# Patient Record
Sex: FEMALE | Race: BLACK OR AFRICAN AMERICAN | NOT HISPANIC OR LATINO | Employment: OTHER | URBAN - METROPOLITAN AREA
[De-identification: names, ages, dates, MRNs, and addresses within clinical notes are randomized per-mention and may not be internally consistent; named-entity substitution may affect disease eponyms.]

---

## 2017-01-05 ENCOUNTER — TELEPHONE (OUTPATIENT)
Dept: INTERNAL MEDICINE | Facility: CLINIC | Age: 72
End: 2017-01-05

## 2017-01-05 ENCOUNTER — OFFICE VISIT (OUTPATIENT)
Dept: INTERNAL MEDICINE | Facility: CLINIC | Age: 72
End: 2017-01-05
Payer: MEDICARE

## 2017-01-05 ENCOUNTER — TELEPHONE (OUTPATIENT)
Dept: OTOLARYNGOLOGY | Facility: CLINIC | Age: 72
End: 2017-01-05

## 2017-01-05 VITALS
HEART RATE: 102 BPM | HEIGHT: 64 IN | DIASTOLIC BLOOD PRESSURE: 94 MMHG | BODY MASS INDEX: 31.69 KG/M2 | WEIGHT: 185.63 LBS | SYSTOLIC BLOOD PRESSURE: 146 MMHG

## 2017-01-05 DIAGNOSIS — H92.01 RIGHT EAR PAIN: Primary | ICD-10-CM

## 2017-01-05 DIAGNOSIS — H61.23 BILATERAL IMPACTED CERUMEN: ICD-10-CM

## 2017-01-05 DIAGNOSIS — H60.311 DIFFUSE OTITIS EXTERNA, RIGHT EAR: ICD-10-CM

## 2017-01-05 PROCEDURE — 99999 PR PBB SHADOW E&M-EST. PATIENT-LVL IV: CPT | Mod: PBBFAC,,, | Performed by: PHYSICIAN ASSISTANT

## 2017-01-05 PROCEDURE — 99213 OFFICE O/P EST LOW 20 MIN: CPT | Mod: S$GLB,,, | Performed by: PHYSICIAN ASSISTANT

## 2017-01-05 PROCEDURE — 99214 OFFICE O/P EST MOD 30 MIN: CPT | Mod: PBBFAC | Performed by: PHYSICIAN ASSISTANT

## 2017-01-05 RX ORDER — ACETAMINOPHEN AND CODEINE PHOSPHATE 300; 30 MG/1; MG/1
1 TABLET ORAL NIGHTLY PRN
Qty: 14 TABLET | Refills: 0 | Status: SHIPPED | OUTPATIENT
Start: 2017-01-05 | End: 2017-01-15

## 2017-01-05 RX ORDER — AMOXICILLIN AND CLAVULANATE POTASSIUM 875; 125 MG/1; MG/1
1 TABLET, FILM COATED ORAL EVERY 12 HOURS
Qty: 14 TABLET | Refills: 0 | Status: SHIPPED | OUTPATIENT
Start: 2017-01-05 | End: 2017-01-24 | Stop reason: ALTCHOICE

## 2017-01-05 NOTE — PROGRESS NOTES
Subjective:       Patient ID: Lesli Gong is a 71 y.o. female.        Chief Complaint: Otalgia (R-pain=9)    HPI Comments: Lesli Gong is an established patient of Patti Brian MD here today for urgent care visit.    Seen 12/6 and 12/13 for right ear pain.  Had ENT appointment but missed it unfortunately.  With bilateral cerumen impaction and right otitis externa, which resolves with antibiotics (Augmentin, then Levaquin) but then recurs.  No discharge from the ear.  No headache, nausea, vomiting, diarrhea, constipation.  No other URI sx.  Flew to [place] to spend [place] with her daughter and right ear pain recurred.           Review of Systems   Constitutional: Negative for chills, diaphoresis, fatigue and fever.   HENT: Positive for ear pain. Negative for congestion and sore throat.    Eyes: Negative for visual disturbance.   Respiratory: Negative for cough, chest tightness and shortness of breath.    Cardiovascular: Negative for chest pain, palpitations and leg swelling.   Gastrointestinal: Negative for abdominal pain, blood in stool, constipation, diarrhea, nausea and vomiting.   Genitourinary: Negative for dysuria, frequency, hematuria and urgency.   Musculoskeletal: Negative for arthralgias and back pain.   Skin: Negative for rash.   Neurological: Negative for dizziness, syncope, weakness and headaches.   Psychiatric/Behavioral: Negative for dysphoric mood and sleep disturbance. The patient is not nervous/anxious.        Objective:      Physical Exam   Constitutional: She appears well-developed and well-nourished.   HENT:   Head: Normocephalic.   Right Ear: External ear normal. There is tenderness. No drainage or swelling.   Left Ear: External ear normal. No drainage, swelling or tenderness.   Mouth/Throat: Oropharynx is clear and moist.   Pain with palpation of the tragus.  Bilateral cerumen impaction.  Right ear almost completely impacted with cerumen.  Can see top of TM and it looks clear  "but due to cerumen difficult to visualize.     Eyes: Pupils are equal, round, and reactive to light.   Cardiovascular: Normal rate, regular rhythm and normal heart sounds.  Exam reveals no gallop and no friction rub.    No murmur heard.  Pulmonary/Chest: Effort normal and breath sounds normal. No respiratory distress.   Abdominal: Soft. Normal appearance. There is no tenderness.   Musculoskeletal: She exhibits no edema.   Neurological: She is alert.   Skin: Skin is warm and dry.   Psychiatric: She has a normal mood and affect.   Nursing note and vitals reviewed.      Assessment:       1. Right ear pain    2. Diffuse otitis externa, right ear    3. Bilateral impacted cerumen        Plan:       Lesli was seen today for otalgia.    Diagnoses and all orders for this visit:    Right ear pain/right diffuse otitis externa  -     Ambulatory consult to ENT  -     amoxicillin-clavulanate 875-125mg (AUGMENTIN) 875-125 mg per tablet; Take 1 tablet by mouth every 12 (twelve) hours.  -     acetaminophen-codeine 300-30mg (TYLENOL #3) 300-30 mg Tab; Take 1 tablet by mouth nightly as needed.    Bilateral impacted cerumen    Called ENT.  Soonest available appointment 1/17/17.  Importance of keeping this appointment discussed.      Pt has been given instructions populated from [employer] database and has verbalized understanding of the after visit summary and information contained wherein.    Follow up with a primary care provider. May go to ER for acute shortness of breath, lightheadedness, fever, or any other emergent complaints or changes in condition.    "This note will be shared with the patient"    Future Appointments  Date Time Provider Department Center   1/17/2017 1:30 PM Dave Mcgee MD [facility] ENT [facility]   1/23/2017 9:30 AM LAB, APPOINTMENT [facility] LAB [facility]   1/23/2017 12:00 PM [facility] CT2 64- LIMIT 600 LBS [facility] CT SCAN [facility]   1/24/2017 1:00 PM Shani Francisco MD [facility] HEM ONC [facility] "   3/6/2017 9:20 AM [facility]

## 2017-01-05 NOTE — TELEPHONE ENCOUNTER
----- Message from Brie Garcia sent at 1/5/2017 12:01 PM CST -----  Contact: Self/ [phone]   Type: Rx    Name of medication(s): acetaminophen (TYLENOL EXTRA STRENGTH) 500 MG tablet, and  levoFLOXacin (LEVAQUIN) 500 MG tablet    Is this a refill? New rx? Refill     Who prescribed medication? Dr. Johnson     Pharmacy Name, Phone, & Location[facility]     Comments: pt is calling to have a refill on the medications above. Please call and advise     Thank you

## 2017-01-05 NOTE — PATIENT INSTRUCTIONS
Impacted Earwax  Impacted earwax is a buildup of the natural wax in the ear (cerumen). Impacted earwax is very common. It can cause symptoms such as hearing loss. It can also stop a doctor doing an exam of your ear.  Understanding earwax  Tiny glands in your ear make substances that combine with dead skin cells to form earwax. Earwax helps protect your ear canal from water, dirt, infection, and injury. Over time, earwax travels from the inner part of your ear canal to the entrance of the canal. Then it falls away naturally. But in some cases, it cant travel to the entrance of the canal. This may be because of a health condition or objects put in the ear. With age, earwax tends to become harder and less fluid. Older adults are more likely to have problems with earwax buildup.  What causes impacted earwax?  Earwax can build up because of many health conditions. Some cause a physical blockage. Others cause too much earwax to be made. Health conditions that can cause earwax buildup include:  · Bony blockage in the ear (osteoma or exostoses)  · Infections, such as swimmers ear (external otitis)  · Skin disease, such as eczema  · Autoimmune diseases, such as lupus  · A narrowed ear canal from birth, chronic inflammation, or injury  · Too much earwax because of injury  · Too much earwax because of  water in the ear canal  Objects repeatedly placed in the ear can also cause impacted earwax. For example, putting cotton swabs in the ear may push the wax deeper into the ear. Over time, this may cause blockage. Hearing aids, swimming plugs, and swim molds can cause the same problem when used again and again.  In some cases, the cause of impacted earwax is not known.  Symptoms of impacted earwax  Excess earwax usually does not cause any symptoms, unless there is a large amount of buildup. Then it may cause symptoms such as:  · Hearing loss  · Earache  · Sense of ear fullness  · Itching in the ear  · Dizziness  · Ringing in  the ears  · Cough  Treatment for impacted earwax  If you dont have symptoms, you may not need treatment. Often the earwax goes away on its own with time. If you have symptoms, you may have 1 or more treatments such as:  · Ear drops. These help to soften the earwax. This helps it leave the ear over time.  · Rinsing (irrigation) of the ear canal with water. This is done in a doctors office.  · Removal of the earwax with small tools. This is also done in a doctors office.  In rare cases, some treatments for earwax removal may cause complications such as:  · Swimmers ear (otitis external)  · Earache  · Short-term hearing loss  · Dizziness  · Water trapped in the ear canal  · Hole in the eardrum  · Ringing in the ears  · Bleeding from the ear  Talk with your health care provider about which risks apply most to you.  Dont use these at home  Health care providers do not advise use of ear candles or ear vacuum kits. These methods are not shown to work.   Preventing impacted earwax  You may not be able to prevent impacted earwax if you have a health condition that causes it, such as eczema. In other cases, you may be able to prevent earwax buildup by:  · Using ear drops once a week  · Having routine cleaning of the ear about every 6 months  · Not using cotton swabs in the ear  When to call the health care provider  Call your health care provider right away if you have severe symptoms after earwax removal. These may include bleeding or severe ear pain.      © 4597-7930 [employer]. [address] [place] [address], [place] [address]. All rights reserved. This information is not intended as a substitute for professional medical care. Always follow your healthcare professional's instructions.        External Ear Infection (Adult)    External otitis (also called swimmers ear) is an infection in the ear canal. It is often caused by bacteria or fungus. It can occur a few days after water gets trapped in the ear canal  (from swimming or bathing). It can also occur after cleaning too deeply in the ear canal with a cotton swab or other object. Sometimes, hair care products get into the ear canal and cause this problem.  Symptoms can include pain, fever, itching, redness, drainage, or swelling of the ear canal. Temporary hearing loss may also occur.  Home care  · Do not try to clean the ear canal. This can push pus and bacteria deeper into the canal.  · Use prescribed ear drops as directed. These help reduce swelling and fight the infection. If an ear wick was placed in the ear canal, apply drops right onto the end of the wick. The wick will draw the medication into the ear canal even if it is swollen closed.  · A cotton ball may be loosely placed in the outer ear to absorb any drainage.  · You may use acetaminophen or ibuprofen to control pain, unless another medication was prescribed. Note: If you have chronic liver or kidney disease or ever had a stomach ulcer or GI bleeding, talk to your health care provider before taking any of these medications.  · Do not allow water to get into your ear when bathing. Also, avoid swimming until the infection has cleared.  Prevention  · Keep your ears dry. This helps lower the risk of infection. Dry your ears with a towel or hair dryer after getting wet. Also, use ear plugs when swimming.  · Do not stick any objects in the ear to remove wax.  · If you feel water trapped in your ear, use ear drops right away. You can get these drops over the counter at most drugstores. They work by removing water from the ear canal.  Follow-up care  Follow up with your health care provider in one week, or as advised.  When to seek medical advice  Call your health care provider right away if any of these occur:  · Ear pain becomes worse or doesnt improve after 3 days of treatment  · Redness or swelling of the outer ear occurs or gets worse  · Headache  · Painful or stiff neck  · Drowsiness or confusion  · Fever  of 100.4ºF (38ºC) or higher, or as directed by your health care provider  · Seizure  © 1045-7092 [employer]. [address] [place] [address], [place] [address]. All rights reserved. This information is not intended as a substitute for professional medical care. Always follow your healthcare professional's instructions.

## 2017-01-05 NOTE — TELEPHONE ENCOUNTER
----- Message from Arie Inman sent at 1/5/2017 12:31 PM CST -----  Contact: Self [phone]  Type: Rx    Name of medication(s): levoFLOXacin (LEVAQUIN) 500 MG tablet, Tylenol #3-300-30 MG    Is this a refill? New rx? Refill    Who prescribed medication? Dr Brian    Pharmacy Name, Phone, & Location:[facility] [address]    Comments:Please advice    Thanks

## 2017-01-05 NOTE — TELEPHONE ENCOUNTER
Left message for patient to call office.  Per Dr. Brian's notes need to find out why patient is requesting Levofoxacin and if she is sick will need to schedule an UC appt to be seen.

## 2017-01-05 NOTE — TELEPHONE ENCOUNTER
What condition is she asking for these meds for? Levofloxacin is an antiobiotic - needs to be evaluated if sick.

## 2017-01-05 NOTE — MR AVS SNAPSHOT
[facility]  [address]  [place] [address]  Phone: [phone]  Fax: [phone]                  Lesli Gong   2017 3:00 PM   Office Visit    Description:  Female : 1945   Provider:  Nisa Lanza PA-C   Department:  [facility]           Reason for Visit     Otalgia           Diagnoses this Visit        Comments    Right ear pain    -  Primary     Diffuse otitis externa, right ear         Bilateral impacted cerumen                To Do List           Future Appointments        Provider Department Dept Phone    2017 1:30 PM Dave Mcgee MD [facility] - Otorhinolaryngology [phone]    2017 9:30 AM LAB, APPOINTMENT [place] [facility] [phone]    2017 12:00 PM [facility] CT2 64- LIMIT 600 LBS [facility] [phone]    2017 1:00 PM MD [facility] - Hematology Oncology [phone]    3/6/2017 9:20 AM Patti Brian MD [facility] - Internal Medicine [phone]      Goals (5 Years of Data)     None       These Medications        Disp Refills Start End    amoxicillin-clavulanate 875-125mg (AUGMENTIN) 875-125 mg per tablet 14 tablet 0 2017     Take 1 tablet by mouth every 12 (twelve) hours. - Oral    Pharmacy: [facility] [address] [place] [address] [address] [place] [address] Ph #: [phone]       acetaminophen-codeine 300-30mg (TYLENOL #3) 300-30 mg Tab 14 tablet 0 2017 1/15/2017    Take 1 tablet by mouth nightly as needed. - Oral    Pharmacy: [facility] [address] [place] [address] [address] [facility] Ph #: [phone]         [facility] On Call     [facility] On Call Nurse Care Line -  Assistance  Registered nurses in the [facility] On Call Center provide clinical advisement, health education, appointment booking, and other advisory services.  Call for this free service at [phone].             Medications           Message regarding Medications   "   Verify the changes and/or additions to your medication regime listed below are the same as discussed with your clinician today.  If any of these changes or additions are incorrect, please notify your healthcare provider.        START taking these NEW medications        Refills    amoxicillin-clavulanate 875-125mg (AUGMENTIN) 875-125 mg per tablet 0    Sig: Take 1 tablet by mouth every 12 (twelve) hours.    Class: Normal    Route: Oral    acetaminophen-codeine 300-30mg (TYLENOL #3) 300-30 mg Tab 0    Sig: Take 1 tablet by mouth nightly as needed.    Class: Print    Route: Oral      STOP taking these medications     acetaminophen (TYLENOL EXTRA STRENGTH) 500 MG tablet Take 500 mg by mouth every 6 (six) hours as needed for Pain.           Verify that the below list of medications is an accurate representation of the medications you are currently taking.  If none reported, the list may be blank. If incorrect, please contact your healthcare provider. Carry this list with you in case of emergency.           Current Medications     acetaminophen-codeine 300-30mg (TYLENOL #3) 300-30 mg Tab Take 1 tablet by mouth nightly as needed.    amoxicillin-clavulanate 875-125mg (AUGMENTIN) 875-125 mg per tablet Take 1 tablet by mouth every 12 (twelve) hours.    calcium-vitamin D (OSCAL) 250 (625)-125 mg-unit per tablet Take 1 tablet by mouth once daily.    cyanocobalamin, vitamin B-12, (VITAMIN B-12) 50 mcg tablet Take 50 mcg by mouth once daily.    lisinopril 10 MG tablet Take 10 mg by mouth 2 (two) times daily.    lorazepam (ATIVAN) 1 MG tablet TK 1 T PO UP TO TID    multivitamin capsule Take 1 capsule by mouth once daily.           Clinical Reference Information           Vital Signs - Last Recorded  Most recent update: 1/5/2017  2:59 PM by Lona Pitt MA    BP Pulse Ht Wt BMI    (!) 146/94 (BP Location: Left arm, Patient Position: Sitting, BP Method: Manual) 102 5' 4" (1.626 m) 84.2 kg (185 lb 10 oz) 31.86 kg/m2      Blood " Pressure          Most Recent Value    BP  (!)  146/94      Allergies as of 1/5/2017     Anaprox [Naproxen Sodium]    Motrin [Ibuprofen]    Neomycin-polymyxin-hc    Sulfa (Sulfonamide Antibiotics)      Immunizations Administered on Date of Encounter - 1/5/2017     None      Orders Placed During Today's Visit      Normal Orders This Visit    Ambulatory consult to ENT       [facility] Sign-Up     Activating your [facility] account is as easy as 1-2-3!     1) Visit [email or web address], select Sign Up Now, enter this activation code and your date of birth, then select Next.  [email or web address]  Expires: 1/20/2017  2:45 PM      2) Create a username and password to use when you visit [facility] in the future and select a security question in case you lose your password and select Next.    3) Enter your e-mail address and click Sign Up!    Additional Information  If you have questions, please e-mail [email or web address] or call [phone] to talk to our [facility] staff. Remember, [facility] is NOT to be used for urgent needs. For medical emergencies, dial 911.         Instructions      Impacted Earwax  Impacted earwax is a buildup of the natural wax in the ear (cerumen). Impacted earwax is very common. It can cause symptoms such as hearing loss. It can also stop a doctor doing an exam of your ear.  Understanding earwax  Tiny glands in your ear make substances that combine with dead skin cells to form earwax. Earwax helps protect your ear canal from water, dirt, infection, and injury. Over time, earwax travels from the inner part of your ear canal to the entrance of the canal. Then it falls away naturally. But in some cases, it cant travel to the entrance of the canal. This may be because of a health condition or objects put in the ear. With age, earwax tends to become harder and less fluid. Older adults are more likely to have problems with earwax buildup.  What causes impacted earwax?  Earwax can build up because of many health  conditions. Some cause a physical blockage. Others cause too much earwax to be made. Health conditions that can cause earwax buildup include:  · Bony blockage in the ear (osteoma or exostoses)  · Infections, such as swimmers ear (external otitis)  · Skin disease, such as eczema  · Autoimmune diseases, such as lupus  · A narrowed ear canal from birth, chronic inflammation, or injury  · Too much earwax because of injury  · Too much earwax because of  water in the ear canal  Objects repeatedly placed in the ear can also cause impacted earwax. For example, putting cotton swabs in the ear may push the wax deeper into the ear. Over time, this may cause blockage. Hearing aids, swimming plugs, and swim molds can cause the same problem when used again and again.  In some cases, the cause of impacted earwax is not known.  Symptoms of impacted earwax  Excess earwax usually does not cause any symptoms, unless there is a large amount of buildup. Then it may cause symptoms such as:  · Hearing loss  · Earache  · Sense of ear fullness  · Itching in the ear  · Dizziness  · Ringing in the ears  · Cough  Treatment for impacted earwax  If you dont have symptoms, you may not need treatment. Often the earwax goes away on its own with time. If you have symptoms, you may have 1 or more treatments such as:  · Ear drops. These help to soften the earwax. This helps it leave the ear over time.  · Rinsing (irrigation) of the ear canal with water. This is done in a doctors office.  · Removal of the earwax with small tools. This is also done in a doctors office.  In rare cases, some treatments for earwax removal may cause complications such as:  · Swimmers ear (otitis external)  · Earache  · Short-term hearing loss  · Dizziness  · Water trapped in the ear canal  · Hole in the eardrum  · Ringing in the ears  · Bleeding from the ear  Talk with your health care provider about which risks apply most to you.  Dont use these at home  Health care  providers do not advise use of ear candles or ear vacuum kits. These methods are not shown to work.   Preventing impacted earwax  You may not be able to prevent impacted earwax if you have a health condition that causes it, such as eczema. In other cases, you may be able to prevent earwax buildup by:  · Using ear drops once a week  · Having routine cleaning of the ear about every 6 months  · Not using cotton swabs in the ear  When to call the health care provider  Call your health care provider right away if you have severe symptoms after earwax removal. These may include bleeding or severe ear pain.      © 2586-4919 [employer]. [address] [place] [address], [place] [address]. All rights reserved. This information is not intended as a substitute for professional medical care. Always follow your healthcare professional's instructions.        External Ear Infection (Adult)    External otitis (also called swimmers ear) is an infection in the ear canal. It is often caused by bacteria or fungus. It can occur a few days after water gets trapped in the ear canal (from swimming or bathing). It can also occur after cleaning too deeply in the ear canal with a cotton swab or other object. Sometimes, hair care products get into the ear canal and cause this problem.  Symptoms can include pain, fever, itching, redness, drainage, or swelling of the ear canal. Temporary hearing loss may also occur.  Home care  · Do not try to clean the ear canal. This can push pus and bacteria deeper into the canal.  · Use prescribed ear drops as directed. These help reduce swelling and fight the infection. If an ear wick was placed in the ear canal, apply drops right onto the end of the wick. The wick will draw the medication into the ear canal even if it is swollen closed.  · A cotton ball may be loosely placed in the outer ear to absorb any drainage.  · You may use acetaminophen or ibuprofen to control pain, unless another medication  was prescribed. Note: If you have chronic liver or kidney disease or ever had a stomach ulcer or GI bleeding, talk to your health care provider before taking any of these medications.  · Do not allow water to get into your ear when bathing. Also, avoid swimming until the infection has cleared.  Prevention  · Keep your ears dry. This helps lower the risk of infection. Dry your ears with a towel or hair dryer after getting wet. Also, use ear plugs when swimming.  · Do not stick any objects in the ear to remove wax.  · If you feel water trapped in your ear, use ear drops right away. You can get these drops over the counter at most drugstores. They work by removing water from the ear canal.  Follow-up care  Follow up with your health care provider in one week, or as advised.  When to seek medical advice  Call your health care provider right away if any of these occur:  · Ear pain becomes worse or doesnt improve after 3 days of treatment  · Redness or swelling of the outer ear occurs or gets worse  · Headache  · Painful or stiff neck  · Drowsiness or confusion  · Fever of 100.4ºF (38ºC) or higher, or as directed by your health care provider  · Seizure  © 5689-5918 [employer]. [address] [place] [address], [place] [address]. All rights reserved. This information is not intended as a substitute for professional medical care. Always follow your healthcare professional's instructions.

## 2017-01-17 ENCOUNTER — INITIAL CONSULT (OUTPATIENT)
Dept: OTOLARYNGOLOGY | Facility: CLINIC | Age: 72
End: 2017-01-17
Payer: MEDICARE

## 2017-01-17 VITALS
BODY MASS INDEX: 31.79 KG/M2 | SYSTOLIC BLOOD PRESSURE: 131 MMHG | DIASTOLIC BLOOD PRESSURE: 92 MMHG | WEIGHT: 185.19 LBS

## 2017-01-17 DIAGNOSIS — H93.8X3 SENSATION OF FULLNESS IN BOTH EARS: ICD-10-CM

## 2017-01-17 DIAGNOSIS — H61.23 IMPACTED CERUMEN OF BOTH EARS: ICD-10-CM

## 2017-01-17 DIAGNOSIS — H60.331 ACUTE SWIMMER'S EAR OF RIGHT SIDE: Primary | ICD-10-CM

## 2017-01-17 PROCEDURE — 99999 PR PBB SHADOW E&M-EST. PATIENT-LVL III: CPT | Mod: PBBFAC,,, | Performed by: OTOLARYNGOLOGY

## 2017-01-17 PROCEDURE — 1160F RVW MEDS BY RX/DR IN RCRD: CPT | Mod: S$GLB,,, | Performed by: OTOLARYNGOLOGY

## 2017-01-17 PROCEDURE — 1157F ADVNC CARE PLAN IN RCRD: CPT | Mod: S$GLB,,, | Performed by: OTOLARYNGOLOGY

## 2017-01-17 PROCEDURE — 69210 REMOVE IMPACTED EAR WAX UNI: CPT | Mod: S$GLB,,, | Performed by: OTOLARYNGOLOGY

## 2017-01-17 PROCEDURE — 99213 OFFICE O/P EST LOW 20 MIN: CPT | Mod: 25,S$GLB,, | Performed by: OTOLARYNGOLOGY

## 2017-01-17 PROCEDURE — 1125F AMNT PAIN NOTED PAIN PRSNT: CPT | Mod: S$GLB,,, | Performed by: OTOLARYNGOLOGY

## 2017-01-17 PROCEDURE — 1159F MED LIST DOCD IN RCRD: CPT | Mod: S$GLB,,, | Performed by: OTOLARYNGOLOGY

## 2017-01-17 PROCEDURE — 3075F SYST BP GE 130 - 139MM HG: CPT | Mod: S$GLB,,, | Performed by: OTOLARYNGOLOGY

## 2017-01-17 PROCEDURE — 3080F DIAST BP >= 90 MM HG: CPT | Mod: S$GLB,,, | Performed by: OTOLARYNGOLOGY

## 2017-01-17 RX ORDER — CIPROFLOXACIN AND DEXAMETHASONE 3; 1 MG/ML; MG/ML
4 SUSPENSION/ DROPS AURICULAR (OTIC) 2 TIMES DAILY
Qty: 7.5 ML | Refills: 3 | Status: SHIPPED | OUTPATIENT
Start: 2017-01-17 | End: 2017-01-27

## 2017-01-17 NOTE — PROGRESS NOTES
Subjective:       Patient ID: Lesli Gong is a 71 y.o. female.    Chief Complaint: Ear Fullness    Ear Fullness    There is pain in both ears. This is a recurrent problem. The current episode started 1 to 4 weeks ago. The problem occurs constantly. The problem has been unchanged. There has been no fever. The patient is experiencing no pain. Associated symptoms include hearing loss. Pertinent negatives include no coughing, diarrhea, ear discharge, headaches, neck pain, rash, rhinorrhea or vomiting. She has tried nothing for the symptoms. Her past medical history is significant for hearing loss. There is no history of a chronic ear infection or a tympanostomy tube.     Review of Systems   Constitutional: Negative for activity change, appetite change and fever.   HENT: Positive for hearing loss. Negative for congestion, ear discharge, ear pain, nosebleeds, postnasal drip, rhinorrhea and sneezing.         Recurrent bilateral ear fullness for about a month and similar to symptoms that she presented with back in June of 2016 to this clinic.   Eyes: Negative for redness and visual disturbance.   Respiratory: Negative for apnea, cough, shortness of breath and wheezing.    Cardiovascular: Negative for chest pain and palpitations.   Gastrointestinal: Negative for diarrhea and vomiting.   Genitourinary: Negative for difficulty urinating and frequency.   Musculoskeletal: Negative for arthralgias, back pain, gait problem and neck pain.   Skin: Negative for color change and rash.   Neurological: Negative for dizziness, speech difficulty, weakness and headaches.   Hematological: Negative for adenopathy. Does not bruise/bleed easily.   Psychiatric/Behavioral: Negative for agitation and behavioral problems.       Objective:        Constitutional:   Vital signs are normal. She appears well-developed and well-nourished. She is active. Normal speech.      Head:  Normocephalic and atraumatic. Salivary glands normal.  Facial strength  is normal.      Ears:    Right Ear: Decreased hearing is noted.   Left Ear: Decreased hearing is noted.       PROCEDURE NOTE:  Ceruminosis is noted in both EACs.  Wax was removed by manual debridement and suctioning utilizing the assistance of binocular microscopy revealing EACs and TMs WNL. The patient tolerated this procedure without difficulty. The subjective decrease noted in hearing pre-cleaning was resolved post-cleaning.        Nose:  Nose normal including turbinates, nasal mucosa, sinuses and nasal septum.     Mouth/Throat  Oropharynx clear and moist without lesions or asymmetry and normal uvula midline.     Neck:  Neck normal without thyromegaly masses, asymmetry, normal tracheal structure, crepitus, and tenderness, thyroid normal, trachea normal, phonation normal and full range of motion with neck supple.     Psychiatric:   She has a normal mood and affect. Her speech is normal and behavior is normal.     Neurological:   She has neurological normal, alert and oriented.     Skin:   No abrasions, lacerations, lesions, or rashes.       Assessment:       1. Sensation of fullness in both ears    2. Impacted cerumen of both ears        Plan:       1. Hearing conservation strongly recommended.  2. Trial of amplification bilaterally also recommended.  3. Re-check of hearing in 18-24 months or sooner if subjective change noted.  4. F/U with PCP as per schedule.

## 2017-01-17 NOTE — MR AVS SNAPSHOT
[facility] - Otorhinolaryngology  [address]  [facility] [address]  Phone: [phone]  Fax: [phone]                  Lesli Gong   2017 1:30 PM   Initial consult    Description:  Female : 1945   Provider:  Dave Mcgee MD   Department:  [facility] - Otorhinolaryngology           Reason for Visit     Ear Fullness           Diagnoses this Visit        Comments    Sensation of fullness in both ears         Impacted cerumen of both ears                To Do List           Future Appointments        Provider Department Dept Phone    2017 1:30 PM Dave Mcgee MD [facility] Otorhinolaryngology [phone]    2017 9:30 AM LAB, APPOINTMENT [place] [facility] [phone]    2017 12:00 PM [facility] CT2 64- LIMIT 600 LBS [facility] [phone]    2017 1:00 PM Shani Francisco MD [place] - Hematology Oncology [phone]    3/6/2017 9:20 AM Patti Brian MD [facility] - Internal Medicine [phone]      Goals (5 Years of Data)     None      Follow-Up and Disposition     Return if symptoms worsen or fail to improve.      [facility]     [facility] Care [facility] -  Assistance  Registered nurses in the [facility] provide clinical advisement, health education, appointment booking, and other advisory services.  Call for this free service at [phone].             Medications           Message regarding Medications     Verify the changes and/or additions to your medication regime listed below are the same as discussed with your clinician today.  If any of these changes or additions are incorrect, please notify your healthcare provider.             Verify that the below list of medications is an accurate representation of the medications you are currently taking.  If none reported, the list may be blank. If incorrect, please contact your healthcare provider. Carry this list with you in  case of emergency.           Current Medications     amoxicillin-clavulanate 875-125mg (AUGMENTIN) 875-125 mg per tablet Take 1 tablet by mouth every 12 (twelve) hours.    calcium-vitamin D (OSCAL) 250 (625)-125 mg-unit per tablet Take 1 tablet by mouth once daily.    cyanocobalamin, vitamin B-12, (VITAMIN B-12) 50 mcg tablet Take 50 mcg by mouth once daily.    lisinopril 10 MG tablet Take 10 mg by mouth 2 (two) times daily.    lorazepam (ATIVAN) 1 MG tablet TK 1 T PO UP TO TID    multivitamin capsule Take 1 capsule by mouth once daily.           Clinical Reference Information           Vital Signs - Last Recorded  Most recent update: 1/17/2017  1:11 PM by Sandi Wylie MA    BP Wt BMI          (!) 131/92 (BP Location: Right arm, Patient Position: Sitting, BP Method: Automatic) 84 kg (185 lb 3 oz) 31.79 kg/m2        Blood Pressure          Most Recent Value    BP  (!)  131/92      Allergies as of 1/17/2017     Anaprox [Naproxen Sodium]    Motrin [Ibuprofen]    Neomycin-polymyxin-hc    Sulfa (Sulfonamide Antibiotics)      Immunizations Administered on Date of Encounter - 1/17/2017     None      [facility] Sign-Up     Activating your [facility] account is as easy as 1-2-3!     1) Visit [email or web address], select Sign Up Now, enter this activation code and your date of birth, then select Next.  [email or web address]  Expires: 1/20/2017  2:45 PM      2) Create a username and password to use when you visit [facility] in the future and select a security question in case you lose your password and select Next.    3) Enter your e-mail address and click Sign Up!    Additional Information  If you have questions, please e-mail [email or web address] or call [phone] to talk to our [facility] staff. Remember, [facility] is NOT to be used for urgent needs. For medical emergencies, dial 911.

## 2017-01-23 ENCOUNTER — TELEPHONE (OUTPATIENT)
Dept: HEMATOLOGY/ONCOLOGY | Facility: CLINIC | Age: 72
End: 2017-01-23

## 2017-01-23 ENCOUNTER — HOSPITAL ENCOUNTER (OUTPATIENT)
Dept: RADIOLOGY | Facility: HOSPITAL | Age: 72
Discharge: HOME | End: 2017-01-23
Attending: INTERNAL MEDICINE
Payer: MEDICARE

## 2017-01-23 DIAGNOSIS — C49.A3 GIST (GASTROINTESTINAL STROMAL TUMOR) OF SMALL BOWEL, MALIGNANT: ICD-10-CM

## 2017-01-23 PROCEDURE — 25500020 PHARM REV CODE 255: Performed by: INTERNAL MEDICINE

## 2017-01-23 PROCEDURE — 74177 CT ABD & PELVIS W/CONTRAST: CPT | Mod: 26,,, | Performed by: RADIOLOGY

## 2017-01-23 PROCEDURE — 74177 CT ABD & PELVIS W/CONTRAST: CPT | Mod: TC

## 2017-01-23 PROCEDURE — 71260 CT THORAX DX C+: CPT | Mod: TC

## 2017-01-23 PROCEDURE — 71260 CT THORAX DX C+: CPT | Mod: 26,,, | Performed by: RADIOLOGY

## 2017-01-23 RX ADMIN — IOHEXOL 15 ML: 350 INJECTION, SOLUTION INTRAVENOUS at 11:01

## 2017-01-23 RX ADMIN — IOHEXOL 15 ML: 350 INJECTION, SOLUTION INTRAVENOUS at 10:01

## 2017-01-23 RX ADMIN — IOHEXOL 100 ML: 350 INJECTION, SOLUTION INTRAVENOUS at 02:01

## 2017-01-23 NOTE — TELEPHONE ENCOUNTER
----- Message from Brianna Shields sent at 1/23/2017  9:29 AM CST -----  Contact: self  Pt states she has pain around th shoulder were cancerous tumor was removed, pt is requesting a visit today if available, pt is scheduled tomorrow, pt states if she can't be seen today can  can call in a pain medication to [facility].

## 2017-01-23 NOTE — TELEPHONE ENCOUNTER
Returned call to patient, who is calling with L shoulder pain x 3 days. Patient is taking nothing OTC, and she is requesting something to be prescribed. Patient denies fall.  Patient understands she is not on active treatment, and she may have to get pain medication/xray from her PCP, (although nurse would speak with dr renteria).    Patient states she will call back after her CTscan today to see what dr renteria decided. Patient scheduled to see dr renteria tomorrow.    Message routed to dr renteria

## 2017-01-23 NOTE — TELEPHONE ENCOUNTER
Informed patient to take tylenol today---and based on her CTscan results, maybe prescription pain meds will be prescribed tomorrow (at her visit).

## 2017-01-23 NOTE — TELEPHONE ENCOUNTER
----- Message from Eva Youngblood sent at 1/23/2017  9:53 AM CST -----  Contact: self  Pt would like medication for pain.  She currently doesn't have anything.    Contact number [phone]

## 2017-01-23 NOTE — TELEPHONE ENCOUNTER
----- Message from Blessing Randolph sent at 1/23/2017 11:59 AM CST -----  Contact: Pt  Pt is calling to speak with nurse in regards to pain medicine for shoulder.  Pt can be reached at [phone].    Thank you

## 2017-01-23 NOTE — TELEPHONE ENCOUNTER
We will look at the CT scan and decide if she needs prescription pain meds  She can take tylenol in the meantime

## 2017-01-24 ENCOUNTER — OFFICE VISIT (OUTPATIENT)
Dept: HEMATOLOGY/ONCOLOGY | Facility: CLINIC | Age: 72
End: 2017-01-24
Payer: MEDICARE

## 2017-01-24 VITALS
SYSTOLIC BLOOD PRESSURE: 119 MMHG | RESPIRATION RATE: 19 BRPM | BODY MASS INDEX: 31.81 KG/M2 | HEIGHT: 64 IN | HEART RATE: 107 BPM | OXYGEN SATURATION: 94 % | DIASTOLIC BLOOD PRESSURE: 79 MMHG | WEIGHT: 186.31 LBS | TEMPERATURE: 98 F

## 2017-01-24 DIAGNOSIS — C49.A3 GIST (GASTROINTESTINAL STROMAL TUMOR) OF SMALL BOWEL, MALIGNANT: Primary | ICD-10-CM

## 2017-01-24 DIAGNOSIS — G89.29 CHRONIC LEFT SHOULDER PAIN: ICD-10-CM

## 2017-01-24 DIAGNOSIS — M25.512 CHRONIC LEFT SHOULDER PAIN: ICD-10-CM

## 2017-01-24 DIAGNOSIS — G89.29 OTHER CHRONIC PAIN: ICD-10-CM

## 2017-01-24 PROCEDURE — 1159F MED LIST DOCD IN RCRD: CPT | Mod: S$GLB,,, | Performed by: INTERNAL MEDICINE

## 2017-01-24 PROCEDURE — 1125F AMNT PAIN NOTED PAIN PRSNT: CPT | Mod: S$GLB,,, | Performed by: INTERNAL MEDICINE

## 2017-01-24 PROCEDURE — 1160F RVW MEDS BY RX/DR IN RCRD: CPT | Mod: S$GLB,,, | Performed by: INTERNAL MEDICINE

## 2017-01-24 PROCEDURE — 1157F ADVNC CARE PLAN IN RCRD: CPT | Mod: S$GLB,,, | Performed by: INTERNAL MEDICINE

## 2017-01-24 PROCEDURE — 99999 PR PBB SHADOW E&M-EST. PATIENT-LVL IV: CPT | Mod: PBBFAC,,, | Performed by: INTERNAL MEDICINE

## 2017-01-24 PROCEDURE — 3078F DIAST BP <80 MM HG: CPT | Mod: S$GLB,,, | Performed by: INTERNAL MEDICINE

## 2017-01-24 PROCEDURE — 3074F SYST BP LT 130 MM HG: CPT | Mod: S$GLB,,, | Performed by: INTERNAL MEDICINE

## 2017-01-24 PROCEDURE — 99214 OFFICE O/P EST MOD 30 MIN: CPT | Mod: S$GLB,,, | Performed by: INTERNAL MEDICINE

## 2017-01-24 RX ORDER — ACETAMINOPHEN AND CODEINE PHOSPHATE 300; 30 MG/1; MG/1
1 TABLET ORAL 2 TIMES DAILY PRN
Qty: 30 TABLET | Refills: 0 | Status: SHIPPED | OUTPATIENT
Start: 2017-01-24 | End: 2017-02-01 | Stop reason: SDUPTHER

## 2017-01-24 NOTE — Clinical Note
RTC 6 months to see Dr. Francisco with CBC, CMP, CT chest/abdmone/pelvis.  Please set up --Amb referral to pain management.

## 2017-01-24 NOTE — PROGRESS NOTES
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 Gong is a 69-year-old female with a diagnosis of type 1 neurofibromatosis, who was referred to see Dr. Camilo for evaluation of an abdominal mass found. 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 Final pathology from that revealed neurofibroma with atypia and invasion into the skeletal muscle rather than liposarcoma. She underwent a PET scan, which revealed a diffuse low-level activity as a surgical defect and a focus of high-level activity in or near a loop of the small bowel, probably   jejunum in the left upper quadrant. She was recommended to undergo CT scan for further evaluation of the bowel lesion, which she did on 05/12/2015 and that revealed a soft tissue mass in the left upper quadrant abutting a loop of the proximal jejunum, and she was referred to undergo surgery. She underwent exploratory laparotomy, lysis of adhesions and excision of two approximately 5   cm masses as well as small bowel resection with primary repair on 05/25/2015. Pathology revealed gastrointestinal stromal tumor 345, size range from 1 to 3.5 cm involving the small bowel. GIST subtype is mixed 1 mitotic rate per 50 high power fields. Necrosis is present in 30%, low-grade tumor, Ki67 is less than 1% in the tumor cells showing nuclear staining.  She did not want to do Gleevac. She is on surveillance.     4/2016-CT scans which reveal no evidence of recurrence.    1/23/17 CT scans "1. In this patient with history of gist tumor status post small bowel resection, no evidence of local recurrence or metastatic disease is " seen. 2.  0.5 cm pulmonary nodule in the right upper lobe.  Continued surveillance is recommended. 3.  Surgical clips in the right perinephric space, compatible with prior pheochromocytoma resection. 4.  0.6 cm calculus in the urinary bladder adjacent to the right ureterovesicular junction, unchanged and likely representing a nonobstructing bladder calculus. 5.  2.4 cm area of skin thickening in the right lower quadrant, nonspecific.  Correlation with physical exam findings is recommended.    Interval History: Ms. Gong returns for follow up and CT results as above. Patient c/o left shoulder pain after a tight hug from a large man in [place]. She has been caring for her sister which she lifts. She denies any nausea, vomiting, diarrhea, constipation, abdominal pain, weight loss or loss of appetite, chest pain, shortness of breath, leg swelling, fatigue,  headache, dizziness, or mood changes. She is alone.   NOTE: Patient saw  Dr. Grayson 9/23/16 for left shoulder pain in the past and they referred her to pain management.     ECOG Performance Status:   ECOG SCORE    0 - Fully active-able to carry on all pre-disease performance without restriction          PMFSH: all information reviewed and updated as relevant to today's visit    Review of Systems:   Review of Systems   Constitutional: Negative for activity change, appetite change, fatigue and fever.   HENT: Negative for mouth sores, nosebleeds and sore throat.    Eyes: Negative for visual disturbance.   Respiratory: Negative for cough and shortness of breath.    Cardiovascular: Negative for chest pain, palpitations and leg swelling.   Gastrointestinal: Negative for abdominal pain, constipation, diarrhea, nausea and vomiting.   Genitourinary: Negative for difficulty urinating and frequency.   Musculoskeletal: Negative for arthralgias and back pain.        Left shoulder pain   Skin: Negative for rash.   Neurological: Negative for dizziness, numbness and headaches.  "  Hematological: Negative for adenopathy. Does not bruise/bleed easily.   Psychiatric/Behavioral: Negative for confusion and sleep disturbance. The patient is not nervous/anxious.    All other systems reviewed and are negative.        Objective:      Vitals:   Vitals:    01/24/17 1312   BP: 119/79   Pulse: 107   Resp: 19   Temp: 98.2 °F (36.8 °C)   TempSrc: Oral   SpO2: (!) 94%   Weight: 84.5 kg (186 lb 4.6 oz)   Height: 5' 4" (1.626 m)     BMI: Body mass index is 31.98 kg/(m^2).      Physical Exam:   Physical Exam   Constitutional: She is oriented to person, place, and time. She appears well-developed and well-nourished. No distress.   HENT:   Head: Normocephalic.   Right Ear: External ear normal.   Left Ear: External ear normal.   Mouth/Throat: No oropharyngeal exudate.   No sinus tenderness.   Eyes: Conjunctivae and lids are normal. Pupils are equal, round, and reactive to light. No scleral icterus.   Neck: Trachea normal and normal range of motion. Neck supple. No thyromegaly present.   Cardiovascular: Normal rate, regular rhythm and normal heart sounds.    Pulmonary/Chest: Effort normal and breath sounds normal.   Abdominal: Soft. Normal appearance and bowel sounds are normal. She exhibits no distension and no mass. There is no tenderness.   Musculoskeletal:   ROM decreased to left shoulder due to pain   Lymphadenopathy:        Head (right side): No submental and no submandibular adenopathy present.        Head (left side): No submental and no submandibular adenopathy present.     She has no cervical adenopathy.   Neurological: She is alert and oriented to person, place, and time. She has normal strength and normal reflexes. No cranial nerve deficit. Gait normal.   Skin: Skin is warm, dry and intact. No bruising and no rash noted. No cyanosis. Nails show no clubbing.   Psychiatric: She has a normal mood and affect. Her speech is normal and behavior is normal.   Nursing note and vitals " reviewed.        Laboratory Data:  WBC   Date Value Ref Range Status   01/23/2017 6.27 3.90 - 12.70 K/uL Final     Hemoglobin   Date Value Ref Range Status   01/23/2017 15.2 12.0 - 16.0 g/dL Final     Hematocrit   Date Value Ref Range Status   01/23/2017 44.7 37.0 - 48.5 % Final     Platelets   Date Value Ref Range Status   01/23/2017 190 150 - 350 K/uL Final     Gran #   Date Value Ref Range Status   01/23/2017 3.4 1.8 - 7.7 K/uL Final     Comment:     The ANC is based on a white cell differential from an   automated cell counter. It has not been microscopically   reviewed for the presence of abnormal cells. Clinical   correlation is required.       Gran%   Date Value Ref Range Status   09/12/2016 67.9 38.0 - 73.0 % Final       Chemistry        Component Value Date/Time     01/23/2017 0846    K 4.2 01/23/2017 0846     01/23/2017 0846    CO2 27 01/23/2017 0846    BUN 13 01/23/2017 0846    CREATININE 0.8 01/23/2017 0846    GLU 94 01/23/2017 0846        Component Value Date/Time    CALCIUM 9.6 01/23/2017 0846    ALKPHOS 94 01/23/2017 0846    AST 28 01/23/2017 0846    ALT 42 01/23/2017 0846    BILITOT 0.7 01/23/2017 0846              Assessment/Plan:     1. 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 Chronic left shoulder pain    3. Other chronic pain        Plan:    1./ Patient appears clinically stable. CT scan results discussed- no evidence of recurrence. RTC 6 months to see Dr. Francisco with CBC, CMP, CT chest/abdmone/pelvis.  2.3. Dr. Francisco sent one time RX for Tylenol #3.  No more pain medication will be filled. Amb referral to pain management. Encouraged patient to see pain management as per Dr. Grayson requested in 9/2016.    Med and Orders:  Orders Placed This Encounter    CT Abdomen Pelvis With Contrast    CT Chest With Contrast    CBC Oncology    Comprehensive metabolic panel    Ambulatory Referral to Pain Clinic    acetaminophen-codeine 300-30mg (TYLENOL #3) 300-30  mg Tab       Follow Up:  Return in about 6 months (around 7/24/2017).      More than 25 mins were spent during this encounter, greater than 50% was spent in direct counseling and/or coordination of care.   Patient was also seen and examined by Dr. Francisco who agrees with above plan. Patient is in agreement with the proposed treatment plan. All questions were answered to the patient's satisfaction. Pt knows to call clinic if anything is needed before the next clinic visit.    LAMBERT Mike  Hematology and Medical Oncology      STAFF NOTE:  I have reviewed the notes, assessments, and/or procedures performed by the nurse practitioner, as above.  I have personally interviewed the patient at the beside, and rounded with the nurse practitioner. I formulated the plan of care.  I concur with her documentation of Lesli Gong.

## 2017-02-01 DIAGNOSIS — G89.29 OTHER CHRONIC PAIN: ICD-10-CM

## 2017-02-01 RX ORDER — ACETAMINOPHEN AND CODEINE PHOSPHATE 300; 30 MG/1; MG/1
1 TABLET ORAL 2 TIMES DAILY PRN
Qty: 30 TABLET | Refills: 0 | Status: SHIPPED | OUTPATIENT
Start: 2017-02-01 | End: 2017-02-11

## 2017-02-01 NOTE — TELEPHONE ENCOUNTER
----- Message from Blessing Randolph sent at 2/1/2017  1:26 PM CST -----  Contact: Pt  Pt is calling to have medication refilled, acetaminophen-codeine 300-30mg (TYLENOL #3) 300-30 mg Tab.    Rx can be sent to [facility] at [phone].  Pt can be reached at [phone].  Thank you

## 2017-02-07 ENCOUNTER — OFFICE VISIT (OUTPATIENT)
Dept: PAIN MEDICINE | Facility: CLINIC | Age: 72
End: 2017-02-07
Attending: ANESTHESIOLOGY
Payer: MEDICARE

## 2017-02-07 VITALS
RESPIRATION RATE: 18 BRPM | TEMPERATURE: 98 F | WEIGHT: 180.75 LBS | HEIGHT: 64 IN | HEART RATE: 81 BPM | SYSTOLIC BLOOD PRESSURE: 145 MMHG | BODY MASS INDEX: 30.86 KG/M2 | DIASTOLIC BLOOD PRESSURE: 89 MMHG

## 2017-02-07 DIAGNOSIS — S49.92XA: ICD-10-CM

## 2017-02-07 DIAGNOSIS — Q85.01 NEUROFIBROMATOSIS, TYPE 1: Primary | ICD-10-CM

## 2017-02-07 DIAGNOSIS — G89.29 CHRONIC SCAPULAR PAIN: ICD-10-CM

## 2017-02-07 DIAGNOSIS — M89.8X1 CHRONIC SCAPULAR PAIN: ICD-10-CM

## 2017-02-07 PROCEDURE — 1157F ADVNC CARE PLAN IN RCRD: CPT | Mod: S$GLB,,, | Performed by: ANESTHESIOLOGY

## 2017-02-07 PROCEDURE — 3077F SYST BP >= 140 MM HG: CPT | Mod: S$GLB,,, | Performed by: ANESTHESIOLOGY

## 2017-02-07 PROCEDURE — 99204 OFFICE O/P NEW MOD 45 MIN: CPT | Mod: S$GLB,,, | Performed by: ANESTHESIOLOGY

## 2017-02-07 PROCEDURE — 1160F RVW MEDS BY RX/DR IN RCRD: CPT | Mod: S$GLB,,, | Performed by: ANESTHESIOLOGY

## 2017-02-07 PROCEDURE — 99999 PR PBB SHADOW E&M-EST. PATIENT-LVL III: CPT | Mod: PBBFAC,,, | Performed by: ANESTHESIOLOGY

## 2017-02-07 PROCEDURE — 1159F MED LIST DOCD IN RCRD: CPT | Mod: S$GLB,,, | Performed by: ANESTHESIOLOGY

## 2017-02-07 PROCEDURE — 3079F DIAST BP 80-89 MM HG: CPT | Mod: S$GLB,,, | Performed by: ANESTHESIOLOGY

## 2017-02-07 PROCEDURE — 1125F AMNT PAIN NOTED PAIN PRSNT: CPT | Mod: S$GLB,,, | Performed by: ANESTHESIOLOGY

## 2017-02-07 RX ORDER — ACETAMINOPHEN AND CODEINE PHOSPHATE 300; 30 MG/1; MG/1
1 TABLET ORAL EVERY 12 HOURS PRN
Qty: 60 TABLET | Refills: 0 | Status: SHIPPED | OUTPATIENT
Start: 2017-02-07 | End: 2017-03-06 | Stop reason: SDUPTHER

## 2017-02-07 NOTE — MR AVS SNAPSHOT
[facility]  [address]  [facility] [address]  Phone: [phone]  Fax: [phone]                  Lesli Gong   2017 9:30 AM   Office Visit    Description:  Female : 1945   Provider:  Livia Cheney MD   Department:  [facility]           Reason for Visit     Shoulder Pain           Diagnoses this Visit        Comments    Neurofibromatosis, type 1    -  Primary     Chronic scapular pain         Injury to scapular region, left, initial encounter                To Do List           Future Appointments        Provider Department Dept Phone    2017 5:15 PM [facility] [phone]    3/6/2017 9:20 AM Patti Brian MD [facility] - Internal Medicine [phone]    3/21/2017 10:00 AM Livia Cheney MD [facility] Pain Management [phone]      Goals (5 Years of Data)     None       These Medications        Disp Refills Start End    acetaminophen-codeine 300-30mg (TYLENOL #3) 300-30 mg Tab 60 tablet 0 2017 3/9/2017    Take 1 tablet by mouth every 12 (twelve) hours as needed. - Oral    Pharmacy: [facility] - [place], [place] - [address] Ph #: [phone]         [facility]     [facility] Nurse Care Line -  Assistance  Registered nurses in the [facility] provide clinical advisement, health education, appointment booking, and other advisory services.  Call for this free service at [phone].             Medications           Message regarding Medications     Verify the changes and/or additions to your medication regime listed below are the same as discussed with your clinician today.  If any of these changes or additions are incorrect, please notify your healthcare provider.        START taking these NEW medications        Refills    acetaminophen-codeine 300-30mg (TYLENOL #3) 300-30 mg Tab 0    Sig: Take 1 tablet by mouth every 12  "(twelve) hours as needed.    Class: Print    Route: Oral           Verify that the below list of medications is an accurate representation of the medications you are currently taking.  If none reported, the list may be blank. If incorrect, please contact your healthcare provider. Carry this list with you in case of emergency.           Current Medications     acetaminophen-codeine 300-30mg (TYLENOL #3) 300-30 mg Tab Take 1 tablet by mouth 2 (two) times daily as needed.    acetaminophen-codeine 300-30mg (TYLENOL #3) 300-30 mg Tab Take 1 tablet by mouth every 12 (twelve) hours as needed.    calcium-vitamin D (OSCAL) 250 (625)-125 mg-unit per tablet Take 1 tablet by mouth once daily.    cyanocobalamin, vitamin B-12, (VITAMIN B-12) 50 mcg tablet Take 50 mcg by mouth once daily.    lisinopril 10 MG tablet Take 10 mg by mouth 2 (two) times daily.    lorazepam (ATIVAN) 1 MG tablet TK 1 T PO UP TO TID    multivitamin capsule Take 1 capsule by mouth once daily.           Clinical Reference Information           Your Vitals Were     BP Pulse Temp Resp Height Weight    145/89 81 97.8 °F (36.6 °C) (Oral) 18 5' 4" (1.626 m) 82 kg (180 lb 12.4 oz)    BMI                31.03 kg/m2          Blood Pressure          Most Recent Value    BP  (!)  145/89      Allergies as of 2/7/2017     Anaprox [Naproxen Sodium]    Motrin [Ibuprofen]    Neomycin-polymyxin-hc    Sulfa (Sulfonamide Antibiotics)      Immunizations Administered on Date of Encounter - 2/7/2017     None      Orders Placed During Today's Visit     Future Labs/Procedures Expected by Expires    MRI Chest W WO Contrast  2/7/2017 2/7/2018      [facility] Sign-Up     Activating your [facility] account is as easy as 1-2-3!     1) Visit [email or web address], select Sign Up Now, enter this activation code and your date of birth, then select Next.  [email or web address]  Expires: 3/24/2017 10:07 AM      2) Create a username and password to use when you visit [facility] in the future and select a " security question in case you lose your password and select Next.    3) Enter your e-mail address and click Sign Up!    Additional Information  If you have questions, please e-mail [email or web address] or call [phone] to talk to our [facility] staff. Remember, [facility] is NOT to be used for urgent needs. For medical emergencies, dial 911.         Language Assistance Services     ATTENTION: Language assistance services are available, free of charge. Please call [phone].      ATENCIÓN: Si habla español, tiene a gipson disposición servicios gratuitos de asistencia lingüística. Llame al [phone].     CHÚ Ý: N?u b?n nói Ti?ng Vi?t, có các d?ch v? h? tr? ngôn ng? mi?n phí dành cho b?n. G?i s? [phone].         [facility] complies with applicable Federal civil rights laws and does not discriminate on the basis of race, color, national origin, age, disability, or sex.

## 2017-02-07 NOTE — PROGRESS NOTES
"  Chronic Pain - New Consult    Referring Physician: Pamella Templeton NP Heme/Onc    Chief Complaint:   Chief Complaint   Patient presents with    Shoulder Pain     left        SUBJECTIVE: Disclaimer: This note has been generated using voice-recognition software. There may be typographical errors that have been missed during proof-reading    Initial encounter:    Lesli Gong presents to the clinic for the evaluation of her left sided scapular pain. The pain started post-operatively following an excision of a neurofibroma in 2014, and was recently exacerbated by a particular vigorous "hug" at [employer] approximately 2 weeks ago.  Her symptoms have been unchanged. Since that acute event described as dull achey and without radiation - worsened with touch or pressure "like from a bra-strap" but treated well with topical icy/hot cream.      The pain is located in the left medial scapular border and muscles surrounding that border.      At BEST  6/10     At WORST  10/10 on the WORST day.      On average pain is rated as 6/10.     Today the pain is rated as 7/10    The pain is described as aching and dull      Symptoms interfere with daily activity and sleeping.     Exacerbating factors: Laying, Touching and Lifting.      Mitigating factors heat, ice, massage, medications and rest.     Patient denies night fever/night sweats, urinary incontinence, bowel incontinence, significant weight loss, significant motor weakness and loss of sensations.  Patient denies any suicidal or homicidal ideations    Pain Medications: Tylenol #3 - 1-2 tabs daily PRN      Tried in Past:  NSAIDs -Tylenol OTC  TCA -Never  SNRI -Never  Anti-convulsants -Never  Muscle Relaxants -Never  Opioids-Percocet, Norco & tramadol    Physical Therapy/Home Exercise: yes       report:  Reviewed and consistent with medication use as prescribed.    Pain Procedures: None    Chiropractor -never  Acupuncture - never  TENS unit -never  Spinal decompression " -never  Joint replacement - left big toe bunion surgery now fused    Imaging:   MRI cervical spine 10/29/2016  [id number] 10/29/16  10:23:32 [id number] ([facility]) : MRI CERVICAL SPINE WITHOUT CONTRAST    SUPPLIED CLINICAL HISTORY:  Sprain and ligamentous cervical spine, initial in counter.  Strain of the muscle, fascia and tendon and neck level, initial in counter    TECHNIQUE:  Sagittal T1, T2, STIR, and gradient in addition to axial T2 and gradient images of the Cervical Spine without contrast.      COMPARISON: F-18 FDG PET/CT 5/1/15.  No prior cross-sectional or radiographic imaging of the neck is available.     FINDINGS:    Motion limited examination.    There is 2-3 mm anterolisthesis C3 on C4.  Alignment of the remaining cervical spine is within normal limits.  There is reversal of the normal cervical lordosis, apex at C6.      Vertebral body heights are adequately maintained.  No evidence of acute osseous fracture.  There is osteophytic spurring anteriorly at C5-C6, C6-C7, and C7-T1.      There is degenerative disc disease and disc space narrowing throughout the cervical spine, worst at C5-C6 and C6-C7.    The visualized posterior fossa contents, cervicomedullary junction, cervical cord, and visualized upper thoracic cord demonstrate normal signal.      Incidentally visualized soft tissues structures of the neck demonstrate an enlarged thyroid.      C2-C3: No focal disc bulge.  No significant spinal canal or neural foraminal narrowing.    C3-C4: There is 2-3 mm anterolisthesis C3 on C4, bilateral uncovertebral spurring (RIGHT greater than LEFT), and bilateral facet arthropathy which results in moderate spinal canal narrowing, mild mass effect on the ventral surface of the spinal cord, and mild LEFT, moderate RIGHT neuroforaminal narrowing.  No underlying cord signal abnormality.    C4-C5: No focal disc bulge.  There is bilateral uncovertebral spurring and RIGHT facet arthropathy which results in no significant spinal  canal or neuroforaminal narrowing.    C5-C6: There is posterior disc osteophyte complex formation (asymmetric to the LEFT paracentral region), bilateral uncovertebral spurring, and RIGHT facet arthropathy which results in mild spinal canal narrowing but no significant neuroforaminal stenosis.    C6-C7: There is posterior disc osteophyte complex or measuring, right-sided uncovertebral spurring, and bilateral facet arthropathy which results in effacement of the anterior CSF sleeve and moderate RIGHT neuroforaminal stenosis.    C7-T1: No focal disc bulge.  There is bilateral uncovertebral spurring and facet arthropathy which results in moderate bilateral neural foraminal narrowing.   Impression        Multilevel cervical spondylosis as detailed above.             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, malignant 2015    Mass 10-10-14     excision of mass/left shoulder    Neurofibromatosis, type 1 (von Recklinghausen's disease) 2014    Pheochromocytoma      S/p resection in s    Soft tissue sarcoma of chest wall 2014     Past Surgical History   Procedure Laterality Date    Tonsillectomy Bilateral     Hysterectomy N/A     Pheochromocytoma excision N/A     Bunionectomy Right     Breast biopsy      Bilateral salpingoophorectomy       section      Exploratory laparotomy w/ bowel resection      Appendectomy       Social History     Social History    Marital status:      Spouse name: N/A    Number of children: N/A    Years of education: N/A     Occupational History    Not on file.     Social History Main Topics    Smoking status: Never Smoker    Smokeless tobacco: Never Used    Alcohol use No    Drug use: No    Sexual activity: Not Currently     Other Topics Concern    Not on file     Social History Narrative     Family History   Problem Relation Age of Onset     Cancer Sister      GIST    Neurofibromatosis Sister     Neurofibromatosis Father        Review of patient's allergies indicates:   Allergen Reactions    Anaprox [naproxen sodium] Nausea Only and Palpitations    Motrin [ibuprofen] Nausea Only and Palpitations    Neomycin-polymyxin-hc      Itchy (skin)^    Sulfa (sulfonamide antibiotics)      Hives (skin)^       Current Outpatient Prescriptions   Medication Sig    acetaminophen-codeine 300-30mg (TYLENOL #3) 300-30 mg Tab Take 1 tablet by mouth 2 (two) times daily as needed.    acetaminophen-codeine 300-30mg (TYLENOL #3) 300-30 mg Tab Take 1 tablet by mouth every 12 (twelve) hours as needed.    calcium-vitamin D (OSCAL) 250 (625)-125 mg-unit per tablet Take 1 tablet by mouth once daily.    cyanocobalamin, vitamin B-12, (VITAMIN B-12) 50 mcg tablet Take 50 mcg by mouth once daily.    lisinopril 10 MG tablet Take 10 mg by mouth 2 (two) times daily.    lorazepam (ATIVAN) 1 MG tablet TK 1 T PO UP TO TID    multivitamin capsule Take 1 capsule by mouth once daily.     No current facility-administered medications for this visit.        REVIEW OF SYSTEMS:    GENERAL:  No weight loss, malaise or fevers.  HEENT:   No recent changes in vision but gradual decline in hearing and endorses Sinusitis chronically  NECK:  no difficulty with swallowing.  RESPIRATORY:  Negative for cough, wheezing or shortness of breath, patient denies any recent URI.  CARDIOVASCULAR:  Negative for chest pain, leg swelling or palpitations.  GI:  Negative for abdominal discomfort, blood in stools or black stools or change in bowel habits.  MUSCULOSKELETAL:  See HPI.  SKIN:  + for neurofibromas scattered globally, negative for rash, and itching.  PSYCH:  No mood disorder or recent psychosocial stressors.  Patients sleep is somewhat disturbed secondary to pain.  HEMATOLOGY/LYMPHOLOGY:  Negative for prolonged bleeding, bruising easily.  Patient is not currently taking any anti-coagulants  ENDO:  "No history of diabetes or thyroid dysfunction. +hot flashes with post-menopausal status  NEURO:   No history of headaches, syncope, paralysis, seizures or tremors.  All other reviewed and negative other than HPI.     OBJECTIVE:    Visit Vitals    BP (!) 145/89    Pulse 81    Temp 97.8 °F (36.6 °C) (Oral)    Resp 18    Ht 5' 4" (1.626 m)    Wt 82 kg (180 lb 12.4 oz)    BMI 31.03 kg/m2       PHYSICAL EXAMINATION:    GENERAL: Well appearing, in no acute distress, alert and oriented x3.  PSYCH:  Mood and affect appropriate.  SKIN: Skin color, texture, turgor normal, scattered global neurofibromas.  HEAD/FACE:  Normocephalic, atraumatic. Cranial nerves grossly intact.  NECK: No pain to palpation over the cervical paraspinous muscles. Spurling Negative. No pain with neck flexion, extension, or lateral flexion.   CV: RRR with palpation of the radial artery.  PULM: No evidence of respiratory difficulty, symmetric chest rise.  GI:  Soft and non-tender.  BACK:  No pain to palpation over the facet joints of the cervical & thoracic spine or spinous processes. Normal range of motion without pain reproduction.  EXTREMITIES: Right shoulder joint ROM is full and pain free without obvious instability or laxity.  Her left shoulder is painful at end-range of active forward flexion and abduction but is nontender to palpation at AC joint, bicipital tendon - HOWEVER she is exquisitely tender at the medial left scapular border at the excision scar site as slightly lateral at the infraspinatus muscle.  Good capillary refill.  MUSCULOSKELETAL: Shoulder provocative maneuvers are negative.   Bilateral upper and lower extremity strength is normal and symmetric with the exception of the left shoulder abduction limited by pain.  No atrophy or tone abnormalities are noted.  NEURO: Bilateral upper and lower extremity coordination and muscle stretch reflexes are physiologic and symmetric.   No loss of sensation is noted.  GAIT: " normal.    Labs:   CMP  Sodium   Date Value Ref Range Status   01/23/2017 142 136 - 145 mmol/L Final     Potassium   Date Value Ref Range Status   01/23/2017 4.2 3.5 - 5.1 mmol/L Final     Chloride   Date Value Ref Range Status   01/23/2017 107 95 - 110 mmol/L Final     CO2   Date Value Ref Range Status   01/23/2017 27 23 - 29 mmol/L Final     Glucose   Date Value Ref Range Status   01/23/2017 94 70 - 110 mg/dL Final     BUN, Bld   Date Value Ref Range Status   01/23/2017 13 8 - 23 mg/dL Final     Creatinine   Date Value Ref Range Status   01/23/2017 0.8 0.5 - 1.4 mg/dL Final     Calcium   Date Value Ref Range Status   01/23/2017 9.6 8.7 - 10.5 mg/dL Final     Total Protein   Date Value Ref Range Status   01/23/2017 8.1 6.0 - 8.4 g/dL Final     Albumin   Date Value Ref Range Status   01/23/2017 3.7 3.5 - 5.2 g/dL Final     Total Bilirubin   Date Value Ref Range Status   01/23/2017 0.7 0.1 - 1.0 mg/dL Final     Comment:     For infants and newborns, interpretation of results should be based  on gestational age, weight and in agreement with clinical  observations.  Premature Infant recommended reference ranges:  Up to 24 hours.............<8.0 mg/dL  Up to 48 hours............<12.0 mg/dL  3-5 days..................<15.0 mg/dL  6-29 days.................<15.0 mg/dL       Alkaline Phosphatase   Date Value Ref Range Status   01/23/2017 94 55 - 135 U/L Final     AST   Date Value Ref Range Status   01/23/2017 28 10 - 40 U/L Final     ALT   Date Value Ref Range Status   01/23/2017 42 10 - 44 U/L Final     Anion Gap   Date Value Ref Range Status   01/23/2017 8 8 - 16 mmol/L Final     eGFR if    Date Value Ref Range Status   01/23/2017 >60.0 >60 mL/min/1.73 m^2 Final     eGFR if non    Date Value Ref Range Status   01/23/2017 >60.0 >60 mL/min/1.73 m^2 Final     Comment:     Calculation used to obtain the estimated glomerular filtration  rate (eGFR) is the CKD-EPI equation. Since race is unknown    in our information system, the eGFR values for   -American and Non--American patients are given   for each creatinine result.       Lab Results   Component Value Date    WBC 6.27 01/23/2017    HGB 15.2 01/23/2017    HCT 44.7 01/23/2017    MCV 95 01/23/2017     01/23/2017         ASSESSMENT: 71 y.o. year old female with pain, consistent with     Encounter Diagnoses   Name Primary?    Neurofibromatosis, type 1 Yes    Chronic scapular pain     Injury to scapular region, left, initial encounter        PLAN:   1. Recent acute exacerbation of left scapular pain on chronic excisional site pain of neurofibroma type 1 requires further imaging to evaluate for fracture vs recurrence of lesions  1. MRI Chest W & W/O ordered after discussion with Radiology - reviewed of labs - no evidence of kidney dysfunction previously.  2. If negative imaging findings may consider U/S guided aj-excisional medal scapular border injection & hydro-dissection  2. Refilled Tylenol #3 provided today Q12h PRN - (60 tabs) no refills.  3. Ordered compounded topical analgesic cream   1. If not obtained due to cost may consider topical voltaren  4. F/U  in 1 month.    Greater than 25 minutes spent in total in todays visit with the patient, with more than half that time direct face to face counseling and education with the patient today. We discussed the disease process, prognosis, treatment plan, and risks and benefits.     Olu Mathew DO  Pain Fellow PGY5     I reviewed and edited the fellow's note, I conducted my own interview and physical examination and agree with the findings.      Olu Mathew  02/07/2017

## 2017-02-07 NOTE — LETTER
February 7, 2017      Pamella Templeton, NP  [address] [facility] [address]           [facility]  [address]  [facility] [address]  Phone: [phone]  Fax: [phone]          Patient: Lesli Gong   MR Number: [id number]   YOB: 1945   Date of Visit: 2/7/2017       Dear Pamella Templeton:    Thank you for referring Lesli Gong to me for evaluation. Attached you will find relevant portions of my assessment and plan of care.    If you have questions, please do not hesitate to call me. I look forward to following Lesli Gong along with you.    Sincerely,    Livia Cheney MD    Enclosure  CC:  No Recipients    If you would like to receive this communication electronically, please contact [email or web address] or [phone] to request more information on [employer] Link access.    For providers and/or their staff who would like to refer a patient to [facility], please contact us through our one-stop-shop provider referral line, [facility], at [phone].    If you feel you have received this communication in error or would no longer like to receive these types of communications, please e-mail [email or web address]

## 2017-03-06 ENCOUNTER — OFFICE VISIT (OUTPATIENT)
Dept: INTERNAL MEDICINE | Facility: CLINIC | Age: 72
End: 2017-03-06
Payer: MEDICARE

## 2017-03-06 VITALS
HEIGHT: 64 IN | DIASTOLIC BLOOD PRESSURE: 84 MMHG | OXYGEN SATURATION: 98 % | HEART RATE: 96 BPM | BODY MASS INDEX: 30.04 KG/M2 | WEIGHT: 175.94 LBS | TEMPERATURE: 98 F | SYSTOLIC BLOOD PRESSURE: 138 MMHG

## 2017-03-06 DIAGNOSIS — M89.8X1 PAIN OF LEFT SCAPULA: ICD-10-CM

## 2017-03-06 DIAGNOSIS — C49.A3 GIST (GASTROINTESTINAL STROMAL TUMOR) OF SMALL BOWEL, MALIGNANT: ICD-10-CM

## 2017-03-06 DIAGNOSIS — I10 ESSENTIAL HYPERTENSION: Primary | ICD-10-CM

## 2017-03-06 DIAGNOSIS — Q85.01 NEUROFIBROMATOSIS, TYPE 1 (VON RECKLINGHAUSEN'S DISEASE): ICD-10-CM

## 2017-03-06 DIAGNOSIS — F41.9 ANXIETY: ICD-10-CM

## 2017-03-06 DIAGNOSIS — R35.0 URINARY FREQUENCY: ICD-10-CM

## 2017-03-06 LAB
BACTERIA #/AREA URNS AUTO: NORMAL /HPF
BILIRUB UR QL STRIP: ABNORMAL
CAOX CRY UR QL COMP ASSIST: NORMAL
CLARITY UR REFRACT.AUTO: CLEAR
COLOR UR AUTO: YELLOW
GLUCOSE UR QL STRIP: NEGATIVE
HGB UR QL STRIP: NEGATIVE
HYALINE CASTS UR QL AUTO: 0 /LPF
KETONES UR QL STRIP: ABNORMAL
LEUKOCYTE ESTERASE UR QL STRIP: NEGATIVE
MICROSCOPIC COMMENT: NORMAL
NITRITE UR QL STRIP: NEGATIVE
PH UR STRIP: 6 [PH] (ref 5–8)
PROT UR QL STRIP: ABNORMAL
RBC #/AREA URNS AUTO: 2 /HPF (ref 0–4)
SP GR UR STRIP: >=1.03 (ref 1–1.03)
URN SPEC COLLECT METH UR: ABNORMAL
UROBILINOGEN UR STRIP-ACNC: 1 EU/DL
WBC #/AREA URNS AUTO: 2 /HPF (ref 0–5)

## 2017-03-06 PROCEDURE — 1159F MED LIST DOCD IN RCRD: CPT | Mod: S$GLB,,, | Performed by: INTERNAL MEDICINE

## 2017-03-06 PROCEDURE — 3075F SYST BP GE 130 - 139MM HG: CPT | Mod: S$GLB,,, | Performed by: INTERNAL MEDICINE

## 2017-03-06 PROCEDURE — 81001 URINALYSIS AUTO W/SCOPE: CPT

## 2017-03-06 PROCEDURE — 1160F RVW MEDS BY RX/DR IN RCRD: CPT | Mod: S$GLB,,, | Performed by: INTERNAL MEDICINE

## 2017-03-06 PROCEDURE — 3079F DIAST BP 80-89 MM HG: CPT | Mod: S$GLB,,, | Performed by: INTERNAL MEDICINE

## 2017-03-06 PROCEDURE — 99214 OFFICE O/P EST MOD 30 MIN: CPT | Mod: S$GLB,,, | Performed by: INTERNAL MEDICINE

## 2017-03-06 PROCEDURE — 1157F ADVNC CARE PLAN IN RCRD: CPT | Mod: S$GLB,,, | Performed by: INTERNAL MEDICINE

## 2017-03-06 PROCEDURE — 87086 URINE CULTURE/COLONY COUNT: CPT

## 2017-03-06 PROCEDURE — 1126F AMNT PAIN NOTED NONE PRSNT: CPT | Mod: S$GLB,,, | Performed by: INTERNAL MEDICINE

## 2017-03-06 PROCEDURE — 99999 PR PBB SHADOW E&M-EST. PATIENT-LVL III: CPT | Mod: PBBFAC,,, | Performed by: INTERNAL MEDICINE

## 2017-03-06 RX ORDER — HYDROCODONE BITARTRATE AND ACETAMINOPHEN 5; 325 MG/1; MG/1
1 TABLET ORAL EVERY 12 HOURS PRN
Qty: 60 TABLET | Refills: 0 | Status: SHIPPED | OUTPATIENT
Start: 2017-03-06 | End: 2017-04-10 | Stop reason: SDUPTHER

## 2017-03-06 RX ORDER — LORAZEPAM 1 MG/1
TABLET ORAL
Qty: 60 TABLET | Refills: 1 | Status: SHIPPED | OUTPATIENT
Start: 2017-03-06 | End: 2017-04-19 | Stop reason: ALTCHOICE

## 2017-03-06 RX ORDER — CIPROFLOXACIN HYDROCHLORIDE 3 MG/ML
SOLUTION/ DROPS OPHTHALMIC
Refills: 0 | COMMUNITY
Start: 2016-12-14 | End: 2017-04-19 | Stop reason: ALTCHOICE

## 2017-03-06 NOTE — PROGRESS NOTES
"Subjective:       Patient ID: Lesli Gong is a 71 y.o. female.    Chief Complaint: htn    HPI   72 yo F here for follow up of HTN, right ear OM,     Hx GIST - 6 mo labs, CT  Dr. 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 Recent acute exacerbation of left scapular pain on chronic excisional site pain of neurofibroma type 1 requires further imaging to evaluate for fracture vs recurrence of lesions  1. MRI Chest W & W/O ordered after discussion with Radiology - reviewed of labs - no evidence of kidney dysfunction previously. Scheduled wed 3pm  2. If negative imaging findings may consider U/S guided aj-excisional medal scapular border injection & hydro-dissection  2. Refilled Tylenol #3 provided today Q12h PRN - (60 tabs) no refills.  3. Ordered compounded topical analgesic cream   1. If not obtained due to cost may consider topical voltaren  4. F/U in 1 month        Ativan 1mg  Previous psychiatrist at   Wants to switch to me    Review of Systems   Constitutional: Negative for fever.   Respiratory: Negative for shortness of breath.    Cardiovascular: Negative for chest pain.   Musculoskeletal: Negative.         Left scapular pain near previous sarcoma excision   Skin: Negative.        Objective:   /84  Pulse 96  Temp 98.4 °F (36.9 °C)  Ht 5' 4" (1.626 m)  Wt 79.8 kg (175 lb 14.8 oz)  SpO2 98%  BMI 30.2 kg/m2     Physical Exam   Constitutional: She is oriented to person, place, and time. She appears well-developed and well-nourished. No distress.   HENT:   Head: Normocephalic and atraumatic.   Cardiovascular: Normal rate and regular rhythm.    Pulmonary/Chest: Effort normal. No respiratory distress. She has no wheezes. She has no rales.   Abdominal: Soft. She exhibits no distension. There is no tenderness.   Musculoskeletal:   Left scapula area with " well healed surgical scar, surgically small scapula, ttp without discrete area of pain, no rash, no flucutance   Neurological: She is alert and oriented to person, place, and time.   Skin: Skin is warm and dry. She is not diaphoretic.   Multiple neurofibromas   Psychiatric: She has a normal mood and affect. Her behavior is normal.       Assessment:       1. Essential hypertension    2. 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 Neurofibromatosis, type 1 (von Recklinghausen's disease)    4. Anxiety    5. Pain of left scapula    6. Urinary frequency        Plan:       Lesli was seen today for establish care.    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 Francisco     Neurofibromatosis, type 1 (von Recklinghausen's disease)  Stable, monitor    Anxiety  -     lorazepam (ATIVAN) 1 MG tablet; Take 1 tab PO BID PRN anxiety/panic attack   Will take over refills -    Decrease ativan from tid to bid    Pain of left scapula  -     hydrocodone-acetaminophen 5-325mg (NORCO) 5-325 mg per tablet; Take 1 tablet by mouth every 12 (twelve) hours as needed for Pain.   MRI scheduled in 2 days   Followed by pain clinic    Urinary frequency  -     Urine culture  -     Urinalysis         cscope one year ago at     [facility] to  - cscope, dexa, immunizations, last clinic notes

## 2017-03-06 NOTE — LETTER
March 6, 2017      Gaudencio Johnson MD  [address]  [place] [address]           [facility]  [address]  [facility] [address]  Phone: [phone]  Fax: [phone]          Patient: Lesli Gong   MR Number: [id number]   YOB: 1945   Date of Visit: 3/6/2017       Dear Dr. Gaudencio Johnson:    Thank you for referring Lesli Gong to me for evaluation. Attached you will find relevant portions of my assessment and plan of care.    If you have questions, please do not hesitate to call me. I look forward to following Lesli Gong along with you.    Sincerely,    Patti Brian MD    Enclosure  CC:  No Recipients    If you would like to receive this communication electronically, please contact [email or web address] or [phone] to request more information on [employer] Link access.    For providers and/or their staff who would like to refer a patient to [facility], please contact us through our one-stop-shop provider referral line, [facility], at [phone].    If you feel you have received this communication in error or would no longer like to receive these types of communications, please e-mail [email or web address]

## 2017-03-06 NOTE — MR AVS SNAPSHOT
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 MRI2 [facility] [phone]    3/21/2017 10:00 AM Livia Cheney MD [facility] - Pain Management [phone]    4/10/2017 10:40 AM Patti Brian MD [facility] Internal Medicine [phone]      Goals (5 Years of Data)     None      Follow-Up and Disposition     Return in about 4 weeks (around 4/3/2017).    Follow-up and Disposition History       These Medications        Disp Refills Start End    lorazepam (ATIVAN) 1 MG tablet 60 tablet 1 3/6/2017     Take 1 tab PO BID PRN anxiety/panic attack    Pharmacy: [facility] [place] [address] Ph #: [phone]       hydrocodone-acetaminophen 5-325mg (NORCO) 5-325 mg per tablet 60 tablet 0 3/6/2017     Take 1 tablet by mouth every 12 (twelve) hours as needed for Pain. - Oral    Pharmacy: [facility] - [place], [place] - [address] Ph #: [phone]         [facility]     [facility] Nurse Care [facility] -  Assistance  Registered nurses in the [facility] provide clinical advisement, health education, appointment  booking, and other advisory services.  Call for this free service at [phone].             Medications           Message regarding Medications     Verify the changes and/or additions to your medication regime listed below are the same as discussed with your clinician today.  If any of these changes or additions are incorrect, please notify your healthcare provider.        START taking these NEW medications        Refills    hydrocodone-acetaminophen 5-325mg (NORCO) 5-325 mg per tablet 0    Sig: Take 1 tablet by mouth every 12 (twelve) hours as needed for Pain.    Class: Normal    Route: Oral      CHANGE how you are taking these medications     Start Taking Instead of    lorazepam (ATIVAN) 1 MG tablet lorazepam (ATIVAN) 1 MG tablet    Dosage:  Take 1 tab PO BID PRN anxiety/panic attack Dosage:  TK 1 T PO UP TO TID    Reason for Change:  Reorder       STOP taking these medications     acetaminophen-codeine 300-30mg (TYLENOL #3) 300-30 mg Tab Take 1 tablet by mouth every 12 (twelve) hours as needed.           Verify that the below list of medications is an accurate representation of the medications you are currently taking.  If none reported, the list may be blank. If incorrect, please contact your healthcare provider. Carry this list with you in case of emergency.           Current Medications     calcium-vitamin D (OSCAL) 250 (625)-125 mg-unit per tablet Take 1 tablet by mouth once daily.    cyanocobalamin, vitamin B-12, (VITAMIN B-12) 50 mcg tablet Take 50 mcg by mouth once daily.    lisinopril 10 MG tablet Take 10 mg by mouth 2 (two) times daily.    lorazepam (ATIVAN) 1 MG tablet Take 1 tab PO BID PRN anxiety/panic attack    multivitamin capsule Take 1 capsule by mouth once daily.    ciprofloxacin HCl (CILOXAN) 0.3 % ophthalmic solution INSTILL 1 DROP INTO RIGHT EAR TWICE A DAY FOR 7 DAYS    hydrocodone-acetaminophen 5-325mg (NORCO) 5-325 mg per tablet Take 1 tablet by mouth every 12 (twelve) hours as needed  "for Pain.           Clinical Reference Information           Your Vitals Were     BP Pulse Temp Height Weight SpO2    138/84 96 98.4 °F (36.9 °C) 5' 4" (1.626 m) 79.8 kg (175 lb 14.8 oz) 98%    BMI                30.2 kg/m2          Blood Pressure          Most Recent Value    BP  138/84      Allergies as of 3/6/2017     Anaprox [Naproxen Sodium]    Motrin [Ibuprofen]    Neomycin-polymyxin-hc    Sulfa (Sulfonamide Antibiotics)      Immunizations Administered on Date of Encounter - 3/6/2017     None      Orders Placed During Today's Visit      Normal Orders This Visit    Urinalysis     Urine culture       [facility] Sign-Up     Activating your [facility] account is as easy as 1-2-3!     1) Visit [email or web address], select Sign Up Now, enter this activation code and your date of birth, then select Next.  [email or web address]  Expires: 3/24/2017 10:07 AM      2) Create a username and password to use when you visit [facility] in the future and select a security question in case you lose your password and select Next.    3) Enter your e-mail address and click Sign Up!    Additional Information  If you have questions, please e-mail [email or web address] or call [phone] to talk to our [facility] staff. Remember, [facility] is NOT to be used for urgent needs. For medical emergencies, dial 911.         Language Assistance Services     ATTENTION: Language assistance services are available, free of charge. Please call [phone].      ATENCIÓN: Si habla español, tiene a gipson disposición servicios gratuitos de asistencia lingüística. Llame al [phone].     PILI Ý: N?u b?n nói Ti?ng Vi?t, có các d?ch v? h? tr? ngôn ng? mi?n phí dành cho b?n. G?i s? [phone].         [facility] complies with applicable Federal civil rights laws and does not discriminate on the basis of race, color, national origin, age, disability, or sex.        "

## 2017-03-07 LAB — BACTERIA UR CULT: NO GROWTH

## 2017-03-08 ENCOUNTER — HOSPITAL ENCOUNTER (OUTPATIENT)
Dept: RADIOLOGY | Facility: HOSPITAL | Age: 72
Discharge: HOME | End: 2017-03-08
Attending: ANESTHESIOLOGY
Payer: MEDICARE

## 2017-03-08 DIAGNOSIS — Q85.01 NEUROFIBROMATOSIS, TYPE 1: ICD-10-CM

## 2017-03-08 DIAGNOSIS — S49.92XA: ICD-10-CM

## 2017-03-08 PROCEDURE — 71552 MRI CHEST W/O & W/DYE: CPT | Mod: TC

## 2017-03-08 PROCEDURE — 25500020 PHARM REV CODE 255: Performed by: ANESTHESIOLOGY

## 2017-03-08 PROCEDURE — A9585 GADOBUTROL INJECTION: HCPCS | Performed by: ANESTHESIOLOGY

## 2017-03-08 PROCEDURE — 71552 MRI CHEST W/O & W/DYE: CPT | Mod: 26,,, | Performed by: RADIOLOGY

## 2017-03-08 RX ORDER — GADOBUTROL 604.72 MG/ML
8 INJECTION INTRAVENOUS
Status: COMPLETED | OUTPATIENT
Start: 2017-03-08 | End: 2017-03-08

## 2017-03-08 RX ADMIN — GADOBUTROL 8 ML: 604.72 INJECTION INTRAVENOUS at 05:03

## 2017-03-14 ENCOUNTER — TELEPHONE (OUTPATIENT)
Dept: INTERNAL MEDICINE | Facility: CLINIC | Age: 72
End: 2017-03-14

## 2017-03-14 DIAGNOSIS — Z20.5 EXPOSURE TO HEPATITIS B: ICD-10-CM

## 2017-03-14 DIAGNOSIS — R76.8 HEPATITIS C ANTIBODY TEST POSITIVE: Primary | ICD-10-CM

## 2017-03-14 NOTE — TELEPHONE ENCOUNTER
Called pt re: results. Spoke to pt.  She has no hx of hepatitis C that she knows of. She had hx hep B that was told had cleared spontaneously.   Hep c ab positive.   Viral load ordered.   Alt 46, remainder nl.     Please schedule pt for labs tomorrow 3/15 at 11am.

## 2017-03-15 ENCOUNTER — LAB VISIT (OUTPATIENT)
Dept: LAB | Facility: HOSPITAL | Age: 72
End: 2017-03-15
Attending: INTERNAL MEDICINE
Payer: MEDICARE

## 2017-03-15 DIAGNOSIS — R76.8 HEPATITIS C ANTIBODY TEST POSITIVE: ICD-10-CM

## 2017-03-15 DIAGNOSIS — Z20.5 EXPOSURE TO HEPATITIS B: ICD-10-CM

## 2017-03-15 LAB
INR PPP: 1.1
PROTHROMBIN TIME: 11.3 SEC

## 2017-03-15 PROCEDURE — 87516 HEPATITIS B DNA AMP PROBE: CPT

## 2017-03-15 PROCEDURE — 85610 PROTHROMBIN TIME: CPT

## 2017-03-15 PROCEDURE — 36415 COLL VENOUS BLD VENIPUNCTURE: CPT

## 2017-03-15 PROCEDURE — 87522 HEPATITIS C REVRS TRNSCRPJ: CPT

## 2017-03-16 ENCOUNTER — TELEPHONE (OUTPATIENT)
Dept: INTERNAL MEDICINE | Facility: CLINIC | Age: 72
End: 2017-03-16

## 2017-03-16 LAB
HCV LOG: 6.26 LOG (10) IU/ML
HCV RNA QUANT PCR: ABNORMAL IU/ML
HCV, QUALITATIVE: DETECTED IU/ML

## 2017-03-16 NOTE — TELEPHONE ENCOUNTER
She has done the correct labs. It takes a few days for these results to come back. I should have them by Monday but hopefully tomorrow.

## 2017-03-16 NOTE — TELEPHONE ENCOUNTER
----- Message from Brie Garcia sent at 3/16/2017 10:27 AM CDT -----  Contact: Self/ [phone]   Pt want to know if she have any more lab she need to take. Pt want to know if she finish taking all the labs. Please call and advise     Thank you

## 2017-03-17 ENCOUNTER — TELEPHONE (OUTPATIENT)
Dept: INTERNAL MEDICINE | Facility: CLINIC | Age: 72
End: 2017-03-17

## 2017-03-17 DIAGNOSIS — B18.2 CHRONIC HEPATITIS C WITHOUT HEPATIC COMA: ICD-10-CM

## 2017-03-17 LAB — HBV DNA SPEC QL NAA+PROBE: NOT DETECTED

## 2017-03-19 ENCOUNTER — DOCUMENTATION ONLY (OUTPATIENT)
Dept: TRANSPLANT | Facility: CLINIC | Age: 72
End: 2017-03-19

## 2017-03-19 NOTE — LETTER
March 19, 2017    Patti Brian MD  [address]  [place] [address]      Dear Dr. Brian    Patient: Lesli Gong   MR Number: [id number]   YOB: 1945     Thank you for the referral of Lesli Gong to the [facility]. An initial appointment will be scheduled for your patient with one of our Hepatologists.      Thank you again for your trust in our program.  If there is anything we can do for you or your staff, please feel free to contact us.        Sincerely,        [facility]  [facility] [place], [place] [id number] [phone]

## 2017-03-19 NOTE — LETTER
March 19, 2017    Lesli Gong  [facility] [address]      Dear Lesli Gong:    Your doctor has referred you to the [facility]. You will be contacted by our office and an initial appointment will then be scheduled for you.    We look forward to seeing you soon. If you have any further questions, please contact us at [phone].       Sincerely,        [facility]   [address] [place] [address] [id number] [phone]

## 2017-03-20 ENCOUNTER — TELEPHONE (OUTPATIENT)
Dept: HEPATOLOGY | Facility: CLINIC | Age: 72
End: 2017-03-20

## 2017-03-20 NOTE — TELEPHONE ENCOUNTER
----- Message from Megan Morris sent at 3/19/2017 11:50 PM CDT -----  Pt records reviewed.   Pt will be referred to Hepatitis C clinic    Initial referral received  from the [facility].   Referring Provider/diagnosis  Patti Brian MD   Diagnosis: Chronic hepatitis C without hepatic coma        Referral letter sent to provider and patient.

## 2017-03-20 NOTE — NURSING
Pt records reviewed.   Pt will be referred to Hepatitis C clinic    Initial referral received  from the [facility].   Referring Provider/diagnosis  Patti Brian MD    Diagnosis: Chronic hepatitis C without hepatic coma         Referral letter sent to provider and patient.

## 2017-03-20 NOTE — TELEPHONE ENCOUNTER
Spoke with pt regarding diagnosis of hepatitis c. She will follow up with hepatology. She expresses understanding.    She also reports ongoing shoulder pain. She has a follow up appt with pain management tomorrow.

## 2017-03-20 NOTE — TELEPHONE ENCOUNTER
Chart reviewed. HCV RNA noted along with recent labs. CT abdomen done 9/12/16.   Attempted to speak with pt. Left message requesting that pt call back to schedule consult. Given direct number.

## 2017-03-23 ENCOUNTER — TELEPHONE (OUTPATIENT)
Dept: INTERNAL MEDICINE | Facility: CLINIC | Age: 72
End: 2017-03-23

## 2017-03-23 NOTE — TELEPHONE ENCOUNTER
----- Message from Caitlyn Collnis MA sent at 3/23/2017 12:41 PM CDT -----  Contact: self - [phone] or [phone]  Type: Rx    Name of medication(s):  hydrocodone-acetaminophen 5-325mg (NORCO) 5-325 mg per tablet    Is this a refill? New rx? Refill     Who prescribed medication?    Pharmacy Name, Phone, & Location: [facility] - [place], [place] - [address]    Comments: Please call. Thanks!

## 2017-03-24 ENCOUNTER — TELEPHONE (OUTPATIENT)
Dept: HEPATOLOGY | Facility: CLINIC | Age: 72
End: 2017-03-24

## 2017-03-28 ENCOUNTER — TELEPHONE (OUTPATIENT)
Dept: INTERNAL MEDICINE | Facility: CLINIC | Age: 72
End: 2017-03-28

## 2017-03-28 NOTE — TELEPHONE ENCOUNTER
----- Message from Ethan Alaniz sent at 3/27/2017 10:43 AM CDT -----  Contact: self   Patient would like to get medical advice.  Symptoms (please be specific):  Pain to right side neck & shoulder   How long has patient had these symptoms:  2 days   Pharmacy name and phone #:  [facility]  [phone] (Phone)  [phone] (Fax)  Any drug allergies:  See Chart   Comments:

## 2017-03-30 ENCOUNTER — TELEPHONE (OUTPATIENT)
Dept: HEPATOLOGY | Facility: CLINIC | Age: 72
End: 2017-03-30

## 2017-03-30 DIAGNOSIS — B18.2 CHRONIC HEPATITIS C WITHOUT HEPATIC COMA: Primary | ICD-10-CM

## 2017-03-30 NOTE — TELEPHONE ENCOUNTER
Pt returned call to schedule consult. HCV RNA noted along with recent labs. CT abd done 9/12/16.    Genotype scheduled on 4/10. Fibroscan and clinic visit scheduled on 4/19. Reminders mailed.

## 2017-04-04 DIAGNOSIS — F41.9 ANXIETY: ICD-10-CM

## 2017-04-04 RX ORDER — LORAZEPAM 1 MG/1
TABLET ORAL
Qty: 60 TABLET | Refills: 1 | Status: CANCELLED | OUTPATIENT
Start: 2017-04-04

## 2017-04-04 NOTE — TELEPHONE ENCOUNTER
----- Message from Megan Malagon sent at 4/4/2017  4:16 PM CDT -----  Contact: Patient  Refill    lorazepam (ATIVAN) 1 MG tablet   Sig: Take 1 tab PO BID PRN anxiety/panic attack    [facility] - [place], [place] - [address] [phone] (Phone) [phone] (Fax)    The patient would like a call back at home [phone].    Thanks!

## 2017-04-04 NOTE — TELEPHONE ENCOUNTER
Lorazepam was sent to primary care pharmacy on 3/6. It had a refill - please advise pt to contact pharmacy to fill the refill.

## 2017-04-10 ENCOUNTER — LAB VISIT (OUTPATIENT)
Dept: LAB | Facility: HOSPITAL | Age: 72
End: 2017-04-10
Attending: INTERNAL MEDICINE
Payer: MEDICARE

## 2017-04-10 ENCOUNTER — OFFICE VISIT (OUTPATIENT)
Dept: INTERNAL MEDICINE | Facility: CLINIC | Age: 72
End: 2017-04-10
Payer: MEDICARE

## 2017-04-10 VITALS
HEIGHT: 64 IN | WEIGHT: 187.63 LBS | BODY MASS INDEX: 32.03 KG/M2 | HEART RATE: 103 BPM | SYSTOLIC BLOOD PRESSURE: 140 MMHG | DIASTOLIC BLOOD PRESSURE: 88 MMHG

## 2017-04-10 DIAGNOSIS — C49.A3 GIST (GASTROINTESTINAL STROMAL TUMOR) OF SMALL BOWEL, MALIGNANT: ICD-10-CM

## 2017-04-10 DIAGNOSIS — I10 ESSENTIAL HYPERTENSION: Primary | ICD-10-CM

## 2017-04-10 DIAGNOSIS — Q85.01 NEUROFIBROMATOSIS, TYPE 1 (VON RECKLINGHAUSEN'S DISEASE): ICD-10-CM

## 2017-04-10 DIAGNOSIS — Z23 NEED FOR STREPTOCOCCUS PNEUMONIAE VACCINATION: ICD-10-CM

## 2017-04-10 DIAGNOSIS — G89.29 CHRONIC LEFT SHOULDER PAIN: ICD-10-CM

## 2017-04-10 DIAGNOSIS — Z85.831 HISTORY OF SARCOMA OF SOFT TISSUE: ICD-10-CM

## 2017-04-10 DIAGNOSIS — B18.2 CHRONIC HEPATITIS C WITHOUT HEPATIC COMA: ICD-10-CM

## 2017-04-10 DIAGNOSIS — M89.8X1 PAIN OF LEFT SCAPULA: ICD-10-CM

## 2017-04-10 DIAGNOSIS — M25.512 CHRONIC LEFT SHOULDER PAIN: ICD-10-CM

## 2017-04-10 PROCEDURE — 99214 OFFICE O/P EST MOD 30 MIN: CPT | Mod: S$GLB,,, | Performed by: INTERNAL MEDICINE

## 2017-04-10 PROCEDURE — 1160F RVW MEDS BY RX/DR IN RCRD: CPT | Mod: S$GLB,,, | Performed by: INTERNAL MEDICINE

## 2017-04-10 PROCEDURE — 1157F ADVNC CARE PLAN IN RCRD: CPT | Mod: S$GLB,,, | Performed by: INTERNAL MEDICINE

## 2017-04-10 PROCEDURE — G0009 ADMIN PNEUMOCOCCAL VACCINE: HCPCS | Mod: S$GLB,,, | Performed by: INTERNAL MEDICINE

## 2017-04-10 PROCEDURE — 36415 COLL VENOUS BLD VENIPUNCTURE: CPT

## 2017-04-10 PROCEDURE — 3077F SYST BP >= 140 MM HG: CPT | Mod: S$GLB,,, | Performed by: INTERNAL MEDICINE

## 2017-04-10 PROCEDURE — 3079F DIAST BP 80-89 MM HG: CPT | Mod: S$GLB,,, | Performed by: INTERNAL MEDICINE

## 2017-04-10 PROCEDURE — 99999 PR PBB SHADOW E&M-EST. PATIENT-LVL III: CPT | Mod: PBBFAC,,, | Performed by: INTERNAL MEDICINE

## 2017-04-10 PROCEDURE — 87522 HEPATITIS C REVRS TRNSCRPJ: CPT

## 2017-04-10 PROCEDURE — 90670 PCV13 VACCINE IM: CPT | Mod: S$GLB,,, | Performed by: INTERNAL MEDICINE

## 2017-04-10 PROCEDURE — 1159F MED LIST DOCD IN RCRD: CPT | Mod: S$GLB,,, | Performed by: INTERNAL MEDICINE

## 2017-04-10 PROCEDURE — 1125F AMNT PAIN NOTED PAIN PRSNT: CPT | Mod: S$GLB,,, | Performed by: INTERNAL MEDICINE

## 2017-04-10 PROCEDURE — 87902 NFCT AGT GNTYP ALYS HEP C: CPT

## 2017-04-10 RX ORDER — HYDROCODONE BITARTRATE AND ACETAMINOPHEN 5; 325 MG/1; MG/1
1 TABLET ORAL EVERY 12 HOURS PRN
Qty: 60 TABLET | Refills: 0 | Status: SHIPPED | OUTPATIENT
Start: 2017-04-10 | End: 2017-04-19 | Stop reason: ALTCHOICE

## 2017-04-10 NOTE — PROGRESS NOTES
"Subjective:       Patient ID: Lesli Gong is a 71 y.o. female.    Chief Complaint: Follow-up    HPI   72 yo F here for follow up of HTN, NF 1, hx GIST followed by Dr. Francisco, anxiety and left scapula pain.     HTN  Lisinopril   bp 140s/90s at home.   No orthostatic sx.    Followed by pain clinic.  MRI - Post surgical changes from prior posterior left thorax soft tissue mass resection without evidence of focal enhancing residual nodular component to indicate residual or recurrent tumor.    Her hep c screening test was found to be positive. Hepatology scheduled on 4/19.       Review of Systems   Constitutional: Negative for fever.   Respiratory: Negative for shortness of breath.    Cardiovascular: Negative for chest pain.   Musculoskeletal: Negative.    Skin: Negative.        Objective:   BP (!) 140/88  Pulse 103  Ht 5' 4" (1.626 m)  Wt 85.1 kg (187 lb 9.8 oz)  BMI 32.2 kg/m2     Physical Exam   Constitutional: She is oriented to person, place, and time. She appears well-developed and well-nourished. No distress.   HENT:   Head: Normocephalic and atraumatic.   Cardiovascular: Normal rate and regular rhythm.    Pulmonary/Chest: Effort normal. No respiratory distress. She has no wheezes. She has no rales.   Neurological: She is alert and oriented to person, place, and time.   Skin: Skin is warm and dry. She is not diaphoretic.   Psychiatric: She has a normal mood and affect. Her behavior is normal.       Assessment:       1. Essential hypertension    2. 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 Chronic left shoulder pain    4. Neurofibromatosis, type 1 (von Recklinghausen's disease)    5. History of sarcoma of soft tissue    6. Chronic hepatitis C without hepatic coma    7. Pain of left scapula    8. Need for Streptococcus pneumoniae vaccination        Plan:       Lesli was seen today for follow-up.    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 Francisco    Neurofibromatosis, type 1 (von Recklinghausen's disease)  Stable, monitor    Chronic hepatitis C without hepatic coma  Keep upcoming hepatology appt  Discussed at length with pt  Transmission of hep c discussed    Pain of left scapula; History of sarcoma of soft tissue  -     hydrocodone-acetaminophen 5-325mg (NORCO) 5-325 mg per tablet; Take 1 tablet by mouth every 12 (twelve) hours as needed for Pain.    Need for Streptococcus pneumoniae vaccination  -     Pneumococcal Conjugate Vaccine (13 Valent) (IM)

## 2017-04-10 NOTE — MR AVS SNAPSHOT
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 Scheuermann, PA [facility] Hepatology [phone]    2017 2:30 PM Livia Cheney MD [facility] [place] [phone]    2017 1:00 PM Patti Brian MD [facility] Internal Medicine [phone]      Goals (5 Years of Data)     None      Follow-Up and Disposition     Return in about 3 months (around 7/10/2017) for 40 minute established physical.       These Medications        Disp Refills Start End    hydrocodone-acetaminophen 5-325mg (NORCO) 5-325 mg per tablet 60 tablet 0 4/10/2017     Take 1 tablet by mouth every 12 (twelve) hours as needed for Pain. - Oral    Pharmacy: [facility] - [place], [place] - [address] Ph #: [phone]         [facility]     [facility] Care [facility] -  Assistance  Unless otherwise directed by your provider, please contact [facility], our nurse care line that is available for  assistance.     Registered nurses in the [facility]  "Center provide: appointment scheduling, clinical advisement, health education, and other advisory services.  Call: [phone] (toll free)               Medications           Message regarding Medications     Verify the changes and/or additions to your medication regime listed below are the same as discussed with your clinician today.  If any of these changes or additions are incorrect, please notify your healthcare provider.             Verify that the below list of medications is an accurate representation of the medications you are currently taking.  If none reported, the list may be blank. If incorrect, please contact your healthcare provider. Carry this list with you in case of emergency.           Current Medications     calcium-vitamin D (OSCAL) 250 (625)-125 mg-unit per tablet Take 1 tablet by mouth once daily.    ciprofloxacin HCl (CILOXAN) 0.3 % ophthalmic solution INSTILL 1 DROP INTO RIGHT EAR TWICE A DAY FOR 7 DAYS    cyanocobalamin, vitamin B-12, (VITAMIN B-12) 50 mcg tablet Take 50 mcg by mouth once daily.    hydrocodone-acetaminophen 5-325mg (NORCO) 5-325 mg per tablet Take 1 tablet by mouth every 12 (twelve) hours as needed for Pain.    lisinopril 10 MG tablet Take 10 mg by mouth 2 (two) times daily.    lorazepam (ATIVAN) 1 MG tablet Take 1 tab PO BID PRN anxiety/panic attack    multivitamin capsule Take 1 capsule by mouth once daily.           Clinical Reference Information           Your Vitals Were     BP Pulse Height Weight BMI    140/88 103 5' 4" (1.626 m) 85.1 kg (187 lb 9.8 oz) 32.2 kg/m2      Blood Pressure          Most Recent Value    BP  (!)  140/88      Allergies as of 4/10/2017     Anaprox [Naproxen Sodium]    Motrin [Ibuprofen]    Neomycin-polymyxin-hc    Sulfa (Sulfonamide Antibiotics)      Immunizations Administered on Date of Encounter - 4/10/2017     Name Date Dose VIS Date Route    Pneumococcal Conjugate - 13 Valent 4/10/2017 0.5 mL 11/5/2015 Intramuscular      Orders Placed " During Today's Visit      Normal Orders This Visit    Pneumococcal Conjugate Vaccine (13 Valent) (IM)       [facility] Sign-Up     Activating your [facility] account is as easy as 1-2-3!     1) Visit [email or web address], select Sign Up Now, enter this activation code and your date of birth, then select Next.  [email or web address]  Expires: 5/25/2017 12:16 PM      2) Create a username and password to use when you visit [facility] in the future and select a security question in case you lose your password and select Next.    3) Enter your e-mail address and click Sign Up!    Additional Information  If you have questions, please e-mail [email or web address] or call [phone] to talk to our [facility] staff. Remember, [facility] is NOT to be used for urgent needs. For medical emergencies, dial 911.         Language Assistance Services     ATTENTION: Language assistance services are available, free of charge. Please call [phone].      ATENCIÓN: Si habla español, tiene a gipson disposición servicios gratuitos de asistencia lingüística. Llame al [phone].     PILI Ý: N?u b?n nói Ti?ng Vi?t, có các d?ch v? h? tr? ngôn ng? mi?n phí dành cho b?n. G?i s? [phone].         [facility] complies with applicable Federal civil rights laws and does not discriminate on the basis of race, color, national origin, age, disability, or sex.

## 2017-04-14 LAB
HCV GENTYP SERPL NAA+PROBE: ABNORMAL
HCV QUALITATIVE RESULT: DETECTED
HCV QUANTITATIVE LOG: 6.34 LOG (10) IU/ML
HCV RNA SPEC NAA+PROBE-ACNC: ABNORMAL IU/ML

## 2017-04-19 ENCOUNTER — OFFICE VISIT (OUTPATIENT)
Dept: HEPATOLOGY | Facility: CLINIC | Age: 72
End: 2017-04-19
Payer: MEDICARE

## 2017-04-19 ENCOUNTER — OFFICE VISIT (OUTPATIENT)
Dept: INTERNAL MEDICINE | Facility: CLINIC | Age: 72
End: 2017-04-19
Payer: MEDICARE

## 2017-04-19 ENCOUNTER — PROCEDURE VISIT (OUTPATIENT)
Dept: HEPATOLOGY | Facility: CLINIC | Age: 72
End: 2017-04-19
Attending: INTERNAL MEDICINE
Payer: MEDICARE

## 2017-04-19 ENCOUNTER — TELEPHONE (OUTPATIENT)
Dept: INTERNAL MEDICINE | Facility: CLINIC | Age: 72
End: 2017-04-19

## 2017-04-19 VITALS
SYSTOLIC BLOOD PRESSURE: 129 MMHG | HEIGHT: 64 IN | DIASTOLIC BLOOD PRESSURE: 76 MMHG | TEMPERATURE: 97 F | RESPIRATION RATE: 20 BRPM | WEIGHT: 176.13 LBS | BODY MASS INDEX: 30.07 KG/M2 | OXYGEN SATURATION: 96 % | HEART RATE: 104 BPM

## 2017-04-19 VITALS
WEIGHT: 174 LBS | HEIGHT: 64 IN | OXYGEN SATURATION: 99 % | TEMPERATURE: 98 F | SYSTOLIC BLOOD PRESSURE: 110 MMHG | DIASTOLIC BLOOD PRESSURE: 80 MMHG | BODY MASS INDEX: 29.71 KG/M2 | HEART RATE: 101 BPM

## 2017-04-19 DIAGNOSIS — R74.8 ABNORMAL TRANSAMINASES: ICD-10-CM

## 2017-04-19 DIAGNOSIS — B18.2 CHRONIC HEPATITIS C WITHOUT HEPATIC COMA: Primary | ICD-10-CM

## 2017-04-19 DIAGNOSIS — R39.15 URINARY URGENCY: Primary | ICD-10-CM

## 2017-04-19 DIAGNOSIS — B18.2 CHRONIC HEPATITIS C WITHOUT HEPATIC COMA: ICD-10-CM

## 2017-04-19 DIAGNOSIS — Q85.01 NEUROFIBROMATOSIS, TYPE 1 (VON RECKLINGHAUSEN'S DISEASE): ICD-10-CM

## 2017-04-19 DIAGNOSIS — R35.0 URINARY FREQUENCY: ICD-10-CM

## 2017-04-19 LAB
BILIRUB SERPL-MCNC: NORMAL MG/DL
BLOOD URINE, POC: NORMAL
COLOR, POC UA: NORMAL
GLUCOSE UR QL STRIP: NORMAL
KETONES UR QL STRIP: NORMAL
LEUKOCYTE ESTERASE URINE, POC: NORMAL
NITRITE, POC UA: NORMAL
PH, POC UA: 5
PROTEIN, POC: NORMAL
SPECIFIC GRAVITY, POC UA: 1.02
UROBILINOGEN, POC UA: 1

## 2017-04-19 PROCEDURE — 1125F AMNT PAIN NOTED PAIN PRSNT: CPT | Mod: S$GLB,,, | Performed by: PHYSICIAN ASSISTANT

## 2017-04-19 PROCEDURE — 1126F AMNT PAIN NOTED NONE PRSNT: CPT | Mod: S$GLB,,, | Performed by: INTERNAL MEDICINE

## 2017-04-19 PROCEDURE — 3078F DIAST BP <80 MM HG: CPT | Mod: S$GLB,,, | Performed by: PHYSICIAN ASSISTANT

## 2017-04-19 PROCEDURE — 99999 PR PBB SHADOW E&M-EST. PATIENT-LVL III: CPT | Mod: PBBFAC,,, | Performed by: PHYSICIAN ASSISTANT

## 2017-04-19 PROCEDURE — 3079F DIAST BP 80-89 MM HG: CPT | Mod: S$GLB,,, | Performed by: INTERNAL MEDICINE

## 2017-04-19 PROCEDURE — 3074F SYST BP LT 130 MM HG: CPT | Mod: S$GLB,,, | Performed by: INTERNAL MEDICINE

## 2017-04-19 PROCEDURE — 99999 PR PBB SHADOW E&M-EST. PATIENT-LVL IV: CPT | Mod: PBBFAC,,, | Performed by: INTERNAL MEDICINE

## 2017-04-19 PROCEDURE — 1157F ADVNC CARE PLAN IN RCRD: CPT | Mod: 8P,S$GLB,, | Performed by: PHYSICIAN ASSISTANT

## 2017-04-19 PROCEDURE — 91200 LIVER ELASTOGRAPHY: CPT | Mod: S$GLB,,, | Performed by: PHYSICIAN ASSISTANT

## 2017-04-19 PROCEDURE — 1159F MED LIST DOCD IN RCRD: CPT | Mod: S$GLB,,, | Performed by: PHYSICIAN ASSISTANT

## 2017-04-19 PROCEDURE — 1160F RVW MEDS BY RX/DR IN RCRD: CPT | Mod: S$GLB,,, | Performed by: INTERNAL MEDICINE

## 2017-04-19 PROCEDURE — 1159F MED LIST DOCD IN RCRD: CPT | Mod: S$GLB,,, | Performed by: INTERNAL MEDICINE

## 2017-04-19 PROCEDURE — 1160F RVW MEDS BY RX/DR IN RCRD: CPT | Mod: S$GLB,,, | Performed by: PHYSICIAN ASSISTANT

## 2017-04-19 PROCEDURE — 81002 URINALYSIS NONAUTO W/O SCOPE: CPT | Mod: S$GLB,,, | Performed by: INTERNAL MEDICINE

## 2017-04-19 PROCEDURE — 87086 URINE CULTURE/COLONY COUNT: CPT

## 2017-04-19 PROCEDURE — 3074F SYST BP LT 130 MM HG: CPT | Mod: S$GLB,,, | Performed by: PHYSICIAN ASSISTANT

## 2017-04-19 PROCEDURE — 99213 OFFICE O/P EST LOW 20 MIN: CPT | Mod: 25,S$GLB,, | Performed by: INTERNAL MEDICINE

## 2017-04-19 PROCEDURE — 99204 OFFICE O/P NEW MOD 45 MIN: CPT | Mod: S$GLB,,, | Performed by: PHYSICIAN ASSISTANT

## 2017-04-19 NOTE — PROGRESS NOTES
Subjective:       Patient ID: Lesli Gong is a 71 y.o. female.    Chief Complaint: Urinary Frequency    HPI Comments: Patient very worried and upset about eval for Hep C.  Not eating or drinking normally    Urinary Frequency    This is a new problem. The current episode started in the past 7 days. The problem occurs every urination. The problem has been unchanged. Quality: no pain. The pain is at a severity of 0/10. The patient is experiencing no pain. There has been no fever. The fever has been present for less than 1 day. She is not sexually active. Associated symptoms include frequency and urgency. Pertinent negatives include no chills, nausea, vomiting or rash. She has tried nothing for the symptoms. The treatment provided no relief.     Review of Systems   Constitutional: Negative for activity change, chills, fatigue and fever.   HENT: Negative for congestion, ear pain, nosebleeds, postnasal drip, sinus pressure and sore throat.    Eyes: Negative.  Negative for visual disturbance.   Respiratory: Negative for cough, chest tightness, shortness of breath and wheezing.    Cardiovascular: Negative for chest pain.   Gastrointestinal: Negative for abdominal pain, diarrhea, nausea and vomiting.   Genitourinary: Positive for frequency and urgency. Negative for difficulty urinating and dysuria.   Musculoskeletal: Negative for arthralgias and neck stiffness.   Skin: Negative for rash.   Neurological: Negative for dizziness, weakness and headaches.   Psychiatric/Behavioral: Negative for sleep disturbance. The patient is not nervous/anxious.        Objective:      Physical Exam   Abdominal: Bowel sounds are normal. She exhibits no mass. There is no tenderness. There is no rigidity, no guarding and no CVA tenderness.   Skin:            Assessment:       1. Urinary urgency    2. Urinary frequency    3. Chronic hepatitis C without hepatic coma    4. Neurofibromatosis, type 1 (von Recklinghausen's disease)        Plan:    Lesli was seen today for urinary frequency.    Diagnoses and all orders for this visit:    Urinary urgency  -     POCT URINE DIPSTICK WITHOUT MICROSCOPE  -     Urine culture    Urinary frequency  -     POCT URINE DIPSTICK WITHOUT MICROSCOPE  -     Urine culture    Chronic hepatitis C without hepatic coma    Neurofibromatosis, type 1 (von Recklinghausen's disease)

## 2017-04-19 NOTE — MR AVS SNAPSHOT
[facility] - Internal Medicine  [address]  [facility] [address]  Phone: [phone]  Fax: [phone]                  Lesli Gong   2017 12:40 PM   Office Visit    Description:  Female : 1945   Provider:  Daphne Garcia MD   Department:  [facility] - Internal Medicine           Reason for Visit     Urinary Frequency           Diagnoses this Visit        Comments    Urinary urgency    -  Primary     Urinary frequency                To Do List           Future Appointments        Provider Department Dept Phone    2017 2:30 PM Livia Cheney MD [facility] Sp[facility] Services [phone]    2017 1:00 PM Patti Brian MD [facility] Internal Medicine [phone]      Goals (5 Years of Data)     None      [facility]     [facility] On [facility] Nurse Care Line -  Assistance  Unless otherwise directed by your provider, please contact [facility], our nurse care line that is available for  assistance.     Registered nurses in the [facility] On Call Center provide: appointment scheduling, clinical advisement, health education, and other advisory services.  Call: [phone] (toll free)               Medications           Message regarding Medications     Verify the changes and/or additions to your medication regime listed below are the same as discussed with your clinician today.  If any of these changes or additions are incorrect, please notify your healthcare provider.             Verify that the below list of medications is an accurate representation of the medications you are currently taking.  If none reported, the list may be blank. If incorrect, please contact your healthcare provider. Carry this list with you in case of emergency.           Current Medications     calcium-vitamin D (OSCAL) 250 (625)-125 mg-unit per tablet Take 1 tablet by mouth once daily.    cyanocobalamin, vitamin B-12, (VITAMIN B-12) 50 mcg tablet Take 50 mcg by mouth once daily.     "lisinopril 10 MG tablet Take 10 mg by mouth 2 (two) times daily.    multivitamin capsule Take 1 capsule by mouth once daily.           Clinical Reference Information           Your Vitals Were     BP Pulse Temp Height Weight SpO2    110/80 101 97.8 °F (36.6 °C) 5' 4" (1.626 m) 78.9 kg (174 lb) 99%    BMI                29.87 kg/m2          Blood Pressure          Most Recent Value    BP  110/80      Allergies as of 4/19/2017     Anaprox [Naproxen Sodium]    Motrin [Ibuprofen]    Neomycin-polymyxin-hc    Sulfa (Sulfonamide Antibiotics)      Immunizations Administered on Date of Encounter - 4/19/2017     None      Orders Placed During Today's Visit      Normal Orders This Visit    POCT URINE DIPSTICK WITHOUT MICROSCOPE     Urine culture          4/19/2017  1:10 PM - Elise Terry MA      Component Results     Component    Color, UA    Sherice    Spec Grav UA    1.020    pH, UA    5    WBC, UA    neg    Nitrite, UA    neg    Protein    trace    Glucose, UA    normal    Ketones, UA    ++mod    Urobilinogen, UA    1    Bilirubin    +    Blood, UA    neg            [facility] Sign-Up     Activating your [facility] account is as easy as 1-2-3!     1) Visit [email or web address], select Sign Up Now, enter this activation code and your date of birth, then select Next.  [email or web address]  Expires: 5/25/2017 12:16 PM      2) Create a username and password to use when you visit [facility] in the future and select a security question in case you lose your password and select Next.    3) Enter your e-mail address and click Sign Up!    Additional Information  If you have questions, please e-mail [email or web address] or call [phone] to talk to our [facility] staff. Remember, [facility] is NOT to be used for urgent needs. For medical emergencies, dial 911.         Instructions    Try azo for your bladder       Language Assistance Services     ATTENTION: Language assistance services are available, free of charge. Please call " [phone].      ATENCIÓN: Si habla español, tiene a gipson disposición servicios gratuitos de asistencia lingüística. Llame al [phone].     CHÚ Ý: N?u b?n nói Ti?ng Vi?t, có các d?ch v? h? tr? ngôn ng? mi?n phí dành cho b?n. G?i s? [phone].         [facility] complies with applicable Federal civil rights laws and does not discriminate on the basis of race, color, national origin, age, disability, or sex.

## 2017-04-19 NOTE — PROCEDURES
Procedures   Name: Lesli Gong  Date of Procedure : 2017   :: Jennifer B Scheuermann, PA  Diagnosis: HCV  Probe: M    Fibroscan readin.3 KPa    IQR/med:2 %    Fibrosis:F 0-1

## 2017-04-19 NOTE — LETTER
April 19, 2017      Patti Brian MD  [address]  [place] [address]           [facility] - Hepatology  [address]  [facility] [address]  Phone: [phone]  Fax: [phone]          Patient: Lesli Gong   MR Number: [id number]   YOB: 1945   Date of Visit: 4/19/2017       Dear Dr. Patti Brian:    Thank you for referring Lesli Gong to me for evaluation. Attached you will find relevant portions of my assessment and plan of care.    If you have questions, please do not hesitate to call me. I look forward to following Lesli Gong along with you.    Sincerely,    Jennifer B. Scheuermann, PA    Enclosure  CC:  No Recipients    If you would like to receive this communication electronically, please contact [email or web address] or [phone] to request more information on [employer] access.    For providers and/or their staff who would like to refer a patient to [facility], please contact us through our one-stop-shop provider referral line, [facility], at [phone].    If you feel you have received this communication in error or would no longer like to receive these types of communications, please e-mail [email or web address]

## 2017-04-19 NOTE — MR AVS SNAPSHOT
[facility] - Hepatology  [address]  [facility] [address]  Phone: [phone]  Fax: [phone]                  Lesli Gong   2017 11:20 AM   Office Visit    Description:  Female : 1945   Provider:  Jennifer B. Scheuermann, PA   Department:  [facility] - Hepatology           Reason for Visit     Hepatitis C           Diagnoses this Visit        Comments    Chronic hepatitis C without hepatic coma    -  Primary     Abnormal transaminases                To Do List           Future Appointments        Provider Department Dept Phone    2017 2:30 PM Livia Cheney MD [facility] - Spine Services [phone]    2017 1:00 PM Patti Brian MD [facility] - Internal Medicine [phone]      Goals (5 Years of Data)     None      [facility]     [facility] On Call Nurse Care Line -  Assistance  Unless otherwise directed by your provider, please contact [facility], our nurse care line that is available for  assistance.     Registered nurses in the [facility] On Call Center provide: appointment scheduling, clinical advisement, health education, and other advisory services.  Call: [phone] (toll free)               Medications           Message regarding Medications     Verify the changes and/or additions to your medication regime listed below are the same as discussed with your clinician today.  If any of these changes or additions are incorrect, please notify your healthcare provider.        STOP taking these medications     ciprofloxacin HCl (CILOXAN) 0.3 % ophthalmic solution INSTILL 1 DROP INTO RIGHT EAR TWICE A DAY FOR 7 DAYS    hydrocodone-acetaminophen 5-325mg (NORCO) 5-325 mg per tablet Take 1 tablet by mouth every 12 (twelve) hours as needed for Pain.    lorazepam (ATIVAN) 1 MG tablet Take 1 tab PO BID PRN anxiety/panic attack           Verify that the below list of medications is an accurate representation of the medications you are currently taking.   "If none reported, the list may be blank. If incorrect, please contact your healthcare provider. Carry this list with you in case of emergency.           Current Medications     calcium-vitamin D (OSCAL) 250 (625)-125 mg-unit per tablet Take 1 tablet by mouth once daily.    cyanocobalamin, vitamin B-12, (VITAMIN B-12) 50 mcg tablet Take 50 mcg by mouth once daily.    lisinopril 10 MG tablet Take 10 mg by mouth 2 (two) times daily.    multivitamin capsule Take 1 capsule by mouth once daily.           Clinical Reference Information           Your Vitals Were     BP Pulse Temp Resp Height Weight    129/76 (BP Location: Right arm, Patient Position: Sitting, BP Method: Automatic) 104 97.1 °F (36.2 °C) (Oral) 20 5' 4" (1.626 m) 79.9 kg (176 lb 2.4 oz)    SpO2 BMI             96% 30.24 kg/m2         Blood Pressure          Most Recent Value    BP  129/76      Allergies as of 4/19/2017     Anaprox [Naproxen Sodium]    Motrin [Ibuprofen]    Neomycin-polymyxin-hc    Sulfa (Sulfonamide Antibiotics)      Immunizations Administered on Date of Encounter - 4/19/2017     None      Orders Placed During Today's Visit     Future Labs/Procedures Expected by Expires    HCV RNA NS5A RESISTANCE,RAEANN. 1  4/19/2017 6/18/2018    Hepatitis A antibody, IgG  As directed 4/19/2018    Hepatitis B core antibody, total  As directed 4/19/2018    Hepatitis B surface antibody  As directed 4/19/2018    Hepatitis B surface antigen  As directed 4/19/2018      [facility] Sign-Up     Activating your [facility] account is as easy as 1-2-3!     1) Visit [email or web address], select Sign Up Now, enter this activation code and your date of birth, then select Next.  [email or web address]  Expires: 5/25/2017 12:16 PM      2) Create a username and password to use when you visit [facility] in the future and select a security question in case you lose your password and select Next.    3) Enter your e-mail address and click Sign Up!    Additional Information  If you have " questions, please e-mail [email or web address] or call [phone] to talk to our [facility] staff. Remember, [facility] is NOT to be used for urgent needs. For medical emergencies, dial 911.         Language Assistance Services     ATTENTION: Language assistance services are available, free of charge. Please call [phone].      ATENCIÓN: Si habla español, tiene a gipson disposición servicios gratuitos de asistencia lingüística. Llame al [phone].     CHÚ Ý: N?u b?n nói Ti?ng Vi?t, có các d?ch v? h? tr? ngôn ng? mi?n phí dành cho b?n. G?i s? [phone].         [facility] complies with applicable Federal civil rights laws and does not discriminate on the basis of race, color, national origin, age, disability, or sex.

## 2017-04-19 NOTE — PROGRESS NOTES
HEPATOLOGY CLINIC VISIT NOTE - HCV clinic    REFERRING PROVIDER: Patti Brian MD    CHIEF COMPLAINT: Hepatitis C    HISTORY: This is a 71 y.o. Black or  female with recently diagnosed chronic hepatitis C, here for further eval / mngmt. She is very anxious about this diagnosis      HCV history:  Recently diagnosed after routine screening  Risk for HCV: blood transfusion             Denies IVDA, nasal drug use, tattoos   - treatment naive  - Genotype 1a  - HCV RNA 2,186,718 IU/mL - 2017    Liver staging:  FibroScan today kPa 5.3, F0-1    Labs and imaging are consistent with this.       Mild transaminase elevation w/ ALT>AST, Normal TBili, albumin 4.2   Normal plt and INR   Normal spleen / liver on CT 2016  No evidence of advanced liver disease, portal HTN or compromised liver function       Denies jaundice, dark urine, abdominal distention, hematemesis, melena, slowed mentation.   No abnormal skin rashes. No generalized joint pain.   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, malignant 2015    Mass 10-10-14    excision of mass/left shoulder    Neurofibromatosis, type 1 (von Recklinghausen's disease) 2014    Pheochromocytoma     S/p resection in     Soft tissue sarcoma of chest wall 2014       Past Surgical History:   Procedure Laterality Date    APPENDECTOMY      BILATERAL SALPINGOOPHORECTOMY      BREAST BIOPSY      BUNIONECTOMY Right      SECTION      EXPLORATORY LAPAROTOMY W/ BOWEL RESECTION      HYSTERECTOMY N/A     pheochromocytoma excision N/A     TONSILLECTOMY Bilateral        FAMILY HISTORY: Negative for liver disease    SOCIAL HISTORY:   Not .  History   Smoking Status    Never Smoker   Smokeless Tobacco    Never Used     Alcohol -  none  Drugs - none      ROS:   No fever, chills, weight loss, fatigue  No chest pain, palpitations, dyspnea, cough  (+) abdominal pain. No change in bowel pattern, nausea, vomiting  (+) dysuria. No hematuria   No skin rashes but numerous nodules / tumors consistent w/ hx of neurofibromatosis  No headaches  No lower extremity edema  No depression. (+) anxiety      PHYSICAL EXAM:  Friendly Black or  female, in no acute distress; alert and oriented to person, place and time  VITALS: reviewed  HEENT: Sclerae anicteric.   NECK: Supple  CVS: Regular rate and rhythm. No murmurs  LUNGS: Normal respiratory effort. Clear bilaterally  ABDOMEN: Flat, soft, nontender. No organomegaly or masses. No ascites or hernias. Good bowel sounds.    SKIN: Warm and dry. No jaundice, No obvious rashes. Numerous small tumors consistent w/ neurofibromatosis  EXTREMITIES: No lower extremity edema  NEURO/PSYCH: Normal gate. Memory intact. Thought and speech pattern appropriate. Behavior normal. No depression or anxiety noted.    RECENT LABS:  Lab Results   Component Value Date    WBC 6.27 2017    HGB 15.2 2017     2017     Lab Results   Component Value Date    INR 1.1 03/15/2017     Lab Results   Component Value Date    AST 32 2017    ALT 46 (H) 2017    BILITOT 0.8 2017    ALBUMIN 4.2 2017    ALKPHOS 108 2017    CREATININE 0.8 2017    BUN 13 2017     2017    K 4.7 2017 16:05   HIV 1/2 Ag/Ab Negative       RECENT IMAGIN2016 CT [facility] - normal liver and spleen. No ascites    ASSESSMENT  71 y.o. Black or  female with:  1. CHRONIC HEPATITIS C, GENOTYPE 1a - treatment naive  -- Fibroscan today - F0-1  -- Elevated transaminases  -- Unknown Immunity to HAV & HBV      EDUCATION:  The natural history of Hepatitis C, including potential progression to cirrhosis was reviewed. Complications of cirrhosis,  including hepatic decompensation, liver cancer, liver failure, need for liver transplant, and death were reviewed.     We discussed the increased progression of liver disease secondary to alcohol use; patient was advised to avoid alcohol completely.     Transmission of Hepatitis C was reviewed, including possible sexual transmission. Sexual contacts should be screened.   Risk of vertical transmission of Hepatitis C from mother to baby was reviewed. Children should be screened.   Patient should avoid sharing personal products such as razors, toothbrushes, etc.     We reviewed that vaccination against Hepatitis A and Hepatitis B is recommended if patient does not already have immunity.    Recommend avoiding raw seafood.  Limit acetaminophen to 2000mg daily.    HCV TREATMENT  Discussed goal of HCV eradication to prevent progression of liver disease.  Discussed use of Zepatier daily x 12 weeks w/ potential side effects of fatigue and headache.   Discussed addition of Ribavirin and extending to 16 weeks (based on lab results) w/ potential side effects of anemia, fatigue, rash, insomnia    Herbal / alternative therapies must be discontinued / avoided  Potential for drug drug interactions reviewed  - Current med list reviewed; no interactions noted  - Pt to notify me if other drugs are prescribed so potential interactions can be assessed    Importance of drug compliance reviewed w/ risk of treatment failure and viral resistance if pt is not adherent to regimen         PLAN:  1. Labs today:  HAVAB, HBsAb, HBsAg, HBcAb - vaccinate accordingly  NS5A resistance  2. Will move forward w/ either 12 weeks zepatier OR 16 weeks zepatier + RBV 1200mg based on labs    Significance of her minimal fibrosis discussed and reassurance provided

## 2017-04-20 LAB — BACTERIA UR CULT: NO GROWTH

## 2017-04-21 ENCOUNTER — TELEPHONE (OUTPATIENT)
Dept: INTERNAL MEDICINE | Facility: CLINIC | Age: 72
End: 2017-04-21

## 2017-04-24 ENCOUNTER — TELEPHONE (OUTPATIENT)
Dept: HEPATOLOGY | Facility: CLINIC | Age: 72
End: 2017-04-24

## 2017-04-24 RX ORDER — RIBAVIRIN 200 MG/1
1200 TABLET, FILM COATED ORAL 2 TIMES DAILY WITH MEALS
Qty: 168 TABLET | Refills: 3 | Status: SHIPPED | OUTPATIENT
Start: 2017-04-24 | End: 2017-09-09

## 2017-04-24 RX ORDER — ELBASVIR AND GRAZOPREVIR 50; 100 MG/1; MG/1
1 TABLET, FILM COATED ORAL DAILY
Qty: 28 TABLET | Refills: 3 | Status: SHIPPED | OUTPATIENT
Start: 2017-04-24 | End: 2017-09-12 | Stop reason: ALTCHOICE

## 2017-04-24 NOTE — TELEPHONE ENCOUNTER
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    I will send an Rx today to [facility] for zepatier (1 pill daliy) + Ribavirin 200mg (3 pills in AM w/ brkfst and 3 pills in PM w/ dinnner) for 16wks    Remind her to call me when she has her meds    2.) labs show she has protection against Hep A and Hep B and does NOT need this vaccine    thanks

## 2017-04-25 ENCOUNTER — TELEPHONE (OUTPATIENT)
Dept: PHARMACY | Facility: CLINIC | Age: 72
End: 2017-04-25

## 2017-04-25 NOTE — TELEPHONE ENCOUNTER
Attempted to speak with pt. Left message from provider. Given call back number.   Provider message also mailed.

## 2017-04-27 ENCOUNTER — TELEPHONE (OUTPATIENT)
Dept: PHARMACY | Facility: CLINIC | Age: 72
End: 2017-04-27

## 2017-04-27 ENCOUNTER — TELEPHONE (OUTPATIENT)
Dept: INTERNAL MEDICINE | Facility: CLINIC | Age: 72
End: 2017-04-27

## 2017-04-27 RX ORDER — LORAZEPAM 1 MG/1
TABLET ORAL
Qty: 60 TABLET | Refills: 0 | Status: SHIPPED | OUTPATIENT
Start: 2017-04-27 | End: 2017-05-25 | Stop reason: SDUPTHER

## 2017-04-27 NOTE — TELEPHONE ENCOUNTER
----- Message from Brie Garcia sent at 4/27/2017  3:22 PM CDT -----  Contact: Self/ [phone]   Type: Rx    Name of medication(s): diazepam (VALIUM) 2 MG tablet     Is this a refill? New rx? Refill     Who prescribed medication? Dr. Brian     Pharmacy Name, Phone, & Location: [facility]     Comments: pt is calling to receive a refill on the medication above. Please call and advise     Thank you

## 2017-04-27 NOTE — TELEPHONE ENCOUNTER
DOCUMENTATION ONLY  Zepatier & Ribavirin prior authorization approved on 04/27/2017 x 16 weeks.  Approval date: 04/27/2017 to 08/17/2017  PA# Unknown  Zepatier Co-pay: $7.70  Ribavirin Co-pay: $3.08    Ribavirin didNOT required prior authorization with insurance company.     VML @3:42pm

## 2017-04-28 ENCOUNTER — TELEPHONE (OUTPATIENT)
Dept: HEPATOLOGY | Facility: CLINIC | Age: 72
End: 2017-04-28

## 2017-04-28 NOTE — TELEPHONE ENCOUNTER
Returned call to pt. Informed her that medications have been approved.  Given number to specialty pharmacy to set up phone consult. Pt states she is sitting with her ill sister today and will have to call back on Monday when she can speak freely.

## 2017-04-28 NOTE — TELEPHONE ENCOUNTER
----- Message from Patti Goyal sent at 4/28/2017  9:40 AM CDT -----  Contact: pt  Called to see if insurance was contacted regarding Zepatier, please call back.

## 2017-05-01 ENCOUNTER — EPISODE CHANGES (OUTPATIENT)
Dept: HEPATOLOGY | Facility: CLINIC | Age: 72
End: 2017-05-01

## 2017-05-01 NOTE — TELEPHONE ENCOUNTER
Patient returned phone call back regarding specialty medication for Zepatier and Ribavirin. Inform patient medication prior authorization approved and copay. Patient scheduled for a phone consultation on Tues 05/02 @1pm and ship medication out on the same day Tues 05/02 address confirmed and patient request for a invoice to send back money order for the payment. Patient voiced understanding.

## 2017-05-02 ENCOUNTER — EPISODE CHANGES (OUTPATIENT)
Dept: HEPATOLOGY | Facility: CLINIC | Age: 72
End: 2017-05-02

## 2017-05-08 ENCOUNTER — TELEPHONE (OUTPATIENT)
Dept: HEPATOLOGY | Facility: CLINIC | Age: 72
End: 2017-05-08

## 2017-05-08 ENCOUNTER — EPISODE CHANGES (OUTPATIENT)
Dept: HEPATOLOGY | Facility: CLINIC | Age: 72
End: 2017-05-08

## 2017-05-08 DIAGNOSIS — B18.2 CHRONIC HEPATITIS C WITHOUT HEPATIC COMA: Primary | ICD-10-CM

## 2017-05-08 DIAGNOSIS — M89.8X1 PAIN OF LEFT SCAPULA: ICD-10-CM

## 2017-05-08 RX ORDER — HYDROCODONE BITARTRATE AND ACETAMINOPHEN 5; 325 MG/1; MG/1
TABLET ORAL
Qty: 60 TABLET | Refills: 0 | Status: SHIPPED | OUTPATIENT
Start: 2017-05-08 | End: 2017-06-09

## 2017-05-08 RX ORDER — HYDROCODONE BITARTRATE AND ACETAMINOPHEN 5; 325 MG/1; MG/1
TABLET ORAL
Qty: 60 TABLET | Refills: 0 | Status: SHIPPED | OUTPATIENT
Start: 2017-05-08 | End: 2017-05-08 | Stop reason: SDUPTHER

## 2017-05-08 NOTE — TELEPHONE ENCOUNTER
----- Message from Ethan Alaniz sent at 5/8/2017 12:55 PM CDT -----  Contact: self  or [phone]  RX request - refill or new RX.  Is this a refill or new RX:  Refill   RX name and strength: hydrocodone acetaminophen 5-325 mg  Directions: Take 1 tablet by mouth as needed for pain   Is this a 30 day or 90 day RX:  30     Please call pt when hard copy ready for pickup

## 2017-05-08 NOTE — TELEPHONE ENCOUNTER
Attempt made to reach patient.  Message from PA Scheuermann left on her VM and mailed to her.  Labs and F/u scheduled as ordered; reminder notices mailed.

## 2017-05-08 NOTE — TELEPHONE ENCOUNTER
----- Message from Francesca Russo sent at 5/8/2017 11:27 AM CDT -----  Contact: self/[phone]  Pt called in regards to getting a Rx for hydrocodone-acetaminophen 5-325mg per tablet 1 tablet.       [facility]  Please advise

## 2017-05-08 NOTE — TELEPHONE ENCOUNTER
Pt beginning 16 weeks of Zepatier + Ribavirin 1200mg on 5/4/17  1a, M28 mutation, F0-1,naive    Pls schedule  - CBC, CMP at week 2 - 5/17  - CBC, CMP, HCV RNA at week 4 - 5/31  - CBC, CMP at week 6 - 6/14    F/U VISIT IN 4-8 WEEKS    thanks

## 2017-05-15 ENCOUNTER — TELEPHONE (OUTPATIENT)
Dept: INTERNAL MEDICINE | Facility: CLINIC | Age: 72
End: 2017-05-15

## 2017-05-15 DIAGNOSIS — F41.9 ANXIETY: Primary | ICD-10-CM

## 2017-05-15 NOTE — TELEPHONE ENCOUNTER
Lorazepam was rx to [facility] on 4/27 for a 1 month supply. She should still have more of this medication.

## 2017-05-15 NOTE — TELEPHONE ENCOUNTER
----- Message from Nan Roblero sent at 5/15/2017  9:16 AM CDT -----  Contact: Pt [phone]  RX request - refill or new RX.  Is this a refill or new RX:  New  RX name and strength: Lorazepam 1 mg  Directions: 1 T BID  Is this a 30 day or 90 day RX:  60   Pharmacy name and phone #: [facility], [id number]  Comments:

## 2017-05-17 ENCOUNTER — TELEPHONE (OUTPATIENT)
Dept: HEPATOLOGY | Facility: CLINIC | Age: 72
End: 2017-05-17

## 2017-05-17 ENCOUNTER — EPISODE CHANGES (OUTPATIENT)
Dept: HEPATOLOGY | Facility: CLINIC | Age: 72
End: 2017-05-17

## 2017-05-17 ENCOUNTER — LAB VISIT (OUTPATIENT)
Dept: LAB | Facility: HOSPITAL | Age: 72
End: 2017-05-17
Attending: INTERNAL MEDICINE
Payer: MEDICARE

## 2017-05-17 DIAGNOSIS — B18.2 CHRONIC HEPATITIS C WITHOUT HEPATIC COMA: ICD-10-CM

## 2017-05-17 LAB
ALBUMIN SERPL BCP-MCNC: 4 G/DL
ALP SERPL-CCNC: 93 U/L
ALT SERPL W/O P-5'-P-CCNC: 14 U/L
ANION GAP SERPL CALC-SCNC: 7 MMOL/L
AST SERPL-CCNC: 17 U/L
BASOPHILS # BLD AUTO: 0 K/UL
BASOPHILS NFR BLD: 0 %
BILIRUB SERPL-MCNC: 1.4 MG/DL
BUN SERPL-MCNC: 29 MG/DL
CALCIUM SERPL-MCNC: 9.9 MG/DL
CHLORIDE SERPL-SCNC: 103 MMOL/L
CO2 SERPL-SCNC: 26 MMOL/L
CREAT SERPL-MCNC: 1.1 MG/DL
DIFFERENTIAL METHOD: ABNORMAL
EOSINOPHIL # BLD AUTO: 0.1 K/UL
EOSINOPHIL NFR BLD: 0.9 %
ERYTHROCYTE [DISTWIDTH] IN BLOOD BY AUTOMATED COUNT: 14 %
EST. GFR  (AFRICAN AMERICAN): 59.2 ML/MIN/1.73 M^2
EST. GFR  (NON AFRICAN AMERICAN): 51.3 ML/MIN/1.73 M^2
GLUCOSE SERPL-MCNC: 92 MG/DL
HCT VFR BLD AUTO: 41.5 %
HGB BLD-MCNC: 13.9 G/DL
LYMPHOCYTES # BLD AUTO: 2.5 K/UL
LYMPHOCYTES NFR BLD: 29.2 %
MCH RBC QN AUTO: 32 PG
MCHC RBC AUTO-ENTMCNC: 33.5 %
MCV RBC AUTO: 95 FL
MONOCYTES # BLD AUTO: 0.8 K/UL
MONOCYTES NFR BLD: 9.2 %
NEUTROPHILS # BLD AUTO: 5.2 K/UL
NEUTROPHILS NFR BLD: 59.9 %
PLATELET # BLD AUTO: 221 K/UL
PMV BLD AUTO: 10.3 FL
POTASSIUM SERPL-SCNC: 4.6 MMOL/L
PROT SERPL-MCNC: 8.2 G/DL
RBC # BLD AUTO: 4.35 M/UL
SODIUM SERPL-SCNC: 136 MMOL/L
WBC # BLD AUTO: 8.66 K/UL

## 2017-05-17 PROCEDURE — 80053 COMPREHEN METABOLIC PANEL: CPT

## 2017-05-17 PROCEDURE — 36415 COLL VENOUS BLD VENIPUNCTURE: CPT

## 2017-05-17 PROCEDURE — 85025 COMPLETE CBC W/AUTO DIFF WBC: CPT

## 2017-05-18 ENCOUNTER — TELEPHONE (OUTPATIENT)
Dept: PAIN MEDICINE | Facility: CLINIC | Age: 72
End: 2017-05-18

## 2017-05-18 ENCOUNTER — OFFICE VISIT (OUTPATIENT)
Dept: SPINE | Facility: CLINIC | Age: 72
End: 2017-05-18
Attending: ANESTHESIOLOGY
Payer: MEDICARE

## 2017-05-18 VITALS
BODY MASS INDEX: 29.71 KG/M2 | DIASTOLIC BLOOD PRESSURE: 72 MMHG | HEART RATE: 91 BPM | SYSTOLIC BLOOD PRESSURE: 145 MMHG | HEIGHT: 64 IN | WEIGHT: 174 LBS

## 2017-05-18 DIAGNOSIS — G89.29 OTHER CHRONIC PAIN: ICD-10-CM

## 2017-05-18 DIAGNOSIS — M25.512 CHRONIC LEFT SHOULDER PAIN: Primary | ICD-10-CM

## 2017-05-18 DIAGNOSIS — G89.29 CHRONIC LEFT SHOULDER PAIN: Primary | ICD-10-CM

## 2017-05-18 DIAGNOSIS — Q85.01 NEUROFIBROMATOSIS, TYPE 1 (VON RECKLINGHAUSEN'S DISEASE): ICD-10-CM

## 2017-05-18 PROCEDURE — 99214 OFFICE O/P EST MOD 30 MIN: CPT | Mod: 25,S$GLB,, | Performed by: ANESTHESIOLOGY

## 2017-05-18 PROCEDURE — 1160F RVW MEDS BY RX/DR IN RCRD: CPT | Mod: S$GLB,,, | Performed by: ANESTHESIOLOGY

## 2017-05-18 PROCEDURE — 1125F AMNT PAIN NOTED PAIN PRSNT: CPT | Mod: S$GLB,,, | Performed by: ANESTHESIOLOGY

## 2017-05-18 PROCEDURE — 3077F SYST BP >= 140 MM HG: CPT | Mod: S$GLB,,, | Performed by: ANESTHESIOLOGY

## 2017-05-18 PROCEDURE — 3078F DIAST BP <80 MM HG: CPT | Mod: S$GLB,,, | Performed by: ANESTHESIOLOGY

## 2017-05-18 PROCEDURE — 99999 PR PBB SHADOW E&M-EST. PATIENT-LVL III: CPT | Mod: PBBFAC,,, | Performed by: ANESTHESIOLOGY

## 2017-05-18 PROCEDURE — 1159F MED LIST DOCD IN RCRD: CPT | Mod: S$GLB,,, | Performed by: ANESTHESIOLOGY

## 2017-05-18 RX ORDER — ACETAMINOPHEN AND CODEINE PHOSPHATE 300; 30 MG/1; MG/1
1 TABLET ORAL EVERY 12 HOURS PRN
Qty: 60 TABLET | Refills: 0 | Status: SHIPPED | OUTPATIENT
Start: 2017-06-07 | End: 2017-06-09 | Stop reason: ALTCHOICE

## 2017-05-18 NOTE — TELEPHONE ENCOUNTER
Called spoke with Maxine Boykin he stated that patient has additional refills on Cream last refilled was February 2017 asked that patient could be sent refills he verified her address matched what we have on file.

## 2017-05-18 NOTE — TELEPHONE ENCOUNTER
HCV LAB REVIEW  Week 2 of Zepatier + Ribavirin 1200mg, planning on 16 weeks treatment  1a, M28 mutation, F0-1,naive    Pertinent labs:  5/17/17  CBC stable, Hgb 13.9  CMP stable      pls call pt:  Labs look good. Liver enzymes now normal. No anemia  - Continue Zepatier - 1 pill daily - don't miss any doses.  - Continue Ribavirin 1200mg - THREE 200mg pills in AM and in PM    Next labs due  - CBC, CMP, HCV RNA at week 4 - 5/31  - CBC, CMP at week 6 - 6/14

## 2017-05-18 NOTE — PROGRESS NOTES
Subjective:      Patient ID: Lesli Gong is a 71 y.o. female.    Chief Complaint: Back Pain and Knee Pain (bilateral)    Referred by: No ref. provider found     Pain Scales  Best: 8/10  Worst: 9/10  Usually: 4/10  Today: 9/10    Back Pain     Knee Pain          Interventional Pain History  ***    Review of Systems   Musculoskeletal: Positive for back pain.           Objective:      ***    Ortho/SPM Exam      Assessment:       No diagnosis found.      Plan:       There are no diagnoses linked to this encounter.     ***
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
## 2017-05-19 ENCOUNTER — TELEPHONE (OUTPATIENT)
Dept: ORTHOPEDICS | Facility: CLINIC | Age: 72
End: 2017-05-19

## 2017-05-19 NOTE — TELEPHONE ENCOUNTER
"Left voice mail for pt to return my call.   Want to verify what pt is coming in for today.  Notes on appointment say "shoulder"   Pt with previous foot issues also  "

## 2017-05-22 ENCOUNTER — TELEPHONE (OUTPATIENT)
Dept: INTERNAL MEDICINE | Facility: CLINIC | Age: 72
End: 2017-05-22

## 2017-05-22 NOTE — TELEPHONE ENCOUNTER
----- Message from Brit Allen sent at 5/22/2017  9:10 AM CDT -----  Contact: Self/[phone]  Pt states she will like antibiotic called into pharmacy.Pt states that she has a possible UTI. Pharmacy:  [facility]  [phone] (Phone) or [phone] (Fax)

## 2017-05-23 ENCOUNTER — TELEPHONE (OUTPATIENT)
Dept: PHARMACY | Facility: CLINIC | Age: 72
End: 2017-05-23

## 2017-05-23 ENCOUNTER — TELEPHONE (OUTPATIENT)
Dept: ORTHOPEDICS | Facility: CLINIC | Age: 72
End: 2017-05-23

## 2017-05-23 NOTE — TELEPHONE ENCOUNTER
----- Message from Alpa Ford sent at 5/23/2017  9:04 AM CDT -----  Contact: self   Pt wanted to tell Dr. Grayson thank you for helping her with her medication because she feels so much better now.

## 2017-05-24 ENCOUNTER — TELEPHONE (OUTPATIENT)
Dept: HEPATOLOGY | Facility: CLINIC | Age: 72
End: 2017-05-24

## 2017-05-24 ENCOUNTER — TELEPHONE (OUTPATIENT)
Dept: ORTHOPEDICS | Facility: CLINIC | Age: 72
End: 2017-05-24

## 2017-05-24 NOTE — TELEPHONE ENCOUNTER
----- Message from Lisa Meeks sent at 5/24/2017 11:42 AM CDT -----  Contact: patient   Requesting a return call needing refill please call

## 2017-05-24 NOTE — TELEPHONE ENCOUNTER
Returned call to pt. Informed her that pharmacy tried reaching her earlier to set up refill.  Given number to specialty pharmacy. Pt states she will call back today.

## 2017-05-24 NOTE — TELEPHONE ENCOUNTER
Tried contacting patient  no answer regarding her appointment tomorrow 5-25-17 with Rosa Tejada left message on cell phone , home phone voice mail box was full

## 2017-05-25 RX ORDER — LORAZEPAM 1 MG/1
TABLET ORAL
Qty: 60 TABLET | Refills: 0 | Status: SHIPPED | OUTPATIENT
Start: 2017-05-25 | End: 2017-06-09 | Stop reason: ALTCHOICE

## 2017-05-25 NOTE — TELEPHONE ENCOUNTER
----- Message from Annalee Flores sent at 5/25/2017  7:45 AM CDT -----  Contact: Self/[phone] home/[phone]   RX request - refill or new RX.  Is this a refill or new RX:  Refill  RX name and strength: lorazepam (ATIVAN) 1 MG tablet  Directions: TAKE ONE TABLET BY MOUTH TWICE A DAY AS NEEDED FOR ANXIETY/PANIC ATTACK  Is this a 30 day or 90 day RX:  30  Pharmacy name and phone #: [facility]       [phone]   Comments: pt said that she needs the medication early because her  is in the hospital and she is a little upset. Please call and advise      Thank you

## 2017-05-29 ENCOUNTER — TELEPHONE (OUTPATIENT)
Dept: HEPATOLOGY | Facility: CLINIC | Age: 72
End: 2017-05-29

## 2017-05-29 NOTE — TELEPHONE ENCOUNTER
----- Message from Monserrat Barroso sent at 5/29/2017  9:34 AM CDT -----  Contact: pt  Said she received med by mail and it is not right she needs to speak with someone right away she is almost out of ribavirin (COPEGUS) 200 MG tablet and has questions about the ZEPATIER  mg Tab as well  please call her asap is worried wants a call today this # first @ # [phone] sitting for a friend

## 2017-05-29 NOTE — TELEPHONE ENCOUNTER
Returned call to pt.   She states that she only received Zepatier in the mail. She did not receive any Ribavirin.   She has a few Zepatier left at home, to get through tomorrow, but still needs refill.    Informed pt that message will be sent to pharmacy for clarification.   Please contact her at her sister's number today: [phone].

## 2017-05-31 ENCOUNTER — EPISODE CHANGES (OUTPATIENT)
Dept: HEPATOLOGY | Facility: CLINIC | Age: 72
End: 2017-05-31

## 2017-05-31 ENCOUNTER — LAB VISIT (OUTPATIENT)
Dept: LAB | Facility: HOSPITAL | Age: 72
End: 2017-05-31
Attending: INTERNAL MEDICINE
Payer: MEDICARE

## 2017-05-31 DIAGNOSIS — B18.2 CHRONIC HEPATITIS C WITHOUT HEPATIC COMA: ICD-10-CM

## 2017-05-31 LAB
ALBUMIN SERPL BCP-MCNC: 3.5 G/DL
ALP SERPL-CCNC: 96 U/L
ALT SERPL W/O P-5'-P-CCNC: 14 U/L
ANION GAP SERPL CALC-SCNC: 8 MMOL/L
AST SERPL-CCNC: 13 U/L
BASOPHILS # BLD AUTO: 0 K/UL
BASOPHILS NFR BLD: 0 %
BILIRUB SERPL-MCNC: 0.6 MG/DL
BUN SERPL-MCNC: 12 MG/DL
CALCIUM SERPL-MCNC: 9 MG/DL
CHLORIDE SERPL-SCNC: 108 MMOL/L
CO2 SERPL-SCNC: 22 MMOL/L
CREAT SERPL-MCNC: 0.7 MG/DL
DIFFERENTIAL METHOD: ABNORMAL
EOSINOPHIL # BLD AUTO: 0.1 K/UL
EOSINOPHIL NFR BLD: 1.3 %
ERYTHROCYTE [DISTWIDTH] IN BLOOD BY AUTOMATED COUNT: 15.1 %
EST. GFR  (AFRICAN AMERICAN): >60 ML/MIN/1.73 M^2
EST. GFR  (NON AFRICAN AMERICAN): >60 ML/MIN/1.73 M^2
GLUCOSE SERPL-MCNC: 104 MG/DL
HCT VFR BLD AUTO: 35.6 %
HGB BLD-MCNC: 11.8 G/DL
LYMPHOCYTES # BLD AUTO: 1.5 K/UL
LYMPHOCYTES NFR BLD: 27 %
MCH RBC QN AUTO: 31.6 PG
MCHC RBC AUTO-ENTMCNC: 33.1 %
MCV RBC AUTO: 95 FL
MONOCYTES # BLD AUTO: 0.4 K/UL
MONOCYTES NFR BLD: 6.8 %
NEUTROPHILS # BLD AUTO: 3.6 K/UL
NEUTROPHILS NFR BLD: 64.4 %
PLATELET # BLD AUTO: 199 K/UL
PMV BLD AUTO: 9.3 FL
POTASSIUM SERPL-SCNC: 4.1 MMOL/L
PROT SERPL-MCNC: 7.6 G/DL
RBC # BLD AUTO: 3.74 M/UL
SODIUM SERPL-SCNC: 138 MMOL/L
WBC # BLD AUTO: 5.56 K/UL

## 2017-05-31 PROCEDURE — 85025 COMPLETE CBC W/AUTO DIFF WBC: CPT

## 2017-05-31 PROCEDURE — 80053 COMPREHEN METABOLIC PANEL: CPT

## 2017-05-31 PROCEDURE — 87522 HEPATITIS C REVRS TRNSCRPJ: CPT

## 2017-06-02 ENCOUNTER — TELEPHONE (OUTPATIENT)
Dept: HEPATOLOGY | Facility: CLINIC | Age: 72
End: 2017-06-02

## 2017-06-02 DIAGNOSIS — B18.2 CHRONIC HEPATITIS C WITHOUT HEPATIC COMA: ICD-10-CM

## 2017-06-02 LAB
HCV LOG: 1.58 LOG (10) IU/ML
HCV RNA QUANT PCR: 38 IU/ML
HCV, QUALITATIVE: DETECTED IU/ML

## 2017-06-02 NOTE — TELEPHONE ENCOUNTER
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 (+) anemia. HCV has dropped from > 2 million down to 38  - Continue Zepatier - 1 pill daily - don't miss any doses.  - Continue Ribavirin 1200mg - THREE 200mg pills in AM and in PM    Please assess for anemia symptoms.  If she is doing fine we can continue current ribavirin dose and move up next lab draw to NEXT WEEK - CBC, CMP 6/7    If having symptoms let me know

## 2017-06-05 NOTE — TELEPHONE ENCOUNTER
"Pt returned call. Given message from provider. Pt denies any symptoms of anemia. She stated, "I feel good. Haven't noticed any changes at all."  Instructed to continue meds at current doses.  Labs scheduled on 6/7.    "

## 2017-06-09 ENCOUNTER — LAB VISIT (OUTPATIENT)
Dept: LAB | Facility: HOSPITAL | Age: 72
End: 2017-06-09
Attending: INTERNAL MEDICINE
Payer: MEDICARE

## 2017-06-09 ENCOUNTER — OFFICE VISIT (OUTPATIENT)
Dept: INTERNAL MEDICINE | Facility: CLINIC | Age: 72
End: 2017-06-09
Payer: MEDICARE

## 2017-06-09 VITALS
DIASTOLIC BLOOD PRESSURE: 74 MMHG | BODY MASS INDEX: 31.1 KG/M2 | SYSTOLIC BLOOD PRESSURE: 108 MMHG | WEIGHT: 182.19 LBS | HEIGHT: 64 IN | HEART RATE: 81 BPM

## 2017-06-09 DIAGNOSIS — B18.2 CHRONIC HEPATITIS C WITHOUT HEPATIC COMA: ICD-10-CM

## 2017-06-09 DIAGNOSIS — Q85.01 NEUROFIBROMATOSIS, TYPE 1 (VON RECKLINGHAUSEN'S DISEASE): ICD-10-CM

## 2017-06-09 DIAGNOSIS — F41.9 ANXIETY: Primary | ICD-10-CM

## 2017-06-09 DIAGNOSIS — I10 ESSENTIAL HYPERTENSION: ICD-10-CM

## 2017-06-09 LAB
ALBUMIN SERPL BCP-MCNC: 3.8 G/DL
ALP SERPL-CCNC: 95 U/L
ALT SERPL W/O P-5'-P-CCNC: 17 U/L
ANION GAP SERPL CALC-SCNC: 7 MMOL/L
AST SERPL-CCNC: 14 U/L
BASOPHILS # BLD AUTO: 0 K/UL
BASOPHILS NFR BLD: 0 %
BILIRUB SERPL-MCNC: 1 MG/DL
BUN SERPL-MCNC: 12 MG/DL
CALCIUM SERPL-MCNC: 9.1 MG/DL
CHLORIDE SERPL-SCNC: 105 MMOL/L
CO2 SERPL-SCNC: 26 MMOL/L
CREAT SERPL-MCNC: 0.9 MG/DL
DIFFERENTIAL METHOD: ABNORMAL
EOSINOPHIL # BLD AUTO: 0.1 K/UL
EOSINOPHIL NFR BLD: 0.8 %
ERYTHROCYTE [DISTWIDTH] IN BLOOD BY AUTOMATED COUNT: 15.5 %
EST. GFR  (AFRICAN AMERICAN): >60 ML/MIN/1.73 M^2
EST. GFR  (NON AFRICAN AMERICAN): >60 ML/MIN/1.73 M^2
GLUCOSE SERPL-MCNC: 91 MG/DL
HCT VFR BLD AUTO: 38.7 %
HGB BLD-MCNC: 12.4 G/DL
LYMPHOCYTES # BLD AUTO: 1.6 K/UL
LYMPHOCYTES NFR BLD: 25.8 %
MCH RBC QN AUTO: 31.7 PG
MCHC RBC AUTO-ENTMCNC: 32 %
MCV RBC AUTO: 99 FL
MONOCYTES # BLD AUTO: 0.5 K/UL
MONOCYTES NFR BLD: 8.2 %
NEUTROPHILS # BLD AUTO: 4 K/UL
NEUTROPHILS NFR BLD: 64.7 %
PLATELET # BLD AUTO: 270 K/UL
PMV BLD AUTO: 9.8 FL
POTASSIUM SERPL-SCNC: 4.4 MMOL/L
PROT SERPL-MCNC: 7.4 G/DL
RBC # BLD AUTO: 3.91 M/UL
SODIUM SERPL-SCNC: 138 MMOL/L
WBC # BLD AUTO: 6.12 K/UL

## 2017-06-09 PROCEDURE — 99999 PR PBB SHADOW E&M-EST. PATIENT-LVL III: CPT | Mod: PBBFAC,,, | Performed by: INTERNAL MEDICINE

## 2017-06-09 PROCEDURE — 1126F AMNT PAIN NOTED NONE PRSNT: CPT | Mod: S$GLB,,, | Performed by: INTERNAL MEDICINE

## 2017-06-09 PROCEDURE — 85025 COMPLETE CBC W/AUTO DIFF WBC: CPT

## 2017-06-09 PROCEDURE — 1159F MED LIST DOCD IN RCRD: CPT | Mod: S$GLB,,, | Performed by: INTERNAL MEDICINE

## 2017-06-09 PROCEDURE — 99214 OFFICE O/P EST MOD 30 MIN: CPT | Mod: S$GLB,,, | Performed by: INTERNAL MEDICINE

## 2017-06-09 PROCEDURE — 36415 COLL VENOUS BLD VENIPUNCTURE: CPT

## 2017-06-09 PROCEDURE — 80053 COMPREHEN METABOLIC PANEL: CPT

## 2017-06-09 RX ORDER — DIAZEPAM 2 MG/1
2 TABLET ORAL EVERY 12 HOURS PRN
Qty: 60 TABLET | Refills: 3 | Status: SHIPPED | OUTPATIENT
Start: 2017-06-09 | End: 2017-07-03 | Stop reason: SDUPTHER
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
## 2017-06-12 ENCOUNTER — LAB VISIT (OUTPATIENT)
Dept: LAB | Facility: HOSPITAL | Age: 72
End: 2017-06-12
Attending: INTERNAL MEDICINE
Payer: MEDICARE

## 2017-06-12 DIAGNOSIS — F41.9 ANXIETY: ICD-10-CM

## 2017-06-12 LAB
AMPHET+METHAMPHET UR QL: NEGATIVE
BARBITURATES UR QL SCN>200 NG/ML: NORMAL
BENZODIAZ UR QL SCN>200 NG/ML: NORMAL
BZE UR QL SCN: NEGATIVE
CANNABINOIDS UR QL SCN: NEGATIVE
CREAT UR-MCNC: 94 MG/DL
ETHANOL UR-MCNC: <10 MG/DL
METHADONE UR QL SCN>300 NG/ML: NEGATIVE
OPIATES UR QL SCN: NEGATIVE
PCP UR QL SCN>25 NG/ML: NEGATIVE
TOXICOLOGY INFORMATION: NORMAL

## 2017-06-12 PROCEDURE — 80307 DRUG TEST PRSMV CHEM ANLYZR: CPT

## 2017-06-14 ENCOUNTER — TELEPHONE (OUTPATIENT)
Dept: INTERNAL MEDICINE | Facility: CLINIC | Age: 72
End: 2017-06-14

## 2017-06-14 NOTE — TELEPHONE ENCOUNTER
Please tell her that I recommend she speak to her pain management doctor regarding her pain medications.   Please let her know that I received the results of her urine screen. It showed benzodiazepines as expected from the diazepam but also showed barbiturates. Is she taking phenobarbital or any non-prescribed medications?

## 2017-06-14 NOTE — TELEPHONE ENCOUNTER
----- Message from Chen Vallejo sent at 6/14/2017  1:56 PM CDT -----  Contact: Self/[phone]  Prescription Request:     Name of medication: Pain Medication    Reason for request: Pain in shoulder since yesterday    Pharmacy: [facility] [address]    Please notify patient when RX has been sent.    Thanks

## 2017-06-15 ENCOUNTER — LAB VISIT (OUTPATIENT)
Dept: LAB | Facility: HOSPITAL | Age: 72
End: 2017-06-15
Attending: INTERNAL MEDICINE
Payer: MEDICARE

## 2017-06-15 ENCOUNTER — EPISODE CHANGES (OUTPATIENT)
Dept: HEPATOLOGY | Facility: CLINIC | Age: 72
End: 2017-06-15

## 2017-06-15 ENCOUNTER — OFFICE VISIT (OUTPATIENT)
Dept: HEPATOLOGY | Facility: CLINIC | Age: 72
End: 2017-06-15
Payer: MEDICARE

## 2017-06-15 ENCOUNTER — TELEPHONE (OUTPATIENT)
Dept: PHARMACY | Facility: CLINIC | Age: 72
End: 2017-06-15

## 2017-06-15 VITALS
DIASTOLIC BLOOD PRESSURE: 70 MMHG | SYSTOLIC BLOOD PRESSURE: 134 MMHG | TEMPERATURE: 97 F | BODY MASS INDEX: 31.66 KG/M2 | RESPIRATION RATE: 16 BRPM | HEIGHT: 64 IN | HEART RATE: 96 BPM | OXYGEN SATURATION: 98 % | WEIGHT: 185.44 LBS

## 2017-06-15 DIAGNOSIS — B18.2 CHRONIC HEPATITIS C WITHOUT HEPATIC COMA: ICD-10-CM

## 2017-06-15 DIAGNOSIS — B18.2 CHRONIC HEPATITIS C WITHOUT HEPATIC COMA: Primary | ICD-10-CM

## 2017-06-15 LAB
ALBUMIN SERPL BCP-MCNC: 3.6 G/DL
ALP SERPL-CCNC: 104 U/L
ALT SERPL W/O P-5'-P-CCNC: 13 U/L
ANION GAP SERPL CALC-SCNC: 8 MMOL/L
AST SERPL-CCNC: 13 U/L
BASOPHILS # BLD AUTO: 0 K/UL
BASOPHILS NFR BLD: 0 %
BILIRUB SERPL-MCNC: 0.5 MG/DL
BUN SERPL-MCNC: 11 MG/DL
CALCIUM SERPL-MCNC: 9.9 MG/DL
CHLORIDE SERPL-SCNC: 107 MMOL/L
CO2 SERPL-SCNC: 26 MMOL/L
CREAT SERPL-MCNC: 0.8 MG/DL
DIFFERENTIAL METHOD: ABNORMAL
EOSINOPHIL # BLD AUTO: 0.1 K/UL
EOSINOPHIL NFR BLD: 2.2 %
ERYTHROCYTE [DISTWIDTH] IN BLOOD BY AUTOMATED COUNT: 15 %
EST. GFR  (AFRICAN AMERICAN): >60 ML/MIN/1.73 M^2
EST. GFR  (NON AFRICAN AMERICAN): >60 ML/MIN/1.73 M^2
GLUCOSE SERPL-MCNC: 92 MG/DL
HCT VFR BLD AUTO: 40.4 %
HGB BLD-MCNC: 12.9 G/DL
LYMPHOCYTES # BLD AUTO: 1.4 K/UL
LYMPHOCYTES NFR BLD: 24.9 %
MCH RBC QN AUTO: 31.6 PG
MCHC RBC AUTO-ENTMCNC: 31.9 %
MCV RBC AUTO: 99 FL
MONOCYTES # BLD AUTO: 0.8 K/UL
MONOCYTES NFR BLD: 14.4 %
NEUTROPHILS # BLD AUTO: 3.1 K/UL
NEUTROPHILS NFR BLD: 57.8 %
PLATELET # BLD AUTO: 248 K/UL
PMV BLD AUTO: 9.9 FL
POTASSIUM SERPL-SCNC: 4.6 MMOL/L
PROT SERPL-MCNC: 7.9 G/DL
RBC # BLD AUTO: 4.08 M/UL
SODIUM SERPL-SCNC: 141 MMOL/L
WBC # BLD AUTO: 5.43 K/UL

## 2017-06-15 PROCEDURE — 99213 OFFICE O/P EST LOW 20 MIN: CPT | Mod: S$GLB,,, | Performed by: PHYSICIAN ASSISTANT

## 2017-06-15 PROCEDURE — 1126F AMNT PAIN NOTED NONE PRSNT: CPT | Mod: S$GLB,,, | Performed by: PHYSICIAN ASSISTANT

## 2017-06-15 PROCEDURE — 1159F MED LIST DOCD IN RCRD: CPT | Mod: S$GLB,,, | Performed by: PHYSICIAN ASSISTANT

## 2017-06-15 PROCEDURE — 99999 PR PBB SHADOW E&M-EST. PATIENT-LVL IV: CPT | Mod: PBBFAC,,, | Performed by: PHYSICIAN ASSISTANT

## 2017-06-15 NOTE — PROGRESS NOTES
HEPATOLOGY CLINIC VISIT NOTE - HCV clinic    CHIEF COMPLAINT: Hepatitis C - on treatment    HISTORY: This is a 71 y.o. Black or  female with chronic hepatitis C, here for f/u. Since last visit she has started HCV therapy.       Interval history:  Began 16 wks Zepatier + RBV 1200mg on 17; presently starting 7th week of treatment   - Baseline HCV RNA 2,186,718 IU/mL  - week 4 HCV RNA 38 IU/mL  Transaminases have normalized    Hgb has remained stable w/ current value 12.9  Has not required RBV dose reduction    Tolerating therapy well  No missed doses  No side effects  Denies headache, fatigue, skin rash, n/v/d      HCV history:  Recently diagnosed 3/2017 after routine screening  Risk for HCV: blood transfusion 1970s            Denies IVDA, nasal drug use, tattoos   - treatment naive prior to Zepatier  - Genotype 1a  - NS5A resistance mutation (17) - M28V      Liver staging:  FibroScan 17 - kPa 5.3, F0-1    Labs and imaging are consistent with this.       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, malignant 2015    Mass 10-10-14    excision of mass/left shoulder    Neurofibromatosis, type 1 (von Recklinghausen's disease) 2014    Pheochromocytoma     S/p resection in     Soft tissue sarcoma of chest wall 2014       Past Surgical History:   Procedure Laterality Date    APPENDECTOMY      BILATERAL SALPINGOOPHORECTOMY      BREAST BIOPSY      BUNIONECTOMY Right      SECTION      EXPLORATORY LAPAROTOMY W/ BOWEL RESECTION      HYSTERECTOMY N/A     pheochromocytoma excision N/A     TONSILLECTOMY Bilateral         FAMILY HISTORY: Negative for liver disease    SOCIAL HISTORY:   Not .  History   Smoking Status    Never Smoker   Smokeless Tobacco    Never Used     Alcohol - none  Drugs - none      ROS:   No fever, chills, weight loss, fatigue  No chest pain, palpitations, dyspnea, cough  No abdominal pain. No change in bowel pattern, nausea, vomiting  No skin rashes but numerous nodules / tumors consistent w/ hx of neurofibromatosis  No headaches  No lower extremity edema  No depression. (+) anxiety      PHYSICAL EXAM:  Friendly Black or  female, in no acute distress; alert and oriented to person, place and time  VITALS: reviewed  HEENT: Sclerae anicteric.   NECK: Supple  LUNGS: Normal respiratory effort.  ABDOMEN: Flat, soft, nontender.   SKIN: Warm and dry. No jaundice, No obvious rashes. Numerous small tumors consistent w/ neurofibromatosis  NEURO/PSYCH: Normal gate. Memory intact. Thought and speech pattern appropriate. Behavior normal. No depression or anxiety noted.    RECENT LABS:  Lab Results   Component Value Date    WBC 5.43 06/15/2017    HGB 12.9 06/15/2017     06/15/2017     Lab Results   Component Value Date    INR 1.1 03/15/2017     Lab Results   Component Value Date    AST 13 06/15/2017    ALT 13 06/15/2017    BILITOT 0.5 06/15/2017    ALBUMIN 3.6 06/15/2017    ALKPHOS 104 06/15/2017    CREATININE 0.8 06/15/2017    BUN 11 06/15/2017     06/15/2017    K 4.6 06/15/2017     RECENT IMAGIN2016 CT [facility] in [facility] - normal liver and spleen. No ascites    ASSESSMENT  71 y.o. Black or  female with:  1. CHRONIC HEPATITIS C, GENOTYPE 1a - on treatment   -- week 7 HCV rx w/ Zepatier + RBV  -- Fibroscan today - F0-1  -- (+) Immunity to HAV & HBV      EDUCATION:  HCV TREATMENT  Discussed plan of 16 weeks of Zepatier + Ribavirin  Discussed small chance of viral relapse after therapy is complete. Will monitor HCV RNA x 12 weeks post treatment to determine  SVR12  Discussed that SVR12 is equivalent to CURE. Relapse would not be expected at that point. No immunity will be conferred. Could be reinfected.       PLAN:  1. Continue zepatier + RBV 1200mg x 16 weeks total  2. CBC and CMP due - 7/10

## 2017-06-20 ENCOUNTER — TELEPHONE (OUTPATIENT)
Dept: PAIN MEDICINE | Facility: CLINIC | Age: 72
End: 2017-06-20

## 2017-06-20 ENCOUNTER — TELEPHONE (OUTPATIENT)
Dept: ORTHOPEDICS | Facility: CLINIC | Age: 72
End: 2017-06-20

## 2017-06-20 NOTE — TELEPHONE ENCOUNTER
----- Message from Saray Ruffin sent at 6/20/2017  2:49 PM CDT -----  X_  1st Request  _  2nd Request  _  3rd Request        Who: JERRI DERAS [[id number]]    Why: Pt would like to know if Dr. Cheney can prescribe something for her shoulder pain. She's rubbing her shoulder with the cream he gave her but she's still having pain. Please call to discuss.    What Number to Call Back:[phone]    When to Expect a call back: (Before the end of the day)   -- if the call is after 12:00, the call back will be tomorrow.                        Returned pt call, no answer, left voicemail message

## 2017-06-20 NOTE — TELEPHONE ENCOUNTER
Spoke with patient regarding pain medication an her appointment in orthopedics with Rosa Tejada on 6-22-17 , patient should  make follow up appointment in pain management for shoulder pain ,Rosa can not give pain medication .Appointment will be cancel

## 2017-06-21 ENCOUNTER — TELEPHONE (OUTPATIENT)
Dept: PHARMACY | Facility: CLINIC | Age: 72
End: 2017-06-21

## 2017-06-21 ENCOUNTER — TELEPHONE (OUTPATIENT)
Dept: PAIN MEDICINE | Facility: CLINIC | Age: 72
End: 2017-06-21

## 2017-06-21 NOTE — TELEPHONE ENCOUNTER
Patient returned phone call back regarding specialty medication refill for Zepatier & Ribavirin. She informed Zepatier is once daily and have 2 tablets left and Ribavirin 3 tablets BID with meals. She scheduled to ship medication out on Thurs 06/22 address confirmed and copay $0. She voiced understanding.     Please call [phone] if medication cant be ship out.

## 2017-06-21 NOTE — TELEPHONE ENCOUNTER
----- Message from Bindu Perez sent at 6/21/2017 10:06 AM CDT -----  Contact: Patient  _  1st Request  x_  2nd Request  _  3rd Request        Who: JERRI DERAS [[id number]]     Why: Pt would like to know if Dr. Cheney can prescribe something for her shoulder pain. She's rubbing her shoulder with the cream he gave her but she's still having pain. Please call to discuss.     What Number to Call Back:[phone]      When to Expect a call back: (Before the end of the day)                        -- if the call is after 12:00, the call back will be tomorrow.

## 2017-06-22 ENCOUNTER — TELEPHONE (OUTPATIENT)
Dept: ORTHOPEDICS | Facility: CLINIC | Age: 72
End: 2017-06-22

## 2017-06-22 NOTE — TELEPHONE ENCOUNTER
Spoke with pt.   Pt requested pain medication or an appointment with Dr Grayson for her shoulder pain.   Advised pt that Dr Grayson does not treat shoulder pain.  Offered pt an appointment with Dr Nixon or a PA.  Pt will call back if she decides she wants to schedule.

## 2017-06-22 NOTE — TELEPHONE ENCOUNTER
----- Message from Melinda Haas sent at 6/22/2017  2:04 PM CDT -----  Contact: self  Pt was returning your call. Pt can be reached at [phone].

## 2017-07-03 DIAGNOSIS — F41.9 ANXIETY: ICD-10-CM

## 2017-07-03 RX ORDER — DIAZEPAM 2 MG/1
2 TABLET ORAL EVERY 12 HOURS PRN
Qty: 30 TABLET | Refills: 0 | Status: SHIPPED | OUTPATIENT
Start: 2017-07-03 | End: 2017-07-27 | Stop reason: SDUPTHER

## 2017-07-03 RX ORDER — LISINOPRIL 10 MG/1
10 TABLET ORAL 2 TIMES DAILY
Status: CANCELLED | OUTPATIENT
Start: 2017-07-03

## 2017-07-03 NOTE — TELEPHONE ENCOUNTER
----- Message from Curt Harding sent at 7/3/2017  9:25 AM CDT -----  Contact: self [phone]  Type: Rx    Name of medication(s): lisinopril 10 MG tablet    Is this a refill? New rx? refill      Who prescribed medication?    Pharmacy Name, Phone, & Location: [facility] from [facility]     Comments: please advise, Thanks !

## 2017-07-03 NOTE — TELEPHONE ENCOUNTER
----- Message from Gayathri Vidal sent at 7/3/2017 10:03 AM CDT -----  Contact: self/[phone]      RX request - refill or new RX.  Is this a refill or new RX:  Refill  RX name and strength: diazePAM (VALIUM) 2 MG tablet  Directions: Take 1 tablet (2 mg total) by mouth every 12 (twelve) hours as needed for Anxiety. - Oral  Is this a 30 day or 90 day RX:    Pharmacy name and phone #: [facility]/pharmacy #[id number] - [place] - [address]  # [phone]   Comments:  Please call and advise.       Thank you!!!

## 2017-07-10 ENCOUNTER — EPISODE CHANGES (OUTPATIENT)
Dept: HEPATOLOGY | Facility: CLINIC | Age: 72
End: 2017-07-10

## 2017-07-10 ENCOUNTER — LAB VISIT (OUTPATIENT)
Dept: LAB | Facility: HOSPITAL | Age: 72
End: 2017-07-10
Attending: INTERNAL MEDICINE
Payer: MEDICARE

## 2017-07-10 ENCOUNTER — TELEPHONE (OUTPATIENT)
Dept: HEPATOLOGY | Facility: CLINIC | Age: 72
End: 2017-07-10

## 2017-07-10 DIAGNOSIS — B18.2 CHRONIC HEPATITIS C WITHOUT HEPATIC COMA: Primary | ICD-10-CM

## 2017-07-10 DIAGNOSIS — B18.2 CHRONIC HEPATITIS C WITHOUT HEPATIC COMA: ICD-10-CM

## 2017-07-10 LAB
ALBUMIN SERPL BCP-MCNC: 3.8 G/DL
ALP SERPL-CCNC: 91 U/L
ALT SERPL W/O P-5'-P-CCNC: 16 U/L
ANION GAP SERPL CALC-SCNC: 8 MMOL/L
AST SERPL-CCNC: 15 U/L
BASOPHILS # BLD AUTO: 0 K/UL
BASOPHILS NFR BLD: 0 %
BILIRUB SERPL-MCNC: 0.7 MG/DL
BUN SERPL-MCNC: 14 MG/DL
CALCIUM SERPL-MCNC: 9.1 MG/DL
CHLORIDE SERPL-SCNC: 111 MMOL/L
CO2 SERPL-SCNC: 23 MMOL/L
CREAT SERPL-MCNC: 0.8 MG/DL
DIFFERENTIAL METHOD: ABNORMAL
EOSINOPHIL # BLD AUTO: 0.1 K/UL
EOSINOPHIL NFR BLD: 2.3 %
ERYTHROCYTE [DISTWIDTH] IN BLOOD BY AUTOMATED COUNT: 14.9 %
EST. GFR  (AFRICAN AMERICAN): >60 ML/MIN/1.73 M^2
EST. GFR  (NON AFRICAN AMERICAN): >60 ML/MIN/1.73 M^2
GLUCOSE SERPL-MCNC: 74 MG/DL
HCT VFR BLD AUTO: 35.4 %
HGB BLD-MCNC: 11.1 G/DL
LYMPHOCYTES # BLD AUTO: 1.1 K/UL
LYMPHOCYTES NFR BLD: 21.9 %
MCH RBC QN AUTO: 31.8 PG
MCHC RBC AUTO-ENTMCNC: 31.4 %
MCV RBC AUTO: 101 FL
MONOCYTES # BLD AUTO: 0.6 K/UL
MONOCYTES NFR BLD: 11.5 %
NEUTROPHILS # BLD AUTO: 3.1 K/UL
NEUTROPHILS NFR BLD: 64.1 %
PLATELET # BLD AUTO: 182 K/UL
PMV BLD AUTO: 9.2 FL
POTASSIUM SERPL-SCNC: 4.8 MMOL/L
PROT SERPL-MCNC: 7.3 G/DL
RBC # BLD AUTO: 3.49 M/UL
SODIUM SERPL-SCNC: 142 MMOL/L
WBC # BLD AUTO: 4.79 K/UL

## 2017-07-10 PROCEDURE — 85025 COMPLETE CBC W/AUTO DIFF WBC: CPT

## 2017-07-10 PROCEDURE — 80053 COMPREHEN METABOLIC PANEL: CPT

## 2017-07-10 PROCEDURE — 36415 COLL VENOUS BLD VENIPUNCTURE: CPT

## 2017-07-10 NOTE — TELEPHONE ENCOUNTER
HCV LAB REVIEW  Week 10 of Zepatier + Ribavirin 1200mg, planning on 16 weeks treatment  1a, M28 mutation, F0-1,naive    Pertinent labs:  7/10/17  CBC stable, Hgb 11.1  CMP stable      pls call pt:  Liver labs normal. (+) anemia but it is stable  - Continue Zepatier - 1 pill daily - don't miss any doses.  - Continue Ribavirin 1200mg - THREE 200mg pills in AM and in PM    pls schedule  CBC, CMP - wk 13 - 8/2  CBC, CMP, HCV RNA - end of Rx - wk 16 - 8/23

## 2017-07-12 ENCOUNTER — TELEPHONE (OUTPATIENT)
Dept: PHARMACY | Facility: CLINIC | Age: 72
End: 2017-07-12

## 2017-07-12 ENCOUNTER — TELEPHONE (OUTPATIENT)
Dept: HEPATOLOGY | Facility: CLINIC | Age: 72
End: 2017-07-12

## 2017-07-12 ENCOUNTER — OFFICE VISIT (OUTPATIENT)
Dept: INTERNAL MEDICINE | Facility: CLINIC | Age: 72
End: 2017-07-12
Payer: MEDICARE

## 2017-07-12 VITALS
OXYGEN SATURATION: 98 % | HEIGHT: 64 IN | DIASTOLIC BLOOD PRESSURE: 78 MMHG | SYSTOLIC BLOOD PRESSURE: 134 MMHG | WEIGHT: 184.5 LBS | BODY MASS INDEX: 31.5 KG/M2 | HEART RATE: 92 BPM

## 2017-07-12 DIAGNOSIS — F41.9 ANXIETY: ICD-10-CM

## 2017-07-12 DIAGNOSIS — B18.2 CHRONIC HEPATITIS C WITHOUT HEPATIC COMA: ICD-10-CM

## 2017-07-12 DIAGNOSIS — I10 ESSENTIAL HYPERTENSION: ICD-10-CM

## 2017-07-12 DIAGNOSIS — Q85.01 NEUROFIBROMATOSIS, TYPE 1 (VON RECKLINGHAUSEN'S DISEASE): ICD-10-CM

## 2017-07-12 DIAGNOSIS — Z00.00 HEALTH CARE MAINTENANCE: Primary | ICD-10-CM

## 2017-07-12 PROCEDURE — 99397 PER PM REEVAL EST PAT 65+ YR: CPT | Mod: S$GLB,,, | Performed by: INTERNAL MEDICINE

## 2017-07-12 PROCEDURE — 99999 PR PBB SHADOW E&M-EST. PATIENT-LVL III: CPT | Mod: PBBFAC,,, | Performed by: INTERNAL MEDICINE

## 2017-07-12 RX ORDER — DULOXETIN HYDROCHLORIDE 30 MG/1
30 CAPSULE, DELAYED RELEASE ORAL DAILY
Qty: 30 CAPSULE | Refills: 5 | Status: SHIPPED | OUTPATIENT
Start: 2017-07-12 | End: 2017-08-24

## 2017-07-12 NOTE — PROGRESS NOTES
"Subjective:       Patient ID: Lesli Gong is a 71 y.o. female.    Chief Complaint: Annual Exam    HPI   70 yo F here for annual exam.     Hep C - plan to treat with Zepatier + Ribavirin. Her medication went missing. She is getting new pills soon. Repeat labs due.     She reports her ex- who is an alcoholic has been around the house intermittently.     Review of Systems   Constitutional: Negative for fever.   HENT: Negative.    Eyes: Negative.    Respiratory: Negative for shortness of breath.    Cardiovascular: Negative for chest pain and leg swelling.   Gastrointestinal: Negative for abdominal pain, diarrhea, nausea and vomiting.   Genitourinary: Negative.    Musculoskeletal: Positive for arthralgias and myalgias.   Skin: Negative.  Negative for rash.   Psychiatric/Behavioral: Negative.        Objective:   /78 (BP Location: Left arm, Patient Position: Sitting, BP Method: Manual)   Pulse 92   Ht 5' 4" (1.626 m)   Wt 83.7 kg (184 lb 8 oz)   SpO2 98%   BMI 31.67 kg/m²      Physical Exam   Constitutional: She is oriented to person, place, and time. She appears well-developed and well-nourished. No distress.   HENT:   Head: Normocephalic and atraumatic.   Cardiovascular: Normal rate and regular rhythm.    Pulmonary/Chest: Effort normal. No respiratory distress. She has no wheezes. She has no rales.   Neurological: She is alert and oriented to person, place, and time.   Skin: Skin is warm and dry. She is not diaphoretic.   Psychiatric: She has a normal mood and affect. Her behavior is normal.   Anxious appearing, pressure speech       Assessment:       1. Health care maintenance    2. Chronic hepatitis C without hepatic coma    3. Neurofibromatosis, type 1 (von Recklinghausen's disease)    4. Anxiety    5. Essential hypertension        Plan:       Lesli was seen today for annual exam.    Diagnoses and all orders for this visit:    Health care maintenance  cscope done at  about a year ago and dexa " ""several years" prior  Will request records  Patient given written rx to receive tdap and shingles vaccine at pharmacy    Chronic hepatitis C without hepatic coma  Undergoing treatment by hepatology    Neurofibromatosis, type 1 (von Recklinghausen's disease)  Stable,  Anxiety  -    start duloxetine (CYMBALTA) 30 MG capsule; Take 1 capsule (30 mg total) by mouth once daily.  -     Ambulatory referral to Psychiatry    Essential hypertension   At goal, continue current meds              "

## 2017-07-12 NOTE — TELEPHONE ENCOUNTER
Patient called and LVM informing [facility] that she has taken her last dose of Zepatier this morning. She has plenty of Ribavirin on hand though. I explained that she may have misplaced a 14 d/s sleeve from the carton. Our last conversation with her on 6/22/17 - she had 2 doses remaining, so she should have enough medication for another week and a half at least. She was asked to search her home for the remaining Zepatier supply. In the meantime, [facility] will try to contact insurance (refill too soon until 7/14/17) to get override to fill and extra month OR try to get emergency supply from .

## 2017-07-12 NOTE — TELEPHONE ENCOUNTER
[phone]   [facility] Help Desk: She informed patient's plan does not allow an override for an early fill.     Member Services: [phone]- Patient will need to call [facility] member services to request for an early override.     Patient: [phone]- Unable to reach out to patient regarding the early refill for Zepatier & RBV.    Member Services/Patient (conference phone call): Submit an override to insurance company to get Zepatier & RBV fill early. Awaiting for insurance to call back.

## 2017-07-12 NOTE — TELEPHONE ENCOUNTER
----- Message from Jade Naranjo sent at 7/12/2017  8:23 AM CDT -----  Contact: pt   Calling to get a refill on her medication. Please call  [phone]

## 2017-07-13 NOTE — TELEPHONE ENCOUNTER
Patient called back about Zepatier refill. She drove here as we were talking and Rx for Zepatier and RBV $0 copay were [facility] b/c she could not find our pharmacy - 2 patient identifiers confirmed upon delivery - name and . Patient will look for Social Security award letter and mail it into [facility] to proceed with further assistance/coverage of replacement for lost Zepatier (~ 14 day supply).

## 2017-07-13 NOTE — TELEPHONE ENCOUNTER
We were able to get an override to fill her Zepatier and RBV today, 7/13/17. UMAIR Cotto, is confirming that insurance also approved another month of medication to replace lost/stolen supply. We will also obtain income information (# per household and SS award letter) to proceed with trying to obtain ~14 day supply to replace lost drug from [facility].     LM to inform patient to  Zepatier+ RBV today to avoid any missed doses AND to provide income information. Pending patient response.    normal...

## 2017-07-14 ENCOUNTER — TELEPHONE (OUTPATIENT)
Dept: INTERNAL MEDICINE | Facility: CLINIC | Age: 72
End: 2017-07-14

## 2017-07-14 DIAGNOSIS — Z12.11 COLON CANCER SCREENING: Primary | ICD-10-CM

## 2017-07-14 NOTE — TELEPHONE ENCOUNTER
Bone density testing done 1/27/15 at  received   No colonoscopy on file at .     I recommend she have colonoscopy done. Is she willing for me to have this ordered at [facility]?

## 2017-07-26 NOTE — TELEPHONE ENCOUNTER
"Spoke with patient-states she is not interested in having a colonoscopy ordered at this time.  She also requested I tell PCP "the pills I was given for my nerves haven't helped at all."  I went through her med list to see which medication she was referencing, but the patient was not able to confirm which medication she was given.  "

## 2017-07-26 NOTE — TELEPHONE ENCOUNTER
Perhaps she would be willing to do the FIT stool collection instead. I have ordered this if she is willing to do so she can  from our office. I am guessing she means duloxetine - we only started it a few weeks ago so please give it a little longer to take effect and set up with psychiatry like we discussed.   Thanks Kinjal!

## 2017-07-27 ENCOUNTER — TELEPHONE (OUTPATIENT)
Dept: INTERNAL MEDICINE | Facility: CLINIC | Age: 72
End: 2017-07-27

## 2017-07-27 ENCOUNTER — TELEPHONE (OUTPATIENT)
Dept: RADIOLOGY | Facility: HOSPITAL | Age: 72
End: 2017-07-27

## 2017-07-27 DIAGNOSIS — F41.9 ANXIETY: ICD-10-CM

## 2017-07-27 RX ORDER — DIAZEPAM 2 MG/1
2 TABLET ORAL EVERY 12 HOURS PRN
Qty: 12 TABLET | Refills: 0 | Status: SHIPPED | OUTPATIENT
Start: 2017-07-27 | End: 2017-08-24

## 2017-07-27 NOTE — TELEPHONE ENCOUNTER
----- Message from Annalee Flores sent at 7/27/2017  9:08 AM CDT -----  Contact: Self/[phone] cell   Pt said that she is calling in regards to needing to let the doctor know that the duloxetine (CYMBALTA) 30 MG capsule she is currently on is not working for her pt wants to know if she can be put back on the diazePAM (VALIUM) 2 MG tablet. Please call and advise              Thanks!!!

## 2017-07-27 NOTE — TELEPHONE ENCOUNTER
She has not been on the duloxetine long enough. This medication can take a few weeks to work. Please continue it. Will send a few diazepam she can take PRN in addition in meantime. Please advise pt to contact psychiatry to schedule with them.

## 2017-07-28 ENCOUNTER — HOSPITAL ENCOUNTER (OUTPATIENT)
Dept: RADIOLOGY | Facility: HOSPITAL | Age: 72
Discharge: HOME | End: 2017-07-28
Attending: NURSE PRACTITIONER
Payer: MEDICARE

## 2017-07-28 DIAGNOSIS — C49.A3 GIST (GASTROINTESTINAL STROMAL TUMOR) OF SMALL BOWEL, MALIGNANT: ICD-10-CM

## 2017-07-28 PROCEDURE — 74177 CT ABD & PELVIS W/CONTRAST: CPT | Mod: 26,,, | Performed by: RADIOLOGY

## 2017-07-28 PROCEDURE — 71260 CT THORAX DX C+: CPT | Mod: TC

## 2017-07-28 PROCEDURE — 74177 CT ABD & PELVIS W/CONTRAST: CPT | Mod: TC

## 2017-07-28 PROCEDURE — 25500020 PHARM REV CODE 255: Performed by: NURSE PRACTITIONER

## 2017-07-28 PROCEDURE — 71260 CT THORAX DX C+: CPT | Mod: 26,,, | Performed by: RADIOLOGY

## 2017-07-28 RX ADMIN — IOHEXOL 75 ML: 350 INJECTION, SOLUTION INTRAVENOUS at 10:07

## 2017-07-28 RX ADMIN — IOHEXOL 15 ML: 350 INJECTION, SOLUTION INTRAVENOUS at 08:07

## 2017-07-28 RX ADMIN — IOHEXOL 15 ML: 350 INJECTION, SOLUTION INTRAVENOUS at 09:07

## 2017-07-31 ENCOUNTER — OFFICE VISIT (OUTPATIENT)
Dept: HEMATOLOGY/ONCOLOGY | Facility: CLINIC | Age: 72
End: 2017-07-31
Payer: MEDICARE

## 2017-07-31 VITALS
HEART RATE: 89 BPM | WEIGHT: 180.56 LBS | SYSTOLIC BLOOD PRESSURE: 130 MMHG | DIASTOLIC BLOOD PRESSURE: 77 MMHG | HEIGHT: 64 IN | OXYGEN SATURATION: 100 % | TEMPERATURE: 98 F | BODY MASS INDEX: 30.83 KG/M2 | RESPIRATION RATE: 18 BRPM

## 2017-07-31 DIAGNOSIS — Z85.09 HISTORY OF GASTROINTESTINAL STROMAL TUMOR (GIST): Primary | ICD-10-CM

## 2017-07-31 DIAGNOSIS — M25.512 ACUTE PAIN OF LEFT SHOULDER: ICD-10-CM

## 2017-07-31 PROCEDURE — 1159F MED LIST DOCD IN RCRD: CPT | Mod: S$GLB,,, | Performed by: INTERNAL MEDICINE

## 2017-07-31 PROCEDURE — 1125F AMNT PAIN NOTED PAIN PRSNT: CPT | Mod: S$GLB,,, | Performed by: INTERNAL MEDICINE

## 2017-07-31 PROCEDURE — 99214 OFFICE O/P EST MOD 30 MIN: CPT | Mod: S$GLB,,, | Performed by: INTERNAL MEDICINE

## 2017-07-31 PROCEDURE — 99999 PR PBB SHADOW E&M-EST. PATIENT-LVL IV: CPT | Mod: PBBFAC,,, | Performed by: INTERNAL MEDICINE

## 2017-07-31 RX ORDER — OXYCODONE AND ACETAMINOPHEN 5; 325 MG/1; MG/1
1 TABLET ORAL EVERY 6 HOURS PRN
Qty: 10 TABLET | Refills: 0 | Status: SHIPPED | OUTPATIENT
Start: 2017-07-31 | End: 2017-08-24

## 2017-07-31 NOTE — PROGRESS NOTES
Subjective:       Patient ID: Lesli Gong is a 71 y.o. female.    Chief Complaint: 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, malign and 6/10 left shoulder pain (fall)  Oncologic History:  Ms. Gong is a 71-year-old female with a diagnosis of type 1 neurofibromatosis, who was referred to see Dr. Camilo for evaluation of an abdominal mass found. 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 Final pathology from that revealed neurofibroma with atypia and invasion into the skeletal muscle rather than liposarcoma. She underwent a PET scan, which revealed a diffuse low-level activity as a surgical defect and a focus of high-level activity in or near a loop of the small bowel, probably   jejunum in the left upper quadrant. She was recommended to undergo CT scan for further evaluation of the bowel lesion, which she did on 05/12/2015 and that revealed a soft tissue mass in the left upper quadrant abutting a loop of the proximal jejunum, and she was referred to undergo surgery. She underwent exploratory laparotomy, lysis of adhesions and excision of two approximately 5   cm masses as well as small bowel resection with primary repair on 05/25/2015. Pathology revealed gastrointestinal stromal tumor 345, size range from 1 to 3.5 cm involving the small bowel. GIST subtype is mixed 1 mitotic rate per 50 high power fields. Necrosis is present in 30%, low-grade tumor, Ki67 is less than 1% in the tumor cells showing nuclear staining.  She did not want to do Gleevac. She is on surveillance.       [facility] scans which reveals no evidence of recurrence.    Review of Systems   Constitutional: Negative for appetite change, fatigue and unexpected weight change.   HENT: Negative for mouth sores.    Eyes: Negative for visual  disturbance.   Respiratory: Negative for cough and shortness of breath.    Cardiovascular: Negative for chest pain.   Gastrointestinal: Negative for abdominal pain and diarrhea.   Genitourinary: Negative for frequency.   Musculoskeletal: Negative for back pain.   Skin: Negative for rash.   Neurological: Negative for headaches.   Hematological: Negative for adenopathy.   Psychiatric/Behavioral: The patient is not nervous/anxious.    All other systems reviewed and are negative.      Objective:      Physical Exam   Constitutional: She is oriented to person, place, and time. She appears well-developed and well-nourished.   HENT:   Mouth/Throat: No oropharyngeal exudate.   Cardiovascular: Normal rate and normal heart sounds.    Pulmonary/Chest: Effort normal and breath sounds normal. She has no wheezes.   Abdominal: Soft. Bowel sounds are normal. There is no tenderness.   Musculoskeletal: She exhibits no edema or tenderness.   Lymphadenopathy:     She has no cervical adenopathy.   Neurological: She is alert and oriented to person, place, and time. Coordination normal.   Skin: Skin is warm and dry. No rash noted.   Psychiatric: She has a normal mood and affect. Judgment and thought content normal.   Vitals reviewed.      LABS:  WBC   Date Value Ref Range Status   07/28/2017 4.35 3.90 - 12.70 K/uL Final     Hemoglobin   Date Value Ref Range Status   07/28/2017 11.6 (L) 12.0 - 16.0 g/dL Final     Hematocrit   Date Value Ref Range Status   07/28/2017 37.1 37.0 - 48.5 % Final     Platelets   Date Value Ref Range Status   07/28/2017 177 150 - 350 K/uL Final     Gran #   Date Value Ref Range Status   07/28/2017 2.7 1.8 - 7.7 K/uL Final     Comment:     The ANC is based on a white cell differential from an   automated cell counter. It has not been microscopically   reviewed for the presence of abnormal cells. Clinical   correlation is required.         Chemistry        Component Value Date/Time     07/28/2017 1057    K 4.8  07/28/2017 1057     07/28/2017 1057    CO2 25 07/28/2017 1057    BUN 10 07/28/2017 1057    CREATININE 0.7 07/28/2017 1057    GLU 87 07/28/2017 1057        Component Value Date/Time    CALCIUM 9.4 07/28/2017 1057    ALKPHOS 98 07/28/2017 1057    AST 16 07/28/2017 1057    ALT 17 07/28/2017 1057    BILITOT 1.5 (H) 07/28/2017 1057    ESTGFRAFRICA >60.0 07/28/2017 1057    EGFRNONAA >60.0 07/28/2017 1057          Assessment:       1. History of gastrointestinal stromal tumor (GIST)    2. Acute pain of left shoulder        Plan:        1. She is doing well off of GLeevac and will continue on surveillance and will return in 6 months with labs.  2. Gave # 10 tabs of percocet for pain s/p fall. She feels Advil and tylenol is helping. She understands we will not be giving out more pain meds.    Above care plan was discussed with patient and all questions were addressed to her satisfaction          Distress Screening Results: Psychosocial Distress screening score of Distress Score: 3 noted and reviewed. No intervention indicated.

## 2017-08-02 ENCOUNTER — EPISODE CHANGES (OUTPATIENT)
Dept: HEPATOLOGY | Facility: CLINIC | Age: 72
End: 2017-08-02

## 2017-08-02 ENCOUNTER — LAB VISIT (OUTPATIENT)
Dept: LAB | Facility: HOSPITAL | Age: 72
End: 2017-08-02
Attending: INTERNAL MEDICINE
Payer: MEDICARE

## 2017-08-02 ENCOUNTER — TELEPHONE (OUTPATIENT)
Dept: HEPATOLOGY | Facility: CLINIC | Age: 72
End: 2017-08-02

## 2017-08-02 DIAGNOSIS — B18.2 CHRONIC HEPATITIS C WITHOUT HEPATIC COMA: ICD-10-CM

## 2017-08-02 LAB
ALBUMIN SERPL BCP-MCNC: 3.9 G/DL
ALP SERPL-CCNC: 106 U/L
ALT SERPL W/O P-5'-P-CCNC: 13 U/L
ANION GAP SERPL CALC-SCNC: 9 MMOL/L
AST SERPL-CCNC: 15 U/L
BASOPHILS # BLD AUTO: 0 K/UL
BASOPHILS NFR BLD: 0 %
BILIRUB SERPL-MCNC: 1.3 MG/DL
BUN SERPL-MCNC: 12 MG/DL
CALCIUM SERPL-MCNC: 9.2 MG/DL
CHLORIDE SERPL-SCNC: 104 MMOL/L
CO2 SERPL-SCNC: 21 MMOL/L
CREAT SERPL-MCNC: 0.7 MG/DL
DIFFERENTIAL METHOD: ABNORMAL
EOSINOPHIL # BLD AUTO: 0.1 K/UL
EOSINOPHIL NFR BLD: 1.1 %
ERYTHROCYTE [DISTWIDTH] IN BLOOD BY AUTOMATED COUNT: 14 %
EST. GFR  (AFRICAN AMERICAN): >60 ML/MIN/1.73 M^2
EST. GFR  (NON AFRICAN AMERICAN): >60 ML/MIN/1.73 M^2
GLUCOSE SERPL-MCNC: 91 MG/DL
HCT VFR BLD AUTO: 37.5 %
HGB BLD-MCNC: 11.8 G/DL
LYMPHOCYTES # BLD AUTO: 0.9 K/UL
LYMPHOCYTES NFR BLD: 16.1 %
MCH RBC QN AUTO: 31.3 PG
MCHC RBC AUTO-ENTMCNC: 31.5 G/DL
MCV RBC AUTO: 100 FL
MONOCYTES # BLD AUTO: 0.3 K/UL
MONOCYTES NFR BLD: 6.2 %
NEUTROPHILS # BLD AUTO: 4.1 K/UL
NEUTROPHILS NFR BLD: 76.2 %
PLATELET # BLD AUTO: 201 K/UL
PMV BLD AUTO: 9.2 FL
POTASSIUM SERPL-SCNC: 3.7 MMOL/L
PROT SERPL-MCNC: 7.6 G/DL
RBC # BLD AUTO: 3.77 M/UL
SODIUM SERPL-SCNC: 134 MMOL/L
WBC # BLD AUTO: 5.34 K/UL

## 2017-08-02 PROCEDURE — 80053 COMPREHEN METABOLIC PANEL: CPT

## 2017-08-02 PROCEDURE — 85025 COMPLETE CBC W/AUTO DIFF WBC: CPT

## 2017-08-02 PROCEDURE — 36415 COLL VENOUS BLD VENIPUNCTURE: CPT

## 2017-08-02 NOTE — TELEPHONE ENCOUNTER
HCV LAB REVIEW  Week 13 of Zepatier + Ribavirin 1200mg, planning on 16 weeks treatment  1a, M28 mutation, F0-1,naive    Pertinent labs:  8/2/17  CBC stable, Hgb 11.8  CMP stable      pls call pt:  Liver labs normal. (+) anemia but it is stable  - Continue Zepatier - 1 pill daily - don't miss any doses.  - Continue Ribavirin 1200mg - THREE 200mg pills in AM and in PM  Only a couple weeks left    pls schedule  CBC, CMP, HCV RNA - end of Rx - wk 16 - 8/23

## 2017-08-02 NOTE — TELEPHONE ENCOUNTER
Attempt made to reach patient.  Message from PA Scheuermann left on her VM.  Labs already scheduled.

## 2017-08-03 ENCOUNTER — EPISODE CHANGES (OUTPATIENT)
Dept: HEPATOLOGY | Facility: CLINIC | Age: 72
End: 2017-08-03

## 2017-08-03 ENCOUNTER — TELEPHONE (OUTPATIENT)
Dept: PHARMACY | Facility: CLINIC | Age: 72
End: 2017-08-03

## 2017-08-03 NOTE — TELEPHONE ENCOUNTER
Zepatier (5 of 5 - loss/misplaced fill) final refill arrangement. Confirmed 2 patient identifiers - name and . He/she has ~7 doses remaining - she was not home to give exact count. Dosing confirmed - Zepatier: one tablet daily, Ribavirin 200m tablets daily. No side effects or missed doses reported. He/she confirms no changes to insurance or health and has no further questions at this time. Patient asked to have medication shipped on 17 to arrive at patient's home on 17. $0 copay (004). Address confirmed - no signature requirement requested.  All questions answered and addressed to patients satisfaction.     Routing provider - this is replacement for ~14 doses lost/stolen. Provider to advise if she should be remainder to clinic or complete additional ~14 doses. TTN

## 2017-08-03 NOTE — TELEPHONE ENCOUNTER
pls call pt  Tell her that she should finish taking 1 zepatier pill daily until they run out - this will be a total of 18 weeks of treatment  She may run out of ribavirin prior to finishing zepatier and this is fine. We will not refill ribavirin    Can push back end of treatment labs from 8/23 to 9/6  (will need to update episode)    thanks

## 2017-08-03 NOTE — TELEPHONE ENCOUNTER
I spoke with patient and message from PA Scheuermann relayed.  It was stressed that she should not stop Zepatier and should take all the tabs until they run out.  Also message from provider mailed to patient.  Lab draw moved to 9/6; reminder notice mailed.

## 2017-08-09 ENCOUNTER — TELEPHONE (OUTPATIENT)
Dept: ORTHOPEDICS | Facility: CLINIC | Age: 72
End: 2017-08-09

## 2017-08-09 NOTE — TELEPHONE ENCOUNTER
Due to lost medication (~ 2 weeks), another refill was supplied and patient directed to complete ALL of this supply - Zepatier. She should also have enough ribavirin for the additional two weeks as well. EOT should be around 9/5/17 (~ 18 weeks). Patient confirms that she has received shipment and verbalized understanding. TTN.

## 2017-08-09 NOTE — TELEPHONE ENCOUNTER
Spoke with pt.   States she is just looking for info on the type of shoe she should wear.   Advised pt that I will discuss with Dr Grayson and get back to her.  Pt asked that I call her on her cell when responding.   Advised pt that it will be tomorrow when he returns to clinic

## 2017-08-09 NOTE — TELEPHONE ENCOUNTER
----- Message from Real Templeton sent at 8/8/2017  2:48 PM CDT -----  Contact: self  Pt request a call regarding a recommendation for orthopedic shoes

## 2017-08-11 ENCOUNTER — OFFICE VISIT (OUTPATIENT)
Dept: PAIN MEDICINE | Facility: CLINIC | Age: 72
End: 2017-08-11
Attending: ANESTHESIOLOGY
Payer: MEDICARE

## 2017-08-11 ENCOUNTER — TELEPHONE (OUTPATIENT)
Dept: ORTHOPEDICS | Facility: CLINIC | Age: 72
End: 2017-08-11

## 2017-08-11 VITALS
BODY MASS INDEX: 31.24 KG/M2 | DIASTOLIC BLOOD PRESSURE: 77 MMHG | HEIGHT: 64 IN | HEART RATE: 91 BPM | SYSTOLIC BLOOD PRESSURE: 145 MMHG | TEMPERATURE: 99 F | WEIGHT: 183 LBS | RESPIRATION RATE: 18 BRPM

## 2017-08-11 DIAGNOSIS — G89.29 OTHER CHRONIC PAIN: ICD-10-CM

## 2017-08-11 DIAGNOSIS — G89.29 CHRONIC LEFT SHOULDER PAIN: ICD-10-CM

## 2017-08-11 DIAGNOSIS — Q85.01 NEUROFIBROMATOSIS, TYPE 1 (VON RECKLINGHAUSEN'S DISEASE): Primary | ICD-10-CM

## 2017-08-11 DIAGNOSIS — M25.512 CHRONIC LEFT SHOULDER PAIN: ICD-10-CM

## 2017-08-11 PROCEDURE — 3078F DIAST BP <80 MM HG: CPT | Mod: S$GLB,,, | Performed by: ANESTHESIOLOGY

## 2017-08-11 PROCEDURE — 1159F MED LIST DOCD IN RCRD: CPT | Mod: S$GLB,,, | Performed by: ANESTHESIOLOGY

## 2017-08-11 PROCEDURE — 3077F SYST BP >= 140 MM HG: CPT | Mod: S$GLB,,, | Performed by: ANESTHESIOLOGY

## 2017-08-11 PROCEDURE — 1125F AMNT PAIN NOTED PAIN PRSNT: CPT | Mod: S$GLB,,, | Performed by: ANESTHESIOLOGY

## 2017-08-11 PROCEDURE — 99999 PR PBB SHADOW E&M-EST. PATIENT-LVL IV: CPT | Mod: PBBFAC,,, | Performed by: ANESTHESIOLOGY

## 2017-08-11 PROCEDURE — 99214 OFFICE O/P EST MOD 30 MIN: CPT | Mod: S$GLB,,, | Performed by: ANESTHESIOLOGY

## 2017-08-11 RX ORDER — DICLOFENAC SODIUM 10 MG/G
2 GEL TOPICAL 4 TIMES DAILY
Qty: 1 TUBE | Refills: 2 | Status: SHIPPED | OUTPATIENT
Start: 2017-08-11 | End: 2017-11-13 | Stop reason: SDUPTHER

## 2017-08-11 RX ORDER — ACETAMINOPHEN AND CODEINE PHOSPHATE 300; 30 MG/1; MG/1
1 TABLET ORAL 2 TIMES DAILY PRN
Qty: 60 TABLET | Refills: 2 | Status: SHIPPED | OUTPATIENT
Start: 2017-08-11 | End: 2017-08-21

## 2017-08-11 NOTE — TELEPHONE ENCOUNTER
Left a detailed voice mail that pt may wear any shoe that is comfortable.  Dr Hatfield did suggest [employer] brand shoes.  Advised pt to call for any further questions
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
## 2017-08-24 ENCOUNTER — OFFICE VISIT (OUTPATIENT)
Dept: INTERNAL MEDICINE | Facility: CLINIC | Age: 72
End: 2017-08-24
Payer: MEDICARE

## 2017-08-24 ENCOUNTER — DOCUMENTATION ONLY (OUTPATIENT)
Dept: INTERNAL MEDICINE | Facility: CLINIC | Age: 72
End: 2017-08-24

## 2017-08-24 VITALS
HEART RATE: 99 BPM | DIASTOLIC BLOOD PRESSURE: 82 MMHG | OXYGEN SATURATION: 98 % | WEIGHT: 188.5 LBS | BODY MASS INDEX: 32.18 KG/M2 | HEIGHT: 64 IN | SYSTOLIC BLOOD PRESSURE: 134 MMHG

## 2017-08-24 DIAGNOSIS — F41.9 ANXIETY: Primary | ICD-10-CM

## 2017-08-24 DIAGNOSIS — Q85.01 NEUROFIBROMATOSIS, TYPE 1 (VON RECKLINGHAUSEN'S DISEASE): ICD-10-CM

## 2017-08-24 DIAGNOSIS — Z12.11 COLON CANCER SCREENING: ICD-10-CM

## 2017-08-24 DIAGNOSIS — G89.29 OTHER CHRONIC PAIN: ICD-10-CM

## 2017-08-24 PROCEDURE — 3008F BODY MASS INDEX DOCD: CPT | Mod: S$GLB,,, | Performed by: INTERNAL MEDICINE

## 2017-08-24 PROCEDURE — 99214 OFFICE O/P EST MOD 30 MIN: CPT | Mod: S$GLB,,, | Performed by: INTERNAL MEDICINE

## 2017-08-24 PROCEDURE — 1159F MED LIST DOCD IN RCRD: CPT | Mod: S$GLB,,, | Performed by: INTERNAL MEDICINE

## 2017-08-24 PROCEDURE — 3075F SYST BP GE 130 - 139MM HG: CPT | Mod: S$GLB,,, | Performed by: INTERNAL MEDICINE

## 2017-08-24 PROCEDURE — 1125F AMNT PAIN NOTED PAIN PRSNT: CPT | Mod: S$GLB,,, | Performed by: INTERNAL MEDICINE

## 2017-08-24 PROCEDURE — 99999 PR PBB SHADOW E&M-EST. PATIENT-LVL III: CPT | Mod: PBBFAC,,, | Performed by: INTERNAL MEDICINE

## 2017-08-24 PROCEDURE — 3079F DIAST BP 80-89 MM HG: CPT | Mod: S$GLB,,, | Performed by: INTERNAL MEDICINE

## 2017-08-24 RX ORDER — BUSPIRONE HYDROCHLORIDE 5 MG/1
5 TABLET ORAL 2 TIMES DAILY PRN
Qty: 60 TABLET | Refills: 3 | Status: SHIPPED | OUTPATIENT
Start: 2017-08-24 | End: 2017-09-12 | Stop reason: SDUPTHER

## 2017-08-24 NOTE — PROGRESS NOTES
"Subjective:       Patient ID: Lesli Gong is a 71 y.o. female.    Chief Complaint: Shoulder Pain (pt slipped & had a fall in bathtub x1wk ago. Pt took Tylenol to relieve the pain but gave no relief.)    HPI   72 yo F here for follow up of chronic anxiety and chronic pain. Currently with shoulder papin bilaterally after slipping and falling in bathtub a week ago. Has tried tylenol otc without relief.   She is asking for rx of oxycodone and valium today.   She saw Dr. Cheney on 8/11. He has rx tylenol #3 60 tabs with 2 refills, voltaren gel, and referred to OT home. I confirmed on  that tylenol #3 rx was filled.     When we have previously spoken about anxiety I prescribed duloxetine 30mg which she did not stay on because she felt like it didn't go down her throat. I referred to psychiatry in July. I gave her short term rx of valium.       Review of Systems   Constitutional: Negative for fever.   Respiratory: Negative for shortness of breath.    Cardiovascular: Negative for chest pain.   Musculoskeletal: Positive for arthralgias and myalgias.   Skin: Negative.    Psychiatric/Behavioral: The patient is nervous/anxious.        Objective:   /82 (BP Location: Right arm, Patient Position: Sitting, BP Method: Medium (Manual))   Pulse 99   Ht 5' 4" (1.626 m)   Wt 85.5 kg (188 lb 7.9 oz)   SpO2 98%   BMI 32.35 kg/m²      Physical Exam   Constitutional: She is oriented to person, place, and time. She appears well-developed and well-nourished. No distress.   HENT:   Head: Normocephalic and atraumatic.   Cardiovascular: Normal rate and regular rhythm.    Pulmonary/Chest: Effort normal. No respiratory distress. She has no wheezes. She has no rales.   Neurological: She is alert and oriented to person, place, and time.   Skin: Skin is warm and dry. She is not diaphoretic.   Psychiatric: She has a normal mood and affect. Her behavior is normal.       Assessment:       1. Anxiety    2. Other chronic pain    3. " Neurofibromatosis, type 1 (von Recklinghausen's disease)    4. Colon cancer screening        Plan:       Lesli was seen today for shoulder pain.    Diagnoses and all orders for this visit:    Anxiety  -  I am not comfortable rx benzos for her while she is on apap with codeine from pain management. She is not willing to take ssri/snri. Discussed extensively. Encouraged to establish with psychiatry.  Trial of  busPIRone (BUSPAR) 5 MG Tab; Take 1 tablet (5 mg total) by mouth 2 (two) times daily as needed (anxiety).    Other chronic pain  Managed by pain management  apap #3 prn    Neurofibromatosis, type 1 (von Recklinghausen's disease)  Stable, monitor    Colon cancer screening  -     Fecal Immunochemical Test (iFOBT); Future

## 2017-08-25 ENCOUNTER — TELEPHONE (OUTPATIENT)
Dept: ORTHOPEDICS | Facility: CLINIC | Age: 72
End: 2017-08-25

## 2017-08-25 NOTE — TELEPHONE ENCOUNTER
Spoke with pt.   States she wants to know if Dr Grayson can order some hand rails for her bath tub.   Advised that I will discuss this with Dr Grayson and get back to her.  Pt verbalized understanding

## 2017-08-25 NOTE — TELEPHONE ENCOUNTER
----- Message from Kaycee Eldridge MA sent at 8/22/2017  1:25 PM CDT -----  Contact: self      ----- Message -----  From: Real [place]  Sent: 8/22/2017   1:10 PM  To: [facility] Staff    Pt request a call regarding her bath rail orders. [phone]

## 2017-08-30 ENCOUNTER — TELEPHONE (OUTPATIENT)
Dept: HEPATOLOGY | Facility: CLINIC | Age: 72
End: 2017-08-30

## 2017-08-30 NOTE — TELEPHONE ENCOUNTER
----- Message from Jade Naranjo sent at 8/30/2017  2:46 PM CDT -----  Patient calling to get an update. Please call  or

## 2017-08-31 ENCOUNTER — TELEPHONE (OUTPATIENT)
Dept: HEPATOLOGY | Facility: CLINIC | Age: 72
End: 2017-08-31

## 2017-08-31 NOTE — TELEPHONE ENCOUNTER
I spoke with patient and told her that she should complete hep c tx on 9/6 and that EOT labs were scheduled on that day.

## 2017-08-31 NOTE — TELEPHONE ENCOUNTER
----- Message from Jade Naranjo sent at 8/31/2017  8:08 AM CDT -----  Contact: patient   Patient calling to get an update on her health. Please call

## 2017-09-06 ENCOUNTER — EPISODE CHANGES (OUTPATIENT)
Dept: HEPATOLOGY | Facility: CLINIC | Age: 72
End: 2017-09-06

## 2017-09-06 ENCOUNTER — TELEPHONE (OUTPATIENT)
Dept: HEPATOLOGY | Facility: CLINIC | Age: 72
End: 2017-09-06

## 2017-09-06 ENCOUNTER — LAB VISIT (OUTPATIENT)
Dept: LAB | Facility: HOSPITAL | Age: 72
End: 2017-09-06
Attending: INTERNAL MEDICINE
Payer: MEDICARE

## 2017-09-06 DIAGNOSIS — B18.2 CHRONIC HEPATITIS C WITHOUT HEPATIC COMA: ICD-10-CM

## 2017-09-06 LAB
ALBUMIN SERPL BCP-MCNC: 3.6 G/DL
ALP SERPL-CCNC: 114 U/L
ALT SERPL W/O P-5'-P-CCNC: 14 U/L
ANION GAP SERPL CALC-SCNC: 11 MMOL/L
AST SERPL-CCNC: 15 U/L
BASOPHILS # BLD AUTO: 0 K/UL
BASOPHILS NFR BLD: 0 %
BILIRUB SERPL-MCNC: 0.3 MG/DL
BUN SERPL-MCNC: 14 MG/DL
CALCIUM SERPL-MCNC: 9.2 MG/DL
CHLORIDE SERPL-SCNC: 110 MMOL/L
CO2 SERPL-SCNC: 22 MMOL/L
CREAT SERPL-MCNC: 0.7 MG/DL
DIFFERENTIAL METHOD: NORMAL
EOSINOPHIL # BLD AUTO: 0.1 K/UL
EOSINOPHIL NFR BLD: 1 %
ERYTHROCYTE [DISTWIDTH] IN BLOOD BY AUTOMATED COUNT: 13 %
EST. GFR  (AFRICAN AMERICAN): >60 ML/MIN/1.73 M^2
EST. GFR  (NON AFRICAN AMERICAN): >60 ML/MIN/1.73 M^2
GLUCOSE SERPL-MCNC: 72 MG/DL
HCT VFR BLD AUTO: 39.8 %
HGB BLD-MCNC: 13 G/DL
LYMPHOCYTES # BLD AUTO: 1.4 K/UL
LYMPHOCYTES NFR BLD: 22.5 %
MCH RBC QN AUTO: 31 PG
MCHC RBC AUTO-ENTMCNC: 32.7 G/DL
MCV RBC AUTO: 95 FL
MONOCYTES # BLD AUTO: 0.4 K/UL
MONOCYTES NFR BLD: 6.7 %
NEUTROPHILS # BLD AUTO: 4.4 K/UL
NEUTROPHILS NFR BLD: 69.5 %
PLATELET # BLD AUTO: 179 K/UL
PMV BLD AUTO: 9.4 FL
POTASSIUM SERPL-SCNC: 3.8 MMOL/L
PROT SERPL-MCNC: 7.5 G/DL
RBC # BLD AUTO: 4.19 M/UL
SODIUM SERPL-SCNC: 143 MMOL/L
WBC # BLD AUTO: 6.26 K/UL

## 2017-09-06 PROCEDURE — 80053 COMPREHEN METABOLIC PANEL: CPT

## 2017-09-06 PROCEDURE — 85025 COMPLETE CBC W/AUTO DIFF WBC: CPT

## 2017-09-06 PROCEDURE — 87522 HEPATITIS C REVRS TRNSCRPJ: CPT

## 2017-09-06 NOTE — TELEPHONE ENCOUNTER
----- Message from Jade Naranjo sent at 9/6/2017  9:37 AM CDT -----  Contact: pt  Patient would a call back from [facility]...please call ...[phone]

## 2017-09-08 LAB
HCV LOG: <1.08 LOG (10) IU/ML
HCV RNA QUANT PCR: <12 IU/ML
HCV, QUALITATIVE: NOT DETECTED IU/ML

## 2017-09-09 ENCOUNTER — HOSPITAL ENCOUNTER (EMERGENCY)
Facility: HOSPITAL | Age: 72
Discharge: HOME | End: 2017-09-09
Attending: FAMILY MEDICINE
Payer: MEDICARE

## 2017-09-09 VITALS
DIASTOLIC BLOOD PRESSURE: 79 MMHG | HEIGHT: 64 IN | RESPIRATION RATE: 16 BRPM | SYSTOLIC BLOOD PRESSURE: 128 MMHG | TEMPERATURE: 99 F | BODY MASS INDEX: 29.02 KG/M2 | WEIGHT: 170 LBS | HEART RATE: 70 BPM | OXYGEN SATURATION: 99 %

## 2017-09-09 DIAGNOSIS — R05.9 COUGH: ICD-10-CM

## 2017-09-09 DIAGNOSIS — J06.9 UPPER RESPIRATORY TRACT INFECTION, UNSPECIFIED TYPE: Primary | ICD-10-CM

## 2017-09-09 LAB
ALBUMIN SERPL BCP-MCNC: 3.7 G/DL
ALP SERPL-CCNC: 111 U/L
ALT SERPL W/O P-5'-P-CCNC: 14 U/L
ANION GAP SERPL CALC-SCNC: 9 MMOL/L
AST SERPL-CCNC: 14 U/L
BASOPHILS # BLD AUTO: 0 K/UL
BASOPHILS NFR BLD: 0 %
BILIRUB SERPL-MCNC: 0.2 MG/DL
BUN SERPL-MCNC: 12 MG/DL
CALCIUM SERPL-MCNC: 9.1 MG/DL
CHLORIDE SERPL-SCNC: 107 MMOL/L
CO2 SERPL-SCNC: 24 MMOL/L
CREAT SERPL-MCNC: 0.8 MG/DL
DIFFERENTIAL METHOD: NORMAL
EOSINOPHIL # BLD AUTO: 0.1 K/UL
EOSINOPHIL NFR BLD: 0.9 %
ERYTHROCYTE [DISTWIDTH] IN BLOOD BY AUTOMATED COUNT: 12.6 %
EST. GFR  (AFRICAN AMERICAN): >60 ML/MIN/1.73 M^2
EST. GFR  (NON AFRICAN AMERICAN): >60 ML/MIN/1.73 M^2
FLUAV AG SPEC QL IA: NEGATIVE
FLUBV AG SPEC QL IA: NEGATIVE
GLUCOSE SERPL-MCNC: 101 MG/DL
HCT VFR BLD AUTO: 40.1 %
HGB BLD-MCNC: 13 G/DL
LYMPHOCYTES # BLD AUTO: 1.4 K/UL
LYMPHOCYTES NFR BLD: 25.2 %
MCH RBC QN AUTO: 30.7 PG
MCHC RBC AUTO-ENTMCNC: 32.4 G/DL
MCV RBC AUTO: 95 FL
MONOCYTES # BLD AUTO: 0.6 K/UL
MONOCYTES NFR BLD: 11.9 %
NEUTROPHILS # BLD AUTO: 3.3 K/UL
NEUTROPHILS NFR BLD: 61.6 %
PLATELET # BLD AUTO: 160 K/UL
PMV BLD AUTO: 9.2 FL
POTASSIUM SERPL-SCNC: 4.1 MMOL/L
PROT SERPL-MCNC: 7.7 G/DL
RBC # BLD AUTO: 4.23 M/UL
SODIUM SERPL-SCNC: 140 MMOL/L
SPECIMEN SOURCE: NORMAL
WBC # BLD AUTO: 5.4 K/UL

## 2017-09-09 PROCEDURE — 85025 COMPLETE CBC W/AUTO DIFF WBC: CPT

## 2017-09-09 PROCEDURE — 99284 EMERGENCY DEPT VISIT MOD MDM: CPT | Mod: ,,,

## 2017-09-09 PROCEDURE — 80053 COMPREHEN METABOLIC PANEL: CPT

## 2017-09-09 PROCEDURE — 99284 EMERGENCY DEPT VISIT MOD MDM: CPT

## 2017-09-09 PROCEDURE — 87400 INFLUENZA A/B EACH AG IA: CPT

## 2017-09-09 RX ORDER — AZITHROMYCIN 250 MG/1
250 TABLET, FILM COATED ORAL DAILY
Qty: 5 TABLET | Refills: 0 | Status: SHIPPED | OUTPATIENT
Start: 2017-09-09 | End: 2017-09-14

## 2017-09-09 RX ORDER — BENZONATATE 100 MG/1
100 CAPSULE ORAL 3 TIMES DAILY PRN
Qty: 20 CAPSULE | Refills: 0 | Status: SHIPPED | OUTPATIENT
Start: 2017-09-09 | End: 2017-09-19

## 2017-09-09 NOTE — ED PROVIDER NOTES
Encounter Date: 2017       History     Chief Complaint   Patient presents with    URI     uri after getting flu shot  1 week ago     71 y.o. female with medical history of anxiety, HTN and hep C presents to ED with productive cough, myalgias and chills x 1 week. Patient had influenza shot last week and concerned. Patient denies fever, chest pain, shortness of breath, abdominal pain, nausea, vomiting, weakness, fatigue, diaphoresis, rhinorrhea, sinus congestion.          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, malignant 2015    Mass 10-10-14    excision of mass/left shoulder    Neurofibromatosis, type 1 (von Recklinghausen's disease) 2014    Pheochromocytoma     S/p resection in     Soft tissue sarcoma of chest wall 2014     Past Surgical History:   Procedure Laterality Date    APPENDECTOMY      BILATERAL SALPINGOOPHORECTOMY      BREAST BIOPSY      BUNIONECTOMY Right      SECTION      EXPLORATORY LAPAROTOMY W/ BOWEL RESECTION      HYSTERECTOMY N/A     pheochromocytoma excision N/A     TONSILLECTOMY Bilateral      Family History   Problem Relation Age of Onset    Cancer Sister      GIST    Neurofibromatosis Sister     Neurofibromatosis Father      Social History   Substance Use Topics    Smoking status: Never Smoker    Smokeless tobacco: Never Used    Alcohol use No     Review of Systems   Constitutional: Positive for appetite change, chills and fatigue. Negative for diaphoresis and fever.   HENT: Negative for sore throat.    Respiratory: Positive for cough. Negative  for shortness of breath.    Cardiovascular: Negative for chest pain and palpitations.   Gastrointestinal: Negative for abdominal pain, nausea and vomiting.   Genitourinary: Negative for dysuria, flank pain, vaginal bleeding, vaginal discharge and vaginal pain.   Musculoskeletal: Positive for myalgias. Negative for back pain and neck pain.   Skin: Negative for rash.   Neurological: Negative for syncope, weakness, light-headedness and headaches.   Hematological: Does not bruise/bleed easily.   Psychiatric/Behavioral: The patient is not nervous/anxious.        Physical Exam     Initial Vitals [09/09/17 1508]   BP Pulse Resp Temp SpO2   121/71 93 18 99.3 °F (37.4 °C) 98 %      MAP       87.67         Physical Exam    Vitals reviewed.  Constitutional: Vital signs are normal. She appears well-developed and well-nourished. She is not diaphoretic. No distress.   HENT:   Head: Normocephalic and atraumatic.   Nose: Nose normal.   Mouth/Throat: Oropharynx is clear and moist.   Eyes: Conjunctivae, EOM and lids are normal. Pupils are equal, round, and reactive to light. Lids are everted and swept, no foreign bodies found. Right eye exhibits no discharge. Left eye exhibits no discharge.   Neck: Trachea normal and normal range of motion. Neck supple.   Cardiovascular: Normal rate, regular rhythm, intact distal pulses and normal pulses.   Pulmonary/Chest: Breath sounds normal. She has no wheezes. She has no rhonchi. She has no rales. She exhibits no tenderness.   Abdominal: Soft. Normal appearance and bowel sounds are normal. There is no tenderness. There is no rebound and no guarding.   Musculoskeletal: She exhibits no edema.   Neurological: She is alert and oriented to person, place, and time. She has normal strength. No cranial nerve deficit or sensory deficit.   Skin: Skin is warm. Capillary refill takes less than 2 seconds. No rash noted. No cyanosis.   Psychiatric: She has a normal mood and affect.         ED Course    Procedures  Labs Reviewed   CBC W/ AUTO DIFFERENTIAL   COMPREHENSIVE METABOLIC PANEL   INFLUENZA A AND B ANTIGEN        Imaging Results          X-Ray Chest PA And Lateral (Final result)  Result time 09/09/17 17:09:53    Final result by Shweta Villasenor MD (09/09/17 17:09:53)                 Impression:         No acute cardiopulmonary process noting possible nodular focus versus atelectasis or scarring projected over the lateral aspect of the right lower lung zone, nonspecific.  Comparison with any more recent examinations would be helpful, noting this at most measures 4 mm..  Nonemergent, outpatient CT could be performed as warranted..          Electronically signed by: SHWETA VILLASENOR MD  Date:     09/09/17  Time:    17:09              Narrative:    Chest PA and lateral    Indication:Cough    Comparison:CT 7/28/2014, radiograph 9/17/2012    Findings: There is stable elevation of left hemidiaphragm. The cardiomediastinal silhouette or is not enlarged.  There is no pleural effusion.  The trachea is midline.  The lungs are symmetrically expanded bilaterally with coarse interstitial attenuation and scattered bandlike regions of atelectasis or scarring, similar to the previous exam.  There is a probable calcified granuloma or cluster of granulomas project over the right midlung zone.  A nodular focus projects over the right lower lung zone, nonspecific, maybe related to atelectasis, scarring, or artifact. No large focal consolidation seen.  There is no pneumothorax.  The osseous structures are remarkable for degenerative changes of the spine and shoulders.  Post surgical changes overlie the right upper quadrant.                                         APC / Resident Notes:   71 y.o. female with medical history of anxiety, HTN and hep C presents to ED with productive cough, myalgias and chills.    DDX includes but not limited to pneumonia, bronchitis, influenza, electrolyte abnormality. Will get basic labs, CXR and  influenza. Informed patient that influenza vaccine is dead virus and can't give you the flu. She would still like to be swabbed for flu. Labs benign. Flu negative. CXR shows possible atelectisis vs. Nodule. Informed patient to follow up with PCP for CT. Discharged to home in stable condition, return to ED warnings given, follow up and patient care instructions given.  Will discharge home with tessalon 100mg and azithromycin 250mg.     I have discussed and reviewed with my supervising physician.              ED Course      Clinical Impression:   The primary encounter diagnosis was Upper respiratory tract infection, unspecified type. A diagnosis of Cough was also pertinent to this visit.    Disposition:   Disposition: Discharged  Condition: Stable                        Janie Guzman PA-C  09/09/17 [id number]

## 2017-09-09 NOTE — ED TRIAGE NOTES
Comes to the ED for c/o productive cough, white/ yellow.  Sore throat. Afebrile since having the flu shot 1 week ago.

## 2017-09-09 NOTE — ED NOTES
LOC: The patient is awake, alert, and oriented to place, time, situation. Affect is appropriate.  Speech is appropriate and clear.     APPEARANCE: Patient resting comfortably in no acute distress.  Patient is clean and well groomed.    SKIN: The skin is warm and dry; color consistent with ethnicity.  Patient has normal skin turgor and moist mucus membranes.  Skin intact; no breakdown or bruising noted.     MUSCULOSKELETAL: Patient moving upper and lower extremities without difficulty.  Denies weakness.     RESPIRATORY: Airway is open and patent. Respirations spontaneous, even, easy, and non-labored.  Patient has a normal effort and rate.  No accessory muscle use noted. Productive cough.  White sputum.  BS clear.    CARDIAC:  No peripheral edema noted. No complaints of chest pain.      ABDOMEN: Soft and non tender to palpation.  No distention noted.     NEUROLOGIC: Eyes open spontaneously.  Behavior appropriate to situation.  Follows commands; facial expression symmetrical.  Purposeful motor response noted; normal sensation in all extremities.

## 2017-09-09 NOTE — ED NOTES
Discharge instructions explained to patient. Patient verbalizes understanding. Patient and discharge paperwork escorted to registration desk by RN at this time. Discharge paperwork given to registration for completion of discharge.  Pt understands need to f/u with pcp. Ambulated without difficulty to discharge desk

## 2017-09-11 ENCOUNTER — TELEPHONE (OUTPATIENT)
Dept: PAIN MEDICINE | Facility: CLINIC | Age: 72
End: 2017-09-11

## 2017-09-11 NOTE — TELEPHONE ENCOUNTER
----- Message from Ten Templeton sent at 9/11/2017 10:05 AM CDT -----  Contact: pt  x_ 1st Request   _ 2nd Request   _ 3rd Request     Who: JERRI DERAS [[id number]]    Why: pt is requesting a call back in reference to getting something called in for pain.    What Number to Call Back: [phone]    When to Expect a call back: (Before the end of the day)   -- if call after 3:00 call back will be tomorrow.    Spoke with patient after speaking with [facility] pharmacy to confirm that patient had 2 refills left on her Tylenol #3 prescription, pharmacist stated that would refill now for patient

## 2017-09-12 ENCOUNTER — TELEPHONE (OUTPATIENT)
Dept: HEPATOLOGY | Facility: CLINIC | Age: 72
End: 2017-09-12

## 2017-09-12 ENCOUNTER — OFFICE VISIT (OUTPATIENT)
Dept: INTERNAL MEDICINE | Facility: CLINIC | Age: 72
End: 2017-09-12
Payer: MEDICARE

## 2017-09-12 VITALS
SYSTOLIC BLOOD PRESSURE: 142 MMHG | DIASTOLIC BLOOD PRESSURE: 98 MMHG | TEMPERATURE: 98 F | BODY MASS INDEX: 30.9 KG/M2 | HEIGHT: 64 IN | WEIGHT: 181 LBS | OXYGEN SATURATION: 98 % | HEART RATE: 81 BPM

## 2017-09-12 DIAGNOSIS — I10 ESSENTIAL HYPERTENSION: Primary | ICD-10-CM

## 2017-09-12 DIAGNOSIS — R93.89 CHEST X-RAY ABNORMALITY: ICD-10-CM

## 2017-09-12 DIAGNOSIS — F41.9 ANXIETY: Primary | ICD-10-CM

## 2017-09-12 DIAGNOSIS — B18.2 CHRONIC HEPATITIS C WITHOUT HEPATIC COMA: Primary | ICD-10-CM

## 2017-09-12 PROCEDURE — 99999 PR PBB SHADOW E&M-EST. PATIENT-LVL IV: CPT | Mod: PBBFAC,,, | Performed by: INTERNAL MEDICINE

## 2017-09-12 PROCEDURE — 99214 OFFICE O/P EST MOD 30 MIN: CPT | Mod: S$GLB,,, | Performed by: INTERNAL MEDICINE

## 2017-09-12 PROCEDURE — 1159F MED LIST DOCD IN RCRD: CPT | Mod: S$GLB,,, | Performed by: INTERNAL MEDICINE

## 2017-09-12 PROCEDURE — 3080F DIAST BP >= 90 MM HG: CPT | Mod: S$GLB,,, | Performed by: INTERNAL MEDICINE

## 2017-09-12 PROCEDURE — 1125F AMNT PAIN NOTED PAIN PRSNT: CPT | Mod: S$GLB,,, | Performed by: INTERNAL MEDICINE

## 2017-09-12 PROCEDURE — 3008F BODY MASS INDEX DOCD: CPT | Mod: S$GLB,,, | Performed by: INTERNAL MEDICINE

## 2017-09-12 PROCEDURE — 3077F SYST BP >= 140 MM HG: CPT | Mod: S$GLB,,, | Performed by: INTERNAL MEDICINE

## 2017-09-12 RX ORDER — BUSPIRONE HYDROCHLORIDE 10 MG/1
10 TABLET ORAL 2 TIMES DAILY PRN
Qty: 60 TABLET | Refills: 3 | Status: SHIPPED | OUTPATIENT
Start: 2017-09-12 | End: 2018-02-28 | Stop reason: SDUPTHER

## 2017-09-12 NOTE — TELEPHONE ENCOUNTER
Attempted to speak with pt. Left message from provider.  Upcoming lab and f/u scheduled, reminders mailed.

## 2017-09-12 NOTE — TELEPHONE ENCOUNTER
----- Message from Brie Garcia sent at 9/11/2017  9:48 AM CDT -----  Contact: Self/ [phone]   Type: Sooner appointment than  is able to schedule    When is the first available appointment? 10/10/2017     What is the nature of the appointment? Follow up from ed     What appointment type: ep     Comments: pt is calling to have a sooner appt. Please call and advise     Thank you

## 2017-09-12 NOTE — TELEPHONE ENCOUNTER
----- Message from Curt Harding sent at 9/7/2017  2:03 PM CDT -----  Contact: self [phone]  Patient is calling to check status of medication for cough . Please call and advise , Thanks  !

## 2017-09-12 NOTE — TELEPHONE ENCOUNTER
----- Message from Abbey Dc sent at 9/7/2017  2:24 PM CDT -----  Contact: Pt at [phone]  Patient would like to get medical advice.  Symptoms (please be specific):  Cough,sore throat  How long has patient had these symptoms:  About 1 week and getting worse  Pharmacy name and phone #:  [id number] - [place], [place] - [address]   [phone] (Phone)  [phone] (Fax)      Any drug allergies:  See chart  Comments:

## 2017-09-12 NOTE — TELEPHONE ENCOUNTER
----- Message from Curt Harding sent at 9/7/2017  2:03 PM CDT -----  Contact: self [phone]  Patient is calling to check status of medication for cough . Please call and advise , Thanks  !

## 2017-09-12 NOTE — TELEPHONE ENCOUNTER
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 HCV now negative  Patient should be finishing HCV treatment any day now.   There is a small chance the virus can return. We will monitor blood for this.      pls schedule  CMP, HCV RNA - SVR12 - 11/29  F/u visit - first week of December

## 2017-09-12 NOTE — PROGRESS NOTES
"Subjective:       Patient ID: Lesli Gong is a 71 y.o. female.    Chief Complaint: Pulmonary Nodules (pt states she had an x-ray done this past sat and they found a nodule on her L lung. ) and URI (pt mentions that she suffered with flu like symptoms ( coughing, body aches). srtarted thurs and progressively worsened that fri.)    HPI   72 yo F here for evaluation of possible pulmonary nodules.  Chest x-ray on 9/9/17 showed possible nodular focus versus atelectasis or scarring over lateral aspect of right mid lung max of 4 mm which is nonspecific.  CT done in July 2017 shows a stable right upper pulmonary nodule that is stable since 2014.  She was treated with zpack. She is feeling much better.     At last visit prescribed buspar 5mg bid prn anxiety. She still wants the valium.   ------------------  When we have previously spoken about anxiety I prescribed duloxetine 30mg which she did not stay on because she felt like it didn't go down her throat. I referred to psychiatry in July. I gave her short term rx of valium.      Review of Systems   Constitutional: Negative for fever.   Respiratory: Negative for shortness of breath.    Cardiovascular: Negative for chest pain.   Musculoskeletal: Negative.    Skin: Negative.    Psychiatric/Behavioral: The patient is nervous/anxious.        Objective:   BP (!) 142/98 (BP Location: Right arm, Patient Position: Sitting, BP Method: Medium (Manual))   Pulse 81   Temp 98.4 °F (36.9 °C) (Oral)   Ht 5' 4" (1.626 m)   Wt 82.1 kg (181 lb)   SpO2 98%   BMI 31.07 kg/m²      Physical Exam   Constitutional: She is oriented to person, place, and time. She appears well-developed and well-nourished. No distress.   HENT:   Head: Normocephalic and atraumatic.   Cardiovascular: Normal rate and regular rhythm.    Pulmonary/Chest: Effort normal. No respiratory distress. She has no wheezes. She has no rales.   Neurological: She is alert and oriented to person, place, and time.   Skin: Skin " is warm and dry. She is not diaphoretic.   Psychiatric: She has a normal mood and affect. Her behavior is normal.       Assessment:       1. Anxiety    2. Chest x-ray abnormality        Plan:       Lesli was seen today for pulmonary nodules and uri.    Diagnoses and all orders for this visit:    Anxiety  -     Increase busPIRone (BUSPAR) 10 MG tablet; Take 1 tablet (10 mg total) by mouth 2 (two) times daily as needed (anxiety).  -     Ambulatory referral to Psychiatry    Chest x-ray abnormality   Right mid lung with atelectasis versus nodule.  She just had a CT scan done in July which showed a stable right upper lobe nodule.  I do not know if we are seeing the same nodule on chest x-ray or this was just an area of atelectasis.  Regardless, no need to repeat CT scan at this time as CT scan in July was normal

## 2017-09-13 ENCOUNTER — TELEPHONE (OUTPATIENT)
Dept: HEPATOLOGY | Facility: CLINIC | Age: 72
End: 2017-09-13

## 2017-09-13 RX ORDER — LISINOPRIL 10 MG/1
20 TABLET ORAL DAILY
Qty: 180 TABLET | Refills: 3 | Status: SHIPPED | OUTPATIENT
Start: 2017-09-13 | End: 2018-02-28 | Stop reason: SDUPTHER

## 2017-09-13 NOTE — TELEPHONE ENCOUNTER
----- Message from Chen Vallejo sent at 9/13/2017 11:02 AM CDT -----  Prescription Request:     Name of medication: lisinopril 10 MG tablet    Reason for request: Refill    Pharmacy: [id number] - [place] - [address]    Please advise.    Thank You

## 2017-09-13 NOTE — TELEPHONE ENCOUNTER
----- Message from Ra Garcia sent at 9/13/2017  9:30 AM CDT -----  Contact: PT  Would like to discuss her labs, would like to know if everything is fine?     Call [phone]

## 2017-09-13 NOTE — TELEPHONE ENCOUNTER
Please call pt. I received her refill request for lisinopril. I would like her take this as 20mg once daily rather than 10mg twice a day. New rx has been sent to pharmacy.

## 2017-09-27 ENCOUNTER — OFFICE VISIT (OUTPATIENT)
Dept: PSYCHIATRY | Facility: CLINIC | Age: 72
End: 2017-09-27
Payer: MEDICARE

## 2017-09-27 DIAGNOSIS — F43.22 ADJUSTMENT DISORDER WITH ANXIETY: Primary | ICD-10-CM

## 2017-09-27 PROCEDURE — 90791 PSYCH DIAGNOSTIC EVALUATION: CPT | Mod: S$GLB,,, | Performed by: SOCIAL WORKER
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
## 2017-09-28 PROBLEM — F43.22 ADJUSTMENT DISORDER WITH ANXIETY: Status: ACTIVE | Noted: 2017-09-28

## 2017-10-19 ENCOUNTER — TELEPHONE (OUTPATIENT)
Dept: INTERNAL MEDICINE | Facility: CLINIC | Age: 72
End: 2017-10-19

## 2017-10-19 NOTE — TELEPHONE ENCOUNTER
----- Message from Abbey Dc sent at 10/19/2017  3:38 PM CDT -----  Contact: Pt at [phone]  Pt would like to know if script lorazepam was called into pharmacy. Please call/advise.

## 2017-10-19 NOTE — TELEPHONE ENCOUNTER
Patient requesting an RX for lorazepam, she states that the Buspar isn't working for her .    Please advise

## 2017-10-19 NOTE — TELEPHONE ENCOUNTER
----- Message from Angelito Paulson sent at 10/19/2017  1:13 PM CDT -----  Contact: [phone] self  Patient would like a Rx for Lorazepam sent to [facility]. Please advise.

## 2017-10-19 NOTE — TELEPHONE ENCOUNTER
----- Message from Angelito Paulson sent at 10/19/2017  1:13 PM CDT -----  Contact: [phone] self  Patient would like a Rx for Lorazepam sent to [facility]. Please advise.

## 2017-10-20 NOTE — TELEPHONE ENCOUNTER
Spoke with pt regarding the Lorazepam & advised pt that she will need to see Psychiatry. Verbalized understanding.

## 2017-10-27 ENCOUNTER — TELEPHONE (OUTPATIENT)
Dept: HEPATOLOGY | Facility: CLINIC | Age: 72
End: 2017-10-27

## 2017-10-27 NOTE — TELEPHONE ENCOUNTER
----- Message from Jade Naranjo sent at 10/27/2017 12:43 PM CDT -----  Contact: patient  Patient calling to speak with Verito         Please call

## 2017-11-13 ENCOUNTER — TELEPHONE (OUTPATIENT)
Dept: PAIN MEDICINE | Facility: CLINIC | Age: 72
End: 2017-11-13

## 2017-11-13 ENCOUNTER — OFFICE VISIT (OUTPATIENT)
Dept: PAIN MEDICINE | Facility: CLINIC | Age: 72
End: 2017-11-13
Payer: MEDICARE

## 2017-11-13 VITALS
WEIGHT: 190.69 LBS | HEART RATE: 87 BPM | TEMPERATURE: 98 F | DIASTOLIC BLOOD PRESSURE: 85 MMHG | HEIGHT: 64 IN | SYSTOLIC BLOOD PRESSURE: 127 MMHG | BODY MASS INDEX: 32.56 KG/M2

## 2017-11-13 DIAGNOSIS — M25.512 CHRONIC LEFT SHOULDER PAIN: ICD-10-CM

## 2017-11-13 DIAGNOSIS — Q85.01 NEUROFIBROMATOSIS, TYPE 1 (VON RECKLINGHAUSEN'S DISEASE): Primary | ICD-10-CM

## 2017-11-13 DIAGNOSIS — G89.29 CHRONIC LEFT SHOULDER PAIN: ICD-10-CM

## 2017-11-13 DIAGNOSIS — G89.29 CHRONIC SCAPULAR PAIN: ICD-10-CM

## 2017-11-13 DIAGNOSIS — R11.0 NAUSEA: Primary | ICD-10-CM

## 2017-11-13 DIAGNOSIS — M89.8X1 CHRONIC SCAPULAR PAIN: ICD-10-CM

## 2017-11-13 PROCEDURE — 99214 OFFICE O/P EST MOD 30 MIN: CPT | Mod: S$GLB,,, | Performed by: NURSE PRACTITIONER

## 2017-11-13 PROCEDURE — 99999 PR PBB SHADOW E&M-EST. PATIENT-LVL III: CPT | Mod: PBBFAC,,, | Performed by: NURSE PRACTITIONER

## 2017-11-13 RX ORDER — ACETAMINOPHEN AND CODEINE PHOSPHATE 300; 30 MG/1; MG/1
TABLET ORAL
Refills: 2 | COMMUNITY
Start: 2017-10-12 | End: 2017-11-13 | Stop reason: SDUPTHER

## 2017-11-13 RX ORDER — DICLOFENAC SODIUM 10 MG/G
2 GEL TOPICAL 4 TIMES DAILY
Qty: 1 TUBE | Refills: 2 | Status: SHIPPED | OUTPATIENT
Start: 2017-11-13 | End: 2018-02-28

## 2017-11-13 RX ORDER — PROMETHAZINE HYDROCHLORIDE 12.5 MG/1
12.5 TABLET ORAL EVERY 8 HOURS PRN
Qty: 30 TABLET | Refills: 0 | Status: SHIPPED | OUTPATIENT
Start: 2017-11-13 | End: 2017-11-23

## 2017-11-13 RX ORDER — ACETAMINOPHEN AND CODEINE PHOSPHATE 300; 30 MG/1; MG/1
1 TABLET ORAL 2 TIMES DAILY PRN
Qty: 60 TABLET | Refills: 2 | Status: SHIPPED | OUTPATIENT
Start: 2017-11-13 | End: 2017-12-04 | Stop reason: ALTCHOICE

## 2017-11-13 NOTE — TELEPHONE ENCOUNTER
----- Message from Ten Templeton sent at 11/13/2017  2:59 PM CST -----  Contact: patient  x_ 1st Request   _ 2nd Request   _ 3rd Request     Who: JERRI DERAS [[id number]]    Why: patient is requesting a call back in reference to she thought something was going to be called in today for nausea.    What Number to Call Back: [phone]    When to Expect a call back: (Before the end of the day)   -- if call after 3:00 call back will be tomorrow.

## 2017-11-13 NOTE — TELEPHONE ENCOUNTER
Writer spoke with patient on phone and let her know that NP had called in a prescription for phenergan to her pharmacy of choice. Directed patient to use caution when taking medication as it can make people drowsy. Patient states she will take medication when she is in for the night until she can see how it will affect her. Thanked writer for calling her to let her know that medication was being ordered and will call with any further questions or concerns.
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
## 2017-11-21 ENCOUNTER — PATIENT OUTREACH (OUTPATIENT)
Dept: ADMINISTRATIVE | Facility: HOSPITAL | Age: 72
End: 2017-11-21

## 2017-11-21 NOTE — PROGRESS NOTES
Left voicemail for patient to call me RE: FIT KIT given on 8/24/17. Would like for her to complete the sample and return ASAP or bring to lab next week when she comes to appt with Dr. Brian on 11/28/17

## 2017-11-22 ENCOUNTER — PATIENT OUTREACH (OUTPATIENT)
Dept: ADMINISTRATIVE | Facility: HOSPITAL | Age: 72
End: 2017-11-22

## 2017-11-22 NOTE — PROGRESS NOTES
Phone call from patient returning my call RE: FIT KIT. Lost the kit that Dr. Brian gave her in August. Explained that Dr. Brian could give her another one. She stated that she may just arrange to have someone bring her and schedule a colonoscopy. I explained that this was still the preferred method of screening for CRC and to speak to Dr. Brian about this at her appointment on Tuesday.

## 2017-11-28 ENCOUNTER — OFFICE VISIT (OUTPATIENT)
Dept: INTERNAL MEDICINE | Facility: CLINIC | Age: 72
End: 2017-11-28
Payer: MEDICARE

## 2017-11-28 ENCOUNTER — LAB VISIT (OUTPATIENT)
Dept: LAB | Facility: HOSPITAL | Age: 72
End: 2017-11-28
Payer: MEDICARE

## 2017-11-28 VITALS
OXYGEN SATURATION: 97 % | WEIGHT: 179.69 LBS | BODY MASS INDEX: 30.68 KG/M2 | HEIGHT: 64 IN | DIASTOLIC BLOOD PRESSURE: 78 MMHG | SYSTOLIC BLOOD PRESSURE: 120 MMHG | HEART RATE: 88 BPM

## 2017-11-28 DIAGNOSIS — Z12.31 ENCOUNTER FOR SCREENING MAMMOGRAM FOR BREAST CANCER: ICD-10-CM

## 2017-11-28 DIAGNOSIS — F41.9 ANXIETY: Primary | ICD-10-CM

## 2017-11-28 DIAGNOSIS — Z00.00 HEALTH CARE MAINTENANCE: ICD-10-CM

## 2017-11-28 DIAGNOSIS — B18.2 CHRONIC HEPATITIS C WITHOUT HEPATIC COMA: ICD-10-CM

## 2017-11-28 DIAGNOSIS — G89.29 OTHER CHRONIC PAIN: ICD-10-CM

## 2017-11-28 DIAGNOSIS — Q85.01 NEUROFIBROMATOSIS, TYPE 1 (VON RECKLINGHAUSEN'S DISEASE): ICD-10-CM

## 2017-11-28 DIAGNOSIS — I10 ESSENTIAL HYPERTENSION: ICD-10-CM

## 2017-11-28 LAB
ALBUMIN SERPL BCP-MCNC: 4.1 G/DL
ALP SERPL-CCNC: 101 U/L
ALT SERPL W/O P-5'-P-CCNC: 15 U/L
ANION GAP SERPL CALC-SCNC: 9 MMOL/L
AST SERPL-CCNC: 14 U/L
BILIRUB SERPL-MCNC: 0.5 MG/DL
BUN SERPL-MCNC: 8 MG/DL
CALCIUM SERPL-MCNC: 9.6 MG/DL
CHLORIDE SERPL-SCNC: 107 MMOL/L
CO2 SERPL-SCNC: 27 MMOL/L
CREAT SERPL-MCNC: 0.7 MG/DL
EST. GFR  (AFRICAN AMERICAN): >60 ML/MIN/1.73 M^2
EST. GFR  (NON AFRICAN AMERICAN): >60 ML/MIN/1.73 M^2
GLUCOSE SERPL-MCNC: 91 MG/DL
POTASSIUM SERPL-SCNC: 3.3 MMOL/L
PROT SERPL-MCNC: 8.2 G/DL
SODIUM SERPL-SCNC: 143 MMOL/L

## 2017-11-28 PROCEDURE — 99214 OFFICE O/P EST MOD 30 MIN: CPT | Mod: S$GLB,,, | Performed by: INTERNAL MEDICINE

## 2017-11-28 PROCEDURE — 99999 PR PBB SHADOW E&M-EST. PATIENT-LVL III: CPT | Mod: PBBFAC,,, | Performed by: INTERNAL MEDICINE

## 2017-11-28 PROCEDURE — 87522 HEPATITIS C REVRS TRNSCRPJ: CPT

## 2017-11-28 PROCEDURE — 36415 COLL VENOUS BLD VENIPUNCTURE: CPT

## 2017-11-28 PROCEDURE — 80053 COMPREHEN METABOLIC PANEL: CPT

## 2017-12-01 ENCOUNTER — EPISODE CHANGES (OUTPATIENT)
Dept: HEPATOLOGY | Facility: CLINIC | Age: 72
End: 2017-12-01

## 2017-12-01 LAB
HCV LOG: <1.08 LOG (10) IU/ML
HCV RNA QUANT PCR: <12 IU/ML
HCV, QUALITATIVE: NOT DETECTED IU/ML

## 2017-12-04 ENCOUNTER — HOSPITAL ENCOUNTER (EMERGENCY)
Facility: HOSPITAL | Age: 72
Discharge: HOME | End: 2017-12-04
Attending: EMERGENCY MEDICINE
Payer: MEDICARE

## 2017-12-04 VITALS
RESPIRATION RATE: 18 BRPM | HEIGHT: 64 IN | SYSTOLIC BLOOD PRESSURE: 178 MMHG | DIASTOLIC BLOOD PRESSURE: 81 MMHG | TEMPERATURE: 99 F | OXYGEN SATURATION: 100 % | HEART RATE: 78 BPM | BODY MASS INDEX: 29.88 KG/M2 | WEIGHT: 175 LBS
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
## 2017-12-04 DIAGNOSIS — M25.512 LEFT SHOULDER PAIN, UNSPECIFIED CHRONICITY: ICD-10-CM
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
## 2017-12-04 DIAGNOSIS — M25.512 LEFT SHOULDER PAIN: ICD-10-CM

## 2017-12-04 DIAGNOSIS — S39.012A STRAIN OF LUMBAR REGION, INITIAL ENCOUNTER: ICD-10-CM

## 2017-12-04 PROCEDURE — 99284 EMERGENCY DEPT VISIT MOD MDM: CPT | Mod: ,,, | Performed by: EMERGENCY MEDICINE

## 2017-12-04 PROCEDURE — 25000003 PHARM REV CODE 250: Performed by: EMERGENCY MEDICINE

## 2017-12-04 PROCEDURE — 99283 EMERGENCY DEPT VISIT LOW MDM: CPT

## 2017-12-04 RX ORDER — ACETAMINOPHEN AND CODEINE PHOSPHATE 300; 30 MG/1; MG/1
1 TABLET ORAL
Status: COMPLETED | OUTPATIENT
Start: 2017-12-04 | End: 2017-12-04

## 2017-12-04 RX ORDER — ACETAMINOPHEN AND CODEINE PHOSPHATE 300; 30 MG/1; MG/1
1 TABLET ORAL EVERY 6 HOURS PRN
Qty: 12 TABLET | Refills: 0 | Status: SHIPPED | OUTPATIENT
Start: 2017-12-04 | End: 2017-12-14

## 2017-12-04 RX ORDER — TRAMADOL HYDROCHLORIDE 50 MG/1
50 TABLET ORAL
Status: DISCONTINUED | OUTPATIENT
Start: 2017-12-04 | End: 2017-12-04

## 2017-12-04 RX ORDER — ACETAMINOPHEN AND CODEINE PHOSPHATE 300; 30 MG/1; MG/1
1 TABLET ORAL EVERY 6 HOURS PRN
Qty: 12 TABLET | Refills: 0 | Status: SHIPPED | OUTPATIENT
Start: 2017-12-04 | End: 2017-12-04

## 2017-12-04 RX ADMIN — ACETAMINOPHEN AND CODEINE PHOSPHATE 1 TABLET: 300; 30 TABLET ORAL at 08:12
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
## 2017-12-05 NOTE — ED NOTES
Patient identifiers verified and correct for Ms Gong  C/C: Back pain and left shoulder pain   APPEARANCE: awake and alert in NAD.  SKIN: warm, dry and intact. No breakdown or bruising.  MUSCULOSKELETAL: Patient moving all extremities spontaneously, no obvious swelling or deformities noted. Ambulates independently.Pain to left shoulder and mid lower back   RESPIRATORY: Denies shortness of breath.Respirations unlabored.   CARDIAC: Denies CP, 2+ distal pulses; no peripheral edema  ABDOMEN: S/ND/NT, Denies nausea  : voids spontaneously, denies difficulty  Neurologic: AAO x 4; follows commands equal strength in all extremities; denies numbness/tingling. Denies dizziness

## 2017-12-08 ENCOUNTER — TELEPHONE (OUTPATIENT)
Dept: HEPATOLOGY | Facility: CLINIC | Age: 72
End: 2017-12-08

## 2017-12-08 DIAGNOSIS — Z86.19 HISTORY OF HEPATITIS C: ICD-10-CM

## 2017-12-08 NOTE — TELEPHONE ENCOUNTER
HCV LAB REVIEW  completed 18 of Zepatier + Ribavirin 1200mg,  9/6/17  1a, M28 mutation, F0-1,naive    Pertinent labs:  11/29  HCV neg    These labs document SVR12 following successful HCV treatment with Zepatier + Ribavirin   This is consistent with a cure. Relapse is not expected.   No immunity is conferred and patient could be reinfected.     Pt has been notified       Please schedule labs - HCV RNA - SVR24 - 2/28/18

## 2017-12-11 ENCOUNTER — OFFICE VISIT (OUTPATIENT)
Dept: ORTHOPEDICS | Facility: CLINIC | Age: 72
End: 2017-12-11
Payer: MEDICARE

## 2017-12-11 VITALS
HEART RATE: 86 BPM | BODY MASS INDEX: 29.88 KG/M2 | SYSTOLIC BLOOD PRESSURE: 129 MMHG | HEIGHT: 64 IN | WEIGHT: 175 LBS | RESPIRATION RATE: 16 BRPM | DIASTOLIC BLOOD PRESSURE: 76 MMHG

## 2017-12-11 DIAGNOSIS — G89.29 CHRONIC LEFT SHOULDER PAIN: Primary | ICD-10-CM

## 2017-12-11 DIAGNOSIS — M25.512 CHRONIC LEFT SHOULDER PAIN: Primary | ICD-10-CM

## 2017-12-11 PROCEDURE — 99203 OFFICE O/P NEW LOW 30 MIN: CPT | Mod: S$GLB,,, | Performed by: PHYSICIAN ASSISTANT

## 2017-12-11 PROCEDURE — 99999 PR PBB SHADOW E&M-EST. PATIENT-LVL IV: CPT | Mod: PBBFAC,,, | Performed by: PHYSICIAN ASSISTANT
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
## 2017-12-12 DIAGNOSIS — Z12.11 SPECIAL SCREENING FOR MALIGNANT NEOPLASMS, COLON: Primary | ICD-10-CM

## 2017-12-12 RX ORDER — POLYETHYLENE GLYCOL 3350, SODIUM SULFATE ANHYDROUS, SODIUM BICARBONATE, SODIUM CHLORIDE, POTASSIUM CHLORIDE 236; 22.74; 6.74; 5.86; 2.97 G/4L; G/4L; G/4L; G/4L; G/4L
4 POWDER, FOR SOLUTION ORAL ONCE
Qty: 4000 ML | Refills: 0 | Status: SHIPPED | OUTPATIENT
Start: 2017-12-12 | End: 2017-12-12

## 2017-12-14 ENCOUNTER — HOSPITAL ENCOUNTER (OUTPATIENT)
Dept: RADIOLOGY | Facility: HOSPITAL | Age: 72
Discharge: HOME | End: 2017-12-14
Attending: INTERNAL MEDICINE
Payer: MEDICARE

## 2017-12-14 VITALS — WEIGHT: 175 LBS | BODY MASS INDEX: 29.88 KG/M2 | HEIGHT: 64 IN

## 2017-12-14 DIAGNOSIS — Z12.31 ENCOUNTER FOR SCREENING MAMMOGRAM FOR BREAST CANCER: ICD-10-CM

## 2017-12-14 PROCEDURE — 77067 SCR MAMMO BI INCL CAD: CPT | Mod: TC

## 2017-12-14 PROCEDURE — 77063 BREAST TOMOSYNTHESIS BI: CPT | Mod: 26,,, | Performed by: RADIOLOGY

## 2017-12-14 PROCEDURE — 77067 SCR MAMMO BI INCL CAD: CPT | Mod: 26,,, | Performed by: RADIOLOGY

## 2018-01-04 ENCOUNTER — TELEPHONE (OUTPATIENT)
Dept: INTERNAL MEDICINE | Facility: CLINIC | Age: 73
End: 2018-01-04

## 2018-01-04 NOTE — TELEPHONE ENCOUNTER
Spoke with pt. Pt states that she has been having right ear pain and nausea for the past three days. Since you didn't have any openings, I offered pt an urgent care appointment with Dr. Garcia this evening but pt refused. Advised pt that if the pain is unbearable & too severe, she can be seen in emergency.

## 2018-01-04 NOTE — TELEPHONE ENCOUNTER
----- Message from Ethan Alaniz sent at 1/4/2018  2:12 PM CST -----  Contact: self [phone]  Patient would like to get medical advice.  Symptoms (please be specific):  Right ear pain, nausea   How long has patient had these symptoms:  3 days   Pharmacy name and phone #:  [id number] [phone] (Phone)  [phone] (Fax)  Any drug allergies:  Anaprox, Sulfa  Comments:

## 2018-01-04 NOTE — TELEPHONE ENCOUNTER
I offered pt an urgent appointment with you on tomorrow for 3:40 pm but pt states that she is heading to emergency room now. Advised pt if she has any questions/concerns, to please contact us.

## 2018-01-05 ENCOUNTER — HOSPITAL ENCOUNTER (EMERGENCY)
Facility: HOSPITAL | Age: 73
Discharge: HOME | End: 2018-01-06
Attending: EMERGENCY MEDICINE
Payer: MEDICARE

## 2018-01-05 VITALS
BODY MASS INDEX: 29.02 KG/M2 | HEIGHT: 64 IN | HEART RATE: 83 BPM | WEIGHT: 170 LBS | RESPIRATION RATE: 18 BRPM | OXYGEN SATURATION: 99 % | SYSTOLIC BLOOD PRESSURE: 157 MMHG | DIASTOLIC BLOOD PRESSURE: 78 MMHG | TEMPERATURE: 99 F

## 2018-01-05 DIAGNOSIS — H92.01 EAR PAIN, RIGHT: Primary | ICD-10-CM

## 2018-01-05 PROCEDURE — 99283 EMERGENCY DEPT VISIT LOW MDM: CPT | Mod: ,,, | Performed by: EMERGENCY MEDICINE

## 2018-01-05 PROCEDURE — 99283 EMERGENCY DEPT VISIT LOW MDM: CPT

## 2018-01-06 PROCEDURE — 25000003 PHARM REV CODE 250: Performed by: EMERGENCY MEDICINE

## 2018-01-06 RX ORDER — CIPROFLOXACIN AND DEXAMETHASONE 3; 1 MG/ML; MG/ML
4 SUSPENSION/ DROPS AURICULAR (OTIC) 2 TIMES DAILY
Qty: 5 ML | Refills: 0 | Status: SHIPPED | OUTPATIENT
Start: 2018-01-06 | End: 2018-01-13

## 2018-01-06 RX ORDER — TRAMADOL HYDROCHLORIDE 50 MG/1
50 TABLET ORAL EVERY 6 HOURS PRN
Qty: 6 TABLET | Refills: 0 | Status: SHIPPED | OUTPATIENT
Start: 2018-01-06 | End: 2018-01-06 | Stop reason: CLARIF

## 2018-01-06 RX ORDER — ACETAMINOPHEN 500 MG
500 TABLET ORAL EVERY 6 HOURS PRN
Qty: 20 TABLET | Refills: 0 | Status: SHIPPED | OUTPATIENT
Start: 2018-01-06 | End: 2018-01-06 | Stop reason: CLARIF

## 2018-01-06 RX ORDER — ACETAMINOPHEN 500 MG
500 TABLET ORAL
Status: COMPLETED | OUTPATIENT
Start: 2018-01-06 | End: 2018-01-06

## 2018-01-06 RX ADMIN — ACETAMINOPHEN 500 MG: 500 TABLET ORAL at 12:01
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
## 2018-01-06 NOTE — ED TRIAGE NOTES
Pt reporting right ear pain for the past 2 days; also complains of nausea with one episode of vomiting yesterday.

## 2018-01-06 NOTE — ED NOTES
LOC: The patient is awake, alert, and oriented to place, time, situation. Affect is appropriate.  Speech is appropriate and clear.     APPEARANCE: Patient resting in chair; appear uncomfortable but in no acute distress.  Patient is clean and well groomed.    SKIN: The skin is warm and dry; color consistent with ethnicity.  Patient has normal skin turgor and moist mucus membranes.  Skin intact; no breakdown or bruising noted.     MUSCULOSKELETAL: Patient moving upper and lower extremities without difficulty.  Denies weakness.     RESPIRATORY: Airway is open and patent. Respirations spontaneous, even, easy, and non-labored.  Patient has a normal effort and rate.  No accessory muscle use noted. Denies cough.     CARDIAC:  Normal rhythm and rate noted.  No peripheral edema noted. No complaints of chest pain.      ABDOMEN: Soft and non tender to palpation.  No distention noted.     NEUROLOGIC: Eyes open spontaneously.  Behavior appropriate to situation.  Follows commands; facial expression symmetrical.  Purposeful motor response noted; normal sensation in all extremities.

## 2018-01-06 NOTE — ED TRIAGE NOTES
"Pt reports otalgia in her right ear x 3days. Pt reports " The pain is so bad its making me nauseous." The pt rates her pain a 10/10 and describes her pain as stabbing   "

## 2018-01-08 ENCOUNTER — TELEPHONE (OUTPATIENT)
Dept: INTERNAL MEDICINE | Facility: CLINIC | Age: 73
End: 2018-01-08

## 2018-01-08 NOTE — TELEPHONE ENCOUNTER
Spoke with pt. Pt would like to know when should she start drinking the liquid for colonoscopy screening. Pt mentions the testing is tomorrow. Please advise.

## 2018-01-08 NOTE — TELEPHONE ENCOUNTER
----- Message from Juanita Chester sent at 1/8/2018 11:57 AM CST -----  Contact: Pt [phone] or [phone]  Pt would like a call back from the nurse regarding when she should starting to drink the liquid for her colonoscopy. Please advise

## 2018-01-09 ENCOUNTER — ANESTHESIA (OUTPATIENT)
Dept: ENDOSCOPY | Facility: HOSPITAL | Age: 73
End: 2018-01-09
Payer: MEDICARE

## 2018-01-09 ENCOUNTER — SURGERY (OUTPATIENT)
Age: 73
End: 2018-01-09

## 2018-01-09 ENCOUNTER — ANESTHESIA EVENT (OUTPATIENT)
Dept: ENDOSCOPY | Facility: HOSPITAL | Age: 73
End: 2018-01-09
Payer: MEDICARE

## 2018-01-09 ENCOUNTER — HOSPITAL ENCOUNTER (OUTPATIENT)
Facility: HOSPITAL | Age: 73
Discharge: HOME | End: 2018-01-09
Attending: INTERNAL MEDICINE | Admitting: INTERNAL MEDICINE
Payer: MEDICARE

## 2018-01-09 VITALS
HEIGHT: 64 IN | WEIGHT: 160 LBS | RESPIRATION RATE: 18 BRPM | DIASTOLIC BLOOD PRESSURE: 69 MMHG | BODY MASS INDEX: 27.31 KG/M2 | OXYGEN SATURATION: 99 % | HEART RATE: 78 BPM | TEMPERATURE: 98 F | SYSTOLIC BLOOD PRESSURE: 149 MMHG

## 2018-01-09 DIAGNOSIS — Z12.11 COLON CANCER SCREENING: ICD-10-CM

## 2018-01-09 PROCEDURE — G0121 COLON CA SCRN NOT HI RSK IND: HCPCS | Performed by: INTERNAL MEDICINE

## 2018-01-09 PROCEDURE — 63600175 PHARM REV CODE 636 W HCPCS: Performed by: NURSE ANESTHETIST, CERTIFIED REGISTERED

## 2018-01-09 PROCEDURE — D9220A PRA ANESTHESIA: Mod: ANES,,, | Performed by: ANESTHESIOLOGY

## 2018-01-09 PROCEDURE — 37000009 HC ANESTHESIA EA ADD 15 MINS: Performed by: INTERNAL MEDICINE

## 2018-01-09 PROCEDURE — G0121 COLON CA SCRN NOT HI RSK IND: HCPCS | Mod: ,,, | Performed by: INTERNAL MEDICINE

## 2018-01-09 PROCEDURE — D9220A PRA ANESTHESIA: Mod: CRNA,,, | Performed by: NURSE ANESTHETIST, CERTIFIED REGISTERED

## 2018-01-09 PROCEDURE — G0105 COLORECTAL SCRN; HI RISK IND: HCPCS | Performed by: INTERNAL MEDICINE

## 2018-01-09 PROCEDURE — 37000008 HC ANESTHESIA 1ST 15 MINUTES: Performed by: INTERNAL MEDICINE

## 2018-01-09 PROCEDURE — 25000003 PHARM REV CODE 250: Performed by: INTERNAL MEDICINE

## 2018-01-09 RX ORDER — SODIUM CHLORIDE 9 MG/ML
INJECTION, SOLUTION INTRAVENOUS CONTINUOUS
Status: DISCONTINUED | OUTPATIENT
Start: 2018-01-09 | End: 2018-01-09 | Stop reason: HOSPADM

## 2018-01-09 RX ORDER — PROPOFOL 10 MG/ML
VIAL (ML) INTRAVENOUS
Status: DISCONTINUED | OUTPATIENT
Start: 2018-01-09 | End: 2018-01-09

## 2018-01-09 RX ORDER — LIDOCAINE HCL/PF 100 MG/5ML
SYRINGE (ML) INTRAVENOUS
Status: DISCONTINUED | OUTPATIENT
Start: 2018-01-09 | End: 2018-01-09

## 2018-01-09 RX ORDER — PROPOFOL 10 MG/ML
VIAL (ML) INTRAVENOUS CONTINUOUS PRN
Status: DISCONTINUED | OUTPATIENT
Start: 2018-01-09 | End: 2018-01-09

## 2018-01-09 RX ADMIN — PROPOFOL 30 MG: 10 INJECTION, EMULSION INTRAVENOUS at 11:01

## 2018-01-09 RX ADMIN — PROPOFOL 150 MCG/KG/MIN: 10 INJECTION, EMULSION INTRAVENOUS at 11:01

## 2018-01-09 RX ADMIN — PROPOFOL 70 MG: 10 INJECTION, EMULSION INTRAVENOUS at 11:01

## 2018-01-09 RX ADMIN — LIDOCAINE HYDROCHLORIDE 80 MG: 20 INJECTION, SOLUTION INTRAVENOUS at 11:01

## 2018-01-09 RX ADMIN — SODIUM CHLORIDE: 900 INJECTION, SOLUTION INTRAVENOUS at 11:01

## 2018-01-09 NOTE — H&P
Short Stay Endoscopy History and Physical    PCP - Patti Brian MD    Procedure - Colonoscopy  ASA - per anesthesia  Mallampati - per anesthesia  History of Anesthesia problems - no  Family history Anesthesia problems - no   Plan of anesthesia - General/MAC    HPI:  This is a 72 y.o. female here for evaluation of : asymptomatic screening exam.  States she had a colonoscopy about 15-20 years ago that was normal with no polyps from her recollection.      ROS:  Constitutional: No fevers, chills, No weight loss  CV: No chest pain  Pulm: No cough, No shortness of breath  GI: see HPI  Derm: No rash    Medical History:  has a past medical history of Anxiety; Essential hypertension; 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, malignant (2015); History of hepatitis C, s/p successful Rx w/ SVR - 2017 (3/17/2017); Mass (10-10-14); Neurofibromatosis, type 1 (von Recklinghausen's disease) (2014); Pheochromocytoma; and Soft tissue sarcoma of chest wall (2014).    Surgical History:  has a past surgical history that includes Tonsillectomy (Bilateral); Hysterectomy (N/A); pheochromocytoma excision (N/A, ); Bunionectomy (Right); Breast biopsy; Bilateral salpingoophorectomy;  section; Exploratory laparotomy w/ bowel resection; and Appendectomy.    Family History: family history includes Breast cancer in her mother; Cancer in her sister; Neurofibromatosis in her father and sister.. Otherwise no colon cancer, inflammatory bowel disease, or GI malignancies.    Social History:  reports that she has never smoked. She has never used smokeless tobacco. She reports that she does not drink alcohol or use drugs.    Review of patient's allergies indicates:   Allergen Reactions    Anaprox [naproxen sodium] Nausea Only and Palpitations    Motrin [ibuprofen] Nausea Only and Palpitations    Neomycin-polymyxin-hc      Itchy (skin)^    Sulfa (sulfonamide antibiotics)      Hives (skin)^        Medications:   Prescriptions Prior to Admission   Medication Sig Dispense Refill Last Dose    busPIRone (BUSPAR) 10 MG tablet Take 1 tablet (10 mg total) by mouth 2 (two) times daily as needed (anxiety). 60 tablet 3 1/8/2018 at Unknown time    calcium-vitamin D (OSCAL) 250 (625)-125 mg-unit per tablet Take 1 tablet by mouth once daily.   1/8/2018 at Unknown time    cyanocobalamin, vitamin B-12, (VITAMIN B-12) 50 mcg tablet Take 50 mcg by mouth once daily.   1/8/2018 at Unknown time    lisinopril 10 MG tablet Take 2 tablets (20 mg total) by mouth once daily. (Patient taking differently: Take 10 mg by mouth once daily. ) 180 tablet 3 1/9/2018 at Unknown time    multivitamin capsule Take 1 capsule by mouth once daily.   1/8/2018 at Unknown time    carbamide peroxide (DEBROX) 6.5 % otic solution Place 5 drops into both ears as needed. 15 mL 0 Unknown at Unknown time    ciprofloxacin-dexamethasone 0.3-0.1% (CIPRODEX) 0.3-0.1 % DrpS Place 4 drops into the right ear 2 (two) times daily. 5 mL 0 Unknown at Unknown time    diclofenac sodium 1 % Gel Apply 2 g topically 4 (four) times daily. 1 Tube 2 1/5/2018         Physical Exam:    Vital Signs:   Vitals:    01/09/18 1108   BP: (!) 163/85   Pulse: 84   Resp: 16   Temp: 98.8 °F (37.1 °C)       General Appearance: Well appearing in no acute distress  Eyes:    No scleral icterus  ENT: Neck supple, Lips, mucosa, and tongue normal; teeth and gums normal  Lungs: CTA bilaterally  Heart:  S1, S2 normal, no murmurs heard  Abdomen: Soft, non tender, non distended with positive bowel sounds. No hepatosplenomegaly, ascites, or mass.  Extremities: 2+ pulses, no clubbing, cyanosis or edema  Skin: No rash      Labs:  Lab Results   Component Value Date    WBC 5.40 09/09/2017    HGB 13.0 09/09/2017    HCT 40.1 09/09/2017     09/09/2017    CHOL 176 03/06/2017    TRIG 70 03/06/2017    HDL 90 (H) 03/06/2017    ALT 15 11/28/2017    AST 14 11/28/2017     11/28/2017    K  3.3 (L) 11/28/2017     11/28/2017    CREATININE 0.7 11/28/2017    BUN 8 11/28/2017    CO2 27 11/28/2017    TSH 0.556 03/06/2017    INR 1.1 03/15/2017    HGBA1C 4.8 03/06/2017       I have explained the risks and benefits of endoscopy procedures to the patient including but not limited to bleeding, perforation, infection, and death.      Antonio Calero MD

## 2018-01-09 NOTE — TRANSFER OF CARE
"Anesthesia Transfer of Care Note    Patient: Lesli Gong    Procedure(s) Performed: Procedure(s) (LRB):  COLONOSCOPY (N/A)    Patient location: GI    Anesthesia Type: general    Transport from OR: Transported from OR on room air with adequate spontaneous ventilation    Post pain: adequate analgesia    Post assessment: no apparent anesthetic complications and tolerated procedure well    Post vital signs: stable    Level of consciousness: awake    Nausea/Vomiting: no nausea/vomiting    Complications: none    Transfer of care protocol was followed      Last vitals:   Visit Vitals  BP (!) 147/73 (BP Location: Left arm, Patient Position: Lying)   Pulse 82   Temp 36.7 °C (98 °F) (Temporal)   Resp 18   Ht 5' 4" (1.626 m)   Wt 72.6 kg (160 lb)   SpO2 96%   Breastfeeding? No   BMI 27.46 kg/m²     "

## 2018-01-09 NOTE — PROVATION PATIENT INSTRUCTIONS
Discharge Summary/Instructions after an Endoscopic Procedure  Patient Name: Lesli Gong  Patient MRN: [id number]  Patient YOB: 1945  Tuesday, January 09, 2018  Oscar Medley MD  RESTRICTIONS:  During your procedure today, you received medications for sedation.  These   medications may affect your judgment, balance and coordination.  Therefore,   for 24 hours, you have the following restrictions:   - DO NOT drive a car, operate machinery, make legal/financial decisions,   sign important papers or drink alcohol.    ACTIVITY:  The following day: return to full activity including work, except no heavy   lifting, straining or running for 3 days if polyps were removed.  DIET:  Eat and drink normally unless instructed otherwise.     TREATMENT FOR COMMON SIDE EFFECTS:  - Mild abdominal pain, belching, bloating or excessive gas: rest, eat   lightly and use a heating pad.  - Sore Throat: treat with throat lozenges and/or gargle with warm salt   water.  SYMPTOMS TO WATCH FOR AND REPORT TO YOUR PHYSICIAN:  1. Abdominal pain or bloating, other than gas cramps.  2. Chest pain.  3. Back pain.  4. Chills or fever occurring within 24 hours after the procedure.  5. Rectal bleeding, which would show as bright red, maroon, or black stools.   (A tablespoon of blood from the rectum is not serious, especially if   hemorrhoids are present.)  6. Vomiting.  7. Weakness or dizziness.  8. Because air was used during the procedure, expelling large amounts of air   from your rectum or belching is normal.  9. If a bowel prep was taken, you may not have a bowel movement for 1-3   days.  This is normal.  GO DIRECTLY TO THE NEAREST EMERGENCY ROOM IF YOU HAVE ANY OF THE FOLLOWING:      Difficulty breathing  Chills and/or fever over 101 F   Persistent vomiting and/or vomiting blood   Severe abdominal pain   Severe chest pain   Black, tarry stools   Bleeding- more than one tablespoon   Any other symptom or condition that you may  feel needs urgent attention  Your doctor recommends these additional instructions:  If any biopsies were taken, your doctor s clinic will contact you in 1 to 2   weeks with any results.  You are being discharged to home.   Resume your previous diet.   Continue your present medications.   Your physician has recommended a repeat colonoscopy in five years for   surveillance.   Return to your referring physician.  For questions, problems or results please call your physician - Oscar Medley MD at Work:  [phone].  [facility] [place], EMERGENCY ROOM PHONE NUMBER: [phone]  IF A COMPLICATION OR EMERGENCY SITUATION ARISES AND YOU ARE UNABLE TO REACH   YOUR PHYSICIAN - GO DIRECTLY TO THE EMERGENCY ROOM.  Oscar Medley MD  1/9/2018 12:03:05 PM  This report has been verified and signed electronically.

## 2018-01-09 NOTE — ANESTHESIA POSTPROCEDURE EVALUATION
"Anesthesia Post Evaluation    Patient: Lesli Gong    Procedure(s) Performed: Procedure(s) (LRB):  COLONOSCOPY (N/A)    Final Anesthesia Type: general  Patient location during evaluation: GI PACU  Patient participation: Yes- Able to Participate  Level of consciousness: awake and alert  Post-procedure vital signs: reviewed and stable  Pain management: adequate  Airway patency: patent  PONV status at discharge: No PONV  Anesthetic complications: no      Cardiovascular status: blood pressure returned to baseline  Respiratory status: unassisted  Hydration status: euvolemic  Follow-up not needed.        Visit Vitals  BP (!) 149/69   Pulse 78   Temp 36.7 °C (98 °F) (Temporal)   Resp 18   Ht 5' 4" (1.626 m)   Wt 72.6 kg (160 lb)   SpO2 99%   Breastfeeding? No   BMI 27.46 kg/m²       Pain/Lincoln Score: Pain Assessment Performed: Yes (1/9/2018 12:26 PM)  Presence of Pain: denies (1/9/2018 12:26 PM)  Pain Rating Prior to Med Admin: 0 (1/9/2018 11:00 AM)  Lincoln Score: 10 (1/9/2018 12:26 PM)      "

## 2018-01-09 NOTE — DISCHARGE INSTRUCTIONS

## 2018-01-09 NOTE — ANESTHESIA PREPROCEDURE EVALUATION
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, malignant 2015    History of hepatitis C, s/p successful Rx w/ SVR12 - 2017 3/17/2017    Completed zepatier + RBV w/ svr     Mass 10-10-14    excision of mass/left shoulder    Neurofibromatosis, type 1 (von Recklinghausen's disease) 2014    Pheochromocytoma     S/p resection in     Soft tissue sarcoma of chest wall 2014     Past Surgical History:   Procedure Laterality Date    APPENDECTOMY      BILATERAL SALPINGOOPHORECTOMY      BREAST BIOPSY      BUNIONECTOMY Right      SECTION      EXPLORATORY LAPAROTOMY W/ BOWEL RESECTION      HYSTERECTOMY N/A     pheochromocytoma excision N/A     TONSILLECTOMY Bilateral      Patient Active Problem List   Diagnosis    History of sarcoma of soft tissue    Neurofibromatosis, type 1 (von Recklinghausen's disease)    Essential hypertension    History of gastrointestinal stromal tumor (GIST)    Sensation of fullness in both ears    Impacted cerumen of both ears    Left shoulder pain    Chronic pain    Anxiety    History of hepatitis C, s/p successful Rx w/ SVR2017    Adjustment disorder with anxiety     Please See ROS/PMH and Active Problem List above                                                                                                            2018  Lesli Gong is a 72 y.o., female.    Anesthesia Evaluation    I have reviewed the Patient Summary Reports.    I  have reviewed the Nursing Notes.   I have reviewed the Medications.     Review of Systems  Anesthesia Hx:  No problems with previous Anesthesia   Denies Personal Hx of Anesthesia complications.   Social:  Non-Smoker, No Alcohol Use  Denies Tobacco Use. Denies Alcohol Use.   Cardiovascular:   Exercise tolerance: poor Hypertension, well controlled Denies Valvular problems/Murmurs.  Denies MI.  Denies CAD.    Denies CABG/stent.  Denies Dysrhythmias.   Functional Capacity low / < 4 METS  Denies Coronary Artery Disease.  Hypertension, Essential Hypertension , Well Controlled on Rx    Pulmonary:   Denies COPD.  Denies Asthma.  Denies Shortness of breath.  Denies Asthma.  Denies Chronic Obstructive Pulmonary Disease (COPD).    Renal/:   Denies Chronic Renal Disease.   Denies Renal Symptoms/Infections/Stones  Denies Kidney Function/Disease    Hepatic/GI:   GERD, well controlled Denies Liver Disease. Denies Hepatitis.  Esophageal / Stomach Disorders Gerd Controlled by chronic antireflux medication.  Denies Liver Disease    Neurological:   Denies TIA. Denies CVA. Denies Seizures.  Denies Seizure Disorder  Denies CVA - Cerebrovasular Accident  Denies TIA - Transient Ischemic Attack    Endocrine:   Denies Diabetes. Denies Hypothyroidism.  Denies Diabetes  Denies Thyroid Disease        Physical Exam  General:  Well nourished    Airway/Jaw/Neck:  Airway Findings: Mouth Opening: Normal Tongue: Normal  General Airway Assessment: Adult, Average  Mallampati: III  Improves to II with phonation.  TM Distance: Normal, at least 6 cm      Dental:  Dental Findings: Upper Dentures, Lower Dentures   Chest/Lungs:  Chest/Lungs Findings: Clear to auscultation, Normal Respiratory Rate     Heart/Vascular:  Heart Findings: Rate: Normal  Rhythm: Regular Rhythm        Mental Status:  Mental Status Findings:  Cooperative, Alert and Oriented         Anesthesia Plan  Type of Anesthesia, risks & benefits discussed:  Anesthesia Type:   general  Patient's Preference: gen  Intra-op Monitoring Plan:   Intra-op Monitoring Plan Comments:   Post Op Pain Control Plan:   Post Op Pain Control Plan Comments: Iv>po  Induction:   IV  Beta Blocker:  Patient is not currently on a Beta-Blocker (No further documentation required).       Informed Consent: Patient understands risks and agrees with Anesthesia plan.  Questions answered. Anesthesia consent signed with patient.  ASA Score: 3     Day of Surgery Review of History & Physical:    H&P update referred to the surgeon.         Ready For Surgery From Anesthesia Perspective.

## 2018-01-09 NOTE — PLAN OF CARE
Discharge instructions given to patient. Pt verbalizes understanding and has no questions at this time.

## 2018-01-16 ENCOUNTER — TELEPHONE (OUTPATIENT)
Dept: ENDOSCOPY | Facility: HOSPITAL | Age: 73
End: 2018-01-16

## 2018-01-17 ENCOUNTER — HOSPITAL ENCOUNTER (EMERGENCY)
Facility: HOSPITAL | Age: 73
Discharge: HOME | End: 2018-01-17
Attending: EMERGENCY MEDICINE
Payer: MEDICARE

## 2018-01-17 VITALS
WEIGHT: 180 LBS | HEIGHT: 64 IN | TEMPERATURE: 98 F | SYSTOLIC BLOOD PRESSURE: 130 MMHG | OXYGEN SATURATION: 98 % | BODY MASS INDEX: 30.73 KG/M2 | HEART RATE: 72 BPM | DIASTOLIC BLOOD PRESSURE: 64 MMHG | RESPIRATION RATE: 18 BRPM

## 2018-01-17 DIAGNOSIS — M25.571 RIGHT ANKLE PAIN: Primary | ICD-10-CM
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
## 2018-01-17 DIAGNOSIS — I10 ESSENTIAL HYPERTENSION: ICD-10-CM

## 2018-01-17 DIAGNOSIS — S96.911A STRAIN OF RIGHT ANKLE, INITIAL ENCOUNTER: ICD-10-CM

## 2018-01-17 PROCEDURE — 99283 EMERGENCY DEPT VISIT LOW MDM: CPT | Mod: ,,, | Performed by: EMERGENCY MEDICINE

## 2018-01-17 PROCEDURE — 99283 EMERGENCY DEPT VISIT LOW MDM: CPT | Mod: 25

## 2018-01-17 PROCEDURE — 25000003 PHARM REV CODE 250: Performed by: PHYSICIAN ASSISTANT

## 2018-01-17 RX ORDER — TRAMADOL HYDROCHLORIDE 50 MG/1
50 TABLET ORAL
Status: DISCONTINUED | OUTPATIENT
Start: 2018-01-17 | End: 2018-01-17

## 2018-01-17 RX ORDER — ACETAMINOPHEN 325 MG/1
650 TABLET ORAL
Status: COMPLETED | OUTPATIENT
Start: 2018-01-17 | End: 2018-01-17

## 2018-01-17 RX ORDER — HYDROCODONE BITARTRATE AND ACETAMINOPHEN 5; 325 MG/1; MG/1
1 TABLET ORAL
Status: COMPLETED | OUTPATIENT
Start: 2018-01-17 | End: 2018-01-17

## 2018-01-17 RX ADMIN — ACETAMINOPHEN 650 MG: 325 TABLET ORAL at 09:01

## 2018-01-17 RX ADMIN — HYDROCODONE BITARTRATE AND ACETAMINOPHEN 1 TABLET: 5; 325 TABLET ORAL at 11:01

## 2018-01-18 NOTE — ED PROVIDER NOTES
Encounter Date: 1/17/2018    SCRIBE #1 NOTE: I, Liliane Mandel, am scribing for, and in the presence of,  TERRY Hobson. I have scribed the following portions of the note - Other sections scribed: HAYDEN CASON,APC/Resident.       History     Chief Complaint   Patient presents with    Ankle Pain     Pt fell on ice this morning and injured her right ankle, swelling noted, still able to ambulate, denies other injuries including hitting head and LOC.      Time patient was seen by the provider: 8:47 PM      The patient is a 72 y.o. female with no pertinent co-morbidities who presents to the ED with a complaint of right ankle pain. Pt is s/p a fall at 3:00 PM today where she was going down steps and fell on the bottom step and her ankle twisted as she landed. Pt rates her pain at a 10/10. She soaked her foot in epsom salt and applied heat to it but with no relief. Pt also took 2 tablets of tylenol right after the fall with no relief. Pt denies head trauma, LOC, neck or back pain, or additional injury during the fall.       The history is provided by the patient.     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    History of hepatitis C, s/p successful Rx w/ SVR12 - 11/2017 3/17/2017    Completed zepatier + RBV w/ svr     Mass 10-10-14    excision of mass/left shoulder    Neurofibromatosis, type 1 (von Recklinghausen's disease) 7/30/2014    Pheochromocytoma     S/p resection in 80's    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     Past Surgical History:   Procedure Laterality Date    APPENDECTOMY      BILATERAL SALPINGOOPHORECTOMY       BREAST BIOPSY      BUNIONECTOMY Right      SECTION      COLONOSCOPY N/A 2018    Procedure: COLONOSCOPY;  Surgeon: Oscar Medley MD;  Location: [facility] ([address] [place] [address]);  Service: Endoscopy;  Laterality: N/A;    EXPLORATORY LAPAROTOMY W/ BOWEL RESECTION      HYSTERECTOMY N/A     pheochromocytoma excision N/A     TONSILLECTOMY Bilateral      Family History   Problem Relation Age of Onset    Cancer Sister      GIST    Neurofibromatosis Sister     Neurofibromatosis Father     Breast cancer Mother      Social History   Substance Use Topics    Smoking status: Never Smoker    Smokeless tobacco: Never Used    Alcohol use No     Review of Systems   HENT:        (-) head injury   Musculoskeletal:        (+) right ankle pain   Neurological: Negative for syncope.       Physical Exam     Initial Vitals [18]   BP Pulse Resp Temp SpO2   115/63 96 16 98.3 °F (36.8 °C) 100 %      MAP       80.33         Physical Exam    Nursing note and vitals reviewed.  Constitutional: She appears well-developed and well-nourished.   HENT:   Head: Atraumatic.   Mouth/Throat: Oropharynx is clear and moist.   Eyes: Conjunctivae and EOM are normal. Pupils are equal, round, and reactive to light.   Neck: Normal range of motion. Neck supple.   Cardiovascular: Normal rate and regular rhythm.   Pulmonary/Chest: Breath sounds normal. No respiratory distress. She has no wheezes. She has no rhonchi. She has no rales.   Musculoskeletal:        Right ankle: She exhibits decreased range of motion (secondary to pain) and swelling (localized to lateral malleolus). She exhibits no ecchymosis and normal pulse. Tenderness. Lateral malleolus tenderness found. No proximal fibula tenderness found. Achilles tendon normal. Achilles tendon exhibits no defect and normal Vera's test results.   Neurological: She is alert and oriented to person, place, and time.   Skin: No rash noted.   Psychiatric: She has a normal mood  "and affect.         ED Course   Procedures  Labs Reviewed - No data to display          Medical Decision Making:   History:   Old Medical Records: I decided to obtain old medical records.  Clinical Tests:   Radiological Study: Ordered and Reviewed       APC / Resident Notes:   Pt presents with right ankle pain since a slip and fall this afternoon. She has swelling and tenderness over the lateral malleolus. Will order XR to evaluate for possible fracture. No additional injury or signs of trauma on exam.   Ankle Xray shows "No acute fracture. Soft tissue swelling about the ankle."  Patient is provided with an ankle air splint and is advised on R.I.C.E therapy for treatment of ankle sprain.  She has been informed by her PCP that she cannot take NSAIDs, so I will advise that she take Tylenol 1,000 mg every 8 hours for her pain.  She is advised on close follow-up with her primary care physician within 1 week for ER follow-up exam.  She is given ER return precautions.  I discussed the care of this patient with my supervising MD, the patient was also seen by him.     I, Diana Palacio, personally performed the services described in this documentation. All medical record entries made by the scribe were at my direction and in my presence.  I have reviewed the chart and agree that the record reflects my personal performance and is accurate and complete. Diana Palacio, APC.  10:50 PM 01/17/2018         Scribe Attestation:   Scribe #1: I performed the above scribed service and the documentation accurately describes the services I performed. I attest to the accuracy of the note.    Attending Attestation:     Physician Attestation Statement for NP/PA:   I have conducted a face to face encounter with this patient in addition to the NP/PA, due to NP/PA Request    Other NP/PA Attestation Additions:    History of Present Illness: No head injury or loc  No weakness/numbness  Pain confined to ankle, no knee/foot pain  No prior injury " to this ankle she can recall   Physical Exam: R ankle with edema and tenderness to lat mall, no significant laxity, mild tenderness med mal, no other focal tenderness RLE including prox fib.  Nvi.  Compartments soft  Skin intact   Medical Decision Making: xr r/o fx, dislocation, suspect sprain.                   ED Course      Clinical Impression:   The primary encounter diagnosis was Right ankle pain. A diagnosis of Strain of right ankle, initial encounter was also pertinent to this visit.                           NADEEM Anderson  01/17/18 2253       NADEEM Anderson  01/17/18 2257

## 2018-01-18 NOTE — ED TRIAGE NOTES
Pt presents with right ankle pain after fall from standing today. Pt c/o pain with ambulation. Distal pulse and sensation present. Lateral swelling noted to ankle.

## 2018-01-19 ENCOUNTER — OFFICE VISIT (OUTPATIENT)
Dept: INTERNAL MEDICINE | Facility: CLINIC | Age: 73
End: 2018-01-19
Payer: MEDICARE

## 2018-01-19 ENCOUNTER — TELEPHONE (OUTPATIENT)
Dept: INTERNAL MEDICINE | Facility: CLINIC | Age: 73
End: 2018-01-19

## 2018-01-19 VITALS
HEIGHT: 64 IN | HEART RATE: 102 BPM | BODY MASS INDEX: 31.99 KG/M2 | DIASTOLIC BLOOD PRESSURE: 80 MMHG | SYSTOLIC BLOOD PRESSURE: 120 MMHG | TEMPERATURE: 98 F | WEIGHT: 187.38 LBS

## 2018-01-19 DIAGNOSIS — I10 ESSENTIAL HYPERTENSION: ICD-10-CM

## 2018-01-19 DIAGNOSIS — S93.401D SPRAIN OF RIGHT ANKLE, UNSPECIFIED LIGAMENT, SUBSEQUENT ENCOUNTER: Primary | ICD-10-CM

## 2018-01-19 PROCEDURE — 99999 PR PBB SHADOW E&M-EST. PATIENT-LVL III: CPT | Mod: PBBFAC,,, | Performed by: INTERNAL MEDICINE

## 2018-01-19 PROCEDURE — 99213 OFFICE O/P EST LOW 20 MIN: CPT | Mod: S$GLB,,, | Performed by: INTERNAL MEDICINE

## 2018-01-19 RX ORDER — HYDROCODONE BITARTRATE AND ACETAMINOPHEN 5; 325 MG/1; MG/1
TABLET ORAL
Refills: 0 | COMMUNITY
Start: 2017-12-28 | End: 2018-01-25

## 2018-01-19 RX ORDER — HYDROCODONE BITARTRATE AND ACETAMINOPHEN 5; 325 MG/1; MG/1
TABLET ORAL
Refills: 0 | COMMUNITY
Start: 2017-12-08 | End: 2018-01-25

## 2018-01-19 RX ORDER — ACETAMINOPHEN AND CODEINE PHOSPHATE 300; 30 MG/1; MG/1
TABLET ORAL
COMMUNITY
Start: 2018-01-07 | End: 2018-01-25

## 2018-01-19 RX ORDER — HYDROCODONE BITARTRATE AND ACETAMINOPHEN 5; 325 MG/1; MG/1
1 TABLET ORAL EVERY 6 HOURS PRN
Qty: 12 TABLET | Refills: 0 | Status: SHIPPED | OUTPATIENT
Start: 2018-01-19 | End: 2018-01-25

## 2018-01-19 NOTE — TELEPHONE ENCOUNTER
Called patient. No answer. Left vm to return call. Dr. Brian out of office. Please offer patient UC appt.  Thanks

## 2018-01-19 NOTE — TELEPHONE ENCOUNTER
----- Message from Francesca Philip sent at 1/19/2018 11:07 AM CST -----  Contact: patient [phone]  Pt is at the hospital with her  and fell two days ago and injured her ankle, She is asking if you can order something for patient.[facility] [phone]

## 2018-01-19 NOTE — PROGRESS NOTES
Subjective:       Patient ID: Lesli Gong is a 72 y.o. female.    Chief Complaint: Ankle Pain (right ankle pain from fall)    Patient of Dr. Brian presents for an urgent visit c/o right ankle pain. Onset two days ago when she went outside and slipped on ice on the ground. Seen in the ER same day, X-rays (3 views) negative for fracture or dislocation. Pain persists around lateral malleolus, she is able to ambulate but with discomfort. She returns today because she was not prescribed any medication in the ER. Perhaps because records show Tylenol #3 prescribed by Pain Management here, #60 tabs filled on 01/07/18 (she has used it all), and Norco 5/325mg #10 tabs each on 12/08/17 and 12/28/17 from unknown external source.     PMH: HTN, Neurofibromatosis.  Allergies: Naproxen, Ibuprofen, Sulfa.  Medications: reviewed.               Review of Systems   Constitutional: Negative for chills, diaphoresis and fever.   Respiratory: Negative for cough and shortness of breath.    Cardiovascular: Negative for chest pain, palpitations and leg swelling.   Neurological: Negative for weakness and numbness.       Objective:    /80, Pulse 102, Temp 98.0  Physical Exam   Constitutional: She is oriented to person, place, and time. She appears well-developed and well-nourished. No distress.   Ambulatory with cane.   HENT:   Nose: Nose normal.   Mouth/Throat: Oropharynx is clear and moist.   Eyes: EOM are normal. Pupils are equal, round, and reactive to light.   Cardiovascular: Normal rate, regular rhythm and normal heart sounds.    Pulmonary/Chest: Effort normal and breath sounds normal. No respiratory distress. She has no wheezes. She has no rales.   Musculoskeletal: She exhibits no edema.   Mild soft tissue swelling and tenderness at right lateral malleolus without deformity, bruising, abrasion. Some pain on range of motion. Foot is normal distally, normal capillary refill.   Neurological: She is alert and oriented to person,  place, and time.   Skin: Skin is warm and dry. She is not diaphoretic.   Psychiatric: She has a normal mood and affect. Her behavior is normal.       Assessment:       1. Sprain of right ankle, unspecified ligament, subsequent encounter        Plan:       Sprain of right ankle, unspecified ligament, subsequent encounter - stable, no repeat imaging needed at this time.   -     hydrocodone-acetaminophen 5-325mg (NORCO) 5-325 mg per tablet; Take 1 tablet by mouth every 6 (six) hours as needed for Pain.  Dispense: 12 tablet; Refill: 0    Essential hypertension controlled, continue same.     F/U with PCP if no improvement or worsens.

## 2018-01-25 ENCOUNTER — HOSPITAL ENCOUNTER (OUTPATIENT)
Dept: RADIOLOGY | Facility: HOSPITAL | Age: 73
Discharge: HOME | End: 2018-01-25
Attending: INTERNAL MEDICINE
Payer: MEDICARE

## 2018-01-25 ENCOUNTER — TELEPHONE (OUTPATIENT)
Dept: HEMATOLOGY/ONCOLOGY | Facility: CLINIC | Age: 73
End: 2018-01-25

## 2018-01-25 ENCOUNTER — OFFICE VISIT (OUTPATIENT)
Dept: HEMATOLOGY/ONCOLOGY | Facility: CLINIC | Age: 73
End: 2018-01-25
Payer: MEDICARE

## 2018-01-25 VITALS
HEIGHT: 64 IN | TEMPERATURE: 98 F | HEART RATE: 90 BPM | BODY MASS INDEX: 31.81 KG/M2 | RESPIRATION RATE: 20 BRPM | DIASTOLIC BLOOD PRESSURE: 62 MMHG | OXYGEN SATURATION: 97 % | WEIGHT: 186.31 LBS | SYSTOLIC BLOOD PRESSURE: 105 MMHG

## 2018-01-25 DIAGNOSIS — Z85.09 HISTORY OF GASTROINTESTINAL STROMAL TUMOR (GIST): ICD-10-CM

## 2018-01-25 DIAGNOSIS — M79.604 PAIN OF RIGHT LOWER EXTREMITY: ICD-10-CM

## 2018-01-25 DIAGNOSIS — Q85.01 NEUROFIBROMATOSIS, TYPE 1 (VON RECKLINGHAUSEN'S DISEASE): ICD-10-CM

## 2018-01-25 DIAGNOSIS — Z85.831 HISTORY OF SARCOMA OF SOFT TISSUE: Primary | ICD-10-CM

## 2018-01-25 DIAGNOSIS — Z85.09 HISTORY OF GASTROINTESTINAL STROMAL TUMOR (GIST): Primary | ICD-10-CM

## 2018-01-25 PROCEDURE — 74177 CT ABD & PELVIS W/CONTRAST: CPT | Mod: 26,,, | Performed by: RADIOLOGY

## 2018-01-25 PROCEDURE — 71260 CT THORAX DX C+: CPT | Mod: TC

## 2018-01-25 PROCEDURE — 74177 CT ABD & PELVIS W/CONTRAST: CPT | Mod: TC

## 2018-01-25 PROCEDURE — 99214 OFFICE O/P EST MOD 30 MIN: CPT | Mod: S$GLB,,, | Performed by: INTERNAL MEDICINE

## 2018-01-25 PROCEDURE — 99999 PR PBB SHADOW E&M-EST. PATIENT-LVL III: CPT | Mod: PBBFAC,,, | Performed by: INTERNAL MEDICINE

## 2018-01-25 PROCEDURE — 71260 CT THORAX DX C+: CPT | Mod: 26,,, | Performed by: RADIOLOGY

## 2018-01-25 PROCEDURE — 25500020 PHARM REV CODE 255: Performed by: INTERNAL MEDICINE

## 2018-01-25 RX ORDER — HYDROCODONE BITARTRATE AND ACETAMINOPHEN 5; 325 MG/1; MG/1
1 TABLET ORAL ONCE AS NEEDED
Qty: 4 TABLET | Refills: 0 | Status: SHIPPED | OUTPATIENT
Start: 2018-01-25 | End: 2018-01-25

## 2018-01-25 RX ADMIN — IOHEXOL 15 ML: 350 INJECTION, SOLUTION INTRAVENOUS at 03:01

## 2018-01-25 RX ADMIN — IOHEXOL 100 ML: 350 INJECTION, SOLUTION INTRAVENOUS at 03:01

## 2018-01-25 NOTE — TELEPHONE ENCOUNTER
----- Message from Thang Shields sent at 1/25/2018 12:11 PM CST -----  Contact: Pt   Pt will be arriving a few minutes for appt     Contact:[phone]

## 2018-01-25 NOTE — TELEPHONE ENCOUNTER
Left vm for patient, asking her to contact the clinic to discuss whether she would like to reschedule her appointments, as dr renteria needs the CTscan results.

## 2018-01-26 LAB
CREAT SERPL-MCNC: 1.1 MG/DL (ref 0.5–1.4)
SAMPLE: NORMAL

## 2018-02-02 ENCOUNTER — TELEPHONE (OUTPATIENT)
Dept: ORTHOPEDICS | Facility: CLINIC | Age: 73
End: 2018-02-02

## 2018-02-05 ENCOUNTER — OFFICE VISIT (OUTPATIENT)
Dept: ORTHOPEDICS | Facility: CLINIC | Age: 73
End: 2018-02-05
Payer: MEDICARE

## 2018-02-05 VITALS
HEART RATE: 81 BPM | DIASTOLIC BLOOD PRESSURE: 75 MMHG | RESPIRATION RATE: 18 BRPM | SYSTOLIC BLOOD PRESSURE: 121 MMHG | WEIGHT: 186 LBS | HEIGHT: 64 IN | BODY MASS INDEX: 31.76 KG/M2

## 2018-02-05 DIAGNOSIS — G89.29 CHRONIC LEFT SHOULDER PAIN: Primary | ICD-10-CM

## 2018-02-05 DIAGNOSIS — M25.512 CHRONIC LEFT SHOULDER PAIN: Primary | ICD-10-CM

## 2018-02-05 PROCEDURE — 99213 OFFICE O/P EST LOW 20 MIN: CPT | Mod: S$GLB,,, | Performed by: PHYSICIAN ASSISTANT

## 2018-02-05 PROCEDURE — 1159F MED LIST DOCD IN RCRD: CPT | Mod: S$GLB,,, | Performed by: PHYSICIAN ASSISTANT

## 2018-02-05 PROCEDURE — 3008F BODY MASS INDEX DOCD: CPT | Mod: S$GLB,,, | Performed by: PHYSICIAN ASSISTANT

## 2018-02-05 PROCEDURE — 1125F AMNT PAIN NOTED PAIN PRSNT: CPT | Mod: S$GLB,,, | Performed by: PHYSICIAN ASSISTANT

## 2018-02-05 PROCEDURE — 99999 PR PBB SHADOW E&M-EST. PATIENT-LVL IV: CPT | Mod: PBBFAC,,, | Performed by: PHYSICIAN ASSISTANT
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
## 2018-02-14 ENCOUNTER — TELEPHONE (OUTPATIENT)
Dept: INTERNAL MEDICINE | Facility: CLINIC | Age: 73
End: 2018-02-14

## 2018-02-14 NOTE — TELEPHONE ENCOUNTER
I recommend she take acetaminophen over the counter as needed for pain. Please offer an urgent appointment for evaluation of ear pain. She may need an antibiotic if it is infected.

## 2018-02-14 NOTE — TELEPHONE ENCOUNTER
----- Message from Ethan Alaniz sent at 2/12/2018  2:23 PM CST -----  Contact: self [phone]  Patient would like to get medical advice.  Symptoms (please be specific):  Cold, right ear pain   How long has patient had these symptoms:  1 week   Pharmacy name and phone #:  [id number] -[phone] (Phone)  [phone] (Phone)  [phone] (Fax)  Any drug allergies:  See Chart   Comments: Patient would like antibiotics call in/Patient would like a call back

## 2018-02-14 NOTE — TELEPHONE ENCOUNTER
Spoke with pt. Pt states that she has been having sharp, shooting pain in the right ear for about a week now. Pt mentions that she thinks its related to a cold that she's recovered from. Pt would like to know if you can call her in some pain medication? Please advise.

## 2018-02-15 NOTE — TELEPHONE ENCOUNTER
Spoke with pt. Informed pt that you do recommend acetaminophen over the counter, verbalized understanding. I have pt scheduled for an urgent appointment on Fri 02/23 at 11:40. Informed pt that we will give her a call back immediately to see if she can be seem sooner than what is scheduled.

## 2018-02-28 ENCOUNTER — OFFICE VISIT (OUTPATIENT)
Dept: INTERNAL MEDICINE | Facility: CLINIC | Age: 73
End: 2018-02-28
Payer: MEDICARE

## 2018-02-28 ENCOUNTER — LAB VISIT (OUTPATIENT)
Dept: LAB | Facility: HOSPITAL | Age: 73
End: 2018-02-28
Payer: MEDICARE

## 2018-02-28 VITALS
HEART RATE: 97 BPM | SYSTOLIC BLOOD PRESSURE: 106 MMHG | HEIGHT: 64 IN | BODY MASS INDEX: 31.58 KG/M2 | WEIGHT: 185 LBS | DIASTOLIC BLOOD PRESSURE: 76 MMHG

## 2018-02-28 DIAGNOSIS — I10 ESSENTIAL HYPERTENSION: Primary | ICD-10-CM

## 2018-02-28 DIAGNOSIS — Q85.01 NEUROFIBROMATOSIS, TYPE 1 (VON RECKLINGHAUSEN'S DISEASE): ICD-10-CM

## 2018-02-28 DIAGNOSIS — F41.9 ANXIETY: ICD-10-CM

## 2018-02-28 DIAGNOSIS — F43.22 ADJUSTMENT DISORDER WITH ANXIETY: ICD-10-CM

## 2018-02-28 DIAGNOSIS — G89.29 OTHER CHRONIC PAIN: ICD-10-CM

## 2018-02-28 DIAGNOSIS — Z86.19 HISTORY OF HEPATITIS C: ICD-10-CM

## 2018-02-28 DIAGNOSIS — R35.0 URINARY FREQUENCY: ICD-10-CM

## 2018-02-28 PROCEDURE — 87522 HEPATITIS C REVRS TRNSCRPJ: CPT

## 2018-02-28 PROCEDURE — 99999 PR PBB SHADOW E&M-EST. PATIENT-LVL III: CPT | Mod: PBBFAC,,, | Performed by: INTERNAL MEDICINE

## 2018-02-28 PROCEDURE — 1125F AMNT PAIN NOTED PAIN PRSNT: CPT | Mod: S$GLB,,, | Performed by: INTERNAL MEDICINE

## 2018-02-28 PROCEDURE — 1159F MED LIST DOCD IN RCRD: CPT | Mod: S$GLB,,, | Performed by: INTERNAL MEDICINE

## 2018-02-28 PROCEDURE — 3008F BODY MASS INDEX DOCD: CPT | Mod: S$GLB,,, | Performed by: INTERNAL MEDICINE

## 2018-02-28 PROCEDURE — 99214 OFFICE O/P EST MOD 30 MIN: CPT | Mod: S$GLB,,, | Performed by: INTERNAL MEDICINE

## 2018-02-28 PROCEDURE — 36415 COLL VENOUS BLD VENIPUNCTURE: CPT

## 2018-02-28 RX ORDER — LISINOPRIL 10 MG/1
20 TABLET ORAL DAILY
Qty: 180 TABLET | Refills: 3 | COMMUNITY
Start: 2018-02-28 | End: 2018-05-07 | Stop reason: SDUPTHER

## 2018-02-28 RX ORDER — BUSPIRONE HYDROCHLORIDE 10 MG/1
10 TABLET ORAL 2 TIMES DAILY PRN
Qty: 60 TABLET | Refills: 5 | Status: SHIPPED | OUTPATIENT
Start: 2018-02-28 | End: 2018-08-10 | Stop reason: SDUPTHER

## 2018-02-28 NOTE — PROGRESS NOTES
"Subjective:       Patient ID: Lesli Gong is a 72 y.o. female.    Chief Complaint: Follow-up (3 month follow up.) and Ankle Pain (pt complains of moderate pain in the L ankle due to a fall recently. pt uses Diclofenac Gel to relieve pain. hard to walk on at times.)    HPI   71 yo F here for follow up of NF 1, anxiety.    Previously on Tylenol #3 q12h prn pain managed by Dr. Cheney.    reviewed:  apap-codeine #60 on 1/7/18 - [facility]  Hydrocodone-apap #12 on 1/19/18 - [place] - for pain post fall  Hydrocodone-apap #4 on 1/25/18 - [place] - for pain post fall    On buspar 10mg BID as needed anxiety.   Hep C in remission at end of 18 weeks HCV treatment.     Anxiety  buspar is helping.    Sister is sick with CHF.    Review of Systems   Constitutional: Negative for fever.   Respiratory: Negative for shortness of breath.    Cardiovascular: Negative for chest pain.   Musculoskeletal:        Ongoing right ankle discomfort which is improving   Skin: Negative.        Objective:   /76   Pulse 97   Ht 5' 4" (1.626 m)   Wt 83.9 kg (185 lb)   BMI 31.76 kg/m²      Physical Exam   Constitutional: She is oriented to person, place, and time. She appears well-developed and well-nourished. No distress.   HENT:   Head: Normocephalic and atraumatic.   Cardiovascular: Normal rate and regular rhythm.    Pulmonary/Chest: Effort normal. No respiratory distress. She has no wheezes. She has no rales.   Musculoskeletal:   Swelling of lateral right ankle with intact ROM (though discomfort with movement), normal gait observed   Neurological: She is alert and oriented to person, place, and time.   Skin: Skin is warm and dry. She is not diaphoretic.   Psychiatric: She has a normal mood and affect. Her behavior is normal.       Assessment:       1. Essential hypertension    2. Anxiety    3. Adjustment disorder with anxiety    4. History of hepatitis C, s/p successful Rx w/ SVR12 - 11/2017    5. Neurofibromatosis, type 1 (von " Recklinghausen's disease)    6. Other chronic pain    7. Urinary frequency        Plan:       Lesli was seen today for follow-up and ankle pain.    Diagnoses and all orders for this visit:    Essential hypertension  Cont lisinopril 20mg daily    Anxiety; Adjustment disorder with anxiety  -     busPIRone (BUSPAR) 10 MG tablet; Take 1 tablet (10 mg total) by mouth 2 (two) times daily as needed (anxiety).      History of hepatitis C, s/p successful Rx w/ SVR12 - 11/2017  Labs in progress per hepatology    Neurofibromatosis, type 1 (von Recklinghausen's disease)  Stable, monitor    Other chronic pain  No pain medications currently    Urinary frequency  No dysuria, possible s/e of buspar (<1% reported)    declines urinalysis or further eval  monitor

## 2018-03-03 LAB
HCV LOG: <1.08 LOG (10) IU/ML
HCV RNA QUANT PCR: <12 IU/ML
HCV, QUALITATIVE: NOT DETECTED IU/ML

## 2018-03-04 DIAGNOSIS — Z86.19 HISTORY OF HEPATITIS C: Primary | ICD-10-CM

## 2018-03-12 ENCOUNTER — OFFICE VISIT (OUTPATIENT)
Dept: OTOLARYNGOLOGY | Facility: CLINIC | Age: 73
End: 2018-03-12
Payer: MEDICARE

## 2018-03-12 ENCOUNTER — CLINICAL SUPPORT (OUTPATIENT)
Dept: AUDIOLOGY | Facility: CLINIC | Age: 73
End: 2018-03-12
Payer: MEDICARE

## 2018-03-12 VITALS
WEIGHT: 183 LBS | SYSTOLIC BLOOD PRESSURE: 149 MMHG | BODY MASS INDEX: 31.41 KG/M2 | HEART RATE: 84 BPM | DIASTOLIC BLOOD PRESSURE: 90 MMHG

## 2018-03-12 DIAGNOSIS — H90.71 MIXED CONDUCTIVE AND SENSORINEURAL HEARING LOSS OF RIGHT EAR WITH UNRESTRICTED HEARING OF LEFT EAR: Primary | ICD-10-CM

## 2018-03-12 DIAGNOSIS — H61.23 BILATERAL IMPACTED CERUMEN: ICD-10-CM

## 2018-03-12 DIAGNOSIS — Q85.01 NEUROFIBROMATOSIS, TYPE 1 (VON RECKLINGHAUSEN'S DISEASE): ICD-10-CM

## 2018-03-12 DIAGNOSIS — H92.01 RIGHT EAR PAIN: Primary | ICD-10-CM

## 2018-03-12 DIAGNOSIS — H90.3 SENSORINEURAL HEARING LOSS (SNHL) OF BOTH EARS: ICD-10-CM

## 2018-03-12 PROCEDURE — 99213 OFFICE O/P EST LOW 20 MIN: CPT | Mod: 25,S$GLB,, | Performed by: NURSE PRACTITIONER

## 2018-03-12 PROCEDURE — 3080F DIAST BP >= 90 MM HG: CPT | Mod: CPTII,S$GLB,, | Performed by: NURSE PRACTITIONER

## 2018-03-12 PROCEDURE — 92557 COMPREHENSIVE HEARING TEST: CPT | Mod: S$GLB,,, | Performed by: AUDIOLOGIST

## 2018-03-12 PROCEDURE — 3077F SYST BP >= 140 MM HG: CPT | Mod: CPTII,S$GLB,, | Performed by: NURSE PRACTITIONER

## 2018-03-12 PROCEDURE — 92567 TYMPANOMETRY: CPT | Mod: S$GLB,,, | Performed by: AUDIOLOGIST

## 2018-03-12 PROCEDURE — 99999 PR PBB SHADOW E&M-EST. PATIENT-LVL I: CPT | Mod: PBBFAC,,,

## 2018-03-12 PROCEDURE — 99999 PR PBB SHADOW E&M-EST. PATIENT-LVL III: CPT | Mod: PBBFAC,,, | Performed by: NURSE PRACTITIONER

## 2018-03-12 PROCEDURE — 69210 REMOVE IMPACTED EAR WAX UNI: CPT | Mod: S$GLB,,, | Performed by: NURSE PRACTITIONER

## 2018-03-12 NOTE — PATIENT INSTRUCTIONS
"The "gold standard" for determining presence or absence of middle ear fluid is tympanometry. Type "A" tympanogram is consistent with well-aerated middle ear space and absence of middle ear fluid. There is no remaining middle ear fluid/infection at this time. Other possible causes for continued ear pain can include but are not limited to: dental pathology, TMJ (jaw joint arthritis), cervical spine arthritis, head/neck neoplasms, myofascial pain syndrome/headaches, neuralgias, shingles, GERD.   OTC NSAID'S prn.  Encouraged yearly ear cleanings.  Audiogram Reviewed: Mild AS SNHL, Mild to moderate AD SNHL. Type A AU tympanograms.  Noise and Hearing Protection pamphlet provided.  Hearing conservation strongly recommended.  Trial of amplification is not currently indicated.  Re-check of hearing in 1 year or prn sooner if symptoms worsen.  F/U with PCP as per schedule.  RTC prn.  "

## 2018-03-12 NOTE — PROGRESS NOTES
Subjective:       Patient ID: Lesli Gong is a 72 y.o. female.     Chief Complaint: Cerumen Impaction    Otalgia    There is pain in the right ear. This is a new problem. The current episode started 1 to 4 weeks ago. The problem occurs every few hours. The problem has been unchanged. There has been no fever. The pain is at a severity of 3/10. The pain is mild. Pertinent negatives include no abdominal pain, coughing, diarrhea, ear discharge, headaches, hearing loss, neck pain, rash, rhinorrhea, sore throat or vomiting. She has tried nothing for the symptoms. There is no history of a chronic ear infection, hearing loss or a tympanostomy tube. 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    Past Surgical History: Patient has a past surgical history that includes Tonsillectomy (Bilateral); Hysterectomy (N/A); pheochromocytoma excision (N/A, ); Bunionectomy (Right); Breast biopsy; Bilateral salpingoophorectomy;  section; Exploratory laparotomy w/ bowel resection; Appendectomy; and Colonoscopy (N/A, 2018).    Social History: Patient reports that she has never smoked. She has never used smokeless tobacco. She reports that she does not drink alcohol or use drugs.    Family History: family history includes Breast cancer in her mother; Cancer in her sister; Neurofibromatosis in her father and sister.    Medications:   Current Outpatient Prescriptions   Medication Sig    busPIRone (BUSPAR) 10 MG tablet Take 1 tablet (10 mg total) by mouth 2 (two) times daily as needed (anxiety).    calcium-vitamin D (OSCAL) 250 (625)-125 mg-unit  per tablet Take 1 tablet by mouth once daily.    carbamide peroxide (DEBROX) 6.5 % otic solution Place 5 drops into both ears as needed.    cyanocobalamin, vitamin B-12, (VITAMIN B-12) 50 mcg tablet Take 50 mcg by mouth once daily.    diclofenac sodium 1 % Gel Apply 2 g topically 4 (four) times daily.    lisinopril 10 MG tablet Take 2 tablets (20 mg total) by mouth once daily.    multivitamin capsule Take 1 capsule by mouth once daily.     No current facility-administered medications for this visit.        Allergies: Patient is allergic to anaprox [naproxen sodium]; motrin [ibuprofen]; neomycin-polymyxin-hc; and sulfa (sulfonamide antibiotics).    Review of Systems   Constitutional: Negative for activity change, appetite change, chills, diaphoresis, fatigue, fever and unexpected weight change.   HENT: Positive for ear pain. Negative for congestion, dental problem, drooling, ear discharge, facial swelling, hearing loss, mouth sores, nosebleeds, postnasal drip, rhinorrhea, sinus pain, sinus pressure, sneezing, sore throat, tinnitus, trouble swallowing and voice change.    Eyes: Negative for pain and visual disturbance.   Respiratory: Negative for cough, chest tightness, shortness of breath, wheezing and stridor.    Cardiovascular: Negative for chest pain.   Gastrointestinal: Negative for abdominal pain, diarrhea and vomiting.   Musculoskeletal: Negative for gait problem and neck pain.   Skin: Negative for color change and rash.   Allergic/Immunologic: Negative for environmental allergies.   Neurological: Negative for dizziness, seizures, syncope, facial asymmetry, speech difficulty, weakness, light-headedness, numbness and headaches.   Psychiatric/Behavioral: Negative for agitation and confusion. The patient is not nervous/anxious.        Objective:       BP (!) 149/90 (BP Location: Right arm, Patient Position: Sitting, BP Method: Large (Automatic))   Pulse 84   Wt 83 kg (182 lb 15.7 oz)   BMI 31.41 kg/m²      Physical Exam   Constitutional: She is oriented to person, place, and time. She appears well-developed and well-nourished.   HENT:   Head: Normocephalic and atraumatic. Not macrocephalic and not microcephalic. Head is without raccoon's eyes, without Phillips's sign, without abrasion, without contusion, without laceration, without right periorbital erythema and without left periorbital erythema. Hair is normal.   Right Ear: Tympanic membrane, external ear and ear canal normal. No lacerations. No drainage, swelling or tenderness. No foreign bodies. No mastoid tenderness. Tympanic membrane is not injected, not scarred, not perforated, not erythematous, not retracted and not bulging. Tympanic membrane mobility is normal. No middle ear effusion. No hemotympanum. Decreased hearing is noted.   Left Ear: Tympanic membrane, external ear and ear canal normal. No lacerations. No drainage, swelling or tenderness. No foreign bodies. No mastoid tenderness. Tympanic membrane is not injected, not scarred, not perforated, not erythematous, not retracted and not bulging. Tympanic membrane mobility is normal.  No middle ear effusion. No hemotympanum. Decreased hearing is noted.   Ears:    Nose: Nose normal. No mucosal edema, rhinorrhea, nose lacerations, sinus tenderness, nasal deformity, septal deviation or nasal septal hematoma. No epistaxis.  No foreign bodies. Right sinus exhibits no maxillary sinus tenderness and no frontal sinus tenderness. Left sinus exhibits no maxillary sinus tenderness and no frontal sinus tenderness.   Mouth/Throat: Uvula is midline, oropharynx is clear and moist and mucous membranes are normal. Mucous membranes are not pale, not dry and not cyanotic. She has dentures. No oral lesions. No trismus in the jaw. Abnormal dentition. No dental abscesses, uvula swelling, lacerations or dental caries. No oropharyngeal exudate, posterior oropharyngeal edema, posterior oropharyngeal erythema or tonsillar abscesses.    Procedure Note:    Patient was brought to the minor procedure room and using the operating microscope the right ear canal  was cleaned of ceruminous debris. There was a significant cerumen impaction.  The forceps and suction were both used to perform this. Tympanic membrane intact. Pt tolerated well. There were no complications.    Procedure Note:    Patient was brought to the minor procedure room and using the operating microscope the left ear canal  was cleaned of ceruminous debris. There was a significant cerumen impaction.  The forceps and suction were both used to perform this. Tympanic membrane intact. Pt tolerated well. There were no complications.    No AOM or OE.  No mastoid, tenderness, swelling, or redness.  Facial nerve intact.     Eyes: Conjunctivae, EOM and lids are normal. Pupils are equal, round, and reactive to light.   Neck: Trachea normal and normal range of motion. Neck supple. No spinous process tenderness and no muscular tenderness present. No neck rigidity. No edema, no erythema and normal range of motion present. No thyroid mass and no thyromegaly present.   Pulmonary/Chest: Effort normal.   Abdominal: Soft.   Musculoskeletal: Normal range of motion.   Lymphadenopathy:        Head (right side): No submental, no submandibular, no tonsillar, no preauricular and no posterior auricular adenopathy present.        Head (left side): No submental, no submandibular, no tonsillar, no preauricular, no posterior auricular and no occipital adenopathy present.     She has no cervical adenopathy.   Neurological: She is alert and oriented to person, place, and time. No cranial nerve deficit or sensory deficit.   Skin: Skin is warm and dry.   Psychiatric: She has a normal mood and affect. Her behavior is normal. Judgment and thought content normal.   Nursing note and vitals reviewed.      As a result of this patients history and examination findings, a comprehensive audiogram was ordered to determine the  "level of hearing/hearing loss.        Assessment:       1. Right ear pain    2. Bilateral impacted cerumen    3. Sensorineural hearing loss (SNHL) of both ears    4. Neurofibromatosis, type 1 (von Recklinghausen's disease)        Plan:       The "gold standard" for determining presence or absence of middle ear fluid is tympanometry. Type "A" tympanogram is consistent with well-aerated middle ear space and absence of middle ear fluid. There is no remaining middle ear fluid/infection at this time. Other possible causes for continued ear pain can include but are not limited to: dental pathology, TMJ (jaw joint arthritis), cervical spine arthritis, head/neck neoplasms, myofascial pain syndrome/headaches, neuralgias, shingles, GERD.   OTC NSAID'S prn.  Encouraged yearly ear cleanings.  Audiogram Reviewed: Mild AS SNHL, Mild to moderate AD SNHL. Type A AU tympanograms.  Noise and Hearing Protection pamphlet provided.  Hearing conservation strongly recommended.  Trial of amplification is not currently indicated.  Re-check of hearing in 1 year or prn sooner if symptoms worsen.  F/U with PCP as per schedule.  RTC prn.      "

## 2018-03-12 NOTE — PROGRESS NOTES
Lesli Gong was referred for an audiological evaluation today by Tristen Figueroa NP.    Audiological testing revealed a mild to moderate mixed hearing loss, AD and essentially normal hearing with a mild hearing loss noted at 8000 Hz, AS.  A speech reception threshold was obtained at 25 dBHL for the right ear and at 15 dBHL for the left ear.  Speech discrimination testing was 100% at 65 dBHL for the right ear and 100% at 55 dBHL for the left ear.      Tympanometry testing revealed Type A tympanograms, AU.     Recommendations:  1. Otologic evaluation  2. Annual hearing evaluation  3. Hearing protection when in noise   4. Hearing aid consultation for the right ear if Ms. Gong feels as though her hearing loss is negatively impacting quality of life

## 2018-04-09 ENCOUNTER — TELEPHONE (OUTPATIENT)
Dept: ORTHOPEDICS | Facility: CLINIC | Age: 73
End: 2018-04-09

## 2018-04-10 ENCOUNTER — TELEPHONE (OUTPATIENT)
Dept: ORTHOPEDICS | Facility: CLINIC | Age: 73
End: 2018-04-10

## 2018-04-24 ENCOUNTER — TELEPHONE (OUTPATIENT)
Dept: PHARMACY | Facility: CLINIC | Age: 73
End: 2018-04-24

## 2018-04-24 NOTE — TELEPHONE ENCOUNTER
Reason for call:    Notify pt PA required on her medication Diclofenac Sodium Gel 1%.    PA has been submitted to her insurance @ 5:08pm 4/23/18 and a decision can take up to 72 hours.    Please let me know if you any questions.     Thanks,   Alem Rodriguez  Patient Care Advocate   [facility]  Phone: [phone]  Fax: [phone]

## 2018-04-25 ENCOUNTER — TELEPHONE (OUTPATIENT)
Dept: PAIN MEDICINE | Facility: CLINIC | Age: 73
End: 2018-04-25

## 2018-04-25 NOTE — TELEPHONE ENCOUNTER
----- Message from Rebecca Zepeda sent at 4/25/2018  9:23 AM CDT -----  Contact: [facility] [phone] [place]  Can the clinic reply in [facility]: [facility] [phone] ad[place]      Please refill the medication(s) listed below. Please call the patient when the prescription(s) is ready for  at the phone number [facility] [phone] [place]    Medication #1diclofenac sodium 1 % Gel - needs to give PA. Needs to get additional info    Medication #2      Preferred Pharmacy:[facility] [phone] [place]

## 2018-04-25 NOTE — TELEPHONE ENCOUNTER
Staff contacted and spoke with Esther/[facility].  Esther wanted to know if the patient have arteritis? Staff replied no.  Esther verbalized understanding.

## 2018-05-07 ENCOUNTER — TELEPHONE (OUTPATIENT)
Dept: INTERNAL MEDICINE | Facility: CLINIC | Age: 73
End: 2018-05-07

## 2018-05-07 ENCOUNTER — TELEPHONE (OUTPATIENT)
Dept: ORTHOPEDICS | Facility: CLINIC | Age: 73
End: 2018-05-07

## 2018-05-07 DIAGNOSIS — M25.511 RIGHT SHOULDER PAIN, UNSPECIFIED CHRONICITY: Primary | ICD-10-CM

## 2018-05-07 DIAGNOSIS — I10 ESSENTIAL HYPERTENSION: ICD-10-CM

## 2018-05-07 RX ORDER — LISINOPRIL 10 MG/1
20 TABLET ORAL DAILY
Qty: 180 TABLET | Refills: 3 | Status: SHIPPED | OUTPATIENT
Start: 2018-05-07 | End: 2019-03-17 | Stop reason: SDUPTHER

## 2018-05-07 NOTE — TELEPHONE ENCOUNTER
Left voicemail for patient to call RE: scheduling sooner appointment and Xray for Rt shoulder before appt

## 2018-05-07 NOTE — TELEPHONE ENCOUNTER
Spoke with pt. Pt informed that medication was never denied and new Rx was sent over to [facility]. Pt verbalized understanding.

## 2018-05-07 NOTE — TELEPHONE ENCOUNTER
It was never denied. It was sent to [facility]. New rx sent to [facility].

## 2018-05-07 NOTE — TELEPHONE ENCOUNTER
Spoke with pt. Pt was trying to get refill on lisinopril medication for b/p. Pt called [facility] pharmacy and was trying to get medicine refilled but pt verbalized that she could not. I asked pt was it because they needed a prior authorization on medication, pt states that she didn't know what the reasoning was. Just informing. Please adv.

## 2018-05-07 NOTE — TELEPHONE ENCOUNTER
----- Message from Bethany Bledsoeburen sent at 5/7/2018  9:45 AM CDT -----  Contact: self/[phone]  Pt is calling to speak with someone in the office in regards to the medication lisinopril 10 MG tablet. Pt states that she tried to call her medication into [facility] and they told her that it was denied by the doctor months ago. She needs to know why and if she can get her [id number] [place] [address] [address] . Please advise.      Thanks

## 2018-05-09 ENCOUNTER — TELEPHONE (OUTPATIENT)
Dept: PHARMACY | Facility: CLINIC | Age: 73
End: 2018-05-09

## 2018-05-09 NOTE — TELEPHONE ENCOUNTER
Reason for call:    Notify pt PA was denied by her insurance for Dicolfenac Sodium Gel 1%.    2nd attempt to reach patient concerning PA denial.    PA Information:  [employer]  [phone]  Denied due to diagnosis, must have diagnosis of OA of joint.    Please let me know if you any questions.     Thanks,   Alem Rodriguez  Patient Care Advocate   [facility]  Phone: [phone]  Fax: [phone]

## 2018-05-16 ENCOUNTER — TELEPHONE (OUTPATIENT)
Dept: HEPATOLOGY | Facility: CLINIC | Age: 73
End: 2018-05-16

## 2018-05-16 NOTE — TELEPHONE ENCOUNTER
----- Message from Crista Templeton sent at 5/16/2018  2:19 PM CDT -----  Contact: patient   Patient have a medical question and would like a call today if possible.            Please call [phone]      Thanks

## 2018-05-17 ENCOUNTER — DOCUMENTATION ONLY (OUTPATIENT)
Dept: PHARMACY | Facility: CLINIC | Age: 73
End: 2018-05-17

## 2018-05-17 ENCOUNTER — TELEPHONE (OUTPATIENT)
Dept: PAIN MEDICINE | Facility: CLINIC | Age: 73
End: 2018-05-17

## 2018-05-17 NOTE — TELEPHONE ENCOUNTER
Hello, this is staff I've received your request I'm returning your call from the  clinic at [facility]. I just wanted to let you know that I'm working on getting an answer for you by . ( Please add all other notes here if needed.)    Returned patient call regarding refill request to advise patient that she needs to set up an office visit with Sravanthi Quiroz NP in Dr Stapleton absence, no answer, left voicemail message

## 2018-05-17 NOTE — PROGRESS NOTES
Original PA for diclofenac initiated in April was denied.   Appeal is under clinical review.   To follow up with Alem gibson [facility].

## 2018-05-17 NOTE — TELEPHONE ENCOUNTER
----- Message from Erin Ruffin sent at 5/17/2018  9:26 AM CDT -----  Can the clinic reply in [facility]: No    Please refill the medication(s) listed below. Please call the patient when the prescription(s) is ready for  at this phone number   [phone]      Medication #1 acetaminophen-codeine 300-30mg (TYLENOL #3) 300-30 mg Tab      Preferred Pharmacy: [facility]/pharmacy #[id number] - [place] - [address]

## 2018-05-22 RX ORDER — DICLOFENAC SODIUM 10 MG/G
GEL TOPICAL 4 TIMES DAILY
Qty: 100 G | Refills: 2 | Status: SHIPPED | OUTPATIENT
Start: 2018-05-22 | End: 2018-08-10 | Stop reason: SDUPTHER

## 2018-05-24 ENCOUNTER — TELEPHONE (OUTPATIENT)
Dept: ORTHOPEDICS | Facility: CLINIC | Age: 73
End: 2018-05-24

## 2018-05-28 ENCOUNTER — OFFICE VISIT (OUTPATIENT)
Dept: INTERNAL MEDICINE | Facility: CLINIC | Age: 73
End: 2018-05-28
Payer: MEDICARE

## 2018-05-28 VITALS
OXYGEN SATURATION: 97 % | HEART RATE: 88 BPM | SYSTOLIC BLOOD PRESSURE: 121 MMHG | WEIGHT: 182.56 LBS | HEIGHT: 64 IN | BODY MASS INDEX: 31.17 KG/M2 | DIASTOLIC BLOOD PRESSURE: 81 MMHG

## 2018-05-28 DIAGNOSIS — M25.512 CHRONIC LEFT SHOULDER PAIN: ICD-10-CM

## 2018-05-28 DIAGNOSIS — F43.22 ADJUSTMENT DISORDER WITH ANXIETY: ICD-10-CM

## 2018-05-28 DIAGNOSIS — I10 ESSENTIAL HYPERTENSION: ICD-10-CM

## 2018-05-28 DIAGNOSIS — Q85.01 NEUROFIBROMATOSIS, TYPE 1 (VON RECKLINGHAUSEN'S DISEASE): Primary | ICD-10-CM

## 2018-05-28 DIAGNOSIS — F41.9 ANXIETY: ICD-10-CM

## 2018-05-28 DIAGNOSIS — G89.29 CHRONIC LEFT SHOULDER PAIN: ICD-10-CM

## 2018-05-28 DIAGNOSIS — G89.29 OTHER CHRONIC PAIN: ICD-10-CM

## 2018-05-28 PROCEDURE — 99999 PR PBB SHADOW E&M-EST. PATIENT-LVL III: CPT | Mod: PBBFAC,,, | Performed by: INTERNAL MEDICINE

## 2018-05-28 PROCEDURE — 3074F SYST BP LT 130 MM HG: CPT | Mod: CPTII,S$GLB,, | Performed by: INTERNAL MEDICINE

## 2018-05-28 PROCEDURE — 99214 OFFICE O/P EST MOD 30 MIN: CPT | Mod: S$GLB,,, | Performed by: INTERNAL MEDICINE

## 2018-05-28 PROCEDURE — 3079F DIAST BP 80-89 MM HG: CPT | Mod: CPTII,S$GLB,, | Performed by: INTERNAL MEDICINE

## 2018-05-28 RX ORDER — MELOXICAM 7.5 MG/1
7.5 TABLET ORAL DAILY
Qty: 7 TABLET | Refills: 0 | Status: SHIPPED | OUTPATIENT
Start: 2018-05-28 | End: 2018-08-10

## 2018-05-28 NOTE — PROGRESS NOTES
"Subjective:       Patient ID: Lesli Gong is a 72 y.o. female.    Chief Complaint: Follow-up    HPI   71 yo F here for follow up of NF 1, anxiety.     On buspar 10mg BID as needed anxiety.   Hep C in remission at end of 18 weeks HCV treatment.      HTN  Lisinopril 20mg    She reprots she hurt her shoulder left lifting her sister.   She plans to get diclofenac gel on 6/3 when gets her check.   Seeing hand ortho next month.   Tried tylenol OTC.     Review of Systems   Constitutional: Negative for fever.   Respiratory: Negative for shortness of breath.    Cardiovascular: Negative for chest pain.   Musculoskeletal: Negative.         Left shoulder pain   Skin: Negative.        Objective:   /81 (BP Location: Left arm, Patient Position: Sitting)   Pulse 88   Ht 5' 4" (1.626 m)   Wt 82.8 kg (182 lb 8.7 oz)   SpO2 97%   BMI 31.33 kg/m²      Physical Exam   Constitutional: She is oriented to person, place, and time. She appears well-developed and well-nourished. No distress.   HENT:   Head: Normocephalic and atraumatic.   Cardiovascular: Normal rate and regular rhythm.    Pulmonary/Chest: Effort normal. No respiratory distress. She has no wheezes. She has no rales.   Neurological: She is alert and oriented to person, place, and time.   Skin: Skin is warm and dry. She is not diaphoretic.   Psychiatric: She has a normal mood and affect. Her behavior is normal.       Assessment:       1. Neurofibromatosis, type 1 (von Recklinghausen's disease)    2. Essential hypertension    3. Other chronic pain    4. Adjustment disorder with anxiety    5. Anxiety        Plan:       Lesli was seen today for follow-up.    Diagnoses and all orders for this visit:    Neurofibromatosis, type 1 (von Recklinghausen's disease)  [place], monitor    Essential hypertension  At goal    Other chronic pain of left shoulder  -     meloxicam (MOBIC) 7.5 MG tablet; Take 1 tablet (7.5 mg total) by mouth once daily.    Adjustment disorder with " anxiety  Anxiety  Continue buspar

## 2018-05-31 ENCOUNTER — TELEPHONE (OUTPATIENT)
Dept: PAIN MEDICINE | Facility: CLINIC | Age: 73
End: 2018-05-31

## 2018-05-31 NOTE — TELEPHONE ENCOUNTER
----- Message from Saray Ruffin sent at 5/31/2018  9:30 AM CDT -----            Name of Who is Calling:Carol([employer] [place])      What is the request in detail: Rep states she's calling in regards to the appeal that was submitted for the Diflucenac Gel. She states the appeal has been approved.      Can the clinic reply by [facility]: no      What Number to Call Back if not in [facility]: [phone]

## 2018-05-31 NOTE — TELEPHONE ENCOUNTER
Contacted Carol at [facility] regarding message.    Carol stated she was notifying that the Diclofenac gel had been approved and a letter will be sent to confirm as well.    Staff acknowledged information given.

## 2018-05-31 NOTE — TELEPHONE ENCOUNTER
Attempted to contact patient to inform that Diclofenac gel had been approved, no answer, left detailed voice message.

## 2018-06-08 ENCOUNTER — TELEPHONE (OUTPATIENT)
Dept: HEPATOLOGY | Facility: CLINIC | Age: 73
End: 2018-06-08

## 2018-06-08 NOTE — TELEPHONE ENCOUNTER
----- Message from Kellie Pantoja sent at 6/8/2018 10:46 AM CDT -----  Contact: pt  Needs Advice    Reason for call:    Wants an update on care  Communication Preference:  [phone]  Additional Information:  n/a

## 2018-06-12 ENCOUNTER — TELEPHONE (OUTPATIENT)
Dept: HEPATOLOGY | Facility: CLINIC | Age: 73
End: 2018-06-12

## 2018-06-12 NOTE — TELEPHONE ENCOUNTER
----- Message from Mirlande Eldridge sent at 6/11/2018  3:06 PM CDT -----  Contact: Pt  Pt would like an status update on her care    Pt contact number [phone]  Thanks

## 2018-06-13 ENCOUNTER — TELEPHONE (OUTPATIENT)
Dept: ORTHOPEDICS | Facility: CLINIC | Age: 73
End: 2018-06-13

## 2018-06-14 ENCOUNTER — HOSPITAL ENCOUNTER (OUTPATIENT)
Dept: RADIOLOGY | Facility: OTHER | Age: 73
Discharge: HOME | End: 2018-06-14
Attending: PHYSICIAN ASSISTANT
Payer: MEDICARE

## 2018-06-14 ENCOUNTER — OFFICE VISIT (OUTPATIENT)
Dept: ORTHOPEDICS | Facility: CLINIC | Age: 73
End: 2018-06-14
Payer: MEDICARE

## 2018-06-14 VITALS
SYSTOLIC BLOOD PRESSURE: 150 MMHG | WEIGHT: 182 LBS | HEIGHT: 64 IN | DIASTOLIC BLOOD PRESSURE: 85 MMHG | BODY MASS INDEX: 31.07 KG/M2 | HEART RATE: 92 BPM

## 2018-06-14 DIAGNOSIS — M25.511 RIGHT SHOULDER PAIN, UNSPECIFIED CHRONICITY: ICD-10-CM

## 2018-06-14 DIAGNOSIS — M25.512 CHRONIC LEFT SHOULDER PAIN: Primary | ICD-10-CM

## 2018-06-14 DIAGNOSIS — Z85.831 HISTORY OF SARCOMA OF SOFT TISSUE: ICD-10-CM

## 2018-06-14 DIAGNOSIS — M25.512 LEFT SHOULDER PAIN, UNSPECIFIED CHRONICITY: Primary | ICD-10-CM

## 2018-06-14 DIAGNOSIS — M25.512 LEFT SHOULDER PAIN, UNSPECIFIED CHRONICITY: ICD-10-CM

## 2018-06-14 DIAGNOSIS — G89.29 CHRONIC LEFT SHOULDER PAIN: Primary | ICD-10-CM

## 2018-06-14 PROCEDURE — 73030 X-RAY EXAM OF SHOULDER: CPT | Mod: TC,FY,LT

## 2018-06-14 PROCEDURE — 73030 X-RAY EXAM OF SHOULDER: CPT | Mod: 26,LT,, | Performed by: RADIOLOGY

## 2018-06-14 PROCEDURE — 3079F DIAST BP 80-89 MM HG: CPT | Mod: CPTII,S$GLB,, | Performed by: PHYSICIAN ASSISTANT

## 2018-06-14 PROCEDURE — 99999 PR PBB SHADOW E&M-EST. PATIENT-LVL IV: CPT | Mod: PBBFAC,,, | Performed by: PHYSICIAN ASSISTANT

## 2018-06-14 PROCEDURE — 3077F SYST BP >= 140 MM HG: CPT | Mod: CPTII,S$GLB,, | Performed by: PHYSICIAN ASSISTANT

## 2018-06-14 PROCEDURE — 99213 OFFICE O/P EST LOW 20 MIN: CPT | Mod: S$GLB,,, | Performed by: PHYSICIAN ASSISTANT
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
## 2018-08-06 ENCOUNTER — TELEPHONE (OUTPATIENT)
Dept: HEMATOLOGY/ONCOLOGY | Facility: CLINIC | Age: 73
End: 2018-08-06

## 2018-08-06 ENCOUNTER — HOSPITAL ENCOUNTER (OUTPATIENT)
Dept: RADIOLOGY | Facility: HOSPITAL | Age: 73
Discharge: HOME | End: 2018-08-06
Attending: INTERNAL MEDICINE
Payer: MEDICARE

## 2018-08-06 DIAGNOSIS — Z85.09 HISTORY OF GASTROINTESTINAL STROMAL TUMOR (GIST): ICD-10-CM

## 2018-08-06 LAB
CREAT SERPL-MCNC: 0.6 MG/DL (ref 0.5–1.4)
SAMPLE: NORMAL

## 2018-08-06 PROCEDURE — 71260 CT THORAX DX C+: CPT | Mod: TC

## 2018-08-06 PROCEDURE — 74177 CT ABD & PELVIS W/CONTRAST: CPT | Mod: TC

## 2018-08-06 PROCEDURE — 25500020 PHARM REV CODE 255: Performed by: INTERNAL MEDICINE

## 2018-08-06 PROCEDURE — 71260 CT THORAX DX C+: CPT | Mod: 26,,, | Performed by: INTERNAL MEDICINE

## 2018-08-06 PROCEDURE — 74177 CT ABD & PELVIS W/CONTRAST: CPT | Mod: 26,,, | Performed by: INTERNAL MEDICINE

## 2018-08-06 RX ADMIN — IOHEXOL 15 ML: 350 INJECTION, SOLUTION INTRAVENOUS at 08:08

## 2018-08-06 RX ADMIN — IOHEXOL 15 ML: 350 INJECTION, SOLUTION INTRAVENOUS at 09:08

## 2018-08-06 RX ADMIN — IOHEXOL 100 ML: 350 INJECTION, SOLUTION INTRAVENOUS at 09:08

## 2018-08-06 NOTE — TELEPHONE ENCOUNTER
----- Message from Susan Apple sent at 8/6/2018  2:21 PM CDT -----  Contact: pt  Pt called to speak with nurse states that she has a bad ear ache   Callback#[phone]  Thank You  DAWNA Apple

## 2018-08-06 NOTE — TELEPHONE ENCOUNTER
Spoke with patient.  Informed her she needs to contact her PCP for any ear aches, as she needs to be evaluated in clinic, most likely.  Patient voiced understanding.

## 2018-08-10 ENCOUNTER — OFFICE VISIT (OUTPATIENT)
Dept: HEMATOLOGY/ONCOLOGY | Facility: CLINIC | Age: 73
End: 2018-08-10
Payer: MEDICARE

## 2018-08-10 ENCOUNTER — LAB VISIT (OUTPATIENT)
Dept: LAB | Facility: HOSPITAL | Age: 73
End: 2018-08-10
Attending: INTERNAL MEDICINE
Payer: MEDICARE

## 2018-08-10 VITALS
HEIGHT: 64 IN | RESPIRATION RATE: 19 BRPM | TEMPERATURE: 98 F | DIASTOLIC BLOOD PRESSURE: 66 MMHG | HEART RATE: 79 BPM | OXYGEN SATURATION: 98 % | WEIGHT: 185.19 LBS | SYSTOLIC BLOOD PRESSURE: 116 MMHG | BODY MASS INDEX: 31.62 KG/M2

## 2018-08-10 DIAGNOSIS — Z85.09 HISTORY OF GASTROINTESTINAL STROMAL TUMOR (GIST): ICD-10-CM

## 2018-08-10 DIAGNOSIS — Z85.09 HISTORY OF GASTROINTESTINAL STROMAL TUMOR (GIST): Primary | ICD-10-CM

## 2018-08-10 LAB
ALBUMIN SERPL BCP-MCNC: 3.6 G/DL
ALP SERPL-CCNC: 116 U/L
ALT SERPL W/O P-5'-P-CCNC: 19 U/L
ANION GAP SERPL CALC-SCNC: 8 MMOL/L
AST SERPL-CCNC: 15 U/L
BILIRUB SERPL-MCNC: 0.6 MG/DL
BUN SERPL-MCNC: 15 MG/DL
CALCIUM SERPL-MCNC: 9.5 MG/DL
CHLORIDE SERPL-SCNC: 108 MMOL/L
CO2 SERPL-SCNC: 24 MMOL/L
CREAT SERPL-MCNC: 0.8 MG/DL
ERYTHROCYTE [DISTWIDTH] IN BLOOD BY AUTOMATED COUNT: 13.8 %
EST. GFR  (AFRICAN AMERICAN): >60 ML/MIN/1.73 M^2
EST. GFR  (NON AFRICAN AMERICAN): >60 ML/MIN/1.73 M^2
GLUCOSE SERPL-MCNC: 91 MG/DL
HCT VFR BLD AUTO: 44.2 %
HGB BLD-MCNC: 14.4 G/DL
IMM GRANULOCYTES # BLD AUTO: 0.09 K/UL
MCH RBC QN AUTO: 30.3 PG
MCHC RBC AUTO-ENTMCNC: 32.6 G/DL
MCV RBC AUTO: 93 FL
NEUTROPHILS # BLD AUTO: 5.6 K/UL
PLATELET # BLD AUTO: 226 K/UL
PMV BLD AUTO: 9.7 FL
POTASSIUM SERPL-SCNC: 3.8 MMOL/L
PROT SERPL-MCNC: 7.8 G/DL
RBC # BLD AUTO: 4.76 M/UL
SODIUM SERPL-SCNC: 140 MMOL/L
WBC # BLD AUTO: 7.84 K/UL

## 2018-08-10 PROCEDURE — 3074F SYST BP LT 130 MM HG: CPT | Mod: CPTII,S$GLB,, | Performed by: INTERNAL MEDICINE

## 2018-08-10 PROCEDURE — 99213 OFFICE O/P EST LOW 20 MIN: CPT | Mod: S$GLB,,, | Performed by: INTERNAL MEDICINE

## 2018-08-10 PROCEDURE — 3078F DIAST BP <80 MM HG: CPT | Mod: CPTII,S$GLB,, | Performed by: INTERNAL MEDICINE

## 2018-08-10 PROCEDURE — 99999 PR PBB SHADOW E&M-EST. PATIENT-LVL IV: CPT | Mod: PBBFAC,,, | Performed by: INTERNAL MEDICINE

## 2018-08-10 PROCEDURE — 80053 COMPREHEN METABOLIC PANEL: CPT

## 2018-08-10 PROCEDURE — 85027 COMPLETE CBC AUTOMATED: CPT

## 2018-08-10 PROCEDURE — 36415 COLL VENOUS BLD VENIPUNCTURE: CPT

## 2018-08-10 NOTE — PROGRESS NOTES
Subjective:       Patient ID: Lesli Gong is a 72 y.o. female.    Chief Complaint: History of gastrointestinal stromal tumor (GIST) and left scapula pain 7/10  Oncologic History:  Ms. Gong is a 72-year-old female with a diagnosis of type 1 neurofibromatosis, who was referred to see Dr. Camilo for evaluation of an abdominal mass found. 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 Final pathology from that revealed neurofibroma with atypia and invasion into the skeletal muscle rather than liposarcoma. She underwent a PET scan, which revealed a diffuse low-level activity as a surgical defect and a focus of high-level activity in or near a loop of the small bowel, probably   jejunum in the left upper quadrant. She was recommended to undergo CT scan for further evaluation of the bowel lesion, which she did on 05/12/2015 and that revealed a soft tissue mass in the left upper quadrant abutting a loop of the proximal jejunum, and she was referred to undergo surgery. She underwent exploratory laparotomy, lysis of adhesions and excision of two approximately 5   cm masses as well as small bowel resection with primary repair on 05/25/2015. Pathology revealed gastrointestinal stromal tumor 345, size range from 1 to 3.5 cm involving the small bowel. GIST subtype is mixed 1 mitotic rate per 50 high power fields. Necrosis is present in 30%, low-grade tumor, Ki67 is less than 1% in the tumor cells showing nuclear staining.  She did not want to do Gleevac. She is on surveillance.             HPI She comes in today to review her CT scans which reveal no evidence of disease recurrence.    Review of Systems   Constitutional: Negative for appetite change, fatigue and unexpected weight change.   HENT: Negative for mouth sores.    Eyes: Negative for visual  disturbance.   Respiratory: Negative for cough and shortness of breath.    Cardiovascular: Negative for chest pain.   Gastrointestinal: Negative for abdominal pain and diarrhea.   Genitourinary: Negative for frequency.   Musculoskeletal: Negative for back pain.   Skin: Negative for rash.   Neurological: Negative for headaches.   Hematological: Negative for adenopathy.   Psychiatric/Behavioral: The patient is not nervous/anxious.    All other systems reviewed and are negative.      Objective:      Physical Exam   Constitutional: She is oriented to person, place, and time. She appears well-developed and well-nourished.   HENT:   Mouth/Throat: No oropharyngeal exudate.   Cardiovascular: Normal rate and normal heart sounds.    Pulmonary/Chest: Effort normal and breath sounds normal. She has no wheezes.   Abdominal: Soft. Bowel sounds are normal. There is no tenderness.   Musculoskeletal: She exhibits no edema or tenderness.   Lymphadenopathy:     She has no cervical adenopathy.   Neurological: She is alert and oriented to person, place, and time. Coordination normal.   Skin: Skin is warm and dry. No rash noted.   Psychiatric: She has a normal mood and affect. Judgment and thought content normal.   Vitals reviewed.        LABS:  WBC   Date Value Ref Range Status   08/10/2018 7.84 3.90 - 12.70 K/uL Final     Hemoglobin   Date Value Ref Range Status   08/10/2018 14.4 12.0 - 16.0 g/dL Final     Hematocrit   Date Value Ref Range Status   08/10/2018 44.2 37.0 - 48.5 % Final     Platelets   Date Value Ref Range Status   08/10/2018 226 150 - 350 K/uL Final     Gran # (ANC)   Date Value Ref Range Status   08/10/2018 5.6 1.8 - 7.7 K/uL Final     Comment:     The ANC is based on a white cell differential from an   automated cell counter. It has not been microscopically   reviewed for the presence of abnormal cells. Clinical   correlation is required.         Chemistry        Component Value Date/Time     08/10/2018 0944    K  3.8 08/10/2018 0944     08/10/2018 0944    CO2 24 08/10/2018 0944    BUN 15 08/10/2018 0944    CREATININE 0.8 08/10/2018 0944    GLU 91 08/10/2018 0944        Component Value Date/Time    CALCIUM 9.5 08/10/2018 0944    ALKPHOS 116 08/10/2018 0944    AST 15 08/10/2018 0944    ALT 19 08/10/2018 0944    BILITOT 0.6 08/10/2018 0944    ESTGFRAFRICA >60.0 08/10/2018 0944    EGFRNONAA >60.0 08/10/2018 0944          Assessment:       1. History of gastrointestinal stromal tumor (GIST)        Plan:        1. She is doing well with no evidence of recurrence and will return in 6 months with labs and CT scans    Above care plan was discussed with patient and all questions were addressed to her satisfaction

## 2018-09-05 ENCOUNTER — TELEPHONE (OUTPATIENT)
Dept: ORTHOPEDICS | Facility: CLINIC | Age: 73
End: 2018-09-05

## 2018-09-05 NOTE — TELEPHONE ENCOUNTER
----- Message from Shelbi Boyle sent at 9/5/2018  1:32 PM CDT -----  Please call pt at [phone]. Patient would like to get a stat appt due to severe left shoulder pain and swelling since last Thursday 08/30/18    Thank you

## 2018-10-15 ENCOUNTER — OFFICE VISIT (OUTPATIENT)
Dept: PAIN MEDICINE | Facility: CLINIC | Age: 73
End: 2018-10-15
Payer: MEDICARE

## 2018-10-15 VITALS
SYSTOLIC BLOOD PRESSURE: 132 MMHG | HEART RATE: 86 BPM | TEMPERATURE: 97 F | DIASTOLIC BLOOD PRESSURE: 79 MMHG | HEIGHT: 64 IN | BODY MASS INDEX: 31.05 KG/M2 | WEIGHT: 181.88 LBS

## 2018-10-15 DIAGNOSIS — M89.8X1 CHRONIC SCAPULAR PAIN: ICD-10-CM

## 2018-10-15 DIAGNOSIS — G89.29 CHRONIC SCAPULAR PAIN: ICD-10-CM

## 2018-10-15 DIAGNOSIS — G89.29 CHRONIC LEFT SHOULDER PAIN: Primary | ICD-10-CM

## 2018-10-15 DIAGNOSIS — M25.512 CHRONIC LEFT SHOULDER PAIN: Primary | ICD-10-CM

## 2018-10-15 DIAGNOSIS — Q85.01 NEUROFIBROMATOSIS, TYPE 1: ICD-10-CM

## 2018-10-15 PROCEDURE — 1101F PT FALLS ASSESS-DOCD LE1/YR: CPT | Mod: CPTII,,, | Performed by: NURSE PRACTITIONER

## 2018-10-15 PROCEDURE — 99213 OFFICE O/P EST LOW 20 MIN: CPT | Mod: S$PBB,,, | Performed by: NURSE PRACTITIONER

## 2018-10-15 PROCEDURE — 3075F SYST BP GE 130 - 139MM HG: CPT | Mod: CPTII,,, | Performed by: NURSE PRACTITIONER

## 2018-10-15 PROCEDURE — 3078F DIAST BP <80 MM HG: CPT | Mod: CPTII,,, | Performed by: NURSE PRACTITIONER

## 2018-10-15 PROCEDURE — 99999 PR PBB SHADOW E&M-EST. PATIENT-LVL III: CPT | Mod: PBBFAC,,, | Performed by: NURSE PRACTITIONER

## 2018-10-15 PROCEDURE — 99213 OFFICE O/P EST LOW 20 MIN: CPT | Mod: PBBFAC | Performed by: NURSE PRACTITIONER

## 2018-10-15 RX ORDER — ACETAMINOPHEN AND CODEINE PHOSPHATE 300; 30 MG/1; MG/1
1 TABLET ORAL 2 TIMES DAILY PRN
Qty: 60 TABLET | Refills: 1 | Status: SHIPPED | OUTPATIENT
Start: 2018-10-15 | End: 2018-11-14

## 2018-10-15 RX ORDER — DICLOFENAC SODIUM 10 MG/G
GEL TOPICAL 4 TIMES DAILY
Qty: 100 G | Refills: 2 | Status: SHIPPED | OUTPATIENT
Start: 2018-10-15 | End: 2018-12-07
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
## 2018-12-07 ENCOUNTER — OFFICE VISIT (OUTPATIENT)
Dept: URGENT CARE | Facility: CLINIC | Age: 73
End: 2018-12-07
Payer: MEDICARE

## 2018-12-07 VITALS
BODY MASS INDEX: 30.9 KG/M2 | HEIGHT: 64 IN | TEMPERATURE: 98 F | RESPIRATION RATE: 16 BRPM | OXYGEN SATURATION: 98 % | DIASTOLIC BLOOD PRESSURE: 88 MMHG | HEART RATE: 90 BPM | WEIGHT: 181 LBS | SYSTOLIC BLOOD PRESSURE: 129 MMHG

## 2018-12-07 DIAGNOSIS — S01.312A TORN LEFT EAR LOBE, INITIAL ENCOUNTER: Primary | ICD-10-CM

## 2018-12-07 PROCEDURE — 99214 OFFICE O/P EST MOD 30 MIN: CPT | Mod: S$GLB,,, | Performed by: NURSE PRACTITIONER

## 2018-12-07 PROCEDURE — 3079F DIAST BP 80-89 MM HG: CPT | Mod: CPTII,S$GLB,, | Performed by: NURSE PRACTITIONER

## 2018-12-07 PROCEDURE — 3074F SYST BP LT 130 MM HG: CPT | Mod: CPTII,S$GLB,, | Performed by: NURSE PRACTITIONER

## 2018-12-07 PROCEDURE — 1101F PT FALLS ASSESS-DOCD LE1/YR: CPT | Mod: CPTII,S$GLB,, | Performed by: NURSE PRACTITIONER

## 2018-12-07 RX ORDER — MUPIROCIN 20 MG/G
OINTMENT TOPICAL
Qty: 22 G | Refills: 1 | Status: SHIPPED | OUTPATIENT
Start: 2018-12-07 | End: 2020-05-15
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
## 2018-12-07 NOTE — PATIENT INSTRUCTIONS
Patient use bactroban three times a day. Patient to follow up with yogesh or plastics for repair.        Wound Check, No Infection  Your wound is healing as expected. There are no signs of infection.   Home care  Continue to care for your wound as directed.  · Cover your wound with a bandage unless your healthcare provider tells you not to.  · Gently clean your wound with soap and water when you shower.   · Unless told otherwise, avoid swimming and taking tub baths until your wound has healed.  Follow-up care  Follow up with your healthcare provider as advised.  · If you have sutures or staples, return as directed to have them removed. If they are not taken out on time, they may be harder to remove and scarring may be worse. Infection may develop.  · If surgical tape strips were used, you can remove them yourself if they have not fallen off by 10 days after they were applied.   When to seek medical advice  Call your healthcare provider right away if any of these occur:  · Fever of 100.4ºF (38ºC) or higher, or as directed by your health care provider  · Symptoms of a wound infection, including:  ¨ Redness or swelling around the wound  ¨ Warmth coming from the wound  ¨ New or worsening pain  ¨ Red streaks around the wound  ¨ Draining pus  Date Last Reviewed: 8/24/2015 © 2000-2017 [employer] [address] [place] [address], [place] [address]. All rights reserved. This information is not intended as a substitute for professional medical care. Always follow your healthcare professional's instructions.

## 2018-12-11 ENCOUNTER — TELEPHONE (OUTPATIENT)
Dept: INTERNAL MEDICINE | Facility: CLINIC | Age: 73
End: 2018-12-11

## 2018-12-11 ENCOUNTER — TELEPHONE (OUTPATIENT)
Dept: PAIN MEDICINE | Facility: CLINIC | Age: 73
End: 2018-12-11

## 2018-12-11 DIAGNOSIS — M89.8X1 CHRONIC SCAPULAR PAIN: ICD-10-CM

## 2018-12-11 DIAGNOSIS — Z12.31 BREAST CANCER SCREENING BY MAMMOGRAM: Primary | ICD-10-CM

## 2018-12-11 DIAGNOSIS — G89.29 CHRONIC LEFT SHOULDER PAIN: Primary | ICD-10-CM

## 2018-12-11 DIAGNOSIS — M25.512 CHRONIC LEFT SHOULDER PAIN: Primary | ICD-10-CM

## 2018-12-11 DIAGNOSIS — G89.29 CHRONIC SCAPULAR PAIN: ICD-10-CM

## 2018-12-11 DIAGNOSIS — Q85.01 NEUROFIBROMATOSIS, TYPE 1: ICD-10-CM

## 2018-12-11 RX ORDER — ACETAMINOPHEN AND CODEINE PHOSPHATE 300; 30 MG/1; MG/1
1 TABLET ORAL 2 TIMES DAILY PRN
Qty: 60 TABLET | Refills: 1 | Status: SHIPPED | OUTPATIENT
Start: 2018-12-11 | End: 2018-12-11 | Stop reason: SDUPTHER

## 2018-12-11 RX ORDER — ACETAMINOPHEN AND CODEINE PHOSPHATE 300; 30 MG/1; MG/1
1 TABLET ORAL 2 TIMES DAILY PRN
Qty: 60 TABLET | Refills: 1 | Status: SHIPPED | OUTPATIENT
Start: 2018-12-11 | End: 2019-01-10

## 2018-12-11 NOTE — TELEPHONE ENCOUNTER
----- Message from Erin Ruffin sent at 12/11/2018  2:20 PM CST -----  Name of Who is Calling: #JERRI DERAS [[id number]]    What is the request in detail: Pt is checking on the status of a refill request      Can the clinic reply by [facility]:    No      What [facility] Back [facility] [phone]

## 2018-12-11 NOTE — TELEPHONE ENCOUNTER
----- Message from Freddy Rae sent at 12/11/2018  2:51 PM CST -----  Contact: Pt  Request for Orders    Order Needed: Mammo Orders  Who Called: Pt  Contact Preference: [phone]  Additional Information: Pt stated she would like to receive a call once the orders are in if possible, so she can know to contact Macario and schedule.

## 2018-12-11 NOTE — TELEPHONE ENCOUNTER
Contacted and spoke with pt regarding RX. Pt was informed NP has received request, and will call medication in.    Pt verbalized understanding

## 2018-12-11 NOTE — TELEPHONE ENCOUNTER
----- Message from Tayla Avendaño sent at 12/11/2018 10:28 AM CST -----  Contact: pt            Name of Who is Calling: pt      What is the request in detail: is experiencing shoulder pain and is requesting a RX to be called in to her pharmacy for her pain. Pt is asking to speak with staff      Can the clinic reply by [facility]: no      What Number to Call Back if not in [facility]: [phone]

## 2018-12-11 NOTE — TELEPHONE ENCOUNTER
----- Message from Vivien Alaniz sent at 12/11/2018 12:12 PM CST -----  Contact: pt            Name of Who is Calling: Lesli      What is the request in detail: returning call back in reference to missed call from clinic for medication for pain in shoulder.      Can the clinic reply by [facility]: no      [facility] [phone]

## 2018-12-11 NOTE — TELEPHONE ENCOUNTER
----- Message from Erica Beltre LPN sent at 12/11/2018 11:30 AM CST -----  Contact: pt   I spoke with the patient, she is requesting Tylenol #3 refill, I could not find to send to Dr Cheney, can you pend for patient  ----- Message -----  From: Susana Perdomo  Sent: 12/11/2018  11:26 AM  To: Noni Bhakta Staff    Name of Who is Calling: JERRI DERAS [[id number]]    What is the request in detail:Patient is returning a call....Please contact to further discuss and advise      Can the clinic reply by [facility]:     [facility] [phone]

## 2018-12-11 NOTE — TELEPHONE ENCOUNTER
----- Message from Freddy Rae sent at 12/11/2018  2:51 PM CST -----  Contact: Pt  Request for Orders    Order Needed: Mammo Orders  Who Called: Pt  Contact Preference: [phone]  Additional Information: Pt stated she would like to receive a call once the orders are in if possible, so she can know to contact Macario and schedule.

## 2019-01-11 ENCOUNTER — HOSPITAL ENCOUNTER (OUTPATIENT)
Dept: RADIOLOGY | Facility: HOSPITAL | Age: 74
Discharge: HOME | End: 2019-01-11
Attending: INTERNAL MEDICINE
Payer: MEDICARE

## 2019-01-11 VITALS — HEIGHT: 64 IN | BODY MASS INDEX: 30.9 KG/M2 | WEIGHT: 181 LBS

## 2019-01-11 DIAGNOSIS — Z12.31 BREAST CANCER SCREENING BY MAMMOGRAM: ICD-10-CM

## 2019-01-11 PROCEDURE — 77067 SCR MAMMO BI INCL CAD: CPT | Mod: TC

## 2019-01-11 PROCEDURE — 77063 BREAST TOMOSYNTHESIS BI: CPT | Mod: 26,,, | Performed by: RADIOLOGY

## 2019-01-11 PROCEDURE — 77063 MAMMO DIGITAL SCREENING BILAT WITH TOMOSYNTHESIS_CAD: ICD-10-PCS | Mod: 26,,, | Performed by: RADIOLOGY

## 2019-01-11 PROCEDURE — 77067 MAMMO DIGITAL SCREENING BILAT WITH TOMOSYNTHESIS_CAD: ICD-10-PCS | Mod: 26,,, | Performed by: RADIOLOGY

## 2019-01-11 PROCEDURE — 77067 SCR MAMMO BI INCL CAD: CPT | Mod: 26,,, | Performed by: RADIOLOGY

## 2019-01-17 RX ORDER — BUSPIRONE HYDROCHLORIDE 5 MG/1
TABLET ORAL
Qty: 120 TABLET | Refills: 1 | Status: SHIPPED | OUTPATIENT
Start: 2019-01-17 | End: 2019-04-22

## 2019-01-18 ENCOUNTER — TELEPHONE (OUTPATIENT)
Dept: INTERNAL MEDICINE | Facility: CLINIC | Age: 74
End: 2019-01-18

## 2019-01-18 NOTE — TELEPHONE ENCOUNTER
----- Message from Lucila Smith sent at 1/18/2019 11:39 AM CST -----  Contact: [phone]  Patient stated the medication busPIRone (BUSPAR) 5 MG Tab is out at the pharmacy.  Patient wants to know if the doctor can call something else into the pharmacy.    Please advise, thank you

## 2019-01-18 NOTE — TELEPHONE ENCOUNTER
No good alternatives. Needs to transfer to pharmacy with availability.   If pharmacist doesn't know who to send to I can send to [facility].

## 2019-01-18 NOTE — TELEPHONE ENCOUNTER
Spoke to patient. Patient states that the pharmacy is completely out of the Buspar (Buspirone) medication. Patient is asking if you would like to possibly call in an alternative.    Please advise.

## 2019-01-29 ENCOUNTER — HOSPITAL ENCOUNTER (EMERGENCY)
Facility: HOSPITAL | Age: 74
Discharge: HOME | End: 2019-01-29
Attending: EMERGENCY MEDICINE
Payer: MEDICARE

## 2019-01-29 VITALS
OXYGEN SATURATION: 98 % | BODY MASS INDEX: 30.73 KG/M2 | SYSTOLIC BLOOD PRESSURE: 143 MMHG | DIASTOLIC BLOOD PRESSURE: 75 MMHG | HEART RATE: 85 BPM | TEMPERATURE: 98 F | HEIGHT: 64 IN | WEIGHT: 180 LBS | RESPIRATION RATE: 18 BRPM

## 2019-01-29 DIAGNOSIS — H61.21 IMPACTED CERUMEN OF RIGHT EAR: ICD-10-CM

## 2019-01-29 DIAGNOSIS — H60.501 ACUTE OTITIS EXTERNA OF RIGHT EAR, UNSPECIFIED TYPE: Primary | ICD-10-CM

## 2019-01-29 PROCEDURE — 99283 EMERGENCY DEPT VISIT LOW MDM: CPT | Mod: 25

## 2019-01-29 PROCEDURE — 25000003 PHARM REV CODE 250: Performed by: PHYSICIAN ASSISTANT

## 2019-01-29 PROCEDURE — 69210 PR REMOVAL IMPACTED CERUMEN REQUIRING INSTRUMENTATION, UNILATERAL: ICD-10-PCS | Mod: 52,,, | Performed by: PHYSICIAN ASSISTANT

## 2019-01-29 PROCEDURE — 69210 REMOVE IMPACTED EAR WAX UNI: CPT | Mod: RT

## 2019-01-29 PROCEDURE — 69210 REMOVE IMPACTED EAR WAX UNI: CPT | Mod: 52,,, | Performed by: PHYSICIAN ASSISTANT

## 2019-01-29 PROCEDURE — 99284 PR EMERGENCY DEPT VISIT,LEVEL IV: ICD-10-PCS | Mod: 25,,, | Performed by: PHYSICIAN ASSISTANT

## 2019-01-29 PROCEDURE — 99284 EMERGENCY DEPT VISIT MOD MDM: CPT | Mod: 25,,, | Performed by: PHYSICIAN ASSISTANT

## 2019-01-29 RX ORDER — OFLOXACIN 3 MG/ML
10 SOLUTION AURICULAR (OTIC) DAILY
Qty: 10 ML | Refills: 0 | Status: SHIPPED | OUTPATIENT
Start: 2019-01-29 | End: 2019-02-05

## 2019-01-29 RX ORDER — DOCUSATE SODIUM 50 MG/5ML
50 LIQUID ORAL
Status: COMPLETED | OUTPATIENT
Start: 2019-01-29 | End: 2019-01-29

## 2019-01-29 RX ORDER — ACETAMINOPHEN 500 MG
1000 TABLET ORAL
Status: COMPLETED | OUTPATIENT
Start: 2019-01-29 | End: 2019-01-29

## 2019-01-29 RX ADMIN — Medication 5 DROP: at 07:01

## 2019-01-29 RX ADMIN — DOCUSATE SODIUM 50 MG: 50 LIQUID ORAL at 09:01

## 2019-01-29 RX ADMIN — ACETAMINOPHEN 1000 MG: 500 TABLET ORAL at 09:01

## 2019-01-30 NOTE — ED TRIAGE NOTES
PT presents to ed with cc of right ear fullness and pain behind right ear. Pt reports she has been having sinus pressure in her face.

## 2019-01-30 NOTE — ED PROVIDER NOTES
Encounter Date: 2019       History     Chief Complaint   Patient presents with    Otalgia     73-year-old female presents to the ED complaining of right ear pain.  Patient reports pain in the right ear, muffled hearing, and a painful knot behind the right ear for the past 3 days.  She has attempted treatment with Tylenol without significant relief.  She also reports associated sinus pressure and has taken sinus medication without relief in her ear pain. She does report chills but denies fever.          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, malignant 2015    History of hepatitis C, s/p successful Rx w/ SVR - 2018 3/17/2017    Completed zepatier + RBV w/ svr     Mass 10-10-14    excision of mass/left shoulder    Neurofibromatosis, type 1 (von Recklinghausen's disease) 2014    Pheochromocytoma     S/p resection in 's    Soft tissue sarcoma of chest wall 2014     Past Surgical History:   Procedure Laterality Date    APPENDECTOMY      BILATERAL SALPINGOOPHORECTOMY      BREAST BIOPSY Right     BREAST BIOPSY Left     BUNIONECTOMY Right      SECTION      COLONOSCOPY N/A 2018    [facility] (4TH FLR)    EXCISION MASS EXTREMITY Left 10/10/2014    [facility]k [facility] at [facility] OR 2ND FLR    EXCISION-MASS  2015    [facility] by MAC [facility]ilo [facility] at [facility] OR 2ND FLR    EXPLORATORY LAPAROTOMY W/ BOWEL RESECTION      HYSTERECTOMY N/A     LYSIS-ADHESION N/A 2015    [facility]formed [facility] OR 2ND FLR    pheochromocytoma excision  N/A 1980's    RESECTION - SMALL BOWEL  5/25/2015    [facility]    TONSILLECTOMY Bilateral      Family History   Problem Relation Age of Onset    Cancer Sister         GIST    Neurofibromatosis Sister     Neurofibromatosis Father     Breast cancer Mother      Social History     Tobacco Use    Smoking status: Never Smoker    Smokeless tobacco: Never Used   Substance Use Topics    Alcohol use: No    Drug use: No     Review of Systems   Constitutional: Negative for fever.   HENT: Positive for ear pain, hearing loss (muffled hearing) and sinus pressure. Negative for ear discharge and sore throat.    Respiratory: Negative for cough and shortness of breath.    Cardiovascular: Negative for chest pain.   Gastrointestinal: Negative for nausea.   Genitourinary: Negative for dysuria.   Musculoskeletal: Negative for back pain.   Skin: Negative for rash.   Neurological: Negative for weakness.   Hematological: Does not bruise/bleed easily.       Physical Exam     Initial Vitals [01/29/19 1828]   BP Pulse Resp Temp SpO2   130/76 96 18 97.5 °F (36.4 °C) 96 %      MAP       --         Physical Exam    Nursing note and vitals reviewed.  Constitutional: She appears well-developed and well-nourished. She is not diaphoretic.  Non-toxic appearance. She does not appear ill. No distress.   HENT:   Head: Normocephalic and atraumatic.   Right Ear: There is tenderness. No drainage.   Left Ear: No drainage or tenderness.   Pain with manipulation of the pinna and tragus as well as a with examination of the otoscope. Bilatereal cerumen impactions. No significant mastoid tenderness. No mastoid swelling.    Neck: Neck supple.   Cardiovascular: Normal rate and regular rhythm. Exam reveals no gallop and no friction rub.    No murmur heard.  Pulmonary/Chest: Effort normal and breath sounds normal. No accessory muscle usage. No tachypnea. No respiratory distress. She has no decreased breath sounds.  She has no wheezes. She has no rhonchi. She has no rales.   Abdominal: She exhibits no distension.   Lymphadenopathy:     She has no cervical adenopathy.   Neurological: She is alert.   Skin: No rash noted.   Psychiatric: She has a normal mood and affect. Her behavior is normal.         ED Course   Procedures  Labs Reviewed - No data to display       Imaging Results    None          Medical Decision Making:   History:   Old Medical Records: I decided to obtain old medical records.  Differential Diagnosis:   My differential diagnosis includes but is not limited to:  Otitis media, otitis externa, ruptured TM, mastoiditis, cerumen impaction       APC / Resident Notes:   72 yo F presents with 3 days of R ear pain and muffled hearing. Afebrile. Bilateral cerumen impactions were noted.  Pt had significant pain with manipulation of the tragus and pinna.  There was no significant swelling to the most external  Portion of the EAC.  Cerumen removal was attempted manually, with irrigation, and with cerumenolytics without only partial removal of the impaction.  There was some swelling noted to the inner portion of the EAC.  Still unable to visualize TM despite cerumen removal attempts.  Given patient's pain, I will treat with ofloxacin topical drops for external otitis media.  Will refer to ENT for reevaluation if symptoms continue. Stable for discharge.                  Clinical Impression:   The primary encounter diagnosis was Acute otitis externa of right ear, unspecified type. A diagnosis of Impacted cerumen of right ear was also pertinent to this visit.      Disposition:   Disposition: Discharged  Condition: Stable                        Kellie Smith PA-C  01/30/19 3008

## 2019-01-30 NOTE — ED NOTES
LOC: The patient is awake, alert, and aware of environment. The patient is oriented x 3 and speaking appropriately.   APPEARANCE: No acute distress noted.   PSYCHOSOCIAL: Patient is calm and cooperative.   SKIN: The skin is warm, dry.   RESPIRATORY: Airway is open and patent. Bilateral chest rise and fall. Respirations are spontaneous, even and unlabored. Normal effort and rate noted. No accessory muscle use noted.   CARDIAC: Denies chest pain or SOB.   ABDOMEN: Soft and non tender to palpation. No distention noted.   URINARY:  Voids independently.   NEUROLOGIC: Eyes open spontaneously. Speech clear. Tolerating saliva secretions well. Able to follow commands, demonstrating ability to actively and appropriately communicate within context of current conversation. Symmetrical facial muscles. Moving all extremities well. Movement is purposeful.   MUSCULOSKELETAL: No obvious deformities noted.

## 2019-02-23 ENCOUNTER — HOSPITAL ENCOUNTER (OUTPATIENT)
Dept: RADIOLOGY | Facility: HOSPITAL | Age: 74
Discharge: HOME | End: 2019-02-23
Attending: INTERNAL MEDICINE
Payer: MEDICARE

## 2019-02-23 DIAGNOSIS — Z85.09 HISTORY OF GASTROINTESTINAL STROMAL TUMOR (GIST): ICD-10-CM

## 2019-02-23 LAB
CREAT SERPL-MCNC: 0.7 MG/DL (ref 0.5–1.4)
SAMPLE: NORMAL

## 2019-02-23 PROCEDURE — 71260 CT THORAX DX C+: CPT | Mod: 26,,, | Performed by: RADIOLOGY

## 2019-02-23 PROCEDURE — 74177 CT ABD & PELVIS W/CONTRAST: CPT | Mod: 26,,, | Performed by: RADIOLOGY

## 2019-02-23 PROCEDURE — 74177 CT ABDOMEN PELVIS WITH CONTRAST: ICD-10-PCS | Mod: 26,,, | Performed by: RADIOLOGY

## 2019-02-23 PROCEDURE — 74177 CT ABD & PELVIS W/CONTRAST: CPT | Mod: TC

## 2019-02-23 PROCEDURE — 71260 CT CHEST WITH CONTRAST: ICD-10-PCS | Mod: 26,,, | Performed by: RADIOLOGY

## 2019-02-23 PROCEDURE — 25500020 PHARM REV CODE 255: Performed by: INTERNAL MEDICINE

## 2019-02-23 PROCEDURE — 71260 CT THORAX DX C+: CPT | Mod: TC

## 2019-02-23 RX ADMIN — IOHEXOL 15 ML: 350 INJECTION, SOLUTION INTRAVENOUS at 08:02

## 2019-02-23 RX ADMIN — IOHEXOL 100 ML: 350 INJECTION, SOLUTION INTRAVENOUS at 09:02

## 2019-02-27 ENCOUNTER — LAB VISIT (OUTPATIENT)
Dept: LAB | Facility: HOSPITAL | Age: 74
End: 2019-02-27
Attending: INTERNAL MEDICINE
Payer: MEDICARE

## 2019-02-27 ENCOUNTER — OFFICE VISIT (OUTPATIENT)
Dept: HEMATOLOGY/ONCOLOGY | Facility: CLINIC | Age: 74
End: 2019-02-27
Payer: MEDICARE

## 2019-02-27 ENCOUNTER — TELEPHONE (OUTPATIENT)
Dept: HEMATOLOGY/ONCOLOGY | Facility: CLINIC | Age: 74
End: 2019-02-27

## 2019-02-27 VITALS
OXYGEN SATURATION: 100 % | DIASTOLIC BLOOD PRESSURE: 89 MMHG | HEIGHT: 64 IN | WEIGHT: 186.5 LBS | SYSTOLIC BLOOD PRESSURE: 163 MMHG | RESPIRATION RATE: 20 BRPM | HEART RATE: 80 BPM | BODY MASS INDEX: 31.84 KG/M2 | TEMPERATURE: 98 F

## 2019-02-27 DIAGNOSIS — M25.512 CHRONIC LEFT SHOULDER PAIN: ICD-10-CM

## 2019-02-27 DIAGNOSIS — Z85.09 HISTORY OF GASTROINTESTINAL STROMAL TUMOR (GIST): Primary | ICD-10-CM

## 2019-02-27 DIAGNOSIS — Z85.09 HISTORY OF GASTROINTESTINAL STROMAL TUMOR (GIST): ICD-10-CM

## 2019-02-27 DIAGNOSIS — G89.29 CHRONIC LEFT SHOULDER PAIN: ICD-10-CM

## 2019-02-27 LAB
ALBUMIN SERPL BCP-MCNC: 3.9 G/DL
ALP SERPL-CCNC: 127 U/L
ALT SERPL W/O P-5'-P-CCNC: 28 U/L
ANION GAP SERPL CALC-SCNC: 7 MMOL/L
AST SERPL-CCNC: 16 U/L
BILIRUB SERPL-MCNC: 0.5 MG/DL
BUN SERPL-MCNC: 12 MG/DL
CALCIUM SERPL-MCNC: 9.3 MG/DL
CHLORIDE SERPL-SCNC: 106 MMOL/L
CO2 SERPL-SCNC: 27 MMOL/L
CREAT SERPL-MCNC: 0.7 MG/DL
ERYTHROCYTE [DISTWIDTH] IN BLOOD BY AUTOMATED COUNT: 14.5 %
EST. GFR  (AFRICAN AMERICAN): >60 ML/MIN/1.73 M^2
EST. GFR  (NON AFRICAN AMERICAN): >60 ML/MIN/1.73 M^2
GLUCOSE SERPL-MCNC: 99 MG/DL
HCT VFR BLD AUTO: 43.8 %
HGB BLD-MCNC: 14.4 G/DL
IMM GRANULOCYTES # BLD AUTO: 0.04 K/UL
MCH RBC QN AUTO: 30.9 PG
MCHC RBC AUTO-ENTMCNC: 32.9 G/DL
MCV RBC AUTO: 94 FL
NEUTROPHILS # BLD AUTO: 3.3 K/UL
PLATELET # BLD AUTO: 187 K/UL
PMV BLD AUTO: 9.5 FL
POTASSIUM SERPL-SCNC: 3.8 MMOL/L
PROT SERPL-MCNC: 7.6 G/DL
RBC # BLD AUTO: 4.66 M/UL
SODIUM SERPL-SCNC: 140 MMOL/L
WBC # BLD AUTO: 5.77 K/UL

## 2019-02-27 PROCEDURE — 99999 PR PBB SHADOW E&M-EST. PATIENT-LVL IV: ICD-10-PCS | Mod: PBBFAC,,, | Performed by: INTERNAL MEDICINE

## 2019-02-27 PROCEDURE — 3077F PR MOST RECENT SYSTOLIC BLOOD PRESSURE >= 140 MM HG: ICD-10-PCS | Mod: CPTII,S$GLB,, | Performed by: INTERNAL MEDICINE

## 2019-02-27 PROCEDURE — 1101F PR PT FALLS ASSESS DOC 0-1 FALLS W/OUT INJ PAST YR: ICD-10-PCS | Mod: CPTII,S$GLB,, | Performed by: INTERNAL MEDICINE

## 2019-02-27 PROCEDURE — 99214 PR OFFICE/OUTPT VISIT, EST, LEVL IV, 30-39 MIN: ICD-10-PCS | Mod: S$GLB,,, | Performed by: INTERNAL MEDICINE

## 2019-02-27 PROCEDURE — 85027 COMPLETE CBC AUTOMATED: CPT

## 2019-02-27 PROCEDURE — 99999 PR PBB SHADOW E&M-EST. PATIENT-LVL IV: CPT | Mod: PBBFAC,,, | Performed by: INTERNAL MEDICINE

## 2019-02-27 PROCEDURE — 1101F PT FALLS ASSESS-DOCD LE1/YR: CPT | Mod: CPTII,S$GLB,, | Performed by: INTERNAL MEDICINE

## 2019-02-27 PROCEDURE — 3077F SYST BP >= 140 MM HG: CPT | Mod: CPTII,S$GLB,, | Performed by: INTERNAL MEDICINE

## 2019-02-27 PROCEDURE — 3079F DIAST BP 80-89 MM HG: CPT | Mod: CPTII,S$GLB,, | Performed by: INTERNAL MEDICINE

## 2019-02-27 PROCEDURE — 3079F PR MOST RECENT DIASTOLIC BLOOD PRESSURE 80-89 MM HG: ICD-10-PCS | Mod: CPTII,S$GLB,, | Performed by: INTERNAL MEDICINE

## 2019-02-27 PROCEDURE — 99214 OFFICE O/P EST MOD 30 MIN: CPT | Mod: S$GLB,,, | Performed by: INTERNAL MEDICINE

## 2019-02-27 PROCEDURE — 80053 COMPREHEN METABOLIC PANEL: CPT

## 2019-02-27 PROCEDURE — 36415 COLL VENOUS BLD VENIPUNCTURE: CPT

## 2019-02-27 NOTE — TELEPHONE ENCOUNTER
tried reaching out to pt on today to discuss schedule appointment with  office at [facility], but no answer,a detail message was left on home number to call and confirm.

## 2019-02-27 NOTE — PROGRESS NOTES
"Subjective:       Patient ID: Lesli Gong is a 73 y.o. female.    Chief Complaint: History of gastrointestinal stromal tumor (GIST)  Oncologic History:  Ms. Gong is a 73-year-old female with a diagnosis of type 1 neurofibromatosis, who was referred to see Dr. Camilo for evaluation of an abdominal mass found. 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 Final pathology from that revealed neurofibroma with atypia and invasion into the skeletal muscle rather than liposarcoma. She underwent a PET scan, which revealed a diffuse low-level activity as a surgical defect and a focus of high-level activity in or near a loop of the small bowel, probably   jejunum in the left upper quadrant. She was recommended to undergo CT scan for further evaluation of the bowel lesion, which she did on 05/12/2015 and that revealed a soft tissue mass in the left upper quadrant abutting a loop of the proximal jejunum, and she was referred to undergo surgery. She underwent exploratory laparotomy, lysis of adhesions and excision of two approximately 5   cm masses as well as small bowel resection with primary repair on 05/25/2015. Pathology revealed gastrointestinal stromal tumor 345, size range from 1 to 3.5 cm involving the small bowel. GIST subtype is mixed 1 mitotic rate per 50 high power fields. Necrosis is present in 30%, low-grade tumor, Ki67 is less than 1% in the tumor cells showing nuclear staining.  She did not want to do Gleevac. She is on surveillance.               HPI She comes in today to review her CT scans from 2/23/19 which reveal "No findings to indicate metastatic disease. Stable 5 mm right upper lobe nodule. Postop change small bowel resection and right adrenal surgery. There are no measurable lesions per RECIST criteria"  She has left shoulder pain " and is seeing pain management today      Review of Systems   Constitutional: Negative for appetite change, fatigue and unexpected weight change.   HENT: Negative for mouth sores.    Eyes: Negative for visual disturbance.   Respiratory: Negative for cough and shortness of breath.    Cardiovascular: Negative for chest pain.   Gastrointestinal: Negative for abdominal pain and diarrhea.   Genitourinary: Negative for frequency.   Musculoskeletal: Negative for back pain.        Left shoulder pain+   Skin: Negative for rash.   Neurological: Negative for headaches.   Hematological: Negative for adenopathy.   Psychiatric/Behavioral: The patient is not nervous/anxious.    All other systems reviewed and are negative.      Objective:      Physical Exam   Constitutional: She is oriented to person, place, and time. She appears well-developed and well-nourished.   HENT:   Mouth/Throat: No oropharyngeal exudate.   Cardiovascular: Normal rate and normal heart sounds.   Pulmonary/Chest: Effort normal and breath sounds normal. She has no wheezes.   Abdominal: Soft. Bowel sounds are normal. There is no tenderness.   Musculoskeletal: She exhibits no edema or tenderness.   Lymphadenopathy:     She has no cervical adenopathy.   Neurological: She is alert and oriented to person, place, and time. Coordination normal.   Skin: Skin is warm and dry. No rash noted.   Psychiatric: She has a normal mood and affect. Judgment and thought content normal.   Vitals reviewed.        LABS:  WBC   Date Value Ref Range Status   02/27/2019 5.77 3.90 - 12.70 K/uL Final     Hemoglobin   Date Value Ref Range Status   02/27/2019 14.4 12.0 - 16.0 g/dL Final     Hematocrit   Date Value Ref Range Status   02/27/2019 43.8 37.0 - 48.5 % Final     Platelets   Date Value Ref Range Status   02/27/2019 187 150 - 350 K/uL Final     Gran # (ANC)   Date Value Ref Range Status   02/27/2019 3.3 1.8 - 7.7 K/uL Final     Comment:     The ANC is based on a white cell  differential from an   automated cell counter. It has not been microscopically   reviewed for the presence of abnormal cells. Clinical   correlation is required.         Chemistry        Component Value Date/Time     02/27/2019 0808    K 3.8 02/27/2019 0808     02/27/2019 0808    CO2 27 02/27/2019 0808    BUN 12 02/27/2019 0808    CREATININE 0.7 02/27/2019 0808    GLU 99 02/27/2019 0808        Component Value Date/Time    CALCIUM 9.3 02/27/2019 0808    ALKPHOS 127 02/27/2019 0808    AST 16 02/27/2019 0808    ALT 28 02/27/2019 0808    BILITOT 0.5 02/27/2019 0808    ESTGFRAFRICA >60.0 02/27/2019 0808    EGFRNONAA >60.0 02/27/2019 0808          Assessment:       1. History of gastrointestinal stromal tumor (GIST)    2. Chronic left shoulder pain        Plan:        1. She is doing well with no evidence of recurrence and will return in 6 months with labs and CT scans. Will plan annual CT scans after that.  2, She will follow-up back with pain management for injection into her shoulder. She received Tylenol #3 last time in 10/18      Above care plan was discussed with patient and all questions were addressed to  satisfaction

## 2019-03-14 ENCOUNTER — TELEPHONE (OUTPATIENT)
Dept: ORTHOPEDICS | Facility: CLINIC | Age: 74
End: 2019-03-14

## 2019-03-14 ENCOUNTER — TELEPHONE (OUTPATIENT)
Dept: PAIN MEDICINE | Facility: CLINIC | Age: 74
End: 2019-03-14

## 2019-03-14 NOTE — TELEPHONE ENCOUNTER
----- Message from April [facility] sent at 3/14/2019  1:13 PM CDT -----  Name of Who is Calling:[phone]    What is the request in detail: Pt is requesting a call back from clinical staff regarding her having back and shoulder pain. Pt stated that she really needs some pain medication. Please contact to further discuss and advise.        Can the clinic reply by [facility]: No      What Number to Call Back [facility] not [facility] Lesli [phone]

## 2019-03-14 NOTE — TELEPHONE ENCOUNTER
Good Afternoon may I speak with Ms. Gong? Hi this is staff from [facility]. I'm returning a call in regards to your message stating you need pain medications. Unfortunately Ms. Gong you would not be able to receive any medication until you have an office visit with RAMIREZ, your last visit was in October.     Patient agreed to have an appointment to meet with RAMIREZ to discuss medication options. Staff advise patient that she have an availability on tomorrow 3.15.19 for 8:40a    Patient verbalized understanding and confirm date & time.

## 2019-03-17 DIAGNOSIS — I10 ESSENTIAL HYPERTENSION: ICD-10-CM

## 2019-03-18 RX ORDER — DICLOFENAC SODIUM 10 MG/G
GEL TOPICAL 4 TIMES DAILY
Qty: 100 G | Refills: 11 | Status: SHIPPED | OUTPATIENT
Start: 2019-03-18 | End: 2019-03-21

## 2019-03-18 RX ORDER — LISINOPRIL 10 MG/1
20 TABLET ORAL DAILY
Qty: 180 TABLET | Refills: 2 | Status: SHIPPED | OUTPATIENT
Start: 2019-03-18 | End: 2019-12-02 | Stop reason: SDUPTHER

## 2019-03-20 ENCOUNTER — OFFICE VISIT (OUTPATIENT)
Dept: ORTHOPEDICS | Facility: CLINIC | Age: 74
End: 2019-03-20
Payer: MEDICARE

## 2019-03-20 VITALS
HEART RATE: 85 BPM | WEIGHT: 186 LBS | DIASTOLIC BLOOD PRESSURE: 83 MMHG | HEIGHT: 64 IN | SYSTOLIC BLOOD PRESSURE: 139 MMHG | BODY MASS INDEX: 31.76 KG/M2

## 2019-03-20 DIAGNOSIS — M25.612 DECREASED RANGE OF MOTION OF LEFT SHOULDER: ICD-10-CM

## 2019-03-20 DIAGNOSIS — M25.512 CHRONIC LEFT SHOULDER PAIN: Primary | ICD-10-CM

## 2019-03-20 DIAGNOSIS — G89.29 CHRONIC LEFT SHOULDER PAIN: Primary | ICD-10-CM

## 2019-03-20 DIAGNOSIS — M75.42 IMPINGEMENT SYNDROME OF LEFT SHOULDER: ICD-10-CM

## 2019-03-20 PROCEDURE — 1101F PR PT FALLS ASSESS DOC 0-1 FALLS W/OUT INJ PAST YR: ICD-10-PCS | Mod: CPTII,S$GLB,, | Performed by: PHYSICIAN ASSISTANT

## 2019-03-20 PROCEDURE — 99999 PR PBB SHADOW E&M-EST. PATIENT-LVL IV: CPT | Mod: PBBFAC,,, | Performed by: PHYSICIAN ASSISTANT

## 2019-03-20 PROCEDURE — 3079F DIAST BP 80-89 MM HG: CPT | Mod: CPTII,S$GLB,, | Performed by: PHYSICIAN ASSISTANT

## 2019-03-20 PROCEDURE — 99213 OFFICE O/P EST LOW 20 MIN: CPT | Mod: 25,S$GLB,, | Performed by: PHYSICIAN ASSISTANT

## 2019-03-20 PROCEDURE — 99213 PR OFFICE/OUTPT VISIT, EST, LEVL III, 20-29 MIN: ICD-10-PCS | Mod: 25,S$GLB,, | Performed by: PHYSICIAN ASSISTANT

## 2019-03-20 PROCEDURE — 3075F SYST BP GE 130 - 139MM HG: CPT | Mod: CPTII,S$GLB,, | Performed by: PHYSICIAN ASSISTANT

## 2019-03-20 PROCEDURE — 1101F PT FALLS ASSESS-DOCD LE1/YR: CPT | Mod: CPTII,S$GLB,, | Performed by: PHYSICIAN ASSISTANT

## 2019-03-20 PROCEDURE — 3075F PR MOST RECENT SYSTOLIC BLOOD PRESS GE 130-139MM HG: ICD-10-PCS | Mod: CPTII,S$GLB,, | Performed by: PHYSICIAN ASSISTANT

## 2019-03-20 PROCEDURE — 99999 PR PBB SHADOW E&M-EST. PATIENT-LVL IV: ICD-10-PCS | Mod: PBBFAC,,, | Performed by: PHYSICIAN ASSISTANT

## 2019-03-20 PROCEDURE — 3079F PR MOST RECENT DIASTOLIC BLOOD PRESSURE 80-89 MM HG: ICD-10-PCS | Mod: CPTII,S$GLB,, | Performed by: PHYSICIAN ASSISTANT

## 2019-03-20 PROCEDURE — 20610 DRAIN/INJ JOINT/BURSA W/O US: CPT | Mod: LT,S$GLB,, | Performed by: PHYSICIAN ASSISTANT

## 2019-03-20 PROCEDURE — 20610 PR DRAIN/INJECT LARGE JOINT/BURSA: ICD-10-PCS | Mod: LT,S$GLB,, | Performed by: PHYSICIAN ASSISTANT

## 2019-03-20 RX ORDER — BUPIVACAINE HYDROCHLORIDE 2.5 MG/ML
4 INJECTION, SOLUTION INFILTRATION; PERINEURAL
Status: COMPLETED | OUTPATIENT
Start: 2019-03-20 | End: 2019-03-20

## 2019-03-20 RX ORDER — TRIAMCINOLONE ACETONIDE 40 MG/ML
80 INJECTION, SUSPENSION INTRA-ARTICULAR; INTRAMUSCULAR
Status: COMPLETED | OUTPATIENT
Start: 2019-03-20 | End: 2019-03-20

## 2019-03-20 RX ADMIN — BUPIVACAINE HYDROCHLORIDE 10 MG: 2.5 INJECTION, SOLUTION INFILTRATION; PERINEURAL at 03:03

## 2019-03-20 RX ADMIN — TRIAMCINOLONE ACETONIDE 80 MG: 40 INJECTION, SUSPENSION INTRA-ARTICULAR; INTRAMUSCULAR at 03:03
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
## 2019-03-21 ENCOUNTER — OFFICE VISIT (OUTPATIENT)
Dept: PAIN MEDICINE | Facility: CLINIC | Age: 74
End: 2019-03-21
Payer: MEDICARE

## 2019-03-21 VITALS
SYSTOLIC BLOOD PRESSURE: 133 MMHG | TEMPERATURE: 98 F | HEIGHT: 64 IN | BODY MASS INDEX: 31.47 KG/M2 | DIASTOLIC BLOOD PRESSURE: 86 MMHG | WEIGHT: 184.31 LBS | HEART RATE: 87 BPM

## 2019-03-21 DIAGNOSIS — Q85.01 NEUROFIBROMATOSIS, TYPE 1: ICD-10-CM

## 2019-03-21 DIAGNOSIS — G89.4 CHRONIC PAIN DISORDER: ICD-10-CM

## 2019-03-21 DIAGNOSIS — M25.512 CHRONIC LEFT SHOULDER PAIN: Primary | ICD-10-CM

## 2019-03-21 DIAGNOSIS — G89.29 CHRONIC LEFT SHOULDER PAIN: Primary | ICD-10-CM

## 2019-03-21 DIAGNOSIS — M19.012 PRIMARY OSTEOARTHRITIS OF LEFT SHOULDER: ICD-10-CM

## 2019-03-21 PROCEDURE — 99213 PR OFFICE/OUTPT VISIT, EST, LEVL III, 20-29 MIN: ICD-10-PCS | Mod: S$GLB,,, | Performed by: NURSE PRACTITIONER

## 2019-03-21 PROCEDURE — 99999 PR PBB SHADOW E&M-EST. PATIENT-LVL III: ICD-10-PCS | Mod: PBBFAC,,, | Performed by: NURSE PRACTITIONER

## 2019-03-21 PROCEDURE — 3075F PR MOST RECENT SYSTOLIC BLOOD PRESS GE 130-139MM HG: ICD-10-PCS | Mod: CPTII,S$GLB,, | Performed by: NURSE PRACTITIONER

## 2019-03-21 PROCEDURE — 3079F DIAST BP 80-89 MM HG: CPT | Mod: CPTII,S$GLB,, | Performed by: NURSE PRACTITIONER

## 2019-03-21 PROCEDURE — 99999 PR PBB SHADOW E&M-EST. PATIENT-LVL III: CPT | Mod: PBBFAC,,, | Performed by: NURSE PRACTITIONER

## 2019-03-21 PROCEDURE — 99499 UNLISTED E&M SERVICE: CPT | Mod: S$GLB,,, | Performed by: NURSE PRACTITIONER

## 2019-03-21 PROCEDURE — 3075F SYST BP GE 130 - 139MM HG: CPT | Mod: CPTII,S$GLB,, | Performed by: NURSE PRACTITIONER

## 2019-03-21 PROCEDURE — 3079F PR MOST RECENT DIASTOLIC BLOOD PRESSURE 80-89 MM HG: ICD-10-PCS | Mod: CPTII,S$GLB,, | Performed by: NURSE PRACTITIONER

## 2019-03-21 PROCEDURE — 1101F PT FALLS ASSESS-DOCD LE1/YR: CPT | Mod: CPTII,S$GLB,, | Performed by: NURSE PRACTITIONER

## 2019-03-21 PROCEDURE — 99499 RISK ADDL DX/OHS AUDIT: ICD-10-PCS | Mod: S$GLB,,, | Performed by: NURSE PRACTITIONER

## 2019-03-21 PROCEDURE — 99213 OFFICE O/P EST LOW 20 MIN: CPT | Mod: S$GLB,,, | Performed by: NURSE PRACTITIONER

## 2019-03-21 PROCEDURE — 1101F PR PT FALLS ASSESS DOC 0-1 FALLS W/OUT INJ PAST YR: ICD-10-PCS | Mod: CPTII,S$GLB,, | Performed by: NURSE PRACTITIONER

## 2019-03-21 RX ORDER — DICLOFENAC SODIUM 10 MG/G
2 GEL TOPICAL 3 TIMES DAILY
Qty: 1 TUBE | Refills: 5 | Status: SHIPPED | OUTPATIENT
Start: 2019-03-21 | End: 2019-07-12 | Stop reason: SDUPTHER

## 2019-03-21 RX ORDER — ACETAMINOPHEN AND CODEINE PHOSPHATE 300; 30 MG/1; MG/1
1 TABLET ORAL 2 TIMES DAILY PRN
Qty: 60 TABLET | Refills: 1 | Status: SHIPPED | OUTPATIENT
Start: 2019-03-21 | End: 2019-04-20
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
## 2019-04-22 RX ORDER — BUSPIRONE HYDROCHLORIDE 5 MG/1
TABLET ORAL
Qty: 120 TABLET | Refills: 1 | Status: SHIPPED | OUTPATIENT
Start: 2019-04-22 | End: 2019-06-28

## 2019-04-22 NOTE — TELEPHONE ENCOUNTER
----- Message from Bethany Olivarez sent at 4/22/2019  2:32 PM CDT -----  Contact: self/[phone]  Type: Rx    Name of medication(s): busPIRone (BUSPAR) 5 MG Tab    Is this a refill? New rx? Refill     Who prescribed medication?      Pharmacy Name, Phone, & Location: [facility]    Comments: Please call and advise.          Thank You

## 2019-04-22 NOTE — TELEPHONE ENCOUNTER
----- Message from Francesca Russo sent at 4/22/2019  2:09 PM CDT -----  Contact: self/[phone]  Pt called in regards to getting a Rx refill for her anxiety med's.     [facility]  Please advise

## 2019-05-14 ENCOUNTER — TELEPHONE (OUTPATIENT)
Dept: PAIN MEDICINE | Facility: CLINIC | Age: 74
End: 2019-05-14

## 2019-05-14 NOTE — TELEPHONE ENCOUNTER
----- Message from Bindu Perez sent at 5/14/2019  4:23 PM CDT -----  Contact: JERRI DERAS   Can the clinic reply in [facility]: no      Please refill the medication(s) listed below. Please call the patient when the prescription(s) is ready for  at this phone number   [phone]      Medication #1 Tylenol 3    Medication #2       Preferred Pharmacy: [facility] [address]

## 2019-05-14 NOTE — TELEPHONE ENCOUNTER
----- Message from Bindu Perez sent at 5/14/2019  4:23 PM CDT -----  Contact: JERRI DERAS   Can the clinic reply in [facility]: no      Please refill the medication(s) listed below. Please call the patient when the prescription(s) is ready for  at this phone number   [phone]      Medication #1 Tylenol 3    Medication #2       Preferred [facility]: [facility] [address]

## 2019-05-14 NOTE — TELEPHONE ENCOUNTER
Patient is requesting a refill on her tylenol # 3.   Last office visit 03/21/2019.  Patient does have a pain contract on file.    shows patient received the following medication.   Fill Date ID Written Drug Qty Days Prescriber Rx # Pharmacy Refill Daily Dose * Pymt Type    05/07/2019  3  05/07/2019  Hydrocodone-Acetamin 5-325 Mg  12 3 Ma Gag  [id number]  [facility] ([id number][facility]  0 20.00 [place]   04/18/2019  3  03/21/2019  Acetaminophen-Cod #3 Tablet  [address]  [id number]  [facility] ([id number])  0 9.00 [place]ME [place]  [place]

## 2019-06-03 RX ORDER — ACETAMINOPHEN AND CODEINE PHOSPHATE 300; 30 MG/1; MG/1
TABLET ORAL
Qty: 60 TABLET | Refills: 1 | Status: SHIPPED | OUTPATIENT
Start: 2019-06-03 | End: 2019-07-18 | Stop reason: SDUPTHER

## 2019-06-28 RX ORDER — BUSPIRONE HYDROCHLORIDE 5 MG/1
TABLET ORAL
Qty: 120 TABLET | Refills: 1 | Status: CANCELLED | OUTPATIENT
Start: 2019-06-28

## 2019-06-28 RX ORDER — BUSPIRONE HYDROCHLORIDE 5 MG/1
TABLET ORAL
Qty: 120 TABLET | Refills: 1 | Status: SHIPPED | OUTPATIENT
Start: 2019-06-28 | End: 2019-08-29

## 2019-07-12 DIAGNOSIS — M19.012 PRIMARY OSTEOARTHRITIS OF LEFT SHOULDER: ICD-10-CM

## 2019-07-12 DIAGNOSIS — G89.29 CHRONIC LEFT SHOULDER PAIN: ICD-10-CM

## 2019-07-12 DIAGNOSIS — M25.512 CHRONIC LEFT SHOULDER PAIN: ICD-10-CM

## 2019-07-12 RX ORDER — DICLOFENAC SODIUM 10 MG/G
2 GEL TOPICAL 3 TIMES DAILY
Qty: 1 TUBE | Refills: 5 | Status: SHIPPED | OUTPATIENT
Start: 2019-07-12 | End: 2020-01-24 | Stop reason: SDUPTHER

## 2019-07-12 NOTE — TELEPHONE ENCOUNTER
----- Message from Vivien Alaniz sent at 7/12/2019 10:47 AM CDT -----  Contact: pt  Can the clinic reply in [facility]: no      Please refill the medication(s) listed below. The patient can be reached at this phone number  once it is called into the pharmacy. [phone]      Medication #1diclofenac sodium (VOLTAREN) 1 % Gel 1 Tube       Medication #2      Preferred Pharmacy:[facility]/pharmacy #[id number] - [place] [address] [address] [phone] (Phone)  [phone] (Fax)

## 2019-07-18 ENCOUNTER — OFFICE VISIT (OUTPATIENT)
Dept: PAIN MEDICINE | Facility: CLINIC | Age: 74
End: 2019-07-18
Payer: MEDICARE

## 2019-07-18 VITALS
TEMPERATURE: 98 F | HEIGHT: 64 IN | WEIGHT: 183 LBS | BODY MASS INDEX: 31.24 KG/M2 | HEART RATE: 81 BPM | RESPIRATION RATE: 18 BRPM | SYSTOLIC BLOOD PRESSURE: 137 MMHG | DIASTOLIC BLOOD PRESSURE: 88 MMHG

## 2019-07-18 DIAGNOSIS — M89.8X1 CHRONIC SCAPULAR PAIN: ICD-10-CM

## 2019-07-18 DIAGNOSIS — G89.4 CHRONIC PAIN DISORDER: ICD-10-CM

## 2019-07-18 DIAGNOSIS — M19.012 PRIMARY OSTEOARTHRITIS OF LEFT SHOULDER: ICD-10-CM

## 2019-07-18 DIAGNOSIS — G89.29 CHRONIC LEFT SHOULDER PAIN: Primary | ICD-10-CM

## 2019-07-18 DIAGNOSIS — M25.512 CHRONIC LEFT SHOULDER PAIN: Primary | ICD-10-CM

## 2019-07-18 DIAGNOSIS — G89.29 CHRONIC SCAPULAR PAIN: ICD-10-CM

## 2019-07-18 DIAGNOSIS — Q85.01 NEUROFIBROMATOSIS, TYPE 1: ICD-10-CM

## 2019-07-18 PROCEDURE — 99213 PR OFFICE/OUTPT VISIT, EST, LEVL III, 20-29 MIN: ICD-10-PCS | Mod: S$GLB,,, | Performed by: NURSE PRACTITIONER

## 2019-07-18 PROCEDURE — 99213 OFFICE O/P EST LOW 20 MIN: CPT | Mod: S$GLB,,, | Performed by: NURSE PRACTITIONER

## 2019-07-18 PROCEDURE — 1101F PR PT FALLS ASSESS DOC 0-1 FALLS W/OUT INJ PAST YR: ICD-10-PCS | Mod: CPTII,S$GLB,, | Performed by: NURSE PRACTITIONER

## 2019-07-18 PROCEDURE — 99999 PR PBB SHADOW E&M-EST. PATIENT-LVL III: CPT | Mod: PBBFAC,,, | Performed by: NURSE PRACTITIONER

## 2019-07-18 PROCEDURE — 3075F SYST BP GE 130 - 139MM HG: CPT | Mod: CPTII,S$GLB,, | Performed by: NURSE PRACTITIONER

## 2019-07-18 PROCEDURE — 3079F DIAST BP 80-89 MM HG: CPT | Mod: CPTII,S$GLB,, | Performed by: NURSE PRACTITIONER

## 2019-07-18 PROCEDURE — 3075F PR MOST RECENT SYSTOLIC BLOOD PRESS GE 130-139MM HG: ICD-10-PCS | Mod: CPTII,S$GLB,, | Performed by: NURSE PRACTITIONER

## 2019-07-18 PROCEDURE — 1101F PT FALLS ASSESS-DOCD LE1/YR: CPT | Mod: CPTII,S$GLB,, | Performed by: NURSE PRACTITIONER

## 2019-07-18 PROCEDURE — 99999 PR PBB SHADOW E&M-EST. PATIENT-LVL III: ICD-10-PCS | Mod: PBBFAC,,, | Performed by: NURSE PRACTITIONER

## 2019-07-18 PROCEDURE — 3079F PR MOST RECENT DIASTOLIC BLOOD PRESSURE 80-89 MM HG: ICD-10-PCS | Mod: CPTII,S$GLB,, | Performed by: NURSE PRACTITIONER

## 2019-07-18 RX ORDER — ACETAMINOPHEN AND CODEINE PHOSPHATE 300; 30 MG/1; MG/1
1 TABLET ORAL 2 TIMES DAILY PRN
Qty: 60 TABLET | Refills: 2 | Status: SHIPPED | OUTPATIENT
Start: 2019-07-18 | End: 2019-08-17
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
## 2019-07-19 ENCOUNTER — TELEPHONE (OUTPATIENT)
Dept: PAIN MEDICINE | Facility: CLINIC | Age: 74
End: 2019-07-19

## 2019-07-19 NOTE — TELEPHONE ENCOUNTER
----- Message from Susana Perdomo sent at 7/19/2019  2:51 PM CDT -----  Contact: pt  Name of Who is Calling: JERRI DERAS [[id number]]    What is the request in detail:Patient is requesting a call back in regards to getting authorization on acetaminophen-codeine 300-30mg (TYLENOL #3) 300-30 mg Tab Please contact to further discuss and advise      Can the clinic reply by [facility]: No     What [facility]: [phone]

## 2019-07-19 NOTE — TELEPHONE ENCOUNTER
----- Message from Libbychristianne Guillermo sent at 7/19/2019 12:13 PM CDT -----  Contact: JERRI DERAS [[id number]]  Name of Who is Calling:JERRI DERAS [[id number]]    What is the request in detail: Please advise pt on status of acetaminophen-codeine 300-30mg (TYLENOL #3) 300-30 mg Tab. Rx was not sent to pharmacy. Contact pt to further discuss.      Can the clinic reply by [facility]:     What Number to Call Back if not in [facility]: [phone]

## 2019-07-19 NOTE — TELEPHONE ENCOUNTER
Spoke to patient she was informed that NP left her refill authorization on pharmacy voicemail.     Patient thanks the office.

## 2019-08-14 ENCOUNTER — TELEPHONE (OUTPATIENT)
Dept: ORTHOPEDICS | Facility: CLINIC | Age: 74
End: 2019-08-14

## 2019-08-14 DIAGNOSIS — R52 PAIN: Primary | ICD-10-CM

## 2019-08-15 ENCOUNTER — OFFICE VISIT (OUTPATIENT)
Dept: ORTHOPEDICS | Facility: CLINIC | Age: 74
End: 2019-08-15
Payer: MEDICARE

## 2019-08-15 ENCOUNTER — HOSPITAL ENCOUNTER (OUTPATIENT)
Dept: RADIOLOGY | Facility: OTHER | Age: 74
Discharge: HOME | End: 2019-08-15
Attending: PHYSICIAN ASSISTANT
Payer: MEDICARE

## 2019-08-15 VITALS
BODY MASS INDEX: 31.07 KG/M2 | DIASTOLIC BLOOD PRESSURE: 83 MMHG | HEIGHT: 64 IN | WEIGHT: 182 LBS | HEART RATE: 91 BPM | SYSTOLIC BLOOD PRESSURE: 137 MMHG

## 2019-08-15 DIAGNOSIS — M25.612 DECREASED RANGE OF MOTION OF LEFT SHOULDER: ICD-10-CM

## 2019-08-15 DIAGNOSIS — M25.512 CHRONIC LEFT SHOULDER PAIN: ICD-10-CM

## 2019-08-15 DIAGNOSIS — G89.29 CHRONIC LEFT SHOULDER PAIN: ICD-10-CM

## 2019-08-15 DIAGNOSIS — R52 PAIN: ICD-10-CM

## 2019-08-15 DIAGNOSIS — M75.42 IMPINGEMENT SYNDROME OF LEFT SHOULDER: Primary | ICD-10-CM

## 2019-08-15 PROCEDURE — 99999 PR PBB SHADOW E&M-EST. PATIENT-LVL IV: ICD-10-PCS | Mod: PBBFAC,,, | Performed by: PHYSICIAN ASSISTANT

## 2019-08-15 PROCEDURE — 3079F PR MOST RECENT DIASTOLIC BLOOD PRESSURE 80-89 MM HG: ICD-10-PCS | Mod: CPTII,S$GLB,, | Performed by: PHYSICIAN ASSISTANT

## 2019-08-15 PROCEDURE — 3075F SYST BP GE 130 - 139MM HG: CPT | Mod: CPTII,S$GLB,, | Performed by: PHYSICIAN ASSISTANT

## 2019-08-15 PROCEDURE — 73030 X-RAY EXAM OF SHOULDER: CPT | Mod: 26,LT,, | Performed by: RADIOLOGY

## 2019-08-15 PROCEDURE — 73030 XR SHOULDER TRAUMA 3 VIEW LEFT: ICD-10-PCS | Mod: 26,LT,, | Performed by: RADIOLOGY

## 2019-08-15 PROCEDURE — 99999 PR PBB SHADOW E&M-EST. PATIENT-LVL IV: CPT | Mod: PBBFAC,,, | Performed by: PHYSICIAN ASSISTANT

## 2019-08-15 PROCEDURE — 1101F PR PT FALLS ASSESS DOC 0-1 FALLS W/OUT INJ PAST YR: ICD-10-PCS | Mod: CPTII,S$GLB,, | Performed by: PHYSICIAN ASSISTANT

## 2019-08-15 PROCEDURE — 3075F PR MOST RECENT SYSTOLIC BLOOD PRESS GE 130-139MM HG: ICD-10-PCS | Mod: CPTII,S$GLB,, | Performed by: PHYSICIAN ASSISTANT

## 2019-08-15 PROCEDURE — 99214 OFFICE O/P EST MOD 30 MIN: CPT | Mod: S$GLB,,, | Performed by: PHYSICIAN ASSISTANT

## 2019-08-15 PROCEDURE — 99214 PR OFFICE/OUTPT VISIT, EST, LEVL IV, 30-39 MIN: ICD-10-PCS | Mod: S$GLB,,, | Performed by: PHYSICIAN ASSISTANT

## 2019-08-15 PROCEDURE — 3079F DIAST BP 80-89 MM HG: CPT | Mod: CPTII,S$GLB,, | Performed by: PHYSICIAN ASSISTANT

## 2019-08-15 PROCEDURE — 1101F PT FALLS ASSESS-DOCD LE1/YR: CPT | Mod: CPTII,S$GLB,, | Performed by: PHYSICIAN ASSISTANT

## 2019-08-15 PROCEDURE — 73030 X-RAY EXAM OF SHOULDER: CPT | Mod: TC,FY,LT

## 2019-08-23 ENCOUNTER — HOSPITAL ENCOUNTER (OUTPATIENT)
Dept: RADIOLOGY | Facility: HOSPITAL | Age: 74
Discharge: HOME | End: 2019-08-23
Attending: INTERNAL MEDICINE
Payer: MEDICARE

## 2019-08-23 DIAGNOSIS — Z85.09 HISTORY OF GASTROINTESTINAL STROMAL TUMOR (GIST): ICD-10-CM

## 2019-08-23 LAB
CREAT SERPL-MCNC: 0.7 MG/DL (ref 0.5–1.4)
SAMPLE: NORMAL

## 2019-08-23 PROCEDURE — 71260 CT THORAX DX C+: CPT | Mod: 26,,, | Performed by: RADIOLOGY

## 2019-08-23 PROCEDURE — 25500020 PHARM REV CODE 255: Performed by: INTERNAL MEDICINE

## 2019-08-23 PROCEDURE — 74177 CT ABD & PELVIS W/CONTRAST: CPT | Mod: TC

## 2019-08-23 PROCEDURE — 71260 CT CHEST ABDOMEN PELVIS WITH CONTRAST (XPD): ICD-10-PCS | Mod: 26,,, | Performed by: RADIOLOGY

## 2019-08-23 PROCEDURE — 74177 CT CHEST ABDOMEN PELVIS WITH CONTRAST (XPD): ICD-10-PCS | Mod: 26,,, | Performed by: RADIOLOGY

## 2019-08-23 PROCEDURE — 74177 CT ABD & PELVIS W/CONTRAST: CPT | Mod: 26,,, | Performed by: RADIOLOGY

## 2019-08-23 RX ADMIN — IOHEXOL 15 ML: 350 INJECTION, SOLUTION INTRAVENOUS at 02:08

## 2019-08-23 RX ADMIN — IOHEXOL 100 ML: 350 INJECTION, SOLUTION INTRAVENOUS at 02:08

## 2019-08-27 ENCOUNTER — LAB VISIT (OUTPATIENT)
Dept: LAB | Facility: HOSPITAL | Age: 74
End: 2019-08-27
Attending: INTERNAL MEDICINE
Payer: MEDICARE

## 2019-08-27 ENCOUNTER — OFFICE VISIT (OUTPATIENT)
Dept: HEMATOLOGY/ONCOLOGY | Facility: CLINIC | Age: 74
End: 2019-08-27
Payer: MEDICARE

## 2019-08-27 VITALS
WEIGHT: 180.31 LBS | HEART RATE: 74 BPM | OXYGEN SATURATION: 97 % | DIASTOLIC BLOOD PRESSURE: 93 MMHG | RESPIRATION RATE: 20 BRPM | HEIGHT: 64 IN | TEMPERATURE: 99 F | BODY MASS INDEX: 30.78 KG/M2 | SYSTOLIC BLOOD PRESSURE: 111 MMHG

## 2019-08-27 DIAGNOSIS — Z85.09 HISTORY OF GASTROINTESTINAL STROMAL TUMOR (GIST): Primary | ICD-10-CM

## 2019-08-27 DIAGNOSIS — Z85.09 HISTORY OF GASTROINTESTINAL STROMAL TUMOR (GIST): ICD-10-CM

## 2019-08-27 LAB
ALBUMIN SERPL BCP-MCNC: 4.2 G/DL (ref 3.5–5.2)
ALP SERPL-CCNC: 118 U/L (ref 55–135)
ALT SERPL W/O P-5'-P-CCNC: 15 U/L (ref 10–44)
ANION GAP SERPL CALC-SCNC: 7 MMOL/L (ref 8–16)
AST SERPL-CCNC: 12 U/L (ref 10–40)
BILIRUB SERPL-MCNC: 0.3 MG/DL (ref 0.1–1)
BUN SERPL-MCNC: 13 MG/DL (ref 8–23)
CALCIUM SERPL-MCNC: 9 MG/DL (ref 8.7–10.5)
CHLORIDE SERPL-SCNC: 107 MMOL/L (ref 95–110)
CO2 SERPL-SCNC: 26 MMOL/L (ref 23–29)
CREAT SERPL-MCNC: 0.8 MG/DL (ref 0.5–1.4)
ERYTHROCYTE [DISTWIDTH] IN BLOOD BY AUTOMATED COUNT: 13.6 % (ref 11.5–14.5)
EST. GFR  (AFRICAN AMERICAN): >60 ML/MIN/1.73 M^2
EST. GFR  (NON AFRICAN AMERICAN): >60 ML/MIN/1.73 M^2
GLUCOSE SERPL-MCNC: 87 MG/DL (ref 70–110)
HCT VFR BLD AUTO: 45.7 % (ref 37–48.5)
HGB BLD-MCNC: 14.7 G/DL (ref 12–16)
IMM GRANULOCYTES # BLD AUTO: 0.08 K/UL (ref 0–0.04)
MCH RBC QN AUTO: 30.9 PG (ref 27–31)
MCHC RBC AUTO-ENTMCNC: 32.2 G/DL (ref 32–36)
MCV RBC AUTO: 96 FL (ref 82–98)
NEUTROPHILS # BLD AUTO: 4.2 K/UL (ref 1.8–7.7)
PLATELET # BLD AUTO: 191 K/UL (ref 150–350)
PMV BLD AUTO: 9.9 FL (ref 9.2–12.9)
POTASSIUM SERPL-SCNC: 4.4 MMOL/L (ref 3.5–5.1)
PROT SERPL-MCNC: 7.4 G/DL (ref 6–8.4)
RBC # BLD AUTO: 4.76 M/UL (ref 4–5.4)
SODIUM SERPL-SCNC: 140 MMOL/L (ref 136–145)
WBC # BLD AUTO: 6.31 K/UL (ref 3.9–12.7)

## 2019-08-27 PROCEDURE — 99214 OFFICE O/P EST MOD 30 MIN: CPT | Mod: S$GLB,,, | Performed by: INTERNAL MEDICINE

## 2019-08-27 PROCEDURE — 99214 PR OFFICE/OUTPT VISIT, EST, LEVL IV, 30-39 MIN: ICD-10-PCS | Mod: S$GLB,,, | Performed by: INTERNAL MEDICINE

## 2019-08-27 PROCEDURE — 3074F PR MOST RECENT SYSTOLIC BLOOD PRESSURE < 130 MM HG: ICD-10-PCS | Mod: CPTII,S$GLB,, | Performed by: INTERNAL MEDICINE

## 2019-08-27 PROCEDURE — 1101F PR PT FALLS ASSESS DOC 0-1 FALLS W/OUT INJ PAST YR: ICD-10-PCS | Mod: CPTII,S$GLB,, | Performed by: INTERNAL MEDICINE

## 2019-08-27 PROCEDURE — 3074F SYST BP LT 130 MM HG: CPT | Mod: CPTII,S$GLB,, | Performed by: INTERNAL MEDICINE

## 2019-08-27 PROCEDURE — 99999 PR PBB SHADOW E&M-EST. PATIENT-LVL IV: CPT | Mod: PBBFAC,,, | Performed by: INTERNAL MEDICINE

## 2019-08-27 PROCEDURE — 85027 COMPLETE CBC AUTOMATED: CPT

## 2019-08-27 PROCEDURE — 3080F PR MOST RECENT DIASTOLIC BLOOD PRESSURE >= 90 MM HG: ICD-10-PCS | Mod: CPTII,S$GLB,, | Performed by: INTERNAL MEDICINE

## 2019-08-27 PROCEDURE — 3080F DIAST BP >= 90 MM HG: CPT | Mod: CPTII,S$GLB,, | Performed by: INTERNAL MEDICINE

## 2019-08-27 PROCEDURE — 1101F PT FALLS ASSESS-DOCD LE1/YR: CPT | Mod: CPTII,S$GLB,, | Performed by: INTERNAL MEDICINE

## 2019-08-27 PROCEDURE — 99999 PR PBB SHADOW E&M-EST. PATIENT-LVL IV: ICD-10-PCS | Mod: PBBFAC,,, | Performed by: INTERNAL MEDICINE

## 2019-08-27 PROCEDURE — 36415 COLL VENOUS BLD VENIPUNCTURE: CPT

## 2019-08-27 PROCEDURE — 80053 COMPREHEN METABOLIC PANEL: CPT

## 2019-08-27 NOTE — PROGRESS NOTES
"Subjective:       Patient ID: Lesli Gong is a 73 y.o. female.    Chief Complaint: History of gastrointestinal stromal tumor (GIST)  Oncologic History:  Ms. Gong is a 73-year-old female with a diagnosis of type 1 neurofibromatosis, who was referred to see Dr. Camilo for evaluation of an abdominal mass found. 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 Final pathology from that revealed neurofibroma with atypia and invasion into the skeletal muscle rather than liposarcoma. She underwent a PET scan, which revealed a diffuse low-level activity as a surgical defect and a focus of high-level activity in or near a loop of the small bowel, probably   jejunum in the left upper quadrant. She was recommended to undergo CT scan for further evaluation of the bowel lesion, which she did on 05/12/2015 and that revealed a soft tissue mass in the left upper quadrant abutting a loop of the proximal jejunum, and she was referred to undergo surgery. She underwent exploratory laparotomy, lysis of adhesions and excision of two approximately 5   cm masses as well as small bowel resection with primary repair on 05/25/2015. Pathology revealed gastrointestinal stromal tumor 345, size range from 1 to 3.5 cm involving the small bowel. GIST subtype is mixed 1 mitotic rate per 50 high power fields. Necrosis is present in 30%, low-grade tumor, Ki67 is less than 1% in the tumor cells showing nuclear staining.  She did not want to do Gleevac. She is on surveillance.                HPI She comes in today to review her CT scans from 8/23/19 reveals "No evidence of metastatic disease. Postoperative changes of small bowel resection. Redemonstration of 0.5 cm pulmonary nodule along the right upper lobe.  For a solid nodule <6 mm, Fleischner Society 2017 guidelines recommend no " routine follow up for a low risk patient, or follow-up with non-contrast chest CT at 12 months in a high risk patient.There are no measurable lesions per RECIST criteria.  She feels well and denies any new complaints      Review of Systems   Constitutional: Negative for appetite change, fatigue and unexpected weight change.   HENT: Negative for mouth sores.    Eyes: Negative for visual disturbance.   Respiratory: Negative for cough and shortness of breath.    Cardiovascular: Negative for chest pain.   Gastrointestinal: Negative for abdominal pain and diarrhea.   Genitourinary: Negative for frequency.   Musculoskeletal: Negative for back pain.   Skin: Negative for rash.   Neurological: Negative for headaches.   Hematological: Negative for adenopathy.   Psychiatric/Behavioral: The patient is not nervous/anxious.    All other systems reviewed and are negative.      Objective:      Physical Exam   Constitutional: She is oriented to person, place, and time. She appears well-developed and well-nourished.   HENT:   Mouth/Throat: No oropharyngeal exudate.   Cardiovascular: Normal rate and normal heart sounds.   Pulmonary/Chest: Effort normal and breath sounds normal. She has no wheezes.   Abdominal: Soft. Bowel sounds are normal. There is no tenderness.   Musculoskeletal: She exhibits no edema or tenderness.   Lymphadenopathy:     She has no cervical adenopathy.   Neurological: She is alert and oriented to person, place, and time. Coordination normal.   Skin: Skin is warm and dry. No rash noted.   Psychiatric: She has a normal mood and affect. Judgment and thought content normal.   Vitals reviewed.      LABS:  WBC   Date Value Ref Range Status   08/27/2019 6.31 3.90 - 12.70 K/uL Final     Hemoglobin   Date Value Ref Range Status   08/27/2019 14.7 12.0 - 16.0 g/dL Final     Hematocrit   Date Value Ref Range Status   08/27/2019 45.7 37.0 - 48.5 % Final     Platelets   Date Value Ref Range Status   08/27/2019 191 150 - 350  K/uL Final     Gran # (ANC)   Date Value Ref Range Status   08/27/2019 4.2 1.8 - 7.7 K/uL Final     Comment:     The ANC is based on a white cell differential from an   automated cell counter. It has not been microscopically   reviewed for the presence of abnormal cells. Clinical   correlation is required.         Chemistry        Component Value Date/Time     08/27/2019 1144    K 4.4 08/27/2019 1144     08/27/2019 1144    CO2 26 08/27/2019 1144    BUN 13 08/27/2019 1144    CREATININE 0.8 08/27/2019 1144    GLU 87 08/27/2019 1144        Component Value Date/Time    CALCIUM 9.0 08/27/2019 1144    ALKPHOS 118 08/27/2019 1144    AST 12 08/27/2019 1144    ALT 15 08/27/2019 1144    BILITOT 0.3 08/27/2019 1144    ESTGFRAFRICA >60.0 08/27/2019 1144    EGFRNONAA >60.0 08/27/2019 1144           Assessment:       1. History of gastrointestinal stromal tumor (GIST)        Plan:        1. She is doing well with no evidence of recurrence and will return in 6 months with labs and CT scans    Above care plan was discussed with patient and all questions were addressed to her satisfaction

## 2019-08-29 RX ORDER — BUSPIRONE HYDROCHLORIDE 5 MG/1
TABLET ORAL
Qty: 120 TABLET | Refills: 1 | Status: CANCELLED | OUTPATIENT
Start: 2019-08-29

## 2019-08-29 RX ORDER — BUSPIRONE HYDROCHLORIDE 5 MG/1
TABLET ORAL
Qty: 120 TABLET | Refills: 0 | Status: SHIPPED | OUTPATIENT
Start: 2019-08-29 | End: 2019-09-11

## 2019-09-05 ENCOUNTER — TELEPHONE (OUTPATIENT)
Dept: ORTHOPEDICS | Facility: CLINIC | Age: 74
End: 2019-09-05

## 2019-09-11 DIAGNOSIS — F43.22 ADJUSTMENT DISORDER WITH ANXIETY: Primary | ICD-10-CM

## 2019-09-12 DIAGNOSIS — Q85.01 NEUROFIBROMATOSIS, TYPE 1: Primary | ICD-10-CM

## 2019-09-12 DIAGNOSIS — R11.0 NAUSEA: ICD-10-CM

## 2019-09-12 DIAGNOSIS — M19.012 PRIMARY OSTEOARTHRITIS OF LEFT SHOULDER: ICD-10-CM

## 2019-09-12 DIAGNOSIS — M89.8X1 CHRONIC SCAPULAR PAIN: ICD-10-CM

## 2019-09-12 DIAGNOSIS — G89.29 CHRONIC LEFT SHOULDER PAIN: ICD-10-CM

## 2019-09-12 DIAGNOSIS — G89.29 CHRONIC SCAPULAR PAIN: ICD-10-CM

## 2019-09-12 DIAGNOSIS — G89.4 CHRONIC PAIN DISORDER: ICD-10-CM

## 2019-09-12 DIAGNOSIS — M25.512 CHRONIC LEFT SHOULDER PAIN: ICD-10-CM

## 2019-09-12 RX ORDER — BUSPIRONE HYDROCHLORIDE 5 MG/1
TABLET ORAL
Qty: 120 TABLET | Refills: 0 | Status: SHIPPED | OUTPATIENT
Start: 2019-09-12 | End: 2019-11-11

## 2019-09-12 RX ORDER — ACETAMINOPHEN AND CODEINE PHOSPHATE 300; 30 MG/1; MG/1
1 TABLET ORAL EVERY 12 HOURS PRN
Qty: 60 TABLET | Refills: 0 | Status: SHIPPED | OUTPATIENT
Start: 2019-09-14 | End: 2020-02-17 | Stop reason: SDUPTHER

## 2019-09-12 NOTE — TELEPHONE ENCOUNTER
Ms. Gong is requesting a refill on her Tylenol #3   Last office visit 07/18.    shows last refill on 08/16  NO UDS on file  Patient does have a pain contract with dept.     Patient is requesting the refill for today, however since 09/15 falls on a Sunday it is dated for 08/14/2019.  IF authorized to fill 08/12/2019, please change start date on prescription prior to approving.     She has a 3 month follow up scheduled on 10/21/2019

## 2019-09-12 NOTE — TELEPHONE ENCOUNTER
----- Message from Maryann Rivera MA sent at 9/11/2019  5:41 PM CDT -----  Contact: self/[phone]      ----- Message -----  From: Isabell Molina MA  Sent: 9/11/2019  12:10 PM  To: [facility] Staff    PT states she needs a refill on her tylenol 3 medication, she knows its not due til the 14 but she is hurting very bad    ..Tylenol 3     [id number] - [place] - [address]  [address]  [place] [address]  Phone: [phone] Fax: [phone]

## 2019-09-24 ENCOUNTER — TELEPHONE (OUTPATIENT)
Dept: ORTHOPEDICS | Facility: CLINIC | Age: 74
End: 2019-09-24

## 2019-09-24 NOTE — TELEPHONE ENCOUNTER
Patient states that she needs to reschedule MRI before next appointment. Patient stated that she does not want to do the MRI, and that she needs to be seen in clinic. Explained the purpose of MRI, and patient stated that she does not want to do the MRI because she will not be doing surgery.     Scheduled patient for an appointment. Patient verbalized understanding.

## 2019-09-24 NOTE — TELEPHONE ENCOUNTER
----- Message from Yaquelin Stock LPN sent at 9/17/2019  6:13 PM CDT -----  Contact: Self       ----- Message -----  From: Lupe Mcleod  Sent: 9/16/2019   2:31 PM  To: [facility]    Pt would like a call back in regards to her shoulder.     [phone]

## 2019-10-07 ENCOUNTER — TELEPHONE (OUTPATIENT)
Dept: INTERNAL MEDICINE | Facility: CLINIC | Age: 74
End: 2019-10-07

## 2019-10-07 ENCOUNTER — PATIENT OUTREACH (OUTPATIENT)
Dept: ADMINISTRATIVE | Facility: OTHER | Age: 74
End: 2019-10-07

## 2019-10-07 DIAGNOSIS — Z12.31 BREAST CANCER SCREENING BY MAMMOGRAM: Primary | ICD-10-CM

## 2019-10-16 ENCOUNTER — PATIENT OUTREACH (OUTPATIENT)
Dept: ADMINISTRATIVE | Facility: OTHER | Age: 74
End: 2019-10-16

## 2019-10-24 ENCOUNTER — PATIENT OUTREACH (OUTPATIENT)
Dept: ADMINISTRATIVE | Facility: OTHER | Age: 74
End: 2019-10-24

## 2019-10-28 ENCOUNTER — TELEPHONE (OUTPATIENT)
Dept: PAIN MEDICINE | Facility: CLINIC | Age: 74
End: 2019-10-28

## 2019-10-28 NOTE — TELEPHONE ENCOUNTER
----- Message from Sandy Rice sent at 10/28/2019 10:38 AM CDT -----  Contact: JERRI DERAS [[id number]]  Name of Who is Calling: JERRI DERAS [[id number]]    What is the request in detail: Would like to speak with provider in regards to shoulder pain. Please contact to further discuss and advise      Can the clinic reply by [facility]: no    What Number to Call Back [facility] not [facility]: [phone]

## 2019-11-11 DIAGNOSIS — F43.22 ADJUSTMENT DISORDER WITH ANXIETY: ICD-10-CM

## 2019-11-11 RX ORDER — BUSPIRONE HYDROCHLORIDE 5 MG/1
TABLET ORAL
Qty: 120 TABLET | Refills: 0 | Status: CANCELLED | OUTPATIENT
Start: 2019-11-11

## 2019-11-13 RX ORDER — BUSPIRONE HYDROCHLORIDE 5 MG/1
TABLET ORAL
Qty: 120 TABLET | Refills: 0 | Status: SHIPPED | OUTPATIENT
Start: 2019-11-13 | End: 2019-12-20

## 2019-11-19 ENCOUNTER — OFFICE VISIT (OUTPATIENT)
Dept: ORTHOPEDICS | Facility: CLINIC | Age: 74
End: 2019-11-19
Payer: MEDICARE

## 2019-11-19 VITALS
DIASTOLIC BLOOD PRESSURE: 88 MMHG | BODY MASS INDEX: 30.73 KG/M2 | WEIGHT: 180 LBS | HEART RATE: 88 BPM | SYSTOLIC BLOOD PRESSURE: 157 MMHG | HEIGHT: 64 IN

## 2019-11-19 DIAGNOSIS — M25.512 LEFT SHOULDER PAIN, UNSPECIFIED CHRONICITY: Primary | ICD-10-CM

## 2019-11-19 PROCEDURE — 1159F PR MEDICATION LIST DOCUMENTED IN MEDICAL RECORD: ICD-10-PCS | Mod: S$GLB,,, | Performed by: ORTHOPAEDIC SURGERY

## 2019-11-19 PROCEDURE — 99214 PR OFFICE/OUTPT VISIT, EST, LEVL IV, 30-39 MIN: ICD-10-PCS | Mod: S$GLB,,, | Performed by: ORTHOPAEDIC SURGERY

## 2019-11-19 PROCEDURE — 1125F PR PAIN SEVERITY QUANTIFIED, PAIN PRESENT: ICD-10-PCS | Mod: S$GLB,,, | Performed by: ORTHOPAEDIC SURGERY

## 2019-11-19 PROCEDURE — 1159F MED LIST DOCD IN RCRD: CPT | Mod: S$GLB,,, | Performed by: ORTHOPAEDIC SURGERY

## 2019-11-19 PROCEDURE — 1101F PT FALLS ASSESS-DOCD LE1/YR: CPT | Mod: CPTII,S$GLB,, | Performed by: ORTHOPAEDIC SURGERY

## 2019-11-19 PROCEDURE — 99999 PR PBB SHADOW E&M-EST. PATIENT-LVL III: ICD-10-PCS | Mod: PBBFAC,,, | Performed by: ORTHOPAEDIC SURGERY

## 2019-11-19 PROCEDURE — 3079F DIAST BP 80-89 MM HG: CPT | Mod: CPTII,S$GLB,, | Performed by: ORTHOPAEDIC SURGERY

## 2019-11-19 PROCEDURE — 1101F PR PT FALLS ASSESS DOC 0-1 FALLS W/OUT INJ PAST YR: ICD-10-PCS | Mod: CPTII,S$GLB,, | Performed by: ORTHOPAEDIC SURGERY

## 2019-11-19 PROCEDURE — 1125F AMNT PAIN NOTED PAIN PRSNT: CPT | Mod: S$GLB,,, | Performed by: ORTHOPAEDIC SURGERY

## 2019-11-19 PROCEDURE — 99999 PR PBB SHADOW E&M-EST. PATIENT-LVL III: CPT | Mod: PBBFAC,,, | Performed by: ORTHOPAEDIC SURGERY

## 2019-11-19 PROCEDURE — 3077F PR MOST RECENT SYSTOLIC BLOOD PRESSURE >= 140 MM HG: ICD-10-PCS | Mod: CPTII,S$GLB,, | Performed by: ORTHOPAEDIC SURGERY

## 2019-11-19 PROCEDURE — 3077F SYST BP >= 140 MM HG: CPT | Mod: CPTII,S$GLB,, | Performed by: ORTHOPAEDIC SURGERY

## 2019-11-19 PROCEDURE — 3079F PR MOST RECENT DIASTOLIC BLOOD PRESSURE 80-89 MM HG: ICD-10-PCS | Mod: CPTII,S$GLB,, | Performed by: ORTHOPAEDIC SURGERY

## 2019-11-19 PROCEDURE — 99214 OFFICE O/P EST MOD 30 MIN: CPT | Mod: S$GLB,,, | Performed by: ORTHOPAEDIC SURGERY

## 2019-11-19 NOTE — PROGRESS NOTES
Chief complaint left shoulder back pain    History of present illness Ms. Gong 74-year-old female with chronic left shoulder pain she does have a history of a resection of a tumor in the back per patient she states she cannot touch my back or altered touch my heart and I will die reviewed the surgical record with the patient does not appear that ribcage was removed or scapula just muscle we discussed this in length patient states that is where her pain is but she also states the the pain is in the shoulder she points to all throughout her shoulder through her back my discuss that some of this area probably needs to be referred to another doctor but patient states that it was not listening to her and that I needed to listen about the shoulder pain and again she points to the area where she had a large resection surgery in addition to having pain in the shoulder scope full range of motion of the shoulder without pain no instability but any time he touched around the scapula touch her shoulder she will let you touch    Examination alert orient x3 well-developed well-nourished no apparent distress friendly she has got obvious neurofibromatosis of throughout her body to sitting without difficulty examination of her bilateral upper extremity median radial ulnar anterior also its posterior osseous motor and sensation light touch intact brisk cap refill good range of motion of her digits elbow shoulder for flexion to 170 bilaterally external rotation symmetric 55 internal rotation patient could be could do to glue but then she did not stress it she won discuss more for pain from the surgery that she had in 2014 on examination of the shoulder skin is clean dry and intact no erythema no swelling there is a muscle defect directly near the left scapula however the scapula is in place    Past x-rays were reviewed with the patient shows no fracture dislocation her operative report was reviewed with the patient 2014 so that she  would not be concerned about touching her heart from the back plan I discussed injections patient states that she worked at [employer] for 14 years and that a doctor told her she had brittle bone if she got steroids so she does not want steroids we discussed an MRI that had been ordered in August that she did not undertake and she will do it at this time patient also requested pain medicine patient will be referred to Pain Management she states her primary care does not write her for the pain medicine and that it was up to me therefore we were given the patient the Department of policy and she will be referred to pain management

## 2019-11-29 ENCOUNTER — TELEPHONE (OUTPATIENT)
Dept: INTERNAL MEDICINE | Facility: CLINIC | Age: 74
End: 2019-11-29

## 2019-11-29 NOTE — TELEPHONE ENCOUNTER
Pt says she does not know how to explain symptoms but she would like a antibiotic. I asked if she has frequency, she says no. No burning or pain. Pt denies fever. Pt says yesterday she urinated on herself. She is having trouble holding urine. scheduled appt for monday

## 2019-11-29 NOTE — TELEPHONE ENCOUNTER
----- Message from Lakshmi Templeton sent at 11/29/2019 10:19 AM CST -----  Contact: self/ [phone]  Patient would like to get medical advice.  Symptoms (please be specific):  FREQUENT URINE,  , poss Uti,   How long has patient had these symptoms:  2 day  Pharmacy name and phone # (copy/paste from chart):  [id number] - [place], [place] - [address] [phone] (Phone)  [phone] (Fax)  Any drug allergies (copy/paste from chart):      Would the patient rather a call back or a response via [facility]?:    Comments:  Patient would like an antibiotic

## 2019-11-30 DIAGNOSIS — I10 ESSENTIAL HYPERTENSION: ICD-10-CM

## 2019-11-30 RX ORDER — LISINOPRIL 10 MG/1
20 TABLET ORAL DAILY
Qty: 180 TABLET | Refills: 2 | Status: CANCELLED | OUTPATIENT
Start: 2019-11-30

## 2019-12-02 ENCOUNTER — TELEPHONE (OUTPATIENT)
Dept: PAIN MEDICINE | Facility: CLINIC | Age: 74
End: 2019-12-02

## 2019-12-02 ENCOUNTER — OFFICE VISIT (OUTPATIENT)
Dept: INTERNAL MEDICINE | Facility: CLINIC | Age: 74
End: 2019-12-02
Payer: MEDICARE

## 2019-12-02 VITALS
DIASTOLIC BLOOD PRESSURE: 85 MMHG | WEIGHT: 186.06 LBS | HEART RATE: 86 BPM | SYSTOLIC BLOOD PRESSURE: 145 MMHG | BODY MASS INDEX: 31.94 KG/M2 | OXYGEN SATURATION: 99 % | TEMPERATURE: 99 F

## 2019-12-02 DIAGNOSIS — I10 ESSENTIAL HYPERTENSION: ICD-10-CM

## 2019-12-02 DIAGNOSIS — R35.0 URINARY FREQUENCY: Primary | ICD-10-CM

## 2019-12-02 DIAGNOSIS — R10.9 ABDOMINAL CRAMPING: ICD-10-CM

## 2019-12-02 DIAGNOSIS — N39.41 URGE INCONTINENCE: ICD-10-CM

## 2019-12-02 DIAGNOSIS — Q85.01 NEUROFIBROMATOSIS, TYPE 1 (VON RECKLINGHAUSEN'S DISEASE): ICD-10-CM

## 2019-12-02 LAB
BACTERIA #/AREA URNS AUTO: ABNORMAL /HPF
BILIRUB SERPL-MCNC: ABNORMAL MG/DL
BILIRUB UR QL STRIP: NEGATIVE
BLOOD, POC UA: ABNORMAL
CLARITY UR REFRACT.AUTO: ABNORMAL
COLOR UR AUTO: YELLOW
GLUCOSE UR QL STRIP: NEGATIVE
GLUCOSE UR QL STRIP: NORMAL
HGB UR QL STRIP: ABNORMAL
KETONES UR QL STRIP: ABNORMAL
KETONES UR QL STRIP: NEGATIVE
LEUKOCYTE ESTERASE UR QL STRIP: ABNORMAL
LEUKOCYTE ESTERASE URINE, POC: ABNORMAL
MICROSCOPIC COMMENT: ABNORMAL
NITRITE UR QL STRIP: NEGATIVE
NITRITE, POC UA: ABNORMAL
PH UR STRIP: 7 [PH] (ref 5–8)
PH, POC UA: 7
PROT UR QL STRIP: NEGATIVE
PROTEIN, POC: ABNORMAL
RBC #/AREA URNS AUTO: 8 /HPF (ref 0–4)
SP GR UR STRIP: 1.01 (ref 1–1.03)
SPECIFIC GRAVITY, POC UA: 1.01
SQUAMOUS #/AREA URNS AUTO: 1 /HPF
URN SPEC COLLECT METH UR: ABNORMAL
UROBILINOGEN, POC UA: NORMAL
WBC #/AREA URNS AUTO: 24 /HPF (ref 0–5)

## 2019-12-02 PROCEDURE — 87186 SC STD MICRODIL/AGAR DIL: CPT

## 2019-12-02 PROCEDURE — 1159F PR MEDICATION LIST DOCUMENTED IN MEDICAL RECORD: ICD-10-PCS | Mod: S$GLB,,, | Performed by: PHYSICIAN ASSISTANT

## 2019-12-02 PROCEDURE — 99214 PR OFFICE/OUTPT VISIT, EST, LEVL IV, 30-39 MIN: ICD-10-PCS | Mod: 25,S$GLB,, | Performed by: PHYSICIAN ASSISTANT

## 2019-12-02 PROCEDURE — 99214 OFFICE O/P EST MOD 30 MIN: CPT | Mod: 25,S$GLB,, | Performed by: PHYSICIAN ASSISTANT

## 2019-12-02 PROCEDURE — 81003 URINALYSIS AUTO W/O SCOPE: CPT | Mod: QW,S$GLB,, | Performed by: PHYSICIAN ASSISTANT

## 2019-12-02 PROCEDURE — 3079F DIAST BP 80-89 MM HG: CPT | Mod: CPTII,S$GLB,, | Performed by: PHYSICIAN ASSISTANT

## 2019-12-02 PROCEDURE — 3077F PR MOST RECENT SYSTOLIC BLOOD PRESSURE >= 140 MM HG: ICD-10-PCS | Mod: CPTII,S$GLB,, | Performed by: PHYSICIAN ASSISTANT

## 2019-12-02 PROCEDURE — 99999 PR PBB SHADOW E&M-EST. PATIENT-LVL III: ICD-10-PCS | Mod: PBBFAC,,, | Performed by: PHYSICIAN ASSISTANT

## 2019-12-02 PROCEDURE — 1159F MED LIST DOCD IN RCRD: CPT | Mod: S$GLB,,, | Performed by: PHYSICIAN ASSISTANT

## 2019-12-02 PROCEDURE — 1101F PR PT FALLS ASSESS DOC 0-1 FALLS W/OUT INJ PAST YR: ICD-10-PCS | Mod: CPTII,S$GLB,, | Performed by: PHYSICIAN ASSISTANT

## 2019-12-02 PROCEDURE — 87086 URINE CULTURE/COLONY COUNT: CPT

## 2019-12-02 PROCEDURE — 3077F SYST BP >= 140 MM HG: CPT | Mod: CPTII,S$GLB,, | Performed by: PHYSICIAN ASSISTANT

## 2019-12-02 PROCEDURE — 1101F PT FALLS ASSESS-DOCD LE1/YR: CPT | Mod: CPTII,S$GLB,, | Performed by: PHYSICIAN ASSISTANT

## 2019-12-02 PROCEDURE — 81003 POCT URINALYSIS: ICD-10-PCS | Mod: QW,S$GLB,, | Performed by: PHYSICIAN ASSISTANT

## 2019-12-02 PROCEDURE — 3079F PR MOST RECENT DIASTOLIC BLOOD PRESSURE 80-89 MM HG: ICD-10-PCS | Mod: CPTII,S$GLB,, | Performed by: PHYSICIAN ASSISTANT

## 2019-12-02 PROCEDURE — 87077 CULTURE AEROBIC IDENTIFY: CPT

## 2019-12-02 PROCEDURE — 81001 URINALYSIS AUTO W/SCOPE: CPT

## 2019-12-02 PROCEDURE — 87088 URINE BACTERIA CULTURE: CPT

## 2019-12-02 PROCEDURE — 99999 PR PBB SHADOW E&M-EST. PATIENT-LVL III: CPT | Mod: PBBFAC,,, | Performed by: PHYSICIAN ASSISTANT

## 2019-12-02 PROCEDURE — 1125F AMNT PAIN NOTED PAIN PRSNT: CPT | Mod: S$GLB,,, | Performed by: PHYSICIAN ASSISTANT

## 2019-12-02 PROCEDURE — 1125F PR PAIN SEVERITY QUANTIFIED, PAIN PRESENT: ICD-10-PCS | Mod: S$GLB,,, | Performed by: PHYSICIAN ASSISTANT

## 2019-12-02 RX ORDER — LISINOPRIL 10 MG/1
20 TABLET ORAL DAILY
Qty: 180 TABLET | Refills: 2 | Status: CANCELLED | OUTPATIENT
Start: 2019-11-30

## 2019-12-02 RX ORDER — NITROFURANTOIN 25; 75 MG/1; MG/1
100 CAPSULE ORAL 2 TIMES DAILY
Qty: 10 CAPSULE | Refills: 0 | Status: SHIPPED | OUTPATIENT
Start: 2019-12-02 | End: 2019-12-07

## 2019-12-02 RX ORDER — DICYCLOMINE HYDROCHLORIDE 10 MG/1
10 CAPSULE ORAL 2 TIMES DAILY PRN
Qty: 20 CAPSULE | Refills: 0 | Status: SHIPPED | OUTPATIENT
Start: 2019-12-02 | End: 2020-01-01

## 2019-12-02 RX ORDER — LISINOPRIL 10 MG/1
20 TABLET ORAL DAILY
Qty: 180 TABLET | Refills: 2 | Status: SHIPPED | OUTPATIENT
Start: 2019-12-02 | End: 2020-08-11

## 2019-12-02 NOTE — PROGRESS NOTES
Subjective:       Patient ID: Lesli Gong is a 74 y.o. female.    Chief Complaint: Flank Pain and Urinary Incontinence    HPI     Established pt of Patti Brian MD (new to me)    C/o urinary freq, one episode of urge incontinence and intermittent right lower abdominal cramping for the past 3 days. Denies dysuria or hematuria. BMs are normal. No fevers, n/v/d. Took a tylenol which helped pain. Reports similar symptoms in the past that resolved with antibiotics.     Of note she has hx of NF! And GIST tumor of small bowel.   Recent surveillance CT abdomen obtained 3 months ago obtained which was stable.       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   Respiratory: Negative for cough and shortness of breath.    Cardiovascular: Negative for chest pain and leg swelling.   Gastrointestinal: Negative for constipation, diarrhea, nausea and vomiting.        As per HPI   Genitourinary: Positive for frequency and urgency. Negative for dysuria, hematuria, vaginal bleeding, vaginal discharge and vaginal pain.   Skin: Negative for rash.   Neurological: Negative for weakness and headaches.       Objective: BP (!) 145/85   Pulse 86   Wt 84.4 kg (186 lb 1.1 oz)   SpO2 99%   BMI 31.94 kg/m²         Physical Exam    Constitutional: She appears well-developed and well-nourished. No distress.   HENT:   Head: Normocephalic and atraumatic.   Mouth/Throat: Oropharynx is clear and moist.   Cardiovascular: Normal rate and regular rhythm. Exam reveals no friction rub.   No murmur heard.  Pulmonary/Chest: Effort normal and breath sounds normal. She has no wheezes. She has no rales.   Abdominal: Soft. Bowel sounds are normal. She exhibits no mass. There is tenderness (very mild to rlq/suprapubic region). There is no rebound and no guarding.   Neurological: She is alert.   Skin: Skin is warm and dry. No rash noted.   Vitals reviewed.      Assessment:       1. Urinary frequency    2. Urge incontinence    3. Abdominal cramping    4. Essential hypertension    5. Neurofibromatosis, type 1 (von Recklinghausen's disease)        Plan:         Lesli was seen today for abdominal cramping and urinary incontinence.    Diagnoses and all orders for this visit:    Urinary frequency  -     POCT URINALYSIS  -     URINALYSIS  -     Urine culture; Future  -     nitrofurantoin, macrocrystal-monohydrate, (MACROBID) 100 MG capsule; Take 1 capsule (100 mg total) by mouth 2 (two) times daily. for 5 days  -     Urine culture    Urge incontinence  -     POCT URINALYSIS  -     URINALYSIS  -     Urine culture; Future  -     nitrofurantoin, macrocrystal-monohydrate, (MACROBID) 100 MG capsule; Take 1 capsule (100 mg total) by mouth 2 (two) times daily. for 5 days  -     Urine culture    Abdominal cramping  -     dicyclomine (BENTYL) 10 MG capsule; Take 1 capsule (10 mg total) by mouth 2 (two) times daily as needed (abdominal cramping).    Essential hypertension  -     lisinopril 10 MG tablet; TAKE 2 TABLETS (20 MG TOTAL) BY MOUTH ONCE DAILY.    Other orders  -     Urinalysis Microscopic        POC UA +blood +leuk  Will start empiric Macrobid  Follow up Urine culture  Tylenol prn  Trial of bentyl prn for abdominal crmaping  Increase fluids  RTC/ED precautions  discussed for worsening symptoms    Thu Cesar PA-C

## 2019-12-02 NOTE — TELEPHONE ENCOUNTER
----- Message from Gareth Sigala sent at 12/2/2019  4:48 PM CST -----  Contact: Pt  Pt states the medication wasn't called in yet       Please advise pt at [phone]

## 2019-12-05 LAB — BACTERIA UR CULT: ABNORMAL

## 2019-12-20 DIAGNOSIS — F43.22 ADJUSTMENT DISORDER WITH ANXIETY: ICD-10-CM

## 2019-12-20 RX ORDER — BUSPIRONE HYDROCHLORIDE 5 MG/1
TABLET ORAL
Qty: 120 TABLET | Refills: 11 | Status: SHIPPED | OUTPATIENT
Start: 2019-12-20 | End: 2020-09-01

## 2020-01-09 ENCOUNTER — TELEPHONE (OUTPATIENT)
Dept: HEMATOLOGY/ONCOLOGY | Facility: CLINIC | Age: 75
End: 2020-01-09

## 2020-01-09 NOTE — TELEPHONE ENCOUNTER
Returned call to PT. She is due to return the last week in February. PT will probably be pushed to the first week of March. Will mail appointment slip once the appointment is scheduled.

## 2020-01-09 NOTE — TELEPHONE ENCOUNTER
----- Message from Maulik Horta sent at 1/9/2020 10:38 AM CST -----  Contact: Patient  Scheduling request    Scheduling appt :: f/u    Treating provider:: Dr Francisco     Do you feel you need to be seen today:: No    Contact:: [phone] or [phone]    Additional info::

## 2020-01-10 ENCOUNTER — TELEPHONE (OUTPATIENT)
Dept: ORTHOPEDICS | Facility: CLINIC | Age: 75
End: 2020-01-10

## 2020-01-10 DIAGNOSIS — M25.512 CHRONIC LEFT SHOULDER PAIN: ICD-10-CM

## 2020-01-10 DIAGNOSIS — M89.8X1 CHRONIC SCAPULAR PAIN: ICD-10-CM

## 2020-01-10 DIAGNOSIS — M19.012 PRIMARY OSTEOARTHRITIS OF LEFT SHOULDER: ICD-10-CM

## 2020-01-10 DIAGNOSIS — G89.29 CHRONIC SCAPULAR PAIN: ICD-10-CM

## 2020-01-10 DIAGNOSIS — G89.4 CHRONIC PAIN DISORDER: ICD-10-CM

## 2020-01-10 DIAGNOSIS — G89.29 CHRONIC LEFT SHOULDER PAIN: ICD-10-CM

## 2020-01-10 DIAGNOSIS — Q85.01 NEUROFIBROMATOSIS, TYPE 1: ICD-10-CM

## 2020-01-10 RX ORDER — ACETAMINOPHEN AND CODEINE PHOSPHATE 300; 30 MG/1; MG/1
TABLET ORAL
Qty: 60 TABLET | Refills: 2 | OUTPATIENT
Start: 2020-01-10

## 2020-01-10 NOTE — TELEPHONE ENCOUNTER
Called patient in regards to message below. Notified patient per last OV note that she needed to see Pain Mgmt. Pt verbalized understanding and appointment with Sravanthi COLLIER At pain mgmt scheduled today.

## 2020-01-10 NOTE — TELEPHONE ENCOUNTER
----- Message from Marti Kohler sent at 1/10/2020 10:00 AM CST -----  Contact: self  Type:  Needs Medical Advice    Who Called: patient need to speak with nurse.  Patient states experiencing a lot of pain requesting something for pain  Symptoms (please be specific):   How long has patient had these symptoms:    Pharmacy name and phone #:    Would the patient rather a call back or a response via [facility]? call  Best Call Back Number:  [phone]  Additional Information:

## 2020-01-13 ENCOUNTER — HOSPITAL ENCOUNTER (OUTPATIENT)
Dept: RADIOLOGY | Facility: HOSPITAL | Age: 75
Discharge: HOME | End: 2020-01-13
Attending: INTERNAL MEDICINE
Payer: MEDICARE

## 2020-01-13 ENCOUNTER — TELEPHONE (OUTPATIENT)
Dept: INTERNAL MEDICINE | Facility: CLINIC | Age: 75
End: 2020-01-13

## 2020-01-13 DIAGNOSIS — Z12.31 BREAST CANCER SCREENING BY MAMMOGRAM: ICD-10-CM

## 2020-01-13 PROCEDURE — 77067 MAMMO DIGITAL SCREENING BILAT WITH TOMOSYNTHESIS_CAD: ICD-10-PCS | Mod: 26,,, | Performed by: RADIOLOGY

## 2020-01-13 PROCEDURE — 77067 SCR MAMMO BI INCL CAD: CPT | Mod: 26,,, | Performed by: RADIOLOGY

## 2020-01-13 PROCEDURE — 77067 SCR MAMMO BI INCL CAD: CPT | Mod: TC

## 2020-01-13 PROCEDURE — 77063 MAMMO DIGITAL SCREENING BILAT WITH TOMOSYNTHESIS_CAD: ICD-10-PCS | Mod: 26,,, | Performed by: RADIOLOGY

## 2020-01-13 PROCEDURE — 77063 BREAST TOMOSYNTHESIS BI: CPT | Mod: 26,,, | Performed by: RADIOLOGY

## 2020-01-13 NOTE — TELEPHONE ENCOUNTER
----- Message from Lakshmi Templeton sent at 1/13/2020  3:55 PM CST -----  Contact: self/ [phone]  Patient is returning a phone call.  Who left a message for the patient: Liliane Renteria LPN    Does patient know what this is regarding:    Comments:

## 2020-01-13 NOTE — TELEPHONE ENCOUNTER
----- Message from Niles Gong sent at 1/13/2020  2:26 PM CST -----  Contact: self [phone]  Patient would like to get medical advice.  Symptoms (please be specific):  Bad cough right ear pain  How long has patient had these symptoms:  1 day  Pharmacy name and phone # (copy/paste from chart):     [id number] - [place], [place] - [address] [phone] (Phone)  [phone] (Fax)  Any drug allergies (copy/paste from chart):      Would the patient rather a call back or a response via [facility]?:  Call back   Comments:

## 2020-01-24 ENCOUNTER — TELEPHONE (OUTPATIENT)
Dept: PAIN MEDICINE | Facility: CLINIC | Age: 75
End: 2020-01-24

## 2020-01-24 DIAGNOSIS — G89.29 CHRONIC LEFT SHOULDER PAIN: ICD-10-CM

## 2020-01-24 DIAGNOSIS — M25.512 CHRONIC LEFT SHOULDER PAIN: ICD-10-CM

## 2020-01-24 DIAGNOSIS — M19.012 PRIMARY OSTEOARTHRITIS OF LEFT SHOULDER: ICD-10-CM

## 2020-01-24 RX ORDER — DICLOFENAC SODIUM 10 MG/G
2 GEL TOPICAL 3 TIMES DAILY
Qty: 1 TUBE | Refills: 5 | Status: SHIPPED | OUTPATIENT
Start: 2020-01-24 | End: 2020-05-15 | Stop reason: SDUPTHER

## 2020-01-24 NOTE — TELEPHONE ENCOUNTER
Patient is requesting a refill for diclofenac sodium (VOLTAREN) 1 % Gel.    Please review.  Thanks

## 2020-01-24 NOTE — TELEPHONE ENCOUNTER
----- Message from Tristian Llanos sent at 1/24/2020  4:02 PM CST -----  Contact: SELF  Pt needs a refill on medication diclofenac sodium (VOLTAREN) 1 % Gel. Also she states they were going to charge her $52. Pt may be confused as to why.     Contact Info

## 2020-02-07 DIAGNOSIS — G89.29 CHRONIC LEFT SHOULDER PAIN: ICD-10-CM

## 2020-02-07 DIAGNOSIS — M19.012 PRIMARY OSTEOARTHRITIS OF LEFT SHOULDER: ICD-10-CM

## 2020-02-07 DIAGNOSIS — M25.512 CHRONIC LEFT SHOULDER PAIN: ICD-10-CM

## 2020-02-07 RX ORDER — DICLOFENAC SODIUM 10 MG/G
2 GEL TOPICAL 3 TIMES DAILY
Qty: 1 TUBE | Refills: 5 | OUTPATIENT
Start: 2020-02-07

## 2020-02-07 RX ORDER — DICLOFENAC SODIUM 10 MG/G
GEL TOPICAL 4 TIMES DAILY
Qty: 100 G | Refills: 11 | Status: CANCELLED | OUTPATIENT
Start: 2020-02-07 | End: 2021-02-06

## 2020-02-07 NOTE — PROGRESS NOTES
Refill Authorization Note     is requesting a refill authorization.    Brief assessment and rationale for refill: QUICK DC: Duplicate          Medication Therapy Plan: see below    Comments:   Last Prescribed Info:        diclofenac sodium (VOLTAREN) 1 % Gel 1 Tube 5 1/24/2020     Sig - Route: Apply 2 g topically 3 (three) times daily. - Topical (Top)    Sent to pharmacy as: diclofenac sodium (VOLTAREN) 1 % Gel    E-Prescribing Status: Receipt confirmed by pharmacy (1/24/2020  4:19 PM CST)      Ordering User:  Sravanthi Quiroz NP            Diagnosis Association: Chronic left shoulder pain (M25.512 , G89.29); Primary osteoarthritis of left shoulder (M19.012)      Original Order:  diclofenac sodium (VOLTAREN) 1 % Gel [[id number]]      Pharmacy:  [id number] - [place] - [address]

## 2020-02-14 ENCOUNTER — TELEPHONE (OUTPATIENT)
Dept: PAIN MEDICINE | Facility: CLINIC | Age: 75
End: 2020-02-14

## 2020-02-14 NOTE — TELEPHONE ENCOUNTER
Staff contacted the patient to confirm her 02/17/20 9:15 am appointment with Dr. Cheney. Patient can contact our office at [phone] to reschedule or cancel if needed.       Patient confirmed appointment, verbalized understanding of appointment date and time and location. Patient is not workman comp.     Patient expressed thanks.

## 2020-02-17 ENCOUNTER — OFFICE VISIT (OUTPATIENT)
Dept: PAIN MEDICINE | Facility: CLINIC | Age: 75
End: 2020-02-17
Attending: ANESTHESIOLOGY
Payer: MEDICARE

## 2020-02-17 ENCOUNTER — TELEPHONE (OUTPATIENT)
Dept: PAIN MEDICINE | Facility: CLINIC | Age: 75
End: 2020-02-17

## 2020-02-17 VITALS
DIASTOLIC BLOOD PRESSURE: 94 MMHG | OXYGEN SATURATION: 100 % | HEART RATE: 90 BPM | HEIGHT: 64 IN | BODY MASS INDEX: 30.93 KG/M2 | TEMPERATURE: 98 F | WEIGHT: 181.19 LBS | RESPIRATION RATE: 18 BRPM | SYSTOLIC BLOOD PRESSURE: 145 MMHG

## 2020-02-17 DIAGNOSIS — G89.4 CHRONIC PAIN DISORDER: ICD-10-CM

## 2020-02-17 DIAGNOSIS — G89.29 CHRONIC SCAPULAR PAIN: ICD-10-CM

## 2020-02-17 DIAGNOSIS — G89.29 CHRONIC LEFT SHOULDER PAIN: Primary | ICD-10-CM

## 2020-02-17 DIAGNOSIS — M25.512 CHRONIC LEFT SHOULDER PAIN: Primary | ICD-10-CM

## 2020-02-17 DIAGNOSIS — Q85.01 NEUROFIBROMATOSIS, TYPE 1: ICD-10-CM

## 2020-02-17 DIAGNOSIS — M89.8X1 CHRONIC SCAPULAR PAIN: ICD-10-CM

## 2020-02-17 DIAGNOSIS — M19.012 PRIMARY OSTEOARTHRITIS OF LEFT SHOULDER: ICD-10-CM

## 2020-02-17 PROCEDURE — 1159F MED LIST DOCD IN RCRD: CPT | Mod: S$GLB,,, | Performed by: NURSE PRACTITIONER

## 2020-02-17 PROCEDURE — 99214 OFFICE O/P EST MOD 30 MIN: CPT | Mod: S$GLB,,, | Performed by: NURSE PRACTITIONER

## 2020-02-17 PROCEDURE — 1101F PR PT FALLS ASSESS DOC 0-1 FALLS W/OUT INJ PAST YR: ICD-10-PCS | Mod: CPTII,S$GLB,, | Performed by: NURSE PRACTITIONER

## 2020-02-17 PROCEDURE — 1125F AMNT PAIN NOTED PAIN PRSNT: CPT | Mod: S$GLB,,, | Performed by: NURSE PRACTITIONER

## 2020-02-17 PROCEDURE — 3077F SYST BP >= 140 MM HG: CPT | Mod: CPTII,S$GLB,, | Performed by: NURSE PRACTITIONER

## 2020-02-17 PROCEDURE — 3077F PR MOST RECENT SYSTOLIC BLOOD PRESSURE >= 140 MM HG: ICD-10-PCS | Mod: CPTII,S$GLB,, | Performed by: NURSE PRACTITIONER

## 2020-02-17 PROCEDURE — 1101F PT FALLS ASSESS-DOCD LE1/YR: CPT | Mod: CPTII,S$GLB,, | Performed by: NURSE PRACTITIONER

## 2020-02-17 PROCEDURE — 99214 PR OFFICE/OUTPT VISIT, EST, LEVL IV, 30-39 MIN: ICD-10-PCS | Mod: S$GLB,,, | Performed by: NURSE PRACTITIONER

## 2020-02-17 PROCEDURE — 1125F PR PAIN SEVERITY QUANTIFIED, PAIN PRESENT: ICD-10-PCS | Mod: S$GLB,,, | Performed by: NURSE PRACTITIONER

## 2020-02-17 PROCEDURE — 1159F PR MEDICATION LIST DOCUMENTED IN MEDICAL RECORD: ICD-10-PCS | Mod: S$GLB,,, | Performed by: NURSE PRACTITIONER

## 2020-02-17 PROCEDURE — 3080F PR MOST RECENT DIASTOLIC BLOOD PRESSURE >= 90 MM HG: ICD-10-PCS | Mod: CPTII,S$GLB,, | Performed by: NURSE PRACTITIONER

## 2020-02-17 PROCEDURE — 3080F DIAST BP >= 90 MM HG: CPT | Mod: CPTII,S$GLB,, | Performed by: NURSE PRACTITIONER

## 2020-02-17 PROCEDURE — 99999 PR PBB SHADOW E&M-EST. PATIENT-LVL IV: ICD-10-PCS | Mod: PBBFAC,,, | Performed by: NURSE PRACTITIONER

## 2020-02-17 PROCEDURE — 99999 PR PBB SHADOW E&M-EST. PATIENT-LVL IV: CPT | Mod: PBBFAC,,, | Performed by: NURSE PRACTITIONER

## 2020-02-17 RX ORDER — ACETAMINOPHEN AND CODEINE PHOSPHATE 300; 30 MG/1; MG/1
1 TABLET ORAL EVERY 12 HOURS PRN
Qty: 60 TABLET | Refills: 0 | Status: SHIPPED | OUTPATIENT
Start: 2020-02-17 | End: 2020-03-18
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
## 2020-02-17 NOTE — TELEPHONE ENCOUNTER
The following medication was called into [facility] pharmacy Tylenol#3 one tab every 12 hours #60 NO REFILLS

## 2020-02-17 NOTE — TELEPHONE ENCOUNTER
----- Message from Roxana Valenzuela sent at 2/17/2020  3:44 PM CST -----  Contact: JERRI DERAS   Please refill the medication(s) listed below. The patient can be reached at this phone number once it is called into the pharmacy.    Medication #1: acetaminophen-codeine 300-30mg (TYLENOL #3) 300-30 mg Tab    Medication #2    Preferred Pharmacy: [id number] - [place]  [address]

## 2020-02-27 ENCOUNTER — HOSPITAL ENCOUNTER (OUTPATIENT)
Dept: RADIOLOGY | Facility: HOSPITAL | Age: 75
Discharge: HOME | End: 2020-02-27
Attending: INTERNAL MEDICINE
Payer: MEDICARE

## 2020-02-27 ENCOUNTER — OFFICE VISIT (OUTPATIENT)
Dept: HEMATOLOGY/ONCOLOGY | Facility: CLINIC | Age: 75
End: 2020-02-27
Payer: MEDICARE

## 2020-02-27 VITALS
RESPIRATION RATE: 18 BRPM | HEART RATE: 76 BPM | TEMPERATURE: 98 F | BODY MASS INDEX: 32.37 KG/M2 | DIASTOLIC BLOOD PRESSURE: 73 MMHG | OXYGEN SATURATION: 97 % | HEIGHT: 64 IN | SYSTOLIC BLOOD PRESSURE: 149 MMHG | WEIGHT: 189.63 LBS

## 2020-02-27 DIAGNOSIS — Z85.09 HISTORY OF GASTROINTESTINAL STROMAL TUMOR (GIST): Primary | ICD-10-CM

## 2020-02-27 DIAGNOSIS — Z85.09 HISTORY OF GASTROINTESTINAL STROMAL TUMOR (GIST): ICD-10-CM

## 2020-02-27 LAB
CREAT SERPL-MCNC: 0.7 MG/DL (ref 0.5–1.4)
SAMPLE: NORMAL

## 2020-02-27 PROCEDURE — 1101F PR PT FALLS ASSESS DOC 0-1 FALLS W/OUT INJ PAST YR: ICD-10-PCS | Mod: CPTII,S$GLB,, | Performed by: INTERNAL MEDICINE

## 2020-02-27 PROCEDURE — 74177 CT ABD & PELVIS W/CONTRAST: CPT | Mod: 26,,, | Performed by: RADIOLOGY

## 2020-02-27 PROCEDURE — 1101F PT FALLS ASSESS-DOCD LE1/YR: CPT | Mod: CPTII,S$GLB,, | Performed by: INTERNAL MEDICINE

## 2020-02-27 PROCEDURE — 3077F SYST BP >= 140 MM HG: CPT | Mod: CPTII,S$GLB,, | Performed by: INTERNAL MEDICINE

## 2020-02-27 PROCEDURE — 99999 PR PBB SHADOW E&M-EST. PATIENT-LVL IV: CPT | Mod: PBBFAC,,, | Performed by: INTERNAL MEDICINE

## 2020-02-27 PROCEDURE — 1125F PR PAIN SEVERITY QUANTIFIED, PAIN PRESENT: ICD-10-PCS | Mod: S$GLB,,, | Performed by: INTERNAL MEDICINE

## 2020-02-27 PROCEDURE — 1125F AMNT PAIN NOTED PAIN PRSNT: CPT | Mod: S$GLB,,, | Performed by: INTERNAL MEDICINE

## 2020-02-27 PROCEDURE — 74177 CT CHEST ABDOMEN PELVIS WITH CONTRAST (XPD): ICD-10-PCS | Mod: 26,,, | Performed by: RADIOLOGY

## 2020-02-27 PROCEDURE — 3077F PR MOST RECENT SYSTOLIC BLOOD PRESSURE >= 140 MM HG: ICD-10-PCS | Mod: CPTII,S$GLB,, | Performed by: INTERNAL MEDICINE

## 2020-02-27 PROCEDURE — 3078F PR MOST RECENT DIASTOLIC BLOOD PRESSURE < 80 MM HG: ICD-10-PCS | Mod: CPTII,S$GLB,, | Performed by: INTERNAL MEDICINE

## 2020-02-27 PROCEDURE — 25500020 PHARM REV CODE 255: Performed by: INTERNAL MEDICINE

## 2020-02-27 PROCEDURE — 3078F DIAST BP <80 MM HG: CPT | Mod: CPTII,S$GLB,, | Performed by: INTERNAL MEDICINE

## 2020-02-27 PROCEDURE — 71260 CT THORAX DX C+: CPT | Mod: 26,,, | Performed by: RADIOLOGY

## 2020-02-27 PROCEDURE — 99214 OFFICE O/P EST MOD 30 MIN: CPT | Mod: S$GLB,,, | Performed by: INTERNAL MEDICINE

## 2020-02-27 PROCEDURE — 1159F MED LIST DOCD IN RCRD: CPT | Mod: S$GLB,,, | Performed by: INTERNAL MEDICINE

## 2020-02-27 PROCEDURE — 99214 PR OFFICE/OUTPT VISIT, EST, LEVL IV, 30-39 MIN: ICD-10-PCS | Mod: S$GLB,,, | Performed by: INTERNAL MEDICINE

## 2020-02-27 PROCEDURE — 1159F PR MEDICATION LIST DOCUMENTED IN MEDICAL RECORD: ICD-10-PCS | Mod: S$GLB,,, | Performed by: INTERNAL MEDICINE

## 2020-02-27 PROCEDURE — 99999 PR PBB SHADOW E&M-EST. PATIENT-LVL IV: ICD-10-PCS | Mod: PBBFAC,,, | Performed by: INTERNAL MEDICINE

## 2020-02-27 PROCEDURE — 71260 CT CHEST ABDOMEN PELVIS WITH CONTRAST (XPD): ICD-10-PCS | Mod: 26,,, | Performed by: RADIOLOGY

## 2020-02-27 PROCEDURE — 74177 CT ABD & PELVIS W/CONTRAST: CPT | Mod: TC

## 2020-02-27 RX ADMIN — IOHEXOL 15 ML: 350 INJECTION, SOLUTION INTRAVENOUS at 08:02

## 2020-02-27 RX ADMIN — IOHEXOL 15 ML: 350 INJECTION, SOLUTION INTRAVENOUS at 09:02

## 2020-02-27 RX ADMIN — IOHEXOL 75 ML: 350 INJECTION, SOLUTION INTRAVENOUS at 09:02

## 2020-02-27 NOTE — PROGRESS NOTES
"Subjective:       Patient ID: Lesli Gong is a 74 y.o. female.    Chief Complaint: History of gastrointestinal stromal tumor (GIST)  Oncologic History:  Ms. Gong is a 74-year-old female with a diagnosis of type 1 neurofibromatosis, who was referred to see Dr. Camilo for evaluation of an abdominal mass found. 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 Final pathology from that revealed neurofibroma with atypia and invasion into the skeletal muscle rather than liposarcoma. She underwent a PET scan, which revealed a diffuse low-level activity as a surgical defect and a focus of high-level activity in or near a loop of the small bowel, probably   jejunum in the left upper quadrant. She was recommended to undergo CT scan for further evaluation of the bowel lesion, which she did on 05/12/2015 and that revealed a soft tissue mass in the left upper quadrant abutting a loop of the proximal jejunum, and she was referred to undergo surgery. She underwent exploratory laparotomy, lysis of adhesions and excision of two approximately 5   cm masses as well as small bowel resection with primary repair on 05/25/2015. Pathology revealed gastrointestinal stromal tumor 345, size range from 1 to 3.5 cm involving the small bowel. GIST subtype is mixed 1 mitotic rate per 50 high power fields. Necrosis is present in 30%, low-grade tumor, Ki67 is less than 1% in the tumor cells showing nuclear staining.  She did not want to do Gleevac. She is on surveillance.                HPI She comes in today to review her CT scans from 2/27/2020 reveal "No evidence of neoplasm recurrence or metastatic disease in this patient with a reported history of GIST tumor. Postoperative changes of small bowel resection and right-sided pheochromocytoma resection. Stable soft tissue " "pulmonary nodule in the right upper lobe"  She notes right groin pain, no burning sensation when she urinates.    Review of Systems   Constitutional: Negative for appetite change, fatigue and unexpected weight change.   HENT: Negative for mouth sores.    Eyes: Negative for visual disturbance.   Respiratory: Negative for cough and shortness of breath.    Cardiovascular: Negative for chest pain.   Gastrointestinal: Negative for abdominal pain and diarrhea.   Genitourinary: Negative for frequency.   Musculoskeletal: Negative for back pain.   Skin: Negative for rash.   Neurological: Negative for headaches.   Hematological: Negative for adenopathy.   Psychiatric/Behavioral: The patient is not nervous/anxious.    All other systems reviewed and are negative.      Objective:      Physical Exam   Constitutional: She is oriented to person, place, and time. She appears well-developed and well-nourished.   HENT:   Mouth/Throat: No oropharyngeal exudate.   Cardiovascular: Normal rate and normal heart sounds.   Pulmonary/Chest: Effort normal and breath sounds normal. She has no wheezes.   Abdominal: Soft. Bowel sounds are normal. There is no tenderness.   Musculoskeletal: She exhibits no edema or tenderness.   Lymphadenopathy:     She has no cervical adenopathy.   Neurological: She is alert and oriented to person, place, and time. Coordination normal.   Skin: Skin is warm and dry. No rash noted.   Psychiatric: She has a normal mood and affect. Judgment and thought content normal.   Vitals reviewed.        LABS:  WBC   Date Value Ref Range Status   02/27/2020 6.25 3.90 - 12.70 K/uL Final     Hemoglobin   Date Value Ref Range Status   02/27/2020 14.3 12.0 - 16.0 g/dL Final     Hematocrit   Date Value Ref Range Status   02/27/2020 43.6 37.0 - 48.5 % Final     Platelets   Date Value Ref Range Status   02/27/2020 175 150 - 350 K/uL Final     Gran # (ANC)   Date Value Ref Range Status   02/27/2020 3.9 1.8 - 7.7 K/uL Final     Comment: "     The ANC is based on a white cell differential from an   automated cell counter. It has not been microscopically   reviewed for the presence of abnormal cells. Clinical   correlation is required.         Chemistry        Component Value Date/Time     02/27/2020 1514    K 4.7 02/27/2020 1514     02/27/2020 1514    CO2 28 02/27/2020 1514    BUN 11 02/27/2020 1514    CREATININE 0.7 02/27/2020 1514    GLU 91 02/27/2020 1514        Component Value Date/Time    CALCIUM 9.4 02/27/2020 1514    ALKPHOS 123 02/27/2020 1514    AST 12 02/27/2020 1514    ALT 11 02/27/2020 1514    BILITOT 0.5 02/27/2020 1514    ESTGFRAFRICA >60.0 02/27/2020 1514    EGFRNONAA >60.0 02/27/2020 1514          Assessment:       1. History of gastrointestinal stromal tumor (GIST)        Plan:        1. She is doing well clinically with no evidence of recurrence on CT scans and will return in 1 year with restaging scans    Above care plan was discussed with patient and all questions were addressed to her satisfaction

## 2020-03-17 ENCOUNTER — TELEPHONE (OUTPATIENT)
Dept: PAIN MEDICINE | Facility: CLINIC | Age: 75
End: 2020-03-17

## 2020-03-25 ENCOUNTER — TELEPHONE (OUTPATIENT)
Dept: INTERNAL MEDICINE | Facility: CLINIC | Age: 75
End: 2020-03-25

## 2020-03-25 NOTE — TELEPHONE ENCOUNTER
----- Message from Gayathri Vidal sent at 3/25/2020 10:26 AM CDT -----  Contact: self/[phone]  Patient called in regards needing to talk with Dr Brian, I offered a visit with another physician, np, or Televisit but patient want to be seen by pcp. Please call and advise. Thank you.

## 2020-03-26 ENCOUNTER — TELEPHONE (OUTPATIENT)
Dept: PSYCHIATRY | Facility: CLINIC | Age: 75
End: 2020-03-26

## 2020-03-26 NOTE — TELEPHONE ENCOUNTER
Called to discuss visit scheduled for tomorrow on [facility]. Patient prefers to postpone appointment until she can arrive in person. Declined virtual visit offer. Reviewed a safety plan and has a PCP to reach out to for immediate concerns if they arise. Added to list to call for rescheduling.

## 2020-03-30 RX ORDER — ACETAMINOPHEN AND CODEINE PHOSPHATE 300; 30 MG/1; MG/1
TABLET ORAL
Qty: 14 TABLET | Refills: 4 | Status: SHIPPED | OUTPATIENT
Start: 2020-03-30 | End: 2020-05-12

## 2020-03-30 NOTE — TELEPHONE ENCOUNTER
Contacted patient regarding message, there was no answer, left voicemail requesting a return call to discuss further.

## 2020-03-30 NOTE — TELEPHONE ENCOUNTER
----- Message from Liliane Malone sent at 3/30/2020 12:54 PM CDT -----  Contact: JERRI DERAS [[id number]]  Name of Who is Calling: JERRI DERAS [[id number]]      What is the request in detail: Pt is calling to speak to staff in regards to meds for shoulder pain.... Please call to further assist .       Can the clinic reply by [facility]: N      What Number to Call Back if not in [facility]: [phone]

## 2020-05-12 DIAGNOSIS — M25.512 CHRONIC LEFT SHOULDER PAIN: Primary | ICD-10-CM

## 2020-05-12 DIAGNOSIS — G89.29 CHRONIC LEFT SHOULDER PAIN: Primary | ICD-10-CM

## 2020-05-12 RX ORDER — ACETAMINOPHEN AND CODEINE PHOSPHATE 300; 30 MG/1; MG/1
TABLET ORAL
Qty: 14 TABLET | Refills: 0 | Status: SHIPPED | OUTPATIENT
Start: 2020-05-12 | End: 2020-05-27

## 2020-05-12 NOTE — TELEPHONE ENCOUNTER
----- Message from Alpa Kwong sent at 5/12/2020  2:11 PM CDT -----  Contact: JERRI DERAS [[id number]]      Can the clinic reply in [facility]: no      Please refill the medication(s) listed below. Please call the patient when the prescription(s) is ready for  at this phone number   [phone] (home)      Medication #1 acetaminophen-codeine 300-30mg (TYLENOL #3) 300-30 mg Tab         Preferred Pharmacy:   [facility]/pharmacy #[id number] - [place] - [address]  [address]  [place] [address]  Phone: [phone] Fax: [phone]

## 2020-05-12 NOTE — TELEPHONE ENCOUNTER
Patient was contacted and informed that the office has received her refill request once authorized it will be sent to her pharmacy

## 2020-05-12 NOTE — TELEPHONE ENCOUNTER
Patient was last seen in the office 02/17/20 by Sravanthi Quiroz. This is a  patient. Patient last received this medication 05/05/20. Patient does not have a UDS on file. Patient has a pain contract on file. Patient has no follow up scheduled

## 2020-05-15 ENCOUNTER — OFFICE VISIT (OUTPATIENT)
Dept: PAIN MEDICINE | Facility: CLINIC | Age: 75
End: 2020-05-15
Payer: MEDICARE

## 2020-05-15 DIAGNOSIS — Q85.01 NEUROFIBROMATOSIS, TYPE 1: ICD-10-CM

## 2020-05-15 DIAGNOSIS — G89.4 CHRONIC PAIN DISORDER: ICD-10-CM

## 2020-05-15 DIAGNOSIS — G89.29 CHRONIC LEFT SHOULDER PAIN: Primary | ICD-10-CM

## 2020-05-15 DIAGNOSIS — M19.012 PRIMARY OSTEOARTHRITIS OF LEFT SHOULDER: ICD-10-CM

## 2020-05-15 DIAGNOSIS — M25.512 CHRONIC LEFT SHOULDER PAIN: Primary | ICD-10-CM

## 2020-05-15 PROCEDURE — 99441 PR PHYSICIAN TELEPHONE EVALUATION 5-10 MIN: ICD-10-PCS | Mod: 95,,, | Performed by: NURSE PRACTITIONER

## 2020-05-15 PROCEDURE — 99441 PR PHYSICIAN TELEPHONE EVALUATION 5-10 MIN: CPT | Mod: 95,,, | Performed by: NURSE PRACTITIONER

## 2020-05-15 RX ORDER — DICLOFENAC SODIUM 10 MG/G
2 GEL TOPICAL 3 TIMES DAILY
Qty: 1 TUBE | Refills: 5 | Status: SHIPPED | OUTPATIENT
Start: 2020-05-15 | End: 2020-08-05 | Stop reason: SDUPTHER
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
## 2020-05-20 ENCOUNTER — LAB VISIT (OUTPATIENT)
Dept: PRIMARY CARE CLINIC | Facility: CLINIC | Age: 75
End: 2020-05-20
Payer: MEDICARE

## 2020-05-20 DIAGNOSIS — R05.9 COUGH: Primary | ICD-10-CM

## 2020-05-20 PROCEDURE — U0003 INFECTIOUS AGENT DETECTION BY NUCLEIC ACID (DNA OR RNA); SEVERE ACUTE RESPIRATORY SYNDROME CORONAVIRUS 2 (SARS-COV-2) (CORONAVIRUS DISEASE [COVID-19]), AMPLIFIED PROBE TECHNIQUE, MAKING USE OF HIGH THROUGHPUT TECHNOLOGIES AS DESCRIBED BY CMS-2020-01-R: HCPCS

## 2020-05-21 LAB — SARS-COV-2 RNA RESP QL NAA+PROBE: NOT DETECTED

## 2020-06-01 DIAGNOSIS — M25.512 CHRONIC LEFT SHOULDER PAIN: Primary | ICD-10-CM

## 2020-06-01 DIAGNOSIS — G89.29 CHRONIC LEFT SHOULDER PAIN: Primary | ICD-10-CM

## 2020-06-01 RX ORDER — ACETAMINOPHEN AND CODEINE PHOSPHATE 300; 30 MG/1; MG/1
1 TABLET ORAL EVERY 12 HOURS PRN
Qty: 60 TABLET | Refills: 0 | Status: SHIPPED | OUTPATIENT
Start: 2020-06-04 | End: 2020-06-15 | Stop reason: SDUPTHER

## 2020-06-01 NOTE — TELEPHONE ENCOUNTER
Patient was suppose to receive 60 tabs instead of 14. Staff contacted pharmacy was told that a new prescription would have to be submitted after 7 days. Patient last received this medication 05/27/20. Patient's prescription has been post dated for 06/04/20.

## 2020-06-01 NOTE — TELEPHONE ENCOUNTER
----- Message from Emerita Gutierrez sent at 6/1/2020 11:50 AM CDT -----  Contact: pt  Pt would like to receive a call back regarding a refill for acetaminophen-codeine 300-30mg (TYLENOL #3) 300-30 mg Tab. Please contact the pt to advise.    Contact info [phone]

## 2020-06-15 ENCOUNTER — TELEPHONE (OUTPATIENT)
Dept: PAIN MEDICINE | Facility: CLINIC | Age: 75
End: 2020-06-15

## 2020-06-15 ENCOUNTER — OFFICE VISIT (OUTPATIENT)
Dept: PAIN MEDICINE | Facility: CLINIC | Age: 75
End: 2020-06-15
Payer: MEDICARE

## 2020-06-15 VITALS
HEIGHT: 64 IN | BODY MASS INDEX: 31.62 KG/M2 | DIASTOLIC BLOOD PRESSURE: 91 MMHG | HEART RATE: 84 BPM | TEMPERATURE: 98 F | SYSTOLIC BLOOD PRESSURE: 128 MMHG | RESPIRATION RATE: 18 BRPM | WEIGHT: 185.19 LBS

## 2020-06-15 DIAGNOSIS — M89.8X1 CHRONIC SCAPULAR PAIN: ICD-10-CM

## 2020-06-15 DIAGNOSIS — G89.29 CHRONIC LEFT SHOULDER PAIN: Primary | ICD-10-CM

## 2020-06-15 DIAGNOSIS — M19.012 PRIMARY OSTEOARTHRITIS OF LEFT SHOULDER: ICD-10-CM

## 2020-06-15 DIAGNOSIS — M25.512 CHRONIC LEFT SHOULDER PAIN: Primary | ICD-10-CM

## 2020-06-15 DIAGNOSIS — G89.4 CHRONIC PAIN DISORDER: ICD-10-CM

## 2020-06-15 DIAGNOSIS — Q85.01 NEUROFIBROMATOSIS, TYPE 1: ICD-10-CM

## 2020-06-15 DIAGNOSIS — G89.29 CHRONIC SCAPULAR PAIN: ICD-10-CM

## 2020-06-15 DIAGNOSIS — Z79.891 ENCOUNTER FOR MONITORING OPIOID MAINTENANCE THERAPY: ICD-10-CM

## 2020-06-15 DIAGNOSIS — Z51.81 ENCOUNTER FOR MONITORING OPIOID MAINTENANCE THERAPY: ICD-10-CM

## 2020-06-15 PROCEDURE — 99999 PR PBB SHADOW E&M-EST. PATIENT-LVL III: CPT | Mod: PBBFAC,,, | Performed by: NURSE PRACTITIONER

## 2020-06-15 PROCEDURE — 3074F PR MOST RECENT SYSTOLIC BLOOD PRESSURE < 130 MM HG: ICD-10-PCS | Mod: CPTII,S$GLB,, | Performed by: NURSE PRACTITIONER

## 2020-06-15 PROCEDURE — 99213 OFFICE O/P EST LOW 20 MIN: CPT | Mod: S$GLB,,, | Performed by: NURSE PRACTITIONER

## 2020-06-15 PROCEDURE — 99499 RISK ADDL DX/OHS AUDIT: ICD-10-PCS | Mod: S$GLB,,, | Performed by: NURSE PRACTITIONER

## 2020-06-15 PROCEDURE — 99213 PR OFFICE/OUTPT VISIT, EST, LEVL III, 20-29 MIN: ICD-10-PCS | Mod: S$GLB,,, | Performed by: NURSE PRACTITIONER

## 2020-06-15 PROCEDURE — 3080F DIAST BP >= 90 MM HG: CPT | Mod: CPTII,S$GLB,, | Performed by: NURSE PRACTITIONER

## 2020-06-15 PROCEDURE — 1125F PR PAIN SEVERITY QUANTIFIED, PAIN PRESENT: ICD-10-PCS | Mod: S$GLB,,, | Performed by: NURSE PRACTITIONER

## 2020-06-15 PROCEDURE — 1101F PR PT FALLS ASSESS DOC 0-1 FALLS W/OUT INJ PAST YR: ICD-10-PCS | Mod: CPTII,S$GLB,, | Performed by: NURSE PRACTITIONER

## 2020-06-15 PROCEDURE — 3074F SYST BP LT 130 MM HG: CPT | Mod: CPTII,S$GLB,, | Performed by: NURSE PRACTITIONER

## 2020-06-15 PROCEDURE — 1125F AMNT PAIN NOTED PAIN PRSNT: CPT | Mod: S$GLB,,, | Performed by: NURSE PRACTITIONER

## 2020-06-15 PROCEDURE — 99499 UNLISTED E&M SERVICE: CPT | Mod: S$GLB,,, | Performed by: NURSE PRACTITIONER

## 2020-06-15 PROCEDURE — 1159F MED LIST DOCD IN RCRD: CPT | Mod: S$GLB,,, | Performed by: NURSE PRACTITIONER

## 2020-06-15 PROCEDURE — 1101F PT FALLS ASSESS-DOCD LE1/YR: CPT | Mod: CPTII,S$GLB,, | Performed by: NURSE PRACTITIONER

## 2020-06-15 PROCEDURE — 1159F PR MEDICATION LIST DOCUMENTED IN MEDICAL RECORD: ICD-10-PCS | Mod: S$GLB,,, | Performed by: NURSE PRACTITIONER

## 2020-06-15 PROCEDURE — 99999 PR PBB SHADOW E&M-EST. PATIENT-LVL III: ICD-10-PCS | Mod: PBBFAC,,, | Performed by: NURSE PRACTITIONER

## 2020-06-15 PROCEDURE — 3080F PR MOST RECENT DIASTOLIC BLOOD PRESSURE >= 90 MM HG: ICD-10-PCS | Mod: CPTII,S$GLB,, | Performed by: NURSE PRACTITIONER

## 2020-06-15 RX ORDER — ACETAMINOPHEN AND CODEINE PHOSPHATE 300; 30 MG/1; MG/1
1 TABLET ORAL EVERY 12 HOURS PRN
Qty: 60 TABLET | Refills: 0 | Status: SHIPPED | OUTPATIENT
Start: 2020-06-15 | End: 2020-07-15

## 2020-06-15 NOTE — TELEPHONE ENCOUNTER
----- Message from Flora Mabry sent at 6/15/2020 10:50 AM CDT -----  Contact: pt  Please call pt at [phone] or [phone]    Refill request for acetaminophen-codeine 300-30mg (TYLENOL #3) 300-30 mg Tab    Use [facility]/pharmacy #[id number] - [place], [place] - [facility] [address]. [phone] (Phone)  [phone] (Fax)    RX was flushed down the toilet    Thank you

## 2020-06-15 NOTE — TELEPHONE ENCOUNTER
----- Message from Ella Frankel sent at 6/15/2020 10:25 AM CDT -----      Ms. Gong states her whole bottle of the Tylenol #3 were accidentally dropped into the toilet and would like to get a refill if can. If not states she understands, but please call patient to notify regarding the above    PT Lesli Gong MRN [id number] @#  @#
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
## 2020-06-15 NOTE — TELEPHONE ENCOUNTER
Staff contacted and spoke with patient in regards to her message.    Staff informed patient that provider will not see a refill on medication.    Pt verbalized understanding.

## 2020-06-15 NOTE — TELEPHONE ENCOUNTER
Staff contacted the patient to inform her that her request for a refill of Tylenol #3 was sent to her requested pharmacy [id number] - [place], [place] - [address].  6/4/20 -7/4/20  Patient verbalized understanding and expressed thanks.

## 2020-07-02 ENCOUNTER — OFFICE VISIT (OUTPATIENT)
Dept: INTERNAL MEDICINE | Facility: CLINIC | Age: 75
End: 2020-07-02
Payer: MEDICARE

## 2020-07-02 DIAGNOSIS — Z91.199 NO-SHOW FOR APPOINTMENT: Primary | ICD-10-CM

## 2020-07-02 PROCEDURE — 99499 NO LOS: ICD-10-PCS | Mod: 95,,, | Performed by: INTERNAL MEDICINE

## 2020-07-02 PROCEDURE — 99499 UNLISTED E&M SERVICE: CPT | Mod: 95,,, | Performed by: INTERNAL MEDICINE

## 2020-07-08 ENCOUNTER — OFFICE VISIT (OUTPATIENT)
Dept: PSYCHIATRY | Facility: CLINIC | Age: 75
End: 2020-07-08
Payer: COMMERCIAL

## 2020-07-08 DIAGNOSIS — F41.1 GAD (GENERALIZED ANXIETY DISORDER): Primary | ICD-10-CM

## 2020-07-08 DIAGNOSIS — F32.A DEPRESSION, UNSPECIFIED DEPRESSION TYPE: ICD-10-CM

## 2020-07-08 PROCEDURE — 99999 PR PBB SHADOW E&M-EST. PATIENT-LVL II: ICD-10-PCS | Mod: PBBFAC,,, | Performed by: SOCIAL WORKER

## 2020-07-08 PROCEDURE — 90791 PR PSYCHIATRIC DIAGNOSTIC EVALUATION: ICD-10-PCS | Mod: S$GLB,,, | Performed by: SOCIAL WORKER

## 2020-07-08 PROCEDURE — 90791 PSYCH DIAGNOSTIC EVALUATION: CPT | Mod: S$GLB,,, | Performed by: SOCIAL WORKER

## 2020-07-08 PROCEDURE — 99999 PR PBB SHADOW E&M-EST. PATIENT-LVL II: CPT | Mod: PBBFAC,,, | Performed by: SOCIAL WORKER
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
## 2020-08-28 ENCOUNTER — OFFICE VISIT (OUTPATIENT)
Dept: PSYCHIATRY | Facility: CLINIC | Age: 75
End: 2020-08-28
Payer: COMMERCIAL

## 2020-08-28 DIAGNOSIS — F32.A DEPRESSION, UNSPECIFIED DEPRESSION TYPE: ICD-10-CM

## 2020-08-28 DIAGNOSIS — F41.1 GAD (GENERALIZED ANXIETY DISORDER): Primary | ICD-10-CM

## 2020-08-28 PROCEDURE — 90832 PSYTX W PT 30 MINUTES: CPT | Mod: S$GLB,,, | Performed by: SOCIAL WORKER

## 2020-08-28 PROCEDURE — 90832 PR PSYCHOTHERAPY W/PATIENT, 30 MIN: ICD-10-PCS | Mod: S$GLB,,, | Performed by: SOCIAL WORKER

## 2020-08-28 PROCEDURE — 99999 PR PBB SHADOW E&M-EST. PATIENT-LVL I: CPT | Mod: PBBFAC,,, | Performed by: SOCIAL WORKER

## 2020-08-28 PROCEDURE — 99999 PR PBB SHADOW E&M-EST. PATIENT-LVL I: ICD-10-PCS | Mod: PBBFAC,,, | Performed by: SOCIAL WORKER

## 2020-08-29 PROBLEM — F32.A DEPRESSION: Status: ACTIVE | Noted: 2020-08-29

## 2020-08-29 PROBLEM — F41.1 GAD (GENERALIZED ANXIETY DISORDER): Status: ACTIVE | Noted: 2020-08-29

## 2020-08-29 NOTE — PROGRESS NOTES
Individual Psychotherapy (PhD/LCSW)    2020    Site:  [facility]         Therapeutic Intervention: Met with patient.  Outpatient - Insight oriented psychotherapy 30 min - CPT code 30219    Chief complaint/reason for encounter: depression, anxiety, sleep, appetite, behavior and interpersonal     Interval history and content of current session: discussed loss and grief as her   of live cancer recently, how to cope, get support and take care of herself, all focused on, grief and loss, memories, feelings and getting support from family and friends and Lutheran all emphasized and take time to grieve also suggested.    Treatment plan:  · Target symptoms: depression, recurrent depression, anxiety , mood swings, mood disorder, adjustment, grief  · Why chosen therapy is appropriate versus another modality: relevant to diagnosis, patient responds to this modality, evidence based practice  · Outcome monitoring methods: self-report, observation  · Therapeutic intervention type: insight oriented psychotherapy, behavior modifying psychotherapy, supportive psychotherapy    Risk parameters:  Patient reports no suicidal ideation  Patient reports no homicidal ideation  Patient reports no self-injurious behavior  Patient reports no violent behavior    Verbal deficits: None    Patient's response to intervention:  The patient's response to intervention is accepting.    Progress toward goals and other mental status changes:  The patient's progress toward goals is limited.    Diagnosis:     ICD-10-CM ICD-9-CM   1. HELGA (generalized anxiety disorder)  F41.1 300.02   2. Depression, unspecified depression type  F32.9 311       Plan:  individual psychotherapy, consult psychiatrist for medication evaluation and medication management by physician    Return to clinic: 2 weeks    Length of Service (minutes): 30

## 2020-08-31 ENCOUNTER — TELEPHONE (OUTPATIENT)
Dept: INTERNAL MEDICINE | Facility: CLINIC | Age: 75
End: 2020-08-31

## 2020-08-31 NOTE — TELEPHONE ENCOUNTER
----- Message from Francesca Philip sent at 8/31/2020 10:53 AM CDT -----  Contact: patient [phone]  Patient lost her  to cancer 2 weeks ago and is asking if you can prescribed lorazepam for anxiety. Her son is taking this and feels it helps but if there is something else that would lhelp, please let patient know. Her psychiatrist , , suggested that she ask you.    [facility]/pharmacy #[id number] - [place], [place] - [facility] [phone] (Phone)

## 2020-09-01 ENCOUNTER — OFFICE VISIT (OUTPATIENT)
Dept: INTERNAL MEDICINE | Facility: CLINIC | Age: 75
End: 2020-09-01
Payer: MEDICARE

## 2020-09-01 VITALS
BODY MASS INDEX: 30.73 KG/M2 | DIASTOLIC BLOOD PRESSURE: 92 MMHG | WEIGHT: 180 LBS | HEIGHT: 64 IN | SYSTOLIC BLOOD PRESSURE: 142 MMHG

## 2020-09-01 DIAGNOSIS — F41.1 GAD (GENERALIZED ANXIETY DISORDER): Primary | ICD-10-CM

## 2020-09-01 PROCEDURE — 1101F PR PT FALLS ASSESS DOC 0-1 FALLS W/OUT INJ PAST YR: ICD-10-PCS | Mod: CPTII,S$GLB,, | Performed by: INTERNAL MEDICINE

## 2020-09-01 PROCEDURE — 3077F PR MOST RECENT SYSTOLIC BLOOD PRESSURE >= 140 MM HG: ICD-10-PCS | Mod: CPTII,S$GLB,, | Performed by: INTERNAL MEDICINE

## 2020-09-01 PROCEDURE — 1101F PT FALLS ASSESS-DOCD LE1/YR: CPT | Mod: CPTII,S$GLB,, | Performed by: INTERNAL MEDICINE

## 2020-09-01 PROCEDURE — 3008F PR BODY MASS INDEX (BMI) DOCUMENTED: ICD-10-PCS | Mod: CPTII,S$GLB,, | Performed by: INTERNAL MEDICINE

## 2020-09-01 PROCEDURE — 99214 PR OFFICE/OUTPT VISIT, EST, LEVL IV, 30-39 MIN: ICD-10-PCS | Mod: S$GLB,,, | Performed by: INTERNAL MEDICINE

## 2020-09-01 PROCEDURE — 3008F BODY MASS INDEX DOCD: CPT | Mod: CPTII,S$GLB,, | Performed by: INTERNAL MEDICINE

## 2020-09-01 PROCEDURE — 99999 PR PBB SHADOW E&M-EST. PATIENT-LVL III: CPT | Mod: PBBFAC,,, | Performed by: INTERNAL MEDICINE

## 2020-09-01 PROCEDURE — 1159F MED LIST DOCD IN RCRD: CPT | Mod: S$GLB,,, | Performed by: INTERNAL MEDICINE

## 2020-09-01 PROCEDURE — 99214 OFFICE O/P EST MOD 30 MIN: CPT | Mod: S$GLB,,, | Performed by: INTERNAL MEDICINE

## 2020-09-01 PROCEDURE — 3077F SYST BP >= 140 MM HG: CPT | Mod: CPTII,S$GLB,, | Performed by: INTERNAL MEDICINE

## 2020-09-01 PROCEDURE — 3080F PR MOST RECENT DIASTOLIC BLOOD PRESSURE >= 90 MM HG: ICD-10-PCS | Mod: CPTII,S$GLB,, | Performed by: INTERNAL MEDICINE

## 2020-09-01 PROCEDURE — 1159F PR MEDICATION LIST DOCUMENTED IN MEDICAL RECORD: ICD-10-PCS | Mod: S$GLB,,, | Performed by: INTERNAL MEDICINE

## 2020-09-01 PROCEDURE — 99999 PR PBB SHADOW E&M-EST. PATIENT-LVL III: ICD-10-PCS | Mod: PBBFAC,,, | Performed by: INTERNAL MEDICINE

## 2020-09-01 PROCEDURE — 3080F DIAST BP >= 90 MM HG: CPT | Mod: CPTII,S$GLB,, | Performed by: INTERNAL MEDICINE

## 2020-09-01 RX ORDER — DIAZEPAM 2 MG/1
2 TABLET ORAL EVERY 12 HOURS PRN
Qty: 60 TABLET | Refills: 0 | Status: SHIPPED | OUTPATIENT
Start: 2020-09-01 | End: 2020-12-08

## 2020-09-01 RX ORDER — CITALOPRAM 10 MG/1
10 TABLET ORAL DAILY
Qty: 30 TABLET | Refills: 2 | Status: SHIPPED | OUTPATIENT
Start: 2020-09-01 | End: 2020-10-13 | Stop reason: SDUPTHER

## 2020-09-01 RX ORDER — DIAZEPAM 2 MG/1
2 TABLET ORAL EVERY 6 HOURS PRN
Qty: 60 TABLET | Refills: 0 | Status: SHIPPED | OUTPATIENT
Start: 2020-09-01 | End: 2020-09-01 | Stop reason: SDUPTHER

## 2020-09-01 NOTE — PROGRESS NOTES
"    CHIEF COMPLAINT     Chief Complaint   Patient presents with    Anxiety     reports of moderate anxiety since her 's passing.     Medication Refill     had a scheduled appointment with Psych-Dr Johnson on last Thurs., is suggesting that patient takes Gabapentin to help calm nerves.       HPI     Lesli Gong is a 74 y.o. female here today for anxiety    Patient with generalized anxiety disorder.  She has been working psychiatrist and taking BuSpar.  Reports history of benzo use in the past.  Was previous on lorazepam and diazepam.  Reports that her anxiety has been worse last few weeks.  Her  passed 2 weeks ago.  Reports she is having trouble sleeping focusing and completing daily test.  Was seen by her therapist last week who suggested that she follow-up with her primary for medication to help with nerves.    Personally Reviewed Patient's Medical, surgical, family and social hx. Changes updated in [employer].  Care Team updated in Epic    Review of Systems:  Review of Systems   Constitutional: Positive for appetite change (Decreased appetite). Negative for fever and unexpected weight change.   Psychiatric/Behavioral: Positive for dysphoric mood and sleep disturbance. The patient is nervous/anxious.        Health Maintenance:   Reviewed with patient  Due for the following:      PHYSICAL EXAM     BP (!) 142/92 (BP Location: Left arm, Patient Position: Sitting, BP Method: Large (Manual))   Ht 5' 4" (1.626 m)   Wt 81.6 kg (180 lb)   BMI 30.90 kg/m²     Gen: Well Appearing, NAD  HEENT: PERR, EOMI  Neck: FROM, no thyromegaly, no cervical adenopathy  Chest Normal work of breathing  Abd:  Soft, NT, ND non TTP, no mass  MSK: no LE edema  Neuro: A&Ox3, gait normal, speech normal  Mood; Mood sad, affect tearful behavior normal, thought process linear   S skin:  Extensive neurofibromas all over body    LABS     Labs reviewed;  Lab Results   Component Value Date    CREATININE 0.7 02/27/2020    BUN 11 " 02/27/2020     02/27/2020    K 4.7 02/27/2020     02/27/2020    CO2 28 02/27/2020         ASSESSMENT     1. HELGA (generalized anxiety disorder)  citalopram (CELEXA) 10 MG tablet    diazePAM (VALIUM) 2 MG tablet    DISCONTINUED: diazePAM (VALIUM) 2 MG tablet           Plan     Lesli Gong is a 74 y.o. female with generalized anxiety.  He previously been working with therapist and taking BuSpar.  However 's passing has been a hard time for her.  Will give start short-term benzo initiate SSRI.  Will have patient follow-up with me in a few weeks    1. HELGA (generalized anxiety disorder)  New problem to be further treatment plan.  Will start patient on on short course of diazepam 2 mg twice a day as needed for  Will also start citalopram 10 mg daily  Continue work with therapist  Will have patient follow-up in 6 weeks for reassessment  Counseled increased risk of adverse reaction combination Tylenol 3 and benzo use.  Patient will not take both same time period  - citalopram (CELEXA) 10 MG tablet; Take 1 tablet (10 mg total) by mouth once daily.  Dispense: 30 tablet; Refill: 2  - diazePAM (VALIUM) 2 MG tablet; Take 1 tablet (2 mg total) by mouth every 12 (twelve) hours as needed for Anxiety.  Dispense: 60 tablet; Refill: 0      Marques Perla MD

## 2020-09-03 ENCOUNTER — OFFICE VISIT (OUTPATIENT)
Dept: PSYCHIATRY | Facility: CLINIC | Age: 75
End: 2020-09-03
Payer: COMMERCIAL

## 2020-09-03 DIAGNOSIS — F41.9 ANXIETY: ICD-10-CM

## 2020-09-03 DIAGNOSIS — F32.A DEPRESSION, UNSPECIFIED DEPRESSION TYPE: Primary | ICD-10-CM

## 2020-09-03 PROCEDURE — 99499 UNLISTED E&M SERVICE: CPT | Mod: S$GLB,,, | Performed by: PSYCHIATRY & NEUROLOGY

## 2020-09-03 PROCEDURE — 1159F MED LIST DOCD IN RCRD: CPT | Mod: S$GLB,,, | Performed by: PSYCHIATRY & NEUROLOGY

## 2020-09-03 PROCEDURE — 1101F PR PT FALLS ASSESS DOC 0-1 FALLS W/OUT INJ PAST YR: ICD-10-PCS | Mod: CPTII,S$GLB,, | Performed by: PSYCHIATRY & NEUROLOGY

## 2020-09-03 PROCEDURE — 1159F PR MEDICATION LIST DOCUMENTED IN MEDICAL RECORD: ICD-10-PCS | Mod: S$GLB,,, | Performed by: PSYCHIATRY & NEUROLOGY

## 2020-09-03 PROCEDURE — 99499 RISK ADDL DX/OHS AUDIT: ICD-10-PCS | Mod: S$GLB,,, | Performed by: PSYCHIATRY & NEUROLOGY

## 2020-09-03 PROCEDURE — 99213 PR OFFICE/OUTPT VISIT, EST, LEVL III, 20-29 MIN: ICD-10-PCS | Mod: S$GLB,,, | Performed by: PSYCHIATRY & NEUROLOGY

## 2020-09-03 PROCEDURE — 1101F PT FALLS ASSESS-DOCD LE1/YR: CPT | Mod: CPTII,S$GLB,, | Performed by: PSYCHIATRY & NEUROLOGY

## 2020-09-03 PROCEDURE — 99213 OFFICE O/P EST LOW 20 MIN: CPT | Mod: S$GLB,,, | Performed by: PSYCHIATRY & NEUROLOGY

## 2020-09-03 NOTE — PROGRESS NOTES
"Outpatient Psychiatry Initial Visit (MD/NP)    9/3/2020 Phone evaluation.(20"). Patient did not know how to use the virtual system.    Lesli Gong, a 74 y.o. female, for initial evaluation visit.  Patient is referred by Patti Brian MD       Reason for Encounter: Referral for treatment    Complaints:  She has been experiencing depression and generalized anxiety. Patient was seen for an initial evaluation today. She has been seen in the past for psychiatry at the [facility]. Currently she takes Celexa and Valium at a low dose and feels those medicines help her feel stable. Patient is grieving the recent loss of her  to cancer. She is in good self control and has no thoughts of harming herself and has no history of suicide attempts. Patient has no signs of phoenix or psychosis. She was hospitalized about ten years ago for depression but not since that time. Patient has two children who are in good health. 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 Patient has a 12th grade education. She has no signs of a clinical depression but is sad re death of he . Patient is seeing Dr Johnson and appreciates that help. Patient denies side effects from her medications. Patient is very motivated to maintain her mood stability. She was very positive and hopeful at this time despite the grief and stress.    Current Medications:  Medication List with Changes/Refills   Current Medications    ACETAMINOPHEN-CODEINE 300-30MG (TYLENOL #3) 300-30 MG TAB    TAKE 1 TABLET BY MOUTH EVERY 12 HOURS AS NEEDED FOR PAIN    CALCIUM-VITAMIN D (OSCAL) 250 (625)-125 MG-UNIT PER TABLET    Take 1 tablet by mouth once daily.    CITALOPRAM (CELEXA) 10 MG TABLET    Take 1 tablet (10 mg total) by mouth once daily.    CYANOCOBALAMIN, VITAMIN B-12, (VITAMIN B-12) 50 MCG TABLET    Take 50 mcg by mouth once daily.    DIAZEPAM (VALIUM) 2 MG TABLET    " Take 1 tablet (2 mg total) by mouth every 12 (twelve) hours as needed for Anxiety.    DICLOFENAC SODIUM (VOLTAREN) 1 % GEL    Apply 2 g topically 3 (three) times daily.    LISINOPRIL 10 MG TABLET    TAKE 2 TABLETS (20 MG TOTAL) BY MOUTH ONCE DAILY.    MULTIVITAMIN CAPSULE    Take 1 capsule by mouth once daily.        Stressors:  Loss of her .    History:     Past Psychiatric History:   Previous therapy: yes  Previous psychiatric treatment and medication trials: yes  Previous psychiatric hospitalizations: yes  Previous diagnoses: yes  Previous suicide attempts: no  History of violence: no  Currently in treatment with myself and Dr Johnson.  Education: 12th grade  Other pertinent history: None  Depression screening was performed with standardized tool: Not Screened - Not Eligible/Appropriate    Substance Abuse History:  Recreational drugs: no recreational drug use  Use of alcohol: denied  Use of caffeine: coffee 1 cup daily /day  Tobacco use: no  Legal consequences of chemical use: no  Patient feels she ought to cut down on drinking and/or drug use: no  Patient has been annoyed by others criticizing her drinking or drug use: no  Patient has felt bad or guilty about drinking or drug use:no  Patient has had a drink or used drugs as an eye opener first thing in the morning to steady nerves, get rid of a hangover or get the day started: no  Use of OTC medications: vitamins    The following portions of the patient's history were reviewed and updated as appropriate: allergies, current medications, past family history, past medical history, past social history, past surgical history and problem list.    Record Review: moderate      Review Of Systems:     Medical Review Of Systems:  ROS     Psychiatric Review Of Systems:  Sleep: no  Appetite changes: no  Weight changes: no  Energy: yes  Interest/pleasure/anhedonia: no  Somatic symptoms: back pain  Libido: not questioned  Anxiety/panic: yes  Guilty/hopeless:  no  Self-injurious behavior/risky behavior: no  Any drugs: no  Alcohol: no     Current Evaluation:       Mental Status Evaluation:  Appearance:  unremarkable, age appropriate, not assessed   Behavior:  normal, cooperative   Speech:  no latency; no press, spontaneous   Mood:  anxious, sad   Affect:  congruent and appropriate   Thought Process:  normal and logical   Thought Content:  normal, no suicidality, no homicidality, delusions, or paranoia   Sensorium:  grossly intact   Cognition:  grossly intact   Insight:  has awareness of illness   Judgment:  behavior is adequate to circumstances     SIGECAPS  Sleep:   Interest:   Guilt:   Energy:   Concentration:   Appetite:   Psychomotor agitation:   Suicidal intentions: no  Suicidal plan: no    Physical/Somatic Complaints  The patient lists: pain    Functioning in Relationships:  Spouse/partner:   Peers:   Employers:       Assessment - Diagnosis - Goals:       ICD-10-CM ICD-9-CM   1. Depression, unspecified depression type  F32.9 311   2. Anxiety  F41.9 300.00       Treatment Goals:  Specify outcomes written in observable, behavioral terms:   stabilize mood with meds and supportive guidance    Treatment Plan/Recommendations: Patient agrees to next appointment in 2 months,and to continue Celexa 10 mg q am, and Valium 2 mg bid prn.  Follow-up plan for depression was discussed with patient.    Review with patient: Treatment plan reviewed with the patient.  Medication risks/benefit reviewed with the patient    Fabrice De Guzman Jr

## 2020-09-24 ENCOUNTER — PATIENT MESSAGE (OUTPATIENT)
Dept: RESEARCH | Facility: OTHER | Age: 75
End: 2020-09-24

## 2020-09-30 DIAGNOSIS — F43.22 ADJUSTMENT DISORDER WITH ANXIETY: ICD-10-CM

## 2020-09-30 RX ORDER — BUSPIRONE HYDROCHLORIDE 5 MG/1
TABLET ORAL
Qty: 120 TABLET | Refills: 11 | OUTPATIENT
Start: 2020-09-30

## 2020-10-05 ENCOUNTER — HOSPITAL ENCOUNTER (EMERGENCY)
Facility: HOSPITAL | Age: 75
Discharge: HOME | End: 2020-10-05
Attending: EMERGENCY MEDICINE
Payer: MEDICARE

## 2020-10-05 VITALS
BODY MASS INDEX: 27.31 KG/M2 | OXYGEN SATURATION: 97 % | WEIGHT: 160 LBS | HEIGHT: 64 IN | DIASTOLIC BLOOD PRESSURE: 86 MMHG | RESPIRATION RATE: 20 BRPM | TEMPERATURE: 98 F | SYSTOLIC BLOOD PRESSURE: 162 MMHG | HEART RATE: 81 BPM
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
## 2020-10-05 DIAGNOSIS — M79.631 RIGHT FOREARM PAIN: ICD-10-CM

## 2020-10-05 PROCEDURE — 99284 EMERGENCY DEPT VISIT MOD MDM: CPT | Mod: 25

## 2020-10-05 PROCEDURE — 99285 PR EMERGENCY DEPT VISIT,LEVEL V: ICD-10-PCS | Mod: ,,, | Performed by: EMERGENCY MEDICINE

## 2020-10-05 PROCEDURE — 99285 EMERGENCY DEPT VISIT HI MDM: CPT | Mod: ,,, | Performed by: EMERGENCY MEDICINE

## 2020-10-05 RX ORDER — DICLOFENAC SODIUM 50 MG/1
50 TABLET, DELAYED RELEASE ORAL 2 TIMES DAILY
Qty: 10 TABLET | Refills: 0 | Status: SHIPPED | OUTPATIENT
Start: 2020-10-05 | End: 2020-10-05 | Stop reason: SDUPTHER

## 2020-10-05 RX ORDER — METHOCARBAMOL 500 MG/1
1000 TABLET, FILM COATED ORAL 3 TIMES DAILY
Qty: 30 TABLET | Refills: 0 | Status: SHIPPED | OUTPATIENT
Start: 2020-10-05 | End: 2020-10-10

## 2020-10-05 RX ORDER — DICLOFENAC SODIUM 50 MG/1
50 TABLET, DELAYED RELEASE ORAL 2 TIMES DAILY
Qty: 10 TABLET | Refills: 0 | Status: SHIPPED | OUTPATIENT
Start: 2020-10-05 | End: 2022-11-08 | Stop reason: SDUPTHER

## 2020-10-05 RX ORDER — METHOCARBAMOL 500 MG/1
1000 TABLET, FILM COATED ORAL 3 TIMES DAILY
Qty: 30 TABLET | Refills: 0 | Status: SHIPPED | OUTPATIENT
Start: 2020-10-05 | End: 2020-10-05 | Stop reason: SDUPTHER

## 2020-10-05 NOTE — ED PROVIDER NOTES
Encounter Date: 10/5/2020       History     Chief Complaint   Patient presents with    Fall     Pt with fall 3 weeks ago.  Right arm and back pain.  Pt did not hit head     Patient is a 74-year-old female with history of hypertension, neurofibromatosis not on anticoagulation who presents to the ED 3 weeks after a fall.  Patient was getting out of bed approximately 3 weeks ago tripped and landed on her right forearm/elbow.  Since then, she has had pain in that right elbow.  The pain is intermittent, rated 10 10 min it is worse right now it is 8/10.  It does not radiate.  Moving makes the pain worse, Tylenol 3 has made pain little bit better.  Patient has also complained about numbness in the right arm has gotten worse since 3 weeks ago.  She denies hitting her head. Patient denies fevers, chills, nausea, diarrhea, constipation.  Patient also complains of associated left shoulder pain.  She had the pain worsening over last few days, and had surgery of the left shoulder to remove cancer.    The history is provided by the patient and a relative. The history is limited by the condition of the patient.     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    History of hepatitis C, s/p successful Rx w/ SVR24 - 2/2018 3/17/2017    Completed zepatier + RBV w/ svr     History of psychiatric hospitalization     Hx of psychiatric care     Mass 10-10-14    excision of mass/left shoulder    Neurofibromatosis, type 1 (von Recklinghausen's disease) 7/30/2014    Pheochromocytoma     S/p resection in 80's    Psychiatric problem      Soft tissue sarcoma of chest wall 2014    Therapy      Past Surgical History:   Procedure Laterality Date    APPENDECTOMY      BILATERAL SALPINGOOPHORECTOMY      BREAST BIOPSY Right     BREAST BIOPSY Left     BUNIONECTOMY Right      SECTION      COLONOSCOPY N/A 2018    Procedure: COLONOSCOPY;  Surgeon: Oscar Medley MD;  Location: [address]);  Service: Endoscopy;  Laterality: N/A;    EXPLORATORY LAPAROTOMY W/ BOWEL RESECTION      HYSTERECTOMY N/A     pheochromocytoma excision N/A     TONSILLECTOMY Bilateral      Family History   Problem Relation Age of Onset    Cancer Sister         GIST    Neurofibromatosis Sister     Neurofibromatosis Father     Breast cancer Mother      Social History     Tobacco Use    Smoking status: Never Smoker    Smokeless tobacco: Never Used   Substance Use Topics    Alcohol use: No    Drug use: No     Review of Systems   Constitutional: Negative for chills, diaphoresis, fatigue and fever.   HENT: Negative for congestion and sore throat.    Respiratory: Negative for cough, chest tightness, shortness of breath and wheezing.    Cardiovascular: Negative for chest pain, palpitations and leg swelling.   Gastrointestinal: Negative for abdominal distention, abdominal pain, constipation, diarrhea, nausea and vomiting.   Genitourinary: Negative for dysuria.   Musculoskeletal: Negative for back pain and joint swelling.   Skin: Negative for color change, pallor, rash and wound.   Neurological: Negative for dizziness, weakness, numbness and headaches.   All other systems reviewed and are negative.      Physical Exam     Initial Vitals [10/05/20 0851]   BP Pulse Resp Temp SpO2   (!) 188/88 92 20 98 °F (36.7 °C) 98 %      MAP       --         Physical Exam    Nursing note and vitals reviewed.  Constitutional: She appears well-developed and well-nourished. She is not diaphoretic. No distress.   HENT:   Head: Normocephalic and atraumatic.    Mouth/Throat: No oropharyngeal exudate.   Negative adams sign   Eyes: Conjunctivae and EOM are normal. Right eye exhibits no discharge. Left eye exhibits no discharge. No scleral icterus.   No periorbital ecchymosis   Neck: Normal range of motion.   Cardiovascular: Normal rate and regular rhythm. Exam reveals no gallop and no friction rub.    No murmur heard.  Pulmonary/Chest: No respiratory distress. She has no wheezes. She has no rhonchi. She has no rales. She exhibits no tenderness.   Abdominal: Soft. Bowel sounds are normal. She exhibits no distension and no mass. There is no abdominal tenderness. There is no rebound and no guarding.   Musculoskeletal:      Comments: Full range of motion of right shoulder, pain with movement of right shoulder, and right elbow.  Patient has pain on palpation around the proximal forearm, distal humerus, elbow on the right side.  No swelling noticed around the site of pain.  No bruising noted.  No via pain on palpation of spinal processes, no step-offs noted.   Neurological: She is alert and oriented to person, place, and time. She has normal strength. No cranial nerve deficit or sensory deficit. GCS score is 15. GCS eye subscore is 4. GCS verbal subscore is 5. GCS motor subscore is 6.   Skin: Skin is warm and dry. No erythema. No pallor.   Psychiatric: She has a normal mood and affect. Her behavior is normal. Judgment and thought content normal.         ED Course   Procedures  Labs Reviewed - No data to display       Imaging Results          X-Ray Humerus 2 View Right (Final result)  Result time 10/05/20 10:31:06    Final result by Magnus Joseph III, MD (10/05/20 10:31:06)                 Narrative:    EXAMINATION:  XR HUMERUS 2 VIEW RIGHT    CLINICAL HISTORY:  Unspecified fall, initial encounter    FINDINGS:  No fracture dislocation bone destruction seen.  There is mild DJD.      Electronically signed by: Magnus Joseph MD  Date:    10/05/2020  Time:    10:31                              X-Ray Forearm Right (Final result)  Result time 10/05/20 10:29:55    Final result by Magnus Joseph III, MD (10/05/20 10:29:55)                 Narrative:    EXAMINATION:  XR FOREARM RIGHT    CLINICAL HISTORY:  Unspecified fall, initial encounter    FINDINGS:  No fracture dislocation bone destruction seen.  No trauma seen.  There is mild DJD.      Electronically signed by: Magnus Joseph MD  Date:    10/05/2020  Time:    10:29                               Medical Decision Making:   History:   Old Medical Records: I decided to obtain old medical records.  Initial Assessment:   74-year-old female not on anticoagulation presenting 3 few weeks after a fall onto the right forearm with worsening pain and numbness in that right arm.  Differential Diagnosis:   Humeral Fracture, radial fracture, fracture, musculoskeletal pain  Independently Interpreted Test(s):   I have ordered and independently interpreted X-rays - see summary below.       <> Summary of X-Ray Reading(s): I do not appreciate any fracture of the humerus, elbow, radius or ulna.  Clinical Tests:   Radiological Study: Ordered and Reviewed  ED Management:  Because of pain with movement, and history with poor historian, x-rays were obtained of the right forearm, elbow, humerus.  No fractures were seen.  Patient was informed of results of the x-rays, and was discharged with Robaxin and diclofenac as patient is allergic to ibuprofen and naproxen.  Patient's son also informed of results of the x-ray and told to have close follow-up with PCP.              Attending Attestation:   Physician Attestation Statement for Resident:  As the supervising MD   Physician Attestation Statement: I have personally seen and examined this patient.   I agree with the above history. -:   As the supervising MD I agree with the above PE.   -: Full range of motion right shoulder.  Minimal tender to palpation right olecranon/right proximal posterior lateral forearm.   Neurovascular intact distally   As the supervising MD I agree with the above treatment, course, plan, and disposition.                              Clinical Impression:     ICD-10-CM ICD-9-CM   1. Fall  W19.XXXA E888.9   2. Right forearm pain  M79.631 729.5                      Disposition:   Disposition: Discharged  Condition: Stable     ED Disposition Condition    Discharge Stable        ED Prescriptions     Medication Sig Dispense Start Date End Date Auth. Provider    methocarbamoL (ROBAXIN) 500 MG Tab  (Status: Discontinued) Take 2 tablets (1,000 mg total) by mouth 3 (three) times daily. for 5 days 30 tablet 10/5/2020 10/5/2020 Reji Bell MD    diclofenac (VOLTAREN) 50 MG EC tablet  (Status: Discontinued) Take 1 tablet (50 mg total) by mouth 2 (two) times daily. 10 tablet 10/5/2020 10/5/2020 Reji Bell MD    diclofenac (VOLTAREN) 50 MG EC tablet Take 1 tablet (50 mg total) by mouth 2 (two) times daily. 10 tablet 10/5/2020  Reji Bell MD    methocarbamoL (ROBAXIN) 500 MG Tab Take 2 tablets (1,000 mg total) by mouth 3 (three) times daily. for 5 days 30 tablet 10/5/2020 10/10/2020 Reji Bell MD        Follow-up Information     Follow up With Specialties Details Why Contact Info    Patti Brian MD Internal Medicine In 2 days  [address]  [place] [address]  [phone]                                         Reji Bell MD  Resident  10/05/20 1153       Dirk Germain MD  10/05/20 7707

## 2020-10-05 NOTE — DISCHARGE INSTRUCTIONS
You came in 3 weeks after a fall. We got an xray of your arm and saw you did not have a fracture. Please take the robaxin and diclofenac for the pain. Please follow-up with your PCP in 2-3 days. Please return to the ED if you have severe worsening of numbness or inability to move the arm. We thank you for allowing us to assist in your care.

## 2020-10-05 NOTE — ED NOTES
Appearance:  Pt awake, alert & oriented to person, place & time.  Pt in no acute distress at present time.  Skin:  Skin warm, dry & intact.  Mucous membranes moist.  Skin turgor normal.  Respiratory:  Respirations even, non-labored.    Neurologic:  Pt moving all extremities without difficulty.  Sensation intact.     Peripheral Vascular:  All peripheral pulses present.  Musculoskeletal:  Pain to right elbow area.

## 2020-10-05 NOTE — ED TRIAGE NOTES
"Hx per son-states "mother doesn't really understand whats going on"  Pt fell 3wks ago, still c/o right arm pain.  States she fell off bed.  Pt c/o right elbow pain.  Denies LOC.  Son  is concerned about pt's left shoulder/upper back area that she had surgery on and he states she still has problems.  He thinks pt needs to have an MRI today.    "

## 2020-10-12 ENCOUNTER — TELEPHONE (OUTPATIENT)
Dept: PAIN MEDICINE | Facility: CLINIC | Age: 75
End: 2020-10-12

## 2020-10-12 DIAGNOSIS — F41.1 GAD (GENERALIZED ANXIETY DISORDER): ICD-10-CM

## 2020-10-12 NOTE — TELEPHONE ENCOUNTER
----- Message from Jocelyne Quiros sent at 10/12/2020  3:26 PM CDT -----  Contact: HORTENCIA Ballesteros@[phone]--  Rx Refill/Request     Is this a Refill:--Yes--    Rx Name and Strength:    1.busPIRone (BUSPAR) 10 MG tablet     Preferred Pharmacy with phone number:--[facility]--[phone]--   [address]   [facility] [address]    Communication Preference:--Lesli--[phone]--    Additional Information: Refill request for the medication listed above. Please call to advise when sent tot he pharmacy.

## 2020-10-12 NOTE — TELEPHONE ENCOUNTER
----- Message from Jemima Sumner sent at 10/12/2020  1:30 PM CDT -----      Can the clinic reply in [facility]:N        Please refill the medication(s) listed below. Please call the patient when the prescription(s) is ready for  at this phone number         Medication #1 Tylenol 3    Medication #2       Preferred Pharmacy: [facility]/PHARMACY #[id number] - [place] - [address]

## 2020-10-12 NOTE — TELEPHONE ENCOUNTER
Staff contacted patient to inform her that her medication is not due until 10/30/20. Patient has no VM set up

## 2020-10-12 NOTE — TELEPHONE ENCOUNTER
Patient was contacted staff informed her that her medication was not due until 10/30/20. Patient verbalized understanding

## 2020-10-12 NOTE — TELEPHONE ENCOUNTER
----- Message from Nasreen Deluca sent at 10/12/2020  2:49 PM CDT -----  Pt calling would like for you to call in tylenol she's in pain      Pharmacy on file..

## 2020-10-12 NOTE — TELEPHONE ENCOUNTER
----- Message from Anna Matson sent at 10/12/2020  9:44 AM CDT -----  Regarding: Refill Request  Please refill the medication(s) listed below :    Medication # 1 :acetaminophen-codeine 300-30mg (TYLENOL #3) 300-30 mg Tab     Please call the patient when the prescription is sent to pharmacy :[phone]    Can the clinic reply in [facility] :No    Preferred Pharmacy : [facility]/pharmacy #[id number] - [place] - [facility] [address] [phone] (Phone)  [phone] (Fax)

## 2020-10-13 DIAGNOSIS — F41.1 GAD (GENERALIZED ANXIETY DISORDER): ICD-10-CM

## 2020-10-13 RX ORDER — CITALOPRAM 10 MG/1
10 TABLET ORAL DAILY
Qty: 30 TABLET | Refills: 2 | Status: SHIPPED | OUTPATIENT
Start: 2020-10-13 | End: 2020-12-08 | Stop reason: SDUPTHER

## 2020-10-13 RX ORDER — CITALOPRAM 10 MG/1
10 TABLET ORAL DAILY
Qty: 30 TABLET | Refills: 2 | Status: CANCELLED | OUTPATIENT
Start: 2020-10-13 | End: 2021-10-13

## 2020-10-13 NOTE — TELEPHONE ENCOUNTER
"Called to relay message from Kathy Hoyos.   "It appears she was taken off of the buspar when she saw Dr. Perla and started on celexa and given some valium instead.  Please notify patient of this as I do not believe she should be on the buspar any longer.  Thank you. "    Pt did not answer and does not have a voice mail set up.  "

## 2020-10-13 NOTE — TELEPHONE ENCOUNTER
----- Message from Eugene Buchanan sent at 10/13/2020  2:49 PM CDT -----  Regarding: meds  Contact: pt  Pt is requesting a call regarding medications that was supposed to be prescribed to her    Pt can be reached at [phone] or [phone]

## 2020-10-13 NOTE — TELEPHONE ENCOUNTER
No new care gaps identified.  Powered by [employer]. Reference number: [id number]. 10/13/2020 1:55:52 PM   CDT

## 2020-10-13 NOTE — TELEPHONE ENCOUNTER
Spoke with pt, she came into in the clinic to discuss meds. Pt is aware that she is no longer on buspar, would like a refill on celexa and valium. Please give a refill if appropriate

## 2020-10-13 NOTE — TELEPHONE ENCOUNTER
No new care gaps identified.  Powered by [employer]. Reference number: [id number]. 10/13/2020 4:25:50 PM   CDT

## 2020-10-13 NOTE — TELEPHONE ENCOUNTER
----- Message from Margaret Gonzalez sent at 10/13/2020  9:29 AM CDT -----  Contact: self [phone]  Pt states her  passed away 2 months ago. Pt states she is sending blessing to Dr Brian and her new baby.

## 2020-10-13 NOTE — TELEPHONE ENCOUNTER
Spoke to pt. Pt stated she needed the refill due to being out of the medication. I notified the pt she was taken off of the medication and had that replaced with the celexa and the valium. Pt then stated she does not need the Buspar then but will need the other two medications refilled.     meds pended for review.

## 2020-10-14 ENCOUNTER — TELEPHONE (OUTPATIENT)
Dept: INTERNAL MEDICINE | Facility: CLINIC | Age: 75
End: 2020-10-14

## 2020-10-14 RX ORDER — DIAZEPAM 2 MG/1
2 TABLET ORAL EVERY 12 HOURS PRN
Qty: 60 TABLET | Refills: 0 | OUTPATIENT
Start: 2020-10-14 | End: 2020-11-13

## 2020-10-14 NOTE — TELEPHONE ENCOUNTER
----- Message from Crissy Perdomo sent at 10/14/2020  2:07 PM CDT -----  Contact: LESLI DERAS [[id number]]  Patient Requesting Call    Who Called: Lesli Bragg Call Back Number: [phone]  Additional Information: Patient requesting to speak with someone in Dr. Brian's  Office

## 2020-10-14 NOTE — TELEPHONE ENCOUNTER
Called and spoke to patient. Patient did mention that she is completely out of the Valium but did inform that she would need to discuss this furthermore at upcoming visit. Patient was also notified that the Celexa refills had been sent in on yesterday. Patient did pick that Rx up at pharmacy once she came into clinic.

## 2020-10-14 NOTE — TELEPHONE ENCOUNTER
Celexa was refilled yesterday - can discuss diazepam refill at upcoming appointment.      Make sure she knows the diazepam is only for prn use, not daily.  Not sure if she's actually out of this one yet or not.

## 2020-10-22 ENCOUNTER — TELEPHONE (OUTPATIENT)
Dept: INTERNAL MEDICINE | Facility: CLINIC | Age: 75
End: 2020-10-22

## 2020-10-26 ENCOUNTER — TELEPHONE (OUTPATIENT)
Dept: INTERNAL MEDICINE | Facility: CLINIC | Age: 75
End: 2020-10-26

## 2020-10-26 DIAGNOSIS — F41.1 GAD (GENERALIZED ANXIETY DISORDER): Primary | ICD-10-CM

## 2020-10-26 RX ORDER — BUSPIRONE HYDROCHLORIDE 5 MG/1
5-10 TABLET ORAL 2 TIMES DAILY PRN
Qty: 30 TABLET | Refills: 2 | Status: SHIPPED | OUTPATIENT
Start: 2020-10-26 | End: 2020-12-02

## 2020-10-26 NOTE — TELEPHONE ENCOUNTER
Called and spoke to patient.  Patient was scheduled for virtual visit on 11/10 Tues at 1:30 pm. Patient says that it is fine if you can send in the Rx Buspar.

## 2020-10-26 NOTE — TELEPHONE ENCOUNTER
Called and spoke to patient.  Patient was informed of virtual appointment on Tues 11/10 at 130 pm. Patient was instructed on how to take/when to take the Buspar medicine. Patient was advised to continue Celexa as well.

## 2020-10-26 NOTE — TELEPHONE ENCOUNTER
Called and spoke to patient. I did inform patient that she would need to schedule an appointment since this medication was only for short term use. Patient stated that her  has passed away, but her nephew who used to stay with her,  recently on this past Mon. Patient seems very all over the place, overly emotional and tense now which is completely understandable in these rough, hard times. She is requesting further refills.  Please advise.

## 2020-10-26 NOTE — TELEPHONE ENCOUNTER
buspar rx sent. Take 1 tablet twice a day as needed for anxiety. If 1 tablet is ineffective can increase to 2 tablets at a time.   Please also continue citalopram.

## 2020-10-26 NOTE — TELEPHONE ENCOUNTER
----- Message from Mckenna Conrad sent at 10/26/2020  8:49 AM CDT -----  Contact: self   .Requesting an RX refill or new RX.  Is this a refill or new RX: refill  RX name and strength:diazePAM (VALIUM) 2 MG tablet  Is this a 30 day or 90 day RX:   Pharmacy name and phone # (copy/paste from chart):  [facility]/pharmacy #[id number] - [place] - [address] [phone] (Phone)  [phone] (Fax)  Comments:   Pt states after her  passed away two weeks ago and her nephew passed away recently this medication helps her. Please advise

## 2020-10-26 NOTE — TELEPHONE ENCOUNTER
May also offer a virtual visit but needs to be seen. Diazepam is a high risk medication.   I can rx buspar if she is unable to be seen.

## 2020-10-27 ENCOUNTER — PATIENT OUTREACH (OUTPATIENT)
Dept: ADMINISTRATIVE | Facility: OTHER | Age: 75
End: 2020-10-27

## 2020-10-27 ENCOUNTER — PATIENT OUTREACH (OUTPATIENT)
Dept: ADMINISTRATIVE | Facility: HOSPITAL | Age: 75
End: 2020-10-27

## 2020-10-27 NOTE — PROGRESS NOTES
Health Maintenance Due   Topic Date Due    Shingles Vaccine (2 of 3) 09/06/2017    DEXA SCAN  01/27/2020    Influenza Vaccine (1) 08/01/2020     Updates were requested from care everywhere.  Chart was reviewed for overdue [facility]active [facility]counters ([facility]) topics (CRS, Breast Cancer Screening, Eye exam)  Health Maintenance has been updated.  LINKS immunization registry triggered.  Immunizations were reconciled.

## 2020-10-28 ENCOUNTER — TELEPHONE (OUTPATIENT)
Dept: PAIN MEDICINE | Facility: CLINIC | Age: 75
End: 2020-10-28

## 2020-10-28 NOTE — TELEPHONE ENCOUNTER
Staff contacted and spoke with patient regarding medication.    Staff informed patient that she need an appointment for her medication, and that she have an appointment scheduled already with Sravanthi Quiroz NP on Nov. 3, 2020.    Patient verbalized understanding.

## 2020-10-28 NOTE — TELEPHONE ENCOUNTER
----- Message from Patti Livingston sent at 10/28/2020  9:12 AM CDT -----  Contact: JERRI DERAS [[id number]]  Type: RX Refill Request    Who Called:  JERRI DERAS [[id number]]    RX Name and Strength: acetaminophen-codeine 300-30mg (TYLENOL #3) 300-30 mg Tab    Preferred Pharmacy with phone number:   [facility]/pharmacy #[id number] - [place] - [address]  [address]  [place] [address]  Phone: [phone] Fax: [phone]    Would the patient rather a call back or a response via [facility]?     Best Call Back Number: [phone] (mobile)    Additional Information:

## 2020-10-30 ENCOUNTER — TELEPHONE (OUTPATIENT)
Dept: PAIN MEDICINE | Facility: CLINIC | Age: 75
End: 2020-10-30

## 2020-10-30 NOTE — TELEPHONE ENCOUNTER
----- Message from Ella Frankel sent at 10/30/2020  1:14 PM CDT -----  Type: RX Refill Request     Who Called:  JERRI DERAS [[id number]]     RX Name and Strength: acetaminophen-codeine 300-30mg (TYLENOL #3) 300-30 mg Tab     Preferred Pharmacy with phone number:   [facility]/pharmacy #[id number] - [place], [place] - [address]  [address]  [place] [address]    Phone: [phone] Fax: [phone]      Patient can be reached @#  [phone]

## 2020-10-30 NOTE — TELEPHONE ENCOUNTER
Staff contacted and spoke with patient regarding medication.     Staff informed patient that she need an appointment for her medication, and that she have an appointment scheduled already with Sravanthi Quiroz NP on Nov. 4, 2020.

## 2020-11-09 DIAGNOSIS — G89.29 CHRONIC LEFT SHOULDER PAIN: ICD-10-CM

## 2020-11-09 DIAGNOSIS — M19.012 PRIMARY OSTEOARTHRITIS OF LEFT SHOULDER: ICD-10-CM

## 2020-11-09 DIAGNOSIS — M25.512 CHRONIC LEFT SHOULDER PAIN: ICD-10-CM

## 2020-11-09 RX ORDER — DICLOFENAC SODIUM 10 MG/G
2 GEL TOPICAL 3 TIMES DAILY
Qty: 1 TUBE | Refills: 5 | Status: SHIPPED | OUTPATIENT
Start: 2020-11-09 | End: 2021-01-14

## 2020-11-09 NOTE — TELEPHONE ENCOUNTER
----- Message from Niles Gong sent at 11/9/2020 12:37 PM CST -----  Regarding: self [phone]  Requesting an RX refill or new RX.  Is this a refill or new RX: refill   RX name and strength:diclofenac sodium (VOLTAREN) 1 % Gel  Is this a 30 day or 90 day RX:   Pharmacy name and phone # (copy/paste from chart):  [facility]/pharmacy #[id number] - [place], [place] - [facility] [address] [phone] (Phone)  [phone] (Fax  Comments:

## 2020-11-10 ENCOUNTER — TELEPHONE (OUTPATIENT)
Dept: INTERNAL MEDICINE | Facility: CLINIC | Age: 75
End: 2020-11-10

## 2020-11-11 ENCOUNTER — TELEPHONE (OUTPATIENT)
Dept: PAIN MEDICINE | Facility: CLINIC | Age: 75
End: 2020-11-11

## 2020-11-11 ENCOUNTER — TELEPHONE (OUTPATIENT)
Dept: INTERNAL MEDICINE | Facility: CLINIC | Age: 75
End: 2020-11-11

## 2020-11-11 NOTE — TELEPHONE ENCOUNTER
----- Message from Tracee Avina sent at 11/11/2020 11:28 AM CST -----  Regarding: Sooner Appt  Contact: Patient  Type:  Sooner Apoointment Request    Caller is requesting a sooner appointment.  Caller declined first available appointment listed below.  Caller will not accept being placed on the waitlist and is requesting a message be sent to doctor.  Name of Caller: Patient   When is the first available appointment?03/02/2020   Symptoms:left shoulder pain/ annual   Would the patient rather a call back or a response via [facility]? Call back   Best Call Back Number:[phone]  Additional Information: Patient stated she would like to be seen next month if possible

## 2020-11-11 NOTE — TELEPHONE ENCOUNTER
----- Message from Anna Matson sent at 11/11/2020  9:55 AM CST -----  Regarding: Refill Request  Please refill the medication(s) listed below :Patient would like prescription sent in as soon as possible states she is in a lot of pain.    Medication # 1 :acetaminophen-codeine 300-30mg (TYLENOL #3) 300-30 mg Tab    Please call the patient when the prescription is sent to pharmacy :[phone]    Can the clinic reply in [facility] :No    Preferred Pharmacy : [facility]/pharmacy #[id number] - [place] [place] - [facility] [address] [phone] (Phone)  [phone] (Fax)

## 2020-11-11 NOTE — TELEPHONE ENCOUNTER
Called/spoke to patient.  Patient is already scheduled for an annual with Dr Davis in Internal Medicine. Looks like patient canceled then rescheduled with phone staff. Patient has been advised to please follow up with us if need be.

## 2020-11-12 ENCOUNTER — TELEPHONE (OUTPATIENT)
Dept: INTERNAL MEDICINE | Facility: CLINIC | Age: 75
End: 2020-11-12

## 2020-11-12 NOTE — TELEPHONE ENCOUNTER
----- Message from Iliana Frias sent at 11/12/2020  9:56 AM CST -----  Regarding: [phone]  Lesli is requesting a callback.  She states that she needs to see the doctor for a check up and she also states that she needs to get a refill on her medication for her anxiety.      Callback number: [phone]

## 2020-11-12 NOTE — TELEPHONE ENCOUNTER
Called and spoke to patient.  Patient is scheduled for an annual visit with PCP on 12/8 Tues at 11:30 AM.

## 2020-11-16 ENCOUNTER — TELEPHONE (OUTPATIENT)
Dept: PAIN MEDICINE | Facility: CLINIC | Age: 75
End: 2020-11-16

## 2020-11-16 NOTE — TELEPHONE ENCOUNTER
"This message is for patient in regards to his/her appointment 11/17/20 at 09:20a       [facility] Policy: For the safety of all patients and staff members.     Patient Visitor policy: Due to social distancing and limited seating staff are requesting patient to arrive to their schedule appointments alone. If patient do not need assistance with their visit, we're asking all visitors to remain outside the waiting area.    Upon arriving to facility; patient will have temperature taking and are required to wear a face mask, if patient do not have a face mask one will be provided. Upon arriving patient we ask patients to contact clinic at this number [phone] to notify staff that they have arrived or they may do so by utilizing the Mobile checked in Nicole(if patient have patient portal; clinical staff will send a message through there letting them know it's okay to proceed to their visit). Staff will give the patient the "okay" to come up or wait inside their vehicle until clinic is ready for patient to be seen by LAMBERT Patterson. If you have any questions or concerns please contact [phone]    Pt verbalized understanding and has confirmed appt    "

## 2020-11-17 ENCOUNTER — OFFICE VISIT (OUTPATIENT)
Dept: PAIN MEDICINE | Facility: CLINIC | Age: 75
End: 2020-11-17
Payer: MEDICARE

## 2020-11-17 VITALS
WEIGHT: 174.38 LBS | BODY MASS INDEX: 29.77 KG/M2 | HEIGHT: 64 IN | SYSTOLIC BLOOD PRESSURE: 133 MMHG | TEMPERATURE: 98 F | DIASTOLIC BLOOD PRESSURE: 91 MMHG | HEART RATE: 95 BPM

## 2020-11-17 DIAGNOSIS — M25.512 CHRONIC LEFT SHOULDER PAIN: Primary | ICD-10-CM

## 2020-11-17 DIAGNOSIS — G89.29 CHRONIC LEFT SHOULDER PAIN: Primary | ICD-10-CM

## 2020-11-17 DIAGNOSIS — M19.012 PRIMARY OSTEOARTHRITIS OF LEFT SHOULDER: ICD-10-CM

## 2020-11-17 DIAGNOSIS — Z51.81 ENCOUNTER FOR MONITORING OPIOID MAINTENANCE THERAPY: ICD-10-CM

## 2020-11-17 DIAGNOSIS — M89.8X1 CHRONIC SCAPULAR PAIN: ICD-10-CM

## 2020-11-17 DIAGNOSIS — G89.29 CHRONIC SCAPULAR PAIN: ICD-10-CM

## 2020-11-17 DIAGNOSIS — G89.4 CHRONIC PAIN DISORDER: ICD-10-CM

## 2020-11-17 DIAGNOSIS — M25.531 RIGHT WRIST PAIN: ICD-10-CM

## 2020-11-17 DIAGNOSIS — Z79.891 ENCOUNTER FOR MONITORING OPIOID MAINTENANCE THERAPY: ICD-10-CM

## 2020-11-17 DIAGNOSIS — Q85.01 NEUROFIBROMATOSIS, TYPE 1: ICD-10-CM

## 2020-11-17 PROCEDURE — 3075F PR MOST RECENT SYSTOLIC BLOOD PRESS GE 130-139MM HG: ICD-10-PCS | Mod: CPTII,S$GLB,, | Performed by: NURSE PRACTITIONER

## 2020-11-17 PROCEDURE — 99999 PR PBB SHADOW E&M-EST. PATIENT-LVL IV: CPT | Mod: PBBFAC,,, | Performed by: NURSE PRACTITIONER

## 2020-11-17 PROCEDURE — 3075F SYST BP GE 130 - 139MM HG: CPT | Mod: CPTII,S$GLB,, | Performed by: NURSE PRACTITIONER

## 2020-11-17 PROCEDURE — 99213 OFFICE O/P EST LOW 20 MIN: CPT | Mod: S$GLB,,, | Performed by: NURSE PRACTITIONER

## 2020-11-17 PROCEDURE — 1101F PR PT FALLS ASSESS DOC 0-1 FALLS W/OUT INJ PAST YR: ICD-10-PCS | Mod: CPTII,S$GLB,, | Performed by: NURSE PRACTITIONER

## 2020-11-17 PROCEDURE — 1159F PR MEDICATION LIST DOCUMENTED IN MEDICAL RECORD: ICD-10-PCS | Mod: S$GLB,,, | Performed by: NURSE PRACTITIONER

## 2020-11-17 PROCEDURE — 1125F PR PAIN SEVERITY QUANTIFIED, PAIN PRESENT: ICD-10-PCS | Mod: S$GLB,,, | Performed by: NURSE PRACTITIONER

## 2020-11-17 PROCEDURE — 3288F FALL RISK ASSESSMENT DOCD: CPT | Mod: CPTII,S$GLB,, | Performed by: NURSE PRACTITIONER

## 2020-11-17 PROCEDURE — 3080F DIAST BP >= 90 MM HG: CPT | Mod: CPTII,S$GLB,, | Performed by: NURSE PRACTITIONER

## 2020-11-17 PROCEDURE — 3080F PR MOST RECENT DIASTOLIC BLOOD PRESSURE >= 90 MM HG: ICD-10-PCS | Mod: CPTII,S$GLB,, | Performed by: NURSE PRACTITIONER

## 2020-11-17 PROCEDURE — 1101F PT FALLS ASSESS-DOCD LE1/YR: CPT | Mod: CPTII,S$GLB,, | Performed by: NURSE PRACTITIONER

## 2020-11-17 PROCEDURE — 3008F BODY MASS INDEX DOCD: CPT | Mod: CPTII,S$GLB,, | Performed by: NURSE PRACTITIONER

## 2020-11-17 PROCEDURE — 3008F PR BODY MASS INDEX (BMI) DOCUMENTED: ICD-10-PCS | Mod: CPTII,S$GLB,, | Performed by: NURSE PRACTITIONER

## 2020-11-17 PROCEDURE — 1125F AMNT PAIN NOTED PAIN PRSNT: CPT | Mod: S$GLB,,, | Performed by: NURSE PRACTITIONER

## 2020-11-17 PROCEDURE — 1159F MED LIST DOCD IN RCRD: CPT | Mod: S$GLB,,, | Performed by: NURSE PRACTITIONER

## 2020-11-17 PROCEDURE — 99999 PR PBB SHADOW E&M-EST. PATIENT-LVL IV: ICD-10-PCS | Mod: PBBFAC,,, | Performed by: NURSE PRACTITIONER

## 2020-11-17 PROCEDURE — 3288F PR FALLS RISK ASSESSMENT DOCUMENTED: ICD-10-PCS | Mod: CPTII,S$GLB,, | Performed by: NURSE PRACTITIONER

## 2020-11-17 PROCEDURE — 99213 PR OFFICE/OUTPT VISIT, EST, LEVL III, 20-29 MIN: ICD-10-PCS | Mod: S$GLB,,, | Performed by: NURSE PRACTITIONER

## 2020-11-17 PROCEDURE — 80307 DRUG TEST PRSMV CHEM ANLYZR: CPT

## 2020-11-17 RX ORDER — ACETAMINOPHEN AND CODEINE PHOSPHATE 300; 30 MG/1; MG/1
1 TABLET ORAL 2 TIMES DAILY PRN
Qty: 60 TABLET | Refills: 0 | Status: SHIPPED | OUTPATIENT
Start: 2020-11-17 | End: 2020-12-17

## 2020-11-17 RX ORDER — ACETAMINOPHEN AND CODEINE PHOSPHATE 300; 30 MG/1; MG/1
TABLET ORAL
Qty: 60 TABLET | Refills: 0 | Status: SHIPPED | OUTPATIENT
Start: 2020-11-17 | End: 2020-12-31

## 2020-11-17 NOTE — TELEPHONE ENCOUNTER
Staff called to inform the patient again that the Tylenol prescription is still pending Dr. Cheney approval and that as soon as it is approved a member of staff will notify her.      Patient verbalized understanding.

## 2020-11-17 NOTE — TELEPHONE ENCOUNTER
----- Message from Anoop Vallejo sent at 11/17/2020  2:56 PM CST -----  Contact: self  Pt calling [facility]  hasn't received her prescription for her Tylenol 3     Please Call     Contact

## 2020-11-17 NOTE — TELEPHONE ENCOUNTER
----- Message from Liz Sumner sent at 11/17/2020 12:33 PM CST -----  Contact: self  Pt is asking for a call back in regards to the tylenol that was prescribed to her.  She stated that she was told by the pharmacy that the script was never received     Contact info- [phone]

## 2020-11-17 NOTE — TELEPHONE ENCOUNTER
----- Message from Makeda Mcnair sent at 11/17/2020  2:09 PM CST -----  Regarding: call back  Name of Who is Calling: JERRI DERAS [[id number]] What is the request in detail: Patient is requesting a call back in reference to some pain medication that was suppose to be called in. Please call back today. Can the clinic reply by [facility]: No What Number to Call Back if not in [place]: [phone]

## 2020-11-17 NOTE — TELEPHONE ENCOUNTER
Staff called to inform the patient that the Tylenol prescription is still pending Dr. Cheney approval and that as soon as it is approved a member of staff will notify her.     Patient did not answer and does not have voice mail estuardo set up. Staff left message on home number on file.
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
## 2020-11-17 NOTE — TELEPHONE ENCOUNTER
----- Message from Gudelia Herndon sent at 11/17/2020  4:19 PM CST -----  Contact: JERRI DERAS [[id number]]  Type: Patient Call Back Who called:JERRI DERAS [[id number]] What is the request in detail:Patient would like a call back in regards to a prescription that was suppose to be filled today for Tylenol 3. Patient pharmacy is :   [facility]/pharmacy #[id number] - [place] - [address]  [address]  [place] [address]  Phone: [phone] Fax: [phone]  Can the clinic reply by [facility]? noWould the patient rather a call back or a response via [facility]? Call back Best call back number:[phone] (mobile)   Additional Information:

## 2020-11-17 NOTE — TELEPHONE ENCOUNTER
Staff called to inform the patient that the Tylenol prescription is still pending Dr. Cheney approval and that as soon as it is approved a member of staff will notify her.      Patient verbalized understanding.

## 2020-11-18 ENCOUNTER — PATIENT MESSAGE (OUTPATIENT)
Dept: PAIN MEDICINE | Facility: CLINIC | Age: 75
End: 2020-11-18

## 2020-11-18 NOTE — TELEPHONE ENCOUNTER
"Staff returned the pharmacy call regarding Tylenol #3 clarifications. The following is what is documented in yesterday 11/17/20 office note: Tylenol #3 every 12 hours PRN pain, #60, 0 refill. Refill sent with appropriate date.       Pharmacy staff asked," Two different directions were sent over to us. We just needed to know what the directions were."    Staff informed the pharmacy of the above directions.     Pharmacy staff expressed thanks.   "

## 2020-11-18 NOTE — TELEPHONE ENCOUNTER
----- Message from Alpa Kwong sent at 11/18/2020 11:11 AM CST -----  Contact: Lalo with [facility]  Name of Who is Calling: Lalo with [facility]       What is the request in detail: lalo would like clarification on acetaminophen-codeine 300-30mg (TYLENOL #3) 300-30 mg Tab medication. Please call       Can the clinic reply by [facility]: no      What [facility] to [facility] Back [facility]: [phone]

## 2020-11-21 LAB
6MAM UR QL: NOT DETECTED
7AMINOCLONAZEPAM UR QL: NOT DETECTED
A-OH ALPRAZ UR QL: NOT DETECTED
ALPRAZ UR QL: NOT DETECTED
AMPHET UR QL SCN: NOT DETECTED
ANNOTATION COMMENT IMP: NORMAL
ANNOTATION COMMENT IMP: NORMAL
BARBITURATES UR QL: NOT DETECTED
BUPRENORPHINE UR QL: NOT DETECTED
BZE UR QL: NOT DETECTED
CARBOXYTHC UR QL: NOT DETECTED
CARISOPRODOL UR QL: NOT DETECTED
CLONAZEPAM UR QL: NOT DETECTED
CODEINE UR QL: NOT DETECTED
CREAT UR-MCNC: 140.1 MG/DL (ref 20–400)
DIAZEPAM UR QL: NOT DETECTED
ETHYL GLUCURONIDE UR QL: NOT DETECTED
FENTANYL UR QL: NOT DETECTED
HYDROCODONE UR QL: NOT DETECTED
HYDROMORPHONE UR QL: NOT DETECTED
LORAZEPAM UR QL: NOT DETECTED
MDA UR QL: NOT DETECTED
MDEA UR QL: NOT DETECTED
MDMA UR QL: NOT DETECTED
ME-PHENIDATE UR QL: NOT DETECTED
MEPERIDINE UR QL: NOT DETECTED
METHADONE UR QL: NOT DETECTED
METHAMPHET UR QL: NOT DETECTED
MIDAZOLAM UR QL SCN: NOT DETECTED
MORPHINE UR QL: NOT DETECTED
NORBUPRENORPHINE UR QL CFM: NOT DETECTED
NORDIAZEPAM UR QL: NOT DETECTED
NORFENTANYL UR QL: NOT DETECTED
NORHYDROCODONE UR QL CFM: NOT DETECTED
NOROXYCODONE UR QL CFM: NOT DETECTED
NOROXYMORPHONE: NOT DETECTED
OXAZEPAM UR QL: PRESENT
OXYCODONE UR QL: NOT DETECTED
OXYMORPHONE UR QL: NOT DETECTED
PATHOLOGY STUDY: NORMAL
PCP UR QL: NOT DETECTED
PHENTERMINE UR QL: NOT DETECTED
PROPOXYPH UR QL: NOT DETECTED
SERVICE CMNT-IMP: NORMAL
TAPENTADOL UR QL SCN: NOT DETECTED
TAPENTADOL-O-SULF: NOT DETECTED
TEMAZEPAM UR QL: NOT DETECTED
TRAMADOL UR QL: NOT DETECTED
ZOLPIDEM UR QL: NOT DETECTED

## 2020-11-24 ENCOUNTER — PATIENT OUTREACH (OUTPATIENT)
Dept: ADMINISTRATIVE | Facility: HOSPITAL | Age: 75
End: 2020-11-24

## 2020-12-02 DIAGNOSIS — F41.1 GAD (GENERALIZED ANXIETY DISORDER): ICD-10-CM

## 2020-12-02 RX ORDER — BUSPIRONE HYDROCHLORIDE 5 MG/1
5-10 TABLET ORAL 2 TIMES DAILY PRN
Qty: 30 TABLET | Refills: 2 | Status: SHIPPED | OUTPATIENT
Start: 2020-12-02 | End: 2021-01-19

## 2020-12-02 NOTE — TELEPHONE ENCOUNTER
Dr Brian,   I know previously, I think you stated that patient would need to be seen for an appointment to get this medicine refilled.   I see she has an appointment already scheduled for next Tues at 11:30 AM for follow up.     Please advise.  Medication pended.

## 2020-12-02 NOTE — TELEPHONE ENCOUNTER
----- Message from Lauren Cardoza sent at 12/2/2020 11:19 AM CST -----  Contact: [phone]  Requesting an RX refill or new RX.  Is this a refill or new RX: Refill  RX name and strength:busPIRone (BUSPAR) 5 MG Tab  Is this a 30 day or 90 day RX: 30  Pharmacy name and phone # (copy/paste from chart):  [facility]/pharmacy #[id number] - [place] - [facility] [address] [phone] (Phone)  [phone] (Fax)  Comments: Patient would like to request a courtesy call once the Rx is approved.

## 2020-12-03 ENCOUNTER — TELEPHONE (OUTPATIENT)
Dept: PAIN MEDICINE | Facility: CLINIC | Age: 75
End: 2020-12-03

## 2020-12-03 NOTE — TELEPHONE ENCOUNTER
----- Message from Tracee Irwin sent at 12/3/2020 10:59 AM CST -----  Contact: JERRI DERAS [[id number]]  Type: RX Refill Request    Who Called: JERRI DERAS [[id number]]    RX Name and Strength:acetaminophen-codeine 300-30mg (TYLENOL #3) 300-30 mg Tab    Preferred Pharmacy with phone number: ..  [facility]/pharmacy #[id number] - [place] - [address]  [address]  [place] [address]  Phone: [phone] Fax: [phone]      Best Call Back Number: .[phone]    Additional Information: N/A

## 2020-12-03 NOTE — TELEPHONE ENCOUNTER
----- Message from Sammy Green sent at 12/3/2020  1:40 PM CST -----  Regarding: Refill needed      The Pt states that she would like for you to call in her Rx- acetaminophen-codeine 300-30mg (TYLENOL #3) 300-30 mg Tab.  She states that she still needs these for the pain in her arm from when she fell on it.  The Pt states that she is completely out of the Rx.    Pharmacy- [facility]/pharmacy #[id number] - [place] - [facility] [address] [phone] (Phone)  [phone] (Fax)

## 2020-12-03 NOTE — TELEPHONE ENCOUNTER
Patient was contacted and informed that her medication is due 12/18/20. She just filled her medication 11/18/20. Patient stated that she will wait

## 2020-12-04 DIAGNOSIS — R52 PAIN: Primary | ICD-10-CM

## 2020-12-07 ENCOUNTER — TELEPHONE (OUTPATIENT)
Dept: ORTHOPEDICS | Facility: CLINIC | Age: 75
End: 2020-12-07

## 2020-12-07 NOTE — TELEPHONE ENCOUNTER
Spoke with patient to remind her of her scheduled appointment tomorrow.Also she needs to come in 1 hour early for xray of her right hand.The patient appreciated the phone call.

## 2020-12-08 ENCOUNTER — TELEPHONE (OUTPATIENT)
Dept: PAIN MEDICINE | Facility: CLINIC | Age: 75
End: 2020-12-08

## 2020-12-08 ENCOUNTER — IMMUNIZATION (OUTPATIENT)
Dept: PHARMACY | Facility: CLINIC | Age: 75
End: 2020-12-08
Payer: MEDICAID

## 2020-12-08 ENCOUNTER — PATIENT MESSAGE (OUTPATIENT)
Dept: PHARMACY | Facility: CLINIC | Age: 75
End: 2020-12-08

## 2020-12-08 ENCOUNTER — OFFICE VISIT (OUTPATIENT)
Dept: INTERNAL MEDICINE | Facility: CLINIC | Age: 75
End: 2020-12-08
Payer: MEDICARE

## 2020-12-08 VITALS
OXYGEN SATURATION: 96 % | WEIGHT: 174 LBS | BODY MASS INDEX: 29.71 KG/M2 | HEART RATE: 67 BPM | HEIGHT: 64 IN | DIASTOLIC BLOOD PRESSURE: 82 MMHG | SYSTOLIC BLOOD PRESSURE: 122 MMHG

## 2020-12-08 DIAGNOSIS — F32.A DEPRESSION, UNSPECIFIED DEPRESSION TYPE: Primary | ICD-10-CM

## 2020-12-08 DIAGNOSIS — Z78.0 ASYMPTOMATIC MENOPAUSAL STATE: ICD-10-CM

## 2020-12-08 DIAGNOSIS — F41.1 GAD (GENERALIZED ANXIETY DISORDER): ICD-10-CM

## 2020-12-08 DIAGNOSIS — G89.29 OTHER CHRONIC PAIN: ICD-10-CM

## 2020-12-08 PROCEDURE — 1101F PR PT FALLS ASSESS DOC 0-1 FALLS W/OUT INJ PAST YR: ICD-10-PCS | Mod: CPTII,S$GLB,, | Performed by: INTERNAL MEDICINE

## 2020-12-08 PROCEDURE — 3074F SYST BP LT 130 MM HG: CPT | Mod: CPTII,S$GLB,, | Performed by: INTERNAL MEDICINE

## 2020-12-08 PROCEDURE — 3008F BODY MASS INDEX DOCD: CPT | Mod: CPTII,S$GLB,, | Performed by: INTERNAL MEDICINE

## 2020-12-08 PROCEDURE — 99214 OFFICE O/P EST MOD 30 MIN: CPT | Mod: S$GLB,,, | Performed by: INTERNAL MEDICINE

## 2020-12-08 PROCEDURE — 99999 PR PBB SHADOW E&M-EST. PATIENT-LVL V: CPT | Mod: PBBFAC,,, | Performed by: INTERNAL MEDICINE

## 2020-12-08 PROCEDURE — 3288F FALL RISK ASSESSMENT DOCD: CPT | Mod: CPTII,S$GLB,, | Performed by: INTERNAL MEDICINE

## 2020-12-08 PROCEDURE — 3074F PR MOST RECENT SYSTOLIC BLOOD PRESSURE < 130 MM HG: ICD-10-PCS | Mod: CPTII,S$GLB,, | Performed by: INTERNAL MEDICINE

## 2020-12-08 PROCEDURE — 3079F PR MOST RECENT DIASTOLIC BLOOD PRESSURE 80-89 MM HG: ICD-10-PCS | Mod: CPTII,S$GLB,, | Performed by: INTERNAL MEDICINE

## 2020-12-08 PROCEDURE — 99999 PR PBB SHADOW E&M-EST. PATIENT-LVL V: ICD-10-PCS | Mod: PBBFAC,,, | Performed by: INTERNAL MEDICINE

## 2020-12-08 PROCEDURE — 1125F AMNT PAIN NOTED PAIN PRSNT: CPT | Mod: S$GLB,,, | Performed by: INTERNAL MEDICINE

## 2020-12-08 PROCEDURE — 99214 PR OFFICE/OUTPT VISIT, EST, LEVL IV, 30-39 MIN: ICD-10-PCS | Mod: S$GLB,,, | Performed by: INTERNAL MEDICINE

## 2020-12-08 PROCEDURE — 1101F PT FALLS ASSESS-DOCD LE1/YR: CPT | Mod: CPTII,S$GLB,, | Performed by: INTERNAL MEDICINE

## 2020-12-08 PROCEDURE — 1159F MED LIST DOCD IN RCRD: CPT | Mod: S$GLB,,, | Performed by: INTERNAL MEDICINE

## 2020-12-08 PROCEDURE — 1125F PR PAIN SEVERITY QUANTIFIED, PAIN PRESENT: ICD-10-PCS | Mod: S$GLB,,, | Performed by: INTERNAL MEDICINE

## 2020-12-08 PROCEDURE — 3008F PR BODY MASS INDEX (BMI) DOCUMENTED: ICD-10-PCS | Mod: CPTII,S$GLB,, | Performed by: INTERNAL MEDICINE

## 2020-12-08 PROCEDURE — 1159F PR MEDICATION LIST DOCUMENTED IN MEDICAL RECORD: ICD-10-PCS | Mod: S$GLB,,, | Performed by: INTERNAL MEDICINE

## 2020-12-08 PROCEDURE — 3288F PR FALLS RISK ASSESSMENT DOCUMENTED: ICD-10-PCS | Mod: CPTII,S$GLB,, | Performed by: INTERNAL MEDICINE

## 2020-12-08 PROCEDURE — 3079F DIAST BP 80-89 MM HG: CPT | Mod: CPTII,S$GLB,, | Performed by: INTERNAL MEDICINE

## 2020-12-08 RX ORDER — CITALOPRAM 10 MG/1
10 TABLET ORAL DAILY
Qty: 90 TABLET | Refills: 3 | Status: SHIPPED | OUTPATIENT
Start: 2020-12-08 | End: 2022-03-03 | Stop reason: SDUPTHER

## 2020-12-08 NOTE — TELEPHONE ENCOUNTER
Staff returned call to patient in regards to her message.    Staff informed patient it's too soon for an early refill for Tylenol #3 her last refill was 11/18/20 and she's not due for another one until 12/18/20 and she should notify staff next week 3 to 5 days before running out of medications    Pt verbalized understanding and thank staff for information

## 2020-12-08 NOTE — TELEPHONE ENCOUNTER
----- Message from Josue Pitt sent at 12/8/2020  2:43 PM CST -----  Regarding: Refill  Contact: JERRI DERAS [[id number]]      Can the clinic reply in [facility]: no      Please refill the medication(s) listed below. Please call the patient when the prescription(s) is ready for  at this phone number   [phone]      Medication #1  acetaminophen-codeine 300-30mg (TYLENOL #3) 300-30 mg Tab (out)    Preferred Pharmacy: [facility]/PHARMACY #[id number] - [place] - [address]

## 2020-12-08 NOTE — TELEPHONE ENCOUNTER
----- Message from Joann Viktoriya sent at 12/8/2020  1:59 PM CST -----  Who Called: JERRI DERAS Refill or New Rx:Refill  RX Name and Strength:acetaminophen-codeine 300-30mg (TYLENOL #3) 300-30 mg TabHow is the patient currently taking it? (ex. 1XDay): 2x dayIs this a 30 day or 90 day Rx: 30 dayPreferred Pharmacy with phone number:[facility]/pharmacy #[id number] - [place] - [address] or Mail Order:LocalOrdering Provider:Sravanthi Vásquez Call Back Number:[phone] Information: Patient states she is a lot of pain. Please advise

## 2020-12-08 NOTE — PROGRESS NOTES
"Subjective:       Patient ID: Lesli Gong is a 75 y.o. female.    Chief Complaint: Arm Pain (C/O R arm pain, persistent for a few weeks. patient takes Tylenol to help relieve.)    HPI   75 y.o. female here for follow up of    Anxiety  Saw Dr. Perla on 9/1/20. HELGA on buspar. Her  passed in mid August.   Started citalopram 10mg and short course of diazepam 2mg bid prn.     Chronic left shoulder pain. OA shoulder.   On tylenol #3 from Dr. Cheney last filled 11/18. #60 monthly.  Diazepam 2mg was written by Dr. 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     Neurofibromatosis    Last saw me in 2018.     Hep C in remission at end of 18 weeks HCV treatment.      HTN  Lisinopril 20mg    She reports she is having a lot of pain in her right wrist and forearm. She is left handed.   Review of Systems   Constitutional: Negative for fever.   Respiratory: Negative for shortness of breath.    Cardiovascular: Negative for chest pain.   Musculoskeletal: Negative.    Skin: Negative.        Objective:   /82   Pulse 67   Ht 5' 4" (1.626 m)   Wt 78.9 kg (174 lb)   SpO2 96%   BMI 29.87 kg/m²      Physical Exam  Constitutional:       General: She is not in acute distress.     Appearance: She is well-developed. She is not diaphoretic.   HENT:      Head: Normocephalic and atraumatic.   Cardiovascular:      Rate and Rhythm: Normal rate and regular rhythm.   Pulmonary:      Effort: Pulmonary effort is normal. No respiratory distress.      Breath sounds: No wheezing or rales.   Skin:     General: Skin is warm and dry.   Neurological:      Mental Status: She is alert and oriented to person, place, and time.   Psychiatric:         Behavior: Behavior normal.         Assessment:       1. Depression, unspecified depression type    2. Other chronic pain    3. HELGA (generalized anxiety disorder)    4. Asymptomatic menopausal state        Plan:       Lesli was seen today for arm pain.    Diagnoses and all orders for this " visit:    Depression, unspecified depression type  HELGA (generalized anxiety disorder)  -     citalopram (CELEXA) 10 MG tablet; Take 1 tablet (10 mg total) by mouth once daily.  Continue buspar  Now off short term benzo    Other chronic pain  She requests a refill of her tylenol w codeine; I reminded her she has a pain contract for this and can only get from Dr. Cheney and she expressed understanding.     Asymptomatic menopausal state  -     DXA Bone Density Spine And Hip; Future

## 2020-12-09 ENCOUNTER — TELEPHONE (OUTPATIENT)
Dept: ADMINISTRATIVE | Facility: HOSPITAL | Age: 75
End: 2020-12-09

## 2020-12-11 ENCOUNTER — PATIENT MESSAGE (OUTPATIENT)
Dept: OTHER | Facility: OTHER | Age: 75
End: 2020-12-11

## 2020-12-18 ENCOUNTER — TELEPHONE (OUTPATIENT)
Dept: ORTHOPEDICS | Facility: CLINIC | Age: 75
End: 2020-12-18

## 2020-12-18 NOTE — TELEPHONE ENCOUNTER
Spoke with patient to remind her of her scheduled xray and appointment on 12/22/20.The patient appreciated the phone call.

## 2020-12-30 ENCOUNTER — PATIENT MESSAGE (OUTPATIENT)
Dept: ORTHOPEDICS | Facility: CLINIC | Age: 75
End: 2020-12-30

## 2020-12-31 ENCOUNTER — TELEPHONE (OUTPATIENT)
Dept: ORTHOPEDICS | Facility: CLINIC | Age: 75
End: 2020-12-31

## 2020-12-31 DIAGNOSIS — G89.4 CHRONIC PAIN DISORDER: Primary | ICD-10-CM

## 2020-12-31 RX ORDER — ACETAMINOPHEN AND CODEINE PHOSPHATE 300; 30 MG/1; MG/1
1 TABLET ORAL EVERY 8 HOURS PRN
Qty: 60 TABLET | Refills: 0 | Status: SHIPPED | OUTPATIENT
Start: 2020-12-31 | End: 2021-01-28 | Stop reason: SDUPTHER

## 2020-12-31 NOTE — TELEPHONE ENCOUNTER
Spoke to patient and let her know she has not been seen her since 11/2019 that we can not prescribe her anything at this time.She also has a contract with  for pain management and I gave her the phone number to contact them.----- Message from Berta Villegas sent at 12/31/2020 10:02 AM CST -----  Who Called:  JERRI DERAS    What is the reqeust in detail: Patient is requesting pain medication for the pain in her hand that goes up to her elbow. Please advise.     Can the clinic reply by [facility]?: No    Best Call Back Number: [phone]

## 2021-01-04 ENCOUNTER — TELEPHONE (OUTPATIENT)
Dept: PAIN MEDICINE | Facility: CLINIC | Age: 76
End: 2021-01-04

## 2021-01-05 ENCOUNTER — OFFICE VISIT (OUTPATIENT)
Dept: PAIN MEDICINE | Facility: CLINIC | Age: 76
End: 2021-01-05
Payer: MEDICARE

## 2021-01-05 VITALS
RESPIRATION RATE: 18 BRPM | SYSTOLIC BLOOD PRESSURE: 151 MMHG | OXYGEN SATURATION: 100 % | BODY MASS INDEX: 29.37 KG/M2 | DIASTOLIC BLOOD PRESSURE: 95 MMHG | TEMPERATURE: 98 F | HEIGHT: 64 IN | HEART RATE: 94 BPM | WEIGHT: 172 LBS

## 2021-01-05 DIAGNOSIS — Q85.01 NEUROFIBROMATOSIS, TYPE 1: ICD-10-CM

## 2021-01-05 DIAGNOSIS — M25.531 RIGHT WRIST PAIN: ICD-10-CM

## 2021-01-05 DIAGNOSIS — Z79.891 ENCOUNTER FOR MONITORING OPIOID MAINTENANCE THERAPY: ICD-10-CM

## 2021-01-05 DIAGNOSIS — G89.29 CHRONIC LEFT SHOULDER PAIN: Primary | ICD-10-CM

## 2021-01-05 DIAGNOSIS — M25.512 CHRONIC LEFT SHOULDER PAIN: Primary | ICD-10-CM

## 2021-01-05 DIAGNOSIS — M89.8X1 CHRONIC SCAPULAR PAIN: ICD-10-CM

## 2021-01-05 DIAGNOSIS — M19.012 PRIMARY OSTEOARTHRITIS OF LEFT SHOULDER: ICD-10-CM

## 2021-01-05 DIAGNOSIS — Z51.81 ENCOUNTER FOR MONITORING OPIOID MAINTENANCE THERAPY: ICD-10-CM

## 2021-01-05 DIAGNOSIS — G89.29 CHRONIC SCAPULAR PAIN: ICD-10-CM

## 2021-01-05 DIAGNOSIS — G89.4 CHRONIC PAIN DISORDER: ICD-10-CM

## 2021-01-05 PROCEDURE — 3077F SYST BP >= 140 MM HG: CPT | Mod: CPTII,S$GLB,, | Performed by: NURSE PRACTITIONER

## 2021-01-05 PROCEDURE — 80307 DRUG TEST PRSMV CHEM ANLYZR: CPT

## 2021-01-05 PROCEDURE — 3008F PR BODY MASS INDEX (BMI) DOCUMENTED: ICD-10-PCS | Mod: CPTII,S$GLB,, | Performed by: NURSE PRACTITIONER

## 2021-01-05 PROCEDURE — 3288F FALL RISK ASSESSMENT DOCD: CPT | Mod: CPTII,S$GLB,, | Performed by: NURSE PRACTITIONER

## 2021-01-05 PROCEDURE — 1159F MED LIST DOCD IN RCRD: CPT | Mod: S$GLB,,, | Performed by: NURSE PRACTITIONER

## 2021-01-05 PROCEDURE — 3077F PR MOST RECENT SYSTOLIC BLOOD PRESSURE >= 140 MM HG: ICD-10-PCS | Mod: CPTII,S$GLB,, | Performed by: NURSE PRACTITIONER

## 2021-01-05 PROCEDURE — 99999 PR PBB SHADOW E&M-EST. PATIENT-LVL IV: ICD-10-PCS | Mod: PBBFAC,,, | Performed by: NURSE PRACTITIONER

## 2021-01-05 PROCEDURE — 3080F PR MOST RECENT DIASTOLIC BLOOD PRESSURE >= 90 MM HG: ICD-10-PCS | Mod: CPTII,S$GLB,, | Performed by: NURSE PRACTITIONER

## 2021-01-05 PROCEDURE — 3008F BODY MASS INDEX DOCD: CPT | Mod: CPTII,S$GLB,, | Performed by: NURSE PRACTITIONER

## 2021-01-05 PROCEDURE — 99213 PR OFFICE/OUTPT VISIT, EST, LEVL III, 20-29 MIN: ICD-10-PCS | Mod: S$GLB,,, | Performed by: NURSE PRACTITIONER

## 2021-01-05 PROCEDURE — 1125F PR PAIN SEVERITY QUANTIFIED, PAIN PRESENT: ICD-10-PCS | Mod: S$GLB,,, | Performed by: NURSE PRACTITIONER

## 2021-01-05 PROCEDURE — 99999 PR PBB SHADOW E&M-EST. PATIENT-LVL IV: CPT | Mod: PBBFAC,,, | Performed by: NURSE PRACTITIONER

## 2021-01-05 PROCEDURE — 1125F AMNT PAIN NOTED PAIN PRSNT: CPT | Mod: S$GLB,,, | Performed by: NURSE PRACTITIONER

## 2021-01-05 PROCEDURE — 1101F PT FALLS ASSESS-DOCD LE1/YR: CPT | Mod: CPTII,S$GLB,, | Performed by: NURSE PRACTITIONER

## 2021-01-05 PROCEDURE — 3288F PR FALLS RISK ASSESSMENT DOCUMENTED: ICD-10-PCS | Mod: CPTII,S$GLB,, | Performed by: NURSE PRACTITIONER

## 2021-01-05 PROCEDURE — 99213 OFFICE O/P EST LOW 20 MIN: CPT | Mod: S$GLB,,, | Performed by: NURSE PRACTITIONER

## 2021-01-05 PROCEDURE — 1101F PR PT FALLS ASSESS DOC 0-1 FALLS W/OUT INJ PAST YR: ICD-10-PCS | Mod: CPTII,S$GLB,, | Performed by: NURSE PRACTITIONER

## 2021-01-05 PROCEDURE — 1159F PR MEDICATION LIST DOCUMENTED IN MEDICAL RECORD: ICD-10-PCS | Mod: S$GLB,,, | Performed by: NURSE PRACTITIONER

## 2021-01-05 PROCEDURE — 3080F DIAST BP >= 90 MM HG: CPT | Mod: CPTII,S$GLB,, | Performed by: NURSE PRACTITIONER

## 2021-01-08 ENCOUNTER — TELEPHONE (OUTPATIENT)
Dept: PAIN MEDICINE | Facility: CLINIC | Age: 76
End: 2021-01-08

## 2021-01-08 LAB
6MAM UR QL: NOT DETECTED
7AMINOCLONAZEPAM UR QL: NOT DETECTED
A-OH ALPRAZ UR QL: NOT DETECTED
ALPRAZ UR QL: NOT DETECTED
AMPHET UR QL SCN: NOT DETECTED
ANNOTATION COMMENT IMP: NORMAL
ANNOTATION COMMENT IMP: NORMAL
BARBITURATES UR QL: NOT DETECTED
BUPRENORPHINE UR QL: NOT DETECTED
BZE UR QL: NOT DETECTED
CARBOXYTHC UR QL: NOT DETECTED
CARISOPRODOL UR QL: NOT DETECTED
CLONAZEPAM UR QL: NOT DETECTED
CODEINE UR QL: PRESENT
CREAT UR-MCNC: 250.4 MG/DL (ref 20–400)
DIAZEPAM UR QL: NOT DETECTED
ETHYL GLUCURONIDE UR QL: NOT DETECTED
FENTANYL UR QL: NOT DETECTED
HYDROCODONE UR QL: PRESENT
HYDROMORPHONE UR QL: NOT DETECTED
LORAZEPAM UR QL: NOT DETECTED
MDA UR QL: NOT DETECTED
MDEA UR QL: NOT DETECTED
MDMA UR QL: NOT DETECTED
ME-PHENIDATE UR QL: NOT DETECTED
MEPERIDINE UR QL: NOT DETECTED
METHADONE UR QL: NOT DETECTED
METHAMPHET UR QL: NOT DETECTED
MIDAZOLAM UR QL SCN: NOT DETECTED
MORPHINE UR QL: PRESENT
NORBUPRENORPHINE UR QL CFM: NOT DETECTED
NORDIAZEPAM UR QL: NOT DETECTED
NORFENTANYL UR QL: NOT DETECTED
NORHYDROCODONE UR QL CFM: PRESENT
NOROXYCODONE UR QL CFM: NOT DETECTED
NOROXYMORPHONE: NOT DETECTED
OXAZEPAM UR QL: NOT DETECTED
OXYCODONE UR QL: NOT DETECTED
OXYMORPHONE UR QL: NOT DETECTED
PATHOLOGY STUDY: NORMAL
PCP UR QL: NOT DETECTED
PHENTERMINE UR QL: NOT DETECTED
PROPOXYPH UR QL: NOT DETECTED
SERVICE CMNT-IMP: NORMAL
TAPENTADOL UR QL SCN: NOT DETECTED
TAPENTADOL-O-SULF: NOT DETECTED
TEMAZEPAM UR QL: NOT DETECTED
TRAMADOL UR QL: NOT DETECTED
ZOLPIDEM UR QL: NOT DETECTED

## 2021-01-11 ENCOUNTER — TELEPHONE (OUTPATIENT)
Dept: ORTHOPEDICS | Facility: CLINIC | Age: 76
End: 2021-01-11

## 2021-01-14 ENCOUNTER — HOSPITAL ENCOUNTER (OUTPATIENT)
Dept: RADIOLOGY | Facility: OTHER | Age: 76
Discharge: HOME | End: 2021-01-14
Attending: ORTHOPAEDIC SURGERY
Payer: MEDICARE

## 2021-01-14 ENCOUNTER — OFFICE VISIT (OUTPATIENT)
Dept: ORTHOPEDICS | Facility: CLINIC | Age: 76
End: 2021-01-14
Payer: MEDICARE

## 2021-01-14 VITALS
SYSTOLIC BLOOD PRESSURE: 163 MMHG | HEIGHT: 64 IN | WEIGHT: 172 LBS | DIASTOLIC BLOOD PRESSURE: 112 MMHG | HEART RATE: 86 BPM | BODY MASS INDEX: 29.37 KG/M2

## 2021-01-14 DIAGNOSIS — R52 PAIN: ICD-10-CM

## 2021-01-14 DIAGNOSIS — M25.521 ELBOW PAIN, RIGHT: ICD-10-CM

## 2021-01-14 PROCEDURE — 3080F DIAST BP >= 90 MM HG: CPT | Mod: CPTII,S$GLB,, | Performed by: ORTHOPAEDIC SURGERY

## 2021-01-14 PROCEDURE — 99214 OFFICE O/P EST MOD 30 MIN: CPT | Mod: S$GLB,,, | Performed by: ORTHOPAEDIC SURGERY

## 2021-01-14 PROCEDURE — 1101F PT FALLS ASSESS-DOCD LE1/YR: CPT | Mod: CPTII,S$GLB,, | Performed by: ORTHOPAEDIC SURGERY

## 2021-01-14 PROCEDURE — 1159F MED LIST DOCD IN RCRD: CPT | Mod: S$GLB,,, | Performed by: ORTHOPAEDIC SURGERY

## 2021-01-14 PROCEDURE — 99999 PR PBB SHADOW E&M-EST. PATIENT-LVL IV: ICD-10-PCS | Mod: PBBFAC,,, | Performed by: ORTHOPAEDIC SURGERY

## 2021-01-14 PROCEDURE — 1101F PR PT FALLS ASSESS DOC 0-1 FALLS W/OUT INJ PAST YR: ICD-10-PCS | Mod: CPTII,S$GLB,, | Performed by: ORTHOPAEDIC SURGERY

## 2021-01-14 PROCEDURE — 73130 XR HAND COMPLETE 3 VIEW RIGHT: ICD-10-PCS | Mod: 26,RT,, | Performed by: RADIOLOGY

## 2021-01-14 PROCEDURE — 3008F BODY MASS INDEX DOCD: CPT | Mod: CPTII,S$GLB,, | Performed by: ORTHOPAEDIC SURGERY

## 2021-01-14 PROCEDURE — 73130 X-RAY EXAM OF HAND: CPT | Mod: 26,RT,, | Performed by: RADIOLOGY

## 2021-01-14 PROCEDURE — 3080F PR MOST RECENT DIASTOLIC BLOOD PRESSURE >= 90 MM HG: ICD-10-PCS | Mod: CPTII,S$GLB,, | Performed by: ORTHOPAEDIC SURGERY

## 2021-01-14 PROCEDURE — 3077F SYST BP >= 140 MM HG: CPT | Mod: CPTII,S$GLB,, | Performed by: ORTHOPAEDIC SURGERY

## 2021-01-14 PROCEDURE — 1125F AMNT PAIN NOTED PAIN PRSNT: CPT | Mod: S$GLB,,, | Performed by: ORTHOPAEDIC SURGERY

## 2021-01-14 PROCEDURE — 1159F PR MEDICATION LIST DOCUMENTED IN MEDICAL RECORD: ICD-10-PCS | Mod: S$GLB,,, | Performed by: ORTHOPAEDIC SURGERY

## 2021-01-14 PROCEDURE — 3288F PR FALLS RISK ASSESSMENT DOCUMENTED: ICD-10-PCS | Mod: CPTII,S$GLB,, | Performed by: ORTHOPAEDIC SURGERY

## 2021-01-14 PROCEDURE — 1125F PR PAIN SEVERITY QUANTIFIED, PAIN PRESENT: ICD-10-PCS | Mod: S$GLB,,, | Performed by: ORTHOPAEDIC SURGERY

## 2021-01-14 PROCEDURE — 3077F PR MOST RECENT SYSTOLIC BLOOD PRESSURE >= 140 MM HG: ICD-10-PCS | Mod: CPTII,S$GLB,, | Performed by: ORTHOPAEDIC SURGERY

## 2021-01-14 PROCEDURE — 99999 PR PBB SHADOW E&M-EST. PATIENT-LVL IV: CPT | Mod: PBBFAC,,, | Performed by: ORTHOPAEDIC SURGERY

## 2021-01-14 PROCEDURE — 99214 PR OFFICE/OUTPT VISIT, EST, LEVL IV, 30-39 MIN: ICD-10-PCS | Mod: S$GLB,,, | Performed by: ORTHOPAEDIC SURGERY

## 2021-01-14 PROCEDURE — 3008F PR BODY MASS INDEX (BMI) DOCUMENTED: ICD-10-PCS | Mod: CPTII,S$GLB,, | Performed by: ORTHOPAEDIC SURGERY

## 2021-01-14 PROCEDURE — 3288F FALL RISK ASSESSMENT DOCD: CPT | Mod: CPTII,S$GLB,, | Performed by: ORTHOPAEDIC SURGERY

## 2021-01-14 PROCEDURE — 73130 X-RAY EXAM OF HAND: CPT | Mod: TC,FY,RT

## 2021-01-14 RX ORDER — DICLOFENAC SODIUM 10 MG/G
2 GEL TOPICAL 2 TIMES DAILY PRN
Qty: 100 G | Refills: 1 | Status: SHIPPED | OUTPATIENT
Start: 2021-01-14 | End: 2022-11-08 | Stop reason: SDUPTHER

## 2021-01-15 ENCOUNTER — TELEPHONE (OUTPATIENT)
Dept: PAIN MEDICINE | Facility: CLINIC | Age: 76
End: 2021-01-15

## 2021-01-18 DIAGNOSIS — F41.1 GAD (GENERALIZED ANXIETY DISORDER): ICD-10-CM

## 2021-01-19 RX ORDER — BUSPIRONE HYDROCHLORIDE 5 MG/1
5-10 TABLET ORAL 2 TIMES DAILY PRN
Qty: 30 TABLET | Refills: 2 | OUTPATIENT
Start: 2021-01-19 | End: 2022-01-19

## 2021-01-19 RX ORDER — BUSPIRONE HYDROCHLORIDE 5 MG/1
5-10 TABLET ORAL 2 TIMES DAILY PRN
Qty: 30 TABLET | Refills: 2 | Status: SHIPPED | OUTPATIENT
Start: 2021-01-19 | End: 2021-01-25 | Stop reason: SDUPTHER

## 2021-01-22 ENCOUNTER — TELEPHONE (OUTPATIENT)
Dept: ORTHOPEDICS | Facility: CLINIC | Age: 76
End: 2021-01-22

## 2021-01-22 ENCOUNTER — PATIENT OUTREACH (OUTPATIENT)
Dept: ADMINISTRATIVE | Facility: OTHER | Age: 76
End: 2021-01-22

## 2021-01-22 DIAGNOSIS — Z12.31 BREAST CANCER SCREENING BY MAMMOGRAM: Primary | ICD-10-CM

## 2021-01-25 ENCOUNTER — PATIENT MESSAGE (OUTPATIENT)
Dept: ORTHOPEDICS | Facility: CLINIC | Age: 76
End: 2021-01-25

## 2021-01-25 ENCOUNTER — TELEPHONE (OUTPATIENT)
Dept: ORTHOPEDICS | Facility: CLINIC | Age: 76
End: 2021-01-25

## 2021-01-25 DIAGNOSIS — F41.1 GAD (GENERALIZED ANXIETY DISORDER): ICD-10-CM

## 2021-01-25 RX ORDER — BUSPIRONE HYDROCHLORIDE 5 MG/1
5-10 TABLET ORAL 2 TIMES DAILY PRN
Qty: 30 TABLET | Refills: 2 | Status: SHIPPED | OUTPATIENT
Start: 2021-01-25 | End: 2021-03-08

## 2021-01-26 ENCOUNTER — DOCUMENTATION ONLY (OUTPATIENT)
Dept: ORTHOPEDICS | Facility: CLINIC | Age: 76
End: 2021-01-26

## 2021-01-27 ENCOUNTER — TELEPHONE (OUTPATIENT)
Dept: PAIN MEDICINE | Facility: CLINIC | Age: 76
End: 2021-01-27

## 2021-01-27 ENCOUNTER — TELEPHONE (OUTPATIENT)
Dept: ORTHOPEDICS | Facility: CLINIC | Age: 76
End: 2021-01-27

## 2021-01-28 ENCOUNTER — HOSPITAL ENCOUNTER (OUTPATIENT)
Dept: RADIOLOGY | Facility: HOSPITAL | Age: 76
Discharge: HOME | End: 2021-01-28
Attending: INTERNAL MEDICINE
Payer: MEDICARE

## 2021-01-28 DIAGNOSIS — Z12.31 BREAST CANCER SCREENING BY MAMMOGRAM: ICD-10-CM

## 2021-01-28 DIAGNOSIS — G89.4 CHRONIC PAIN DISORDER: ICD-10-CM

## 2021-01-28 PROCEDURE — 77063 MAMMO DIGITAL SCREENING BILAT WITH TOMO: ICD-10-PCS | Mod: 26,,, | Performed by: RADIOLOGY

## 2021-01-28 PROCEDURE — 77067 SCR MAMMO BI INCL CAD: CPT | Mod: 26,,, | Performed by: RADIOLOGY

## 2021-01-28 PROCEDURE — 77063 BREAST TOMOSYNTHESIS BI: CPT | Mod: 26,,, | Performed by: RADIOLOGY

## 2021-01-28 PROCEDURE — 77067 MAMMO DIGITAL SCREENING BILAT WITH TOMO: ICD-10-PCS | Mod: 26,,, | Performed by: RADIOLOGY

## 2021-01-28 PROCEDURE — 77067 SCR MAMMO BI INCL CAD: CPT | Mod: TC

## 2021-01-28 RX ORDER — ACETAMINOPHEN AND CODEINE PHOSPHATE 300; 30 MG/1; MG/1
1 TABLET ORAL EVERY 8 HOURS PRN
Qty: 60 TABLET | Refills: 0 | Status: SHIPPED | OUTPATIENT
Start: 2021-01-30 | End: 2021-02-19 | Stop reason: SDUPTHER

## 2021-01-29 ENCOUNTER — TELEPHONE (OUTPATIENT)
Dept: PAIN MEDICINE | Facility: CLINIC | Age: 76
End: 2021-01-29

## 2021-02-04 ENCOUNTER — TELEPHONE (OUTPATIENT)
Dept: PAIN MEDICINE | Facility: CLINIC | Age: 76
End: 2021-02-04

## 2021-02-05 ENCOUNTER — OFFICE VISIT (OUTPATIENT)
Dept: PAIN MEDICINE | Facility: CLINIC | Age: 76
End: 2021-02-05
Payer: MEDICARE

## 2021-02-05 VITALS
BODY MASS INDEX: 30.54 KG/M2 | OXYGEN SATURATION: 100 % | HEART RATE: 91 BPM | TEMPERATURE: 98 F | WEIGHT: 178.88 LBS | HEIGHT: 64 IN | DIASTOLIC BLOOD PRESSURE: 84 MMHG | SYSTOLIC BLOOD PRESSURE: 133 MMHG | RESPIRATION RATE: 18 BRPM

## 2021-02-05 DIAGNOSIS — M25.531 RIGHT WRIST PAIN: ICD-10-CM

## 2021-02-05 DIAGNOSIS — Z79.891 ENCOUNTER FOR MONITORING OPIOID MAINTENANCE THERAPY: ICD-10-CM

## 2021-02-05 DIAGNOSIS — M19.012 PRIMARY OSTEOARTHRITIS OF LEFT SHOULDER: ICD-10-CM

## 2021-02-05 DIAGNOSIS — Q85.01 NEUROFIBROMATOSIS, TYPE 1: ICD-10-CM

## 2021-02-05 DIAGNOSIS — Z51.81 ENCOUNTER FOR MONITORING OPIOID MAINTENANCE THERAPY: ICD-10-CM

## 2021-02-05 DIAGNOSIS — M25.512 CHRONIC LEFT SHOULDER PAIN: Primary | ICD-10-CM

## 2021-02-05 DIAGNOSIS — G89.29 CHRONIC LEFT SHOULDER PAIN: Primary | ICD-10-CM

## 2021-02-05 PROCEDURE — 99999 PR PBB SHADOW E&M-EST. PATIENT-LVL IV: CPT | Mod: PBBFAC,,, | Performed by: NURSE PRACTITIONER

## 2021-02-05 PROCEDURE — 3288F PR FALLS RISK ASSESSMENT DOCUMENTED: ICD-10-PCS | Mod: CPTII,S$GLB,, | Performed by: NURSE PRACTITIONER

## 2021-02-05 PROCEDURE — 99213 OFFICE O/P EST LOW 20 MIN: CPT | Mod: S$GLB,,, | Performed by: NURSE PRACTITIONER

## 2021-02-05 PROCEDURE — 3288F FALL RISK ASSESSMENT DOCD: CPT | Mod: CPTII,S$GLB,, | Performed by: NURSE PRACTITIONER

## 2021-02-05 PROCEDURE — 1159F PR MEDICATION LIST DOCUMENTED IN MEDICAL RECORD: ICD-10-PCS | Mod: S$GLB,,, | Performed by: NURSE PRACTITIONER

## 2021-02-05 PROCEDURE — 1125F AMNT PAIN NOTED PAIN PRSNT: CPT | Mod: S$GLB,,, | Performed by: NURSE PRACTITIONER

## 2021-02-05 PROCEDURE — 99213 PR OFFICE/OUTPT VISIT, EST, LEVL III, 20-29 MIN: ICD-10-PCS | Mod: S$GLB,,, | Performed by: NURSE PRACTITIONER

## 2021-02-05 PROCEDURE — 1159F MED LIST DOCD IN RCRD: CPT | Mod: S$GLB,,, | Performed by: NURSE PRACTITIONER

## 2021-02-05 PROCEDURE — 99999 PR PBB SHADOW E&M-EST. PATIENT-LVL IV: ICD-10-PCS | Mod: PBBFAC,,, | Performed by: NURSE PRACTITIONER

## 2021-02-05 PROCEDURE — 3079F DIAST BP 80-89 MM HG: CPT | Mod: CPTII,S$GLB,, | Performed by: NURSE PRACTITIONER

## 2021-02-05 PROCEDURE — 1100F PTFALLS ASSESS-DOCD GE2>/YR: CPT | Mod: CPTII,S$GLB,, | Performed by: NURSE PRACTITIONER

## 2021-02-05 PROCEDURE — 3008F PR BODY MASS INDEX (BMI) DOCUMENTED: ICD-10-PCS | Mod: CPTII,S$GLB,, | Performed by: NURSE PRACTITIONER

## 2021-02-05 PROCEDURE — 1125F PR PAIN SEVERITY QUANTIFIED, PAIN PRESENT: ICD-10-PCS | Mod: S$GLB,,, | Performed by: NURSE PRACTITIONER

## 2021-02-05 PROCEDURE — 1100F PR PT FALLS ASSESS DOC 2+ FALLS/FALL W/INJURY/YR: ICD-10-PCS | Mod: CPTII,S$GLB,, | Performed by: NURSE PRACTITIONER

## 2021-02-05 PROCEDURE — 3008F BODY MASS INDEX DOCD: CPT | Mod: CPTII,S$GLB,, | Performed by: NURSE PRACTITIONER

## 2021-02-05 PROCEDURE — 3075F PR MOST RECENT SYSTOLIC BLOOD PRESS GE 130-139MM HG: ICD-10-PCS | Mod: CPTII,S$GLB,, | Performed by: NURSE PRACTITIONER

## 2021-02-05 PROCEDURE — 3075F SYST BP GE 130 - 139MM HG: CPT | Mod: CPTII,S$GLB,, | Performed by: NURSE PRACTITIONER

## 2021-02-05 PROCEDURE — 3079F PR MOST RECENT DIASTOLIC BLOOD PRESSURE 80-89 MM HG: ICD-10-PCS | Mod: CPTII,S$GLB,, | Performed by: NURSE PRACTITIONER

## 2021-02-12 ENCOUNTER — TELEPHONE (OUTPATIENT)
Dept: PAIN MEDICINE | Facility: CLINIC | Age: 76
End: 2021-02-12

## 2021-02-19 DIAGNOSIS — G89.4 CHRONIC PAIN DISORDER: Primary | ICD-10-CM

## 2021-02-19 RX ORDER — ACETAMINOPHEN AND CODEINE PHOSPHATE 300; 30 MG/1; MG/1
1 TABLET ORAL EVERY 8 HOURS PRN
Qty: 60 TABLET | Refills: 0 | Status: SHIPPED | OUTPATIENT
Start: 2021-02-27 | End: 2021-03-05

## 2021-03-02 ENCOUNTER — TELEPHONE (OUTPATIENT)
Dept: PAIN MEDICINE | Facility: CLINIC | Age: 76
End: 2021-03-02

## 2021-03-02 DIAGNOSIS — G89.4 CHRONIC PAIN DISORDER: ICD-10-CM

## 2021-03-08 DIAGNOSIS — F41.1 GAD (GENERALIZED ANXIETY DISORDER): ICD-10-CM

## 2021-03-08 RX ORDER — BUSPIRONE HYDROCHLORIDE 5 MG/1
5-10 TABLET ORAL 2 TIMES DAILY PRN
Qty: 30 TABLET | Refills: 2 | Status: SHIPPED | OUTPATIENT
Start: 2021-03-08 | End: 2021-04-05 | Stop reason: SDUPTHER

## 2021-03-31 ENCOUNTER — TELEPHONE (OUTPATIENT)
Dept: PAIN MEDICINE | Facility: CLINIC | Age: 76
End: 2021-03-31

## 2021-03-31 ENCOUNTER — NURSE TRIAGE (OUTPATIENT)
Dept: ADMINISTRATIVE | Facility: CLINIC | Age: 76
End: 2021-03-31

## 2021-03-31 DIAGNOSIS — G89.4 CHRONIC PAIN DISORDER: ICD-10-CM

## 2021-03-31 DIAGNOSIS — G89.4 CHRONIC PAIN DISORDER: Primary | ICD-10-CM

## 2021-04-01 RX ORDER — ACETAMINOPHEN AND CODEINE PHOSPHATE 300; 30 MG/1; MG/1
1 TABLET ORAL 2 TIMES DAILY PRN
Qty: 60 TABLET | Refills: 0 | OUTPATIENT
Start: 2021-04-01

## 2021-04-05 ENCOUNTER — TELEPHONE (OUTPATIENT)
Dept: ORTHOPEDICS | Facility: CLINIC | Age: 76
End: 2021-04-05

## 2021-04-07 DIAGNOSIS — G89.4 CHRONIC PAIN DISORDER: ICD-10-CM

## 2021-04-07 RX ORDER — ACETAMINOPHEN AND CODEINE PHOSPHATE 300; 30 MG/1; MG/1
1 TABLET ORAL EVERY 12 HOURS PRN
Qty: 60 TABLET | Refills: 0 | Status: SHIPPED | OUTPATIENT
Start: 2021-04-08 | End: 2021-04-26 | Stop reason: SDUPTHER

## 2021-04-12 ENCOUNTER — TELEPHONE (OUTPATIENT)
Dept: INTERNAL MEDICINE | Facility: CLINIC | Age: 76
End: 2021-04-12

## 2021-04-14 ENCOUNTER — TELEPHONE (OUTPATIENT)
Dept: PAIN MEDICINE | Facility: CLINIC | Age: 76
End: 2021-04-14

## 2021-04-20 DIAGNOSIS — F41.1 GAD (GENERALIZED ANXIETY DISORDER): ICD-10-CM

## 2021-04-20 RX ORDER — BUSPIRONE HYDROCHLORIDE 5 MG/1
5-10 TABLET ORAL 2 TIMES DAILY PRN
Qty: 30 TABLET | Refills: 2 | Status: SHIPPED | OUTPATIENT
Start: 2021-04-20 | End: 2021-04-26 | Stop reason: SDUPTHER

## 2021-04-22 DIAGNOSIS — Z85.09 HISTORY OF GASTROINTESTINAL STROMAL TUMOR (GIST): Primary | ICD-10-CM

## 2021-04-26 ENCOUNTER — OFFICE VISIT (OUTPATIENT)
Dept: PSYCHIATRY | Facility: CLINIC | Age: 76
End: 2021-04-26
Payer: MEDICAID

## 2021-04-26 DIAGNOSIS — F41.1 GAD (GENERALIZED ANXIETY DISORDER): ICD-10-CM

## 2021-04-26 DIAGNOSIS — F32.A DEPRESSION, UNSPECIFIED DEPRESSION TYPE: Primary | ICD-10-CM

## 2021-04-26 DIAGNOSIS — G89.4 CHRONIC PAIN DISORDER: Primary | ICD-10-CM

## 2021-04-26 PROCEDURE — 90832 PR PSYCHOTHERAPY W/PATIENT, 30 MIN: ICD-10-PCS | Mod: 95,,, | Performed by: SOCIAL WORKER

## 2021-04-26 PROCEDURE — 90832 PSYTX W PT 30 MINUTES: CPT | Mod: 95,,, | Performed by: SOCIAL WORKER

## 2021-04-26 RX ORDER — BUSPIRONE HYDROCHLORIDE 5 MG/1
5-10 TABLET ORAL 2 TIMES DAILY PRN
Qty: 30 TABLET | Refills: 2 | Status: SHIPPED | OUTPATIENT
Start: 2021-04-26 | End: 2021-06-24 | Stop reason: SDUPTHER

## 2021-04-27 RX ORDER — ACETAMINOPHEN AND CODEINE PHOSPHATE 300; 30 MG/1; MG/1
1 TABLET ORAL EVERY 12 HOURS PRN
Qty: 60 TABLET | Refills: 0 | Status: SHIPPED | OUTPATIENT
Start: 2021-05-07 | End: 2021-06-07

## 2021-04-30 ENCOUNTER — TELEPHONE (OUTPATIENT)
Dept: PAIN MEDICINE | Facility: CLINIC | Age: 76
End: 2021-04-30

## 2021-05-03 ENCOUNTER — TELEPHONE (OUTPATIENT)
Dept: HEMATOLOGY/ONCOLOGY | Facility: CLINIC | Age: 76
End: 2021-05-03

## 2021-05-14 ENCOUNTER — TELEPHONE (OUTPATIENT)
Dept: PAIN MEDICINE | Facility: CLINIC | Age: 76
End: 2021-05-14

## 2021-05-19 ENCOUNTER — TELEPHONE (OUTPATIENT)
Dept: PAIN MEDICINE | Facility: CLINIC | Age: 76
End: 2021-05-19

## 2021-05-24 ENCOUNTER — TELEPHONE (OUTPATIENT)
Dept: PAIN MEDICINE | Facility: CLINIC | Age: 76
End: 2021-05-24

## 2021-06-21 ENCOUNTER — TELEPHONE (OUTPATIENT)
Dept: INTERNAL MEDICINE | Facility: CLINIC | Age: 76
End: 2021-06-21

## 2021-06-24 ENCOUNTER — TELEPHONE (OUTPATIENT)
Dept: INTERNAL MEDICINE | Facility: CLINIC | Age: 76
End: 2021-06-24

## 2021-06-24 DIAGNOSIS — F41.1 GAD (GENERALIZED ANXIETY DISORDER): ICD-10-CM

## 2021-06-24 RX ORDER — BUSPIRONE HYDROCHLORIDE 5 MG/1
5-10 TABLET ORAL 2 TIMES DAILY PRN
Qty: 30 TABLET | Refills: 0 | Status: SHIPPED | OUTPATIENT
Start: 2021-06-24 | End: 2021-07-07 | Stop reason: SDUPTHER

## 2021-06-30 ENCOUNTER — TELEPHONE (OUTPATIENT)
Dept: PAIN MEDICINE | Facility: CLINIC | Age: 76
End: 2021-06-30

## 2021-06-30 DIAGNOSIS — G89.4 CHRONIC PAIN DISORDER: Primary | ICD-10-CM

## 2021-06-30 RX ORDER — ACETAMINOPHEN AND CODEINE PHOSPHATE 300; 30 MG/1; MG/1
1 TABLET ORAL EVERY 12 HOURS PRN
Qty: 60 TABLET | Refills: 0 | OUTPATIENT
Start: 2021-07-07 | End: 2021-08-06

## 2021-07-07 ENCOUNTER — TELEPHONE (OUTPATIENT)
Dept: PAIN MEDICINE | Facility: CLINIC | Age: 76
End: 2021-07-07

## 2021-07-07 DIAGNOSIS — F41.1 GAD (GENERALIZED ANXIETY DISORDER): ICD-10-CM

## 2021-07-07 DIAGNOSIS — G89.4 CHRONIC PAIN DISORDER: ICD-10-CM

## 2021-07-07 RX ORDER — BUSPIRONE HYDROCHLORIDE 5 MG/1
5-10 TABLET ORAL 2 TIMES DAILY PRN
Qty: 30 TABLET | Refills: 0 | Status: SHIPPED | OUTPATIENT
Start: 2021-07-07 | End: 2021-07-14

## 2021-07-07 RX ORDER — ACETAMINOPHEN AND CODEINE PHOSPHATE 300; 30 MG/1; MG/1
1 TABLET ORAL EVERY 12 HOURS PRN
Qty: 60 TABLET | Refills: 0 | OUTPATIENT
Start: 2021-07-07 | End: 2021-08-06

## 2021-07-07 RX ORDER — BUSPIRONE HYDROCHLORIDE 5 MG/1
5-10 TABLET ORAL 2 TIMES DAILY PRN
Qty: 30 TABLET | Refills: 0 | Status: CANCELLED | OUTPATIENT
Start: 2021-07-07 | End: 2022-07-07

## 2021-07-09 ENCOUNTER — TELEPHONE (OUTPATIENT)
Dept: INTERNAL MEDICINE | Facility: CLINIC | Age: 76
End: 2021-07-09

## 2021-07-12 ENCOUNTER — OFFICE VISIT (OUTPATIENT)
Dept: PAIN MEDICINE | Facility: CLINIC | Age: 76
End: 2021-07-12
Payer: MEDICARE

## 2021-07-12 ENCOUNTER — PATIENT OUTREACH (OUTPATIENT)
Dept: ADMINISTRATIVE | Facility: OTHER | Age: 76
End: 2021-07-12

## 2021-07-12 ENCOUNTER — TELEPHONE (OUTPATIENT)
Dept: ORTHOPEDICS | Facility: CLINIC | Age: 76
End: 2021-07-12

## 2021-07-12 VITALS
TEMPERATURE: 97 F | HEART RATE: 75 BPM | DIASTOLIC BLOOD PRESSURE: 99 MMHG | BODY MASS INDEX: 31.24 KG/M2 | HEIGHT: 64 IN | RESPIRATION RATE: 18 BRPM | WEIGHT: 183 LBS | SYSTOLIC BLOOD PRESSURE: 153 MMHG

## 2021-07-12 DIAGNOSIS — M25.512 CHRONIC LEFT SHOULDER PAIN: ICD-10-CM

## 2021-07-12 DIAGNOSIS — F41.1 GAD (GENERALIZED ANXIETY DISORDER): ICD-10-CM

## 2021-07-12 DIAGNOSIS — M89.8X1 CHRONIC SCAPULAR PAIN: ICD-10-CM

## 2021-07-12 DIAGNOSIS — G89.29 CHRONIC SCAPULAR PAIN: ICD-10-CM

## 2021-07-12 DIAGNOSIS — Q85.01 NEUROFIBROMATOSIS, TYPE 1: Primary | ICD-10-CM

## 2021-07-12 DIAGNOSIS — G89.29 CHRONIC LEFT SHOULDER PAIN: ICD-10-CM

## 2021-07-12 DIAGNOSIS — M54.12 RADICULOPATHY, CERVICAL REGION: ICD-10-CM

## 2021-07-12 PROCEDURE — 3288F FALL RISK ASSESSMENT DOCD: CPT | Mod: CPTII,S$GLB,, | Performed by: ANESTHESIOLOGY

## 2021-07-12 PROCEDURE — 99999 PR PBB SHADOW E&M-EST. PATIENT-LVL IV: ICD-10-PCS | Mod: PBBFAC,,, | Performed by: ANESTHESIOLOGY

## 2021-07-12 PROCEDURE — 3008F BODY MASS INDEX DOCD: CPT | Mod: CPTII,S$GLB,, | Performed by: ANESTHESIOLOGY

## 2021-07-12 PROCEDURE — 1125F AMNT PAIN NOTED PAIN PRSNT: CPT | Mod: S$GLB,,, | Performed by: ANESTHESIOLOGY

## 2021-07-12 PROCEDURE — 80307 DRUG TEST PRSMV CHEM ANLYZR: CPT | Performed by: ANESTHESIOLOGY

## 2021-07-12 PROCEDURE — 1159F PR MEDICATION LIST DOCUMENTED IN MEDICAL RECORD: ICD-10-PCS | Mod: S$GLB,,, | Performed by: ANESTHESIOLOGY

## 2021-07-12 PROCEDURE — 99214 OFFICE O/P EST MOD 30 MIN: CPT | Mod: S$GLB,,, | Performed by: ANESTHESIOLOGY

## 2021-07-12 PROCEDURE — 3008F PR BODY MASS INDEX (BMI) DOCUMENTED: ICD-10-PCS | Mod: CPTII,S$GLB,, | Performed by: ANESTHESIOLOGY

## 2021-07-12 PROCEDURE — 99499 RISK ADDL DX/OHS AUDIT: ICD-10-PCS | Mod: S$GLB,,, | Performed by: ANESTHESIOLOGY

## 2021-07-12 PROCEDURE — 3288F PR FALLS RISK ASSESSMENT DOCUMENTED: ICD-10-PCS | Mod: CPTII,S$GLB,, | Performed by: ANESTHESIOLOGY

## 2021-07-12 PROCEDURE — 1159F MED LIST DOCD IN RCRD: CPT | Mod: S$GLB,,, | Performed by: ANESTHESIOLOGY

## 2021-07-12 PROCEDURE — 1101F PR PT FALLS ASSESS DOC 0-1 FALLS W/OUT INJ PAST YR: ICD-10-PCS | Mod: CPTII,S$GLB,, | Performed by: ANESTHESIOLOGY

## 2021-07-12 PROCEDURE — 99499 UNLISTED E&M SERVICE: CPT | Mod: S$GLB,,, | Performed by: ANESTHESIOLOGY

## 2021-07-12 PROCEDURE — 1125F PR PAIN SEVERITY QUANTIFIED, PAIN PRESENT: ICD-10-PCS | Mod: S$GLB,,, | Performed by: ANESTHESIOLOGY

## 2021-07-12 PROCEDURE — 99214 PR OFFICE/OUTPT VISIT, EST, LEVL IV, 30-39 MIN: ICD-10-PCS | Mod: S$GLB,,, | Performed by: ANESTHESIOLOGY

## 2021-07-12 PROCEDURE — 99999 PR PBB SHADOW E&M-EST. PATIENT-LVL IV: CPT | Mod: PBBFAC,,, | Performed by: ANESTHESIOLOGY

## 2021-07-12 PROCEDURE — 1101F PT FALLS ASSESS-DOCD LE1/YR: CPT | Mod: CPTII,S$GLB,, | Performed by: ANESTHESIOLOGY

## 2021-07-12 RX ORDER — DICLOFENAC SODIUM 10 MG/G
2 GEL TOPICAL 4 TIMES DAILY PRN
Qty: 5 TUBE | Refills: 3 | Status: SHIPPED | OUTPATIENT
Start: 2021-07-12 | End: 2021-08-11

## 2021-07-12 RX ORDER — ACETAMINOPHEN AND CODEINE PHOSPHATE 300; 30 MG/1; MG/1
1 TABLET ORAL EVERY 8 HOURS PRN
Qty: 90 TABLET | Refills: 0 | Status: SHIPPED | OUTPATIENT
Start: 2021-07-12 | End: 2021-07-22

## 2021-07-14 RX ORDER — BUSPIRONE HYDROCHLORIDE 5 MG/1
5-10 TABLET ORAL 2 TIMES DAILY PRN
Qty: 30 TABLET | Refills: 0 | Status: SHIPPED | OUTPATIENT
Start: 2021-07-14 | End: 2021-07-19 | Stop reason: SDUPTHER

## 2021-07-14 RX ORDER — CETIRIZINE HYDROCHLORIDE 10 MG/1
10 TABLET ORAL DAILY
Qty: 30 TABLET | Refills: 0 | Status: SHIPPED | OUTPATIENT
Start: 2021-07-14 | End: 2021-08-16 | Stop reason: SDUPTHER

## 2021-07-16 LAB

## 2021-07-19 DIAGNOSIS — F41.1 GAD (GENERALIZED ANXIETY DISORDER): ICD-10-CM

## 2021-07-19 RX ORDER — BUSPIRONE HYDROCHLORIDE 5 MG/1
5-10 TABLET ORAL 2 TIMES DAILY PRN
Qty: 30 TABLET | Refills: 11 | Status: SHIPPED | OUTPATIENT
Start: 2021-07-19 | End: 2021-08-16 | Stop reason: SDUPTHER

## 2021-08-03 ENCOUNTER — TELEPHONE (OUTPATIENT)
Dept: ORTHOPEDICS | Facility: CLINIC | Age: 76
End: 2021-08-03

## 2021-08-04 DIAGNOSIS — G89.4 CHRONIC PAIN DISORDER: ICD-10-CM

## 2021-08-04 RX ORDER — ACETAMINOPHEN AND CODEINE PHOSPHATE 300; 30 MG/1; MG/1
1 TABLET ORAL 2 TIMES DAILY PRN
Qty: 60 TABLET | Refills: 0 | Status: SHIPPED | OUTPATIENT
Start: 2021-08-11 | End: 2021-08-18

## 2021-08-04 RX ORDER — ACETAMINOPHEN AND CODEINE PHOSPHATE 300; 30 MG/1; MG/1
TABLET ORAL
Qty: 90 TABLET | Refills: 0 | OUTPATIENT
Start: 2021-08-04

## 2021-08-06 ENCOUNTER — TELEPHONE (OUTPATIENT)
Dept: PAIN MEDICINE | Facility: CLINIC | Age: 76
End: 2021-08-06

## 2021-08-16 ENCOUNTER — TELEPHONE (OUTPATIENT)
Dept: INTERNAL MEDICINE | Facility: CLINIC | Age: 76
End: 2021-08-16

## 2021-08-16 ENCOUNTER — OFFICE VISIT (OUTPATIENT)
Dept: INTERNAL MEDICINE | Facility: CLINIC | Age: 76
End: 2021-08-16
Payer: MEDICARE

## 2021-08-16 VITALS
OXYGEN SATURATION: 98 % | HEIGHT: 64 IN | DIASTOLIC BLOOD PRESSURE: 78 MMHG | SYSTOLIC BLOOD PRESSURE: 138 MMHG | WEIGHT: 181.19 LBS | HEART RATE: 78 BPM | BODY MASS INDEX: 30.93 KG/M2

## 2021-08-16 DIAGNOSIS — F41.1 GAD (GENERALIZED ANXIETY DISORDER): ICD-10-CM

## 2021-08-16 DIAGNOSIS — G89.29 CHRONIC SCAPULAR PAIN: ICD-10-CM

## 2021-08-16 DIAGNOSIS — Z13.6 SCREENING FOR CARDIOVASCULAR CONDITION: ICD-10-CM

## 2021-08-16 DIAGNOSIS — G89.29 CHRONIC LEFT SHOULDER PAIN: ICD-10-CM

## 2021-08-16 DIAGNOSIS — M25.512 CHRONIC LEFT SHOULDER PAIN: ICD-10-CM

## 2021-08-16 DIAGNOSIS — Z78.0 ASYMPTOMATIC MENOPAUSAL STATE: ICD-10-CM

## 2021-08-16 DIAGNOSIS — Q85.01 NEUROFIBROMATOSIS, TYPE 1: ICD-10-CM

## 2021-08-16 DIAGNOSIS — R11.0 CHRONIC NAUSEA: ICD-10-CM

## 2021-08-16 DIAGNOSIS — I10 ESSENTIAL HYPERTENSION: Primary | ICD-10-CM

## 2021-08-16 DIAGNOSIS — Z85.09 HISTORY OF GASTROINTESTINAL STROMAL TUMOR (GIST): ICD-10-CM

## 2021-08-16 DIAGNOSIS — M89.8X1 CHRONIC SCAPULAR PAIN: ICD-10-CM

## 2021-08-16 DIAGNOSIS — Z91.09 ENVIRONMENTAL ALLERGIES: ICD-10-CM

## 2021-08-16 DIAGNOSIS — M79.10 MYALGIA: ICD-10-CM

## 2021-08-16 PROCEDURE — 1159F MED LIST DOCD IN RCRD: CPT | Mod: CPTII,S$GLB,, | Performed by: INTERNAL MEDICINE

## 2021-08-16 PROCEDURE — 99999 PR PBB SHADOW E&M-EST. PATIENT-LVL IV: ICD-10-PCS | Mod: PBBFAC,,, | Performed by: INTERNAL MEDICINE

## 2021-08-16 PROCEDURE — 99214 PR OFFICE/OUTPT VISIT, EST, LEVL IV, 30-39 MIN: ICD-10-PCS | Mod: S$GLB,,, | Performed by: INTERNAL MEDICINE

## 2021-08-16 PROCEDURE — 3288F FALL RISK ASSESSMENT DOCD: CPT | Mod: CPTII,S$GLB,, | Performed by: INTERNAL MEDICINE

## 2021-08-16 PROCEDURE — 3008F PR BODY MASS INDEX (BMI) DOCUMENTED: ICD-10-PCS | Mod: CPTII,S$GLB,, | Performed by: INTERNAL MEDICINE

## 2021-08-16 PROCEDURE — 1125F PR PAIN SEVERITY QUANTIFIED, PAIN PRESENT: ICD-10-PCS | Mod: CPTII,S$GLB,, | Performed by: INTERNAL MEDICINE

## 2021-08-16 PROCEDURE — 99999 PR PBB SHADOW E&M-EST. PATIENT-LVL IV: CPT | Mod: PBBFAC,,, | Performed by: INTERNAL MEDICINE

## 2021-08-16 PROCEDURE — 1160F RVW MEDS BY RX/DR IN RCRD: CPT | Mod: CPTII,S$GLB,, | Performed by: INTERNAL MEDICINE

## 2021-08-16 PROCEDURE — 1159F PR MEDICATION LIST DOCUMENTED IN MEDICAL RECORD: ICD-10-PCS | Mod: CPTII,S$GLB,, | Performed by: INTERNAL MEDICINE

## 2021-08-16 PROCEDURE — 99214 OFFICE O/P EST MOD 30 MIN: CPT | Mod: S$GLB,,, | Performed by: INTERNAL MEDICINE

## 2021-08-16 PROCEDURE — 3078F PR MOST RECENT DIASTOLIC BLOOD PRESSURE < 80 MM HG: ICD-10-PCS | Mod: CPTII,S$GLB,, | Performed by: INTERNAL MEDICINE

## 2021-08-16 PROCEDURE — 3008F BODY MASS INDEX DOCD: CPT | Mod: CPTII,S$GLB,, | Performed by: INTERNAL MEDICINE

## 2021-08-16 PROCEDURE — 3075F SYST BP GE 130 - 139MM HG: CPT | Mod: CPTII,S$GLB,, | Performed by: INTERNAL MEDICINE

## 2021-08-16 PROCEDURE — 1160F PR REVIEW ALL MEDS BY PRESCRIBER/CLIN PHARMACIST DOCUMENTED: ICD-10-PCS | Mod: CPTII,S$GLB,, | Performed by: INTERNAL MEDICINE

## 2021-08-16 PROCEDURE — 1125F AMNT PAIN NOTED PAIN PRSNT: CPT | Mod: CPTII,S$GLB,, | Performed by: INTERNAL MEDICINE

## 2021-08-16 PROCEDURE — 1101F PT FALLS ASSESS-DOCD LE1/YR: CPT | Mod: CPTII,S$GLB,, | Performed by: INTERNAL MEDICINE

## 2021-08-16 PROCEDURE — 1101F PR PT FALLS ASSESS DOC 0-1 FALLS W/OUT INJ PAST YR: ICD-10-PCS | Mod: CPTII,S$GLB,, | Performed by: INTERNAL MEDICINE

## 2021-08-16 PROCEDURE — 3078F DIAST BP <80 MM HG: CPT | Mod: CPTII,S$GLB,, | Performed by: INTERNAL MEDICINE

## 2021-08-16 PROCEDURE — 3288F PR FALLS RISK ASSESSMENT DOCUMENTED: ICD-10-PCS | Mod: CPTII,S$GLB,, | Performed by: INTERNAL MEDICINE

## 2021-08-16 PROCEDURE — 3075F PR MOST RECENT SYSTOLIC BLOOD PRESS GE 130-139MM HG: ICD-10-PCS | Mod: CPTII,S$GLB,, | Performed by: INTERNAL MEDICINE

## 2021-08-16 RX ORDER — BUSPIRONE HYDROCHLORIDE 5 MG/1
5-10 TABLET ORAL 2 TIMES DAILY PRN
Qty: 30 TABLET | Refills: 11 | Status: SHIPPED | OUTPATIENT
Start: 2021-08-16 | End: 2021-09-07 | Stop reason: SDUPTHER

## 2021-08-16 RX ORDER — CIMETIDINE 400 MG/1
400 TABLET, FILM COATED ORAL 2 TIMES DAILY
Qty: 180 TABLET | Refills: 3 | Status: SHIPPED | OUTPATIENT
Start: 2021-08-16 | End: 2023-01-18

## 2021-08-16 RX ORDER — CETIRIZINE HYDROCHLORIDE 10 MG/1
10 TABLET ORAL DAILY
Qty: 90 TABLET | Refills: 3 | Status: SHIPPED | OUTPATIENT
Start: 2021-08-16 | End: 2021-09-07 | Stop reason: SDUPTHER

## 2021-08-16 NOTE — TELEPHONE ENCOUNTER
Spoke with Ms. Gong and advised her that you are wanting a MRI of the shoulder as well as the C-Spine. She expressed understanding and I also told her to follow up with us if she does not get scheduled by the end of the week,

## 2021-08-18 ENCOUNTER — LAB VISIT (OUTPATIENT)
Dept: LAB | Facility: HOSPITAL | Age: 76
End: 2021-08-18
Attending: INTERNAL MEDICINE
Payer: MEDICAID

## 2021-08-18 DIAGNOSIS — R11.0 CHRONIC NAUSEA: ICD-10-CM

## 2021-08-18 DIAGNOSIS — G89.4 CHRONIC PAIN DISORDER: ICD-10-CM

## 2021-08-18 PROCEDURE — 87338 HPYLORI STOOL AG IA: CPT | Performed by: INTERNAL MEDICINE

## 2021-08-18 RX ORDER — ACETAMINOPHEN AND CODEINE PHOSPHATE 300; 30 MG/1; MG/1
TABLET ORAL
Qty: 60 TABLET | Refills: 0 | Status: SHIPPED | OUTPATIENT
Start: 2021-08-18 | End: 2021-09-16 | Stop reason: SDUPTHER

## 2021-08-24 LAB
H PYLORI AG STL QL IA: ABNORMAL
SPECIMEN SOURCE: ABNORMAL

## 2021-08-25 ENCOUNTER — PATIENT MESSAGE (OUTPATIENT)
Dept: INTERNAL MEDICINE | Facility: CLINIC | Age: 76
End: 2021-08-25

## 2021-08-25 DIAGNOSIS — A04.8 H. PYLORI INFECTION: Primary | ICD-10-CM

## 2021-08-25 RX ORDER — AMOXICILLIN 500 MG/1
1000 TABLET, FILM COATED ORAL EVERY 12 HOURS
Qty: 56 TABLET | Refills: 0 | Status: SHIPPED | OUTPATIENT
Start: 2021-08-25 | End: 2021-09-08

## 2021-08-25 RX ORDER — HYDROGEN PEROXIDE 3 %
20 SOLUTION, NON-ORAL MISCELLANEOUS
Qty: 28 CAPSULE | Refills: 0 | Status: SHIPPED | OUTPATIENT
Start: 2021-08-25 | End: 2023-11-09

## 2021-08-25 RX ORDER — CLARITHROMYCIN 500 MG/1
500 TABLET, FILM COATED ORAL 2 TIMES DAILY
Qty: 28 TABLET | Refills: 0 | Status: SHIPPED | OUTPATIENT
Start: 2021-08-25 | End: 2021-09-08

## 2021-09-07 DIAGNOSIS — I10 ESSENTIAL HYPERTENSION: ICD-10-CM

## 2021-09-07 DIAGNOSIS — Z91.09 ENVIRONMENTAL ALLERGIES: ICD-10-CM

## 2021-09-07 DIAGNOSIS — F41.1 GAD (GENERALIZED ANXIETY DISORDER): ICD-10-CM

## 2021-09-07 RX ORDER — BUSPIRONE HYDROCHLORIDE 5 MG/1
5-10 TABLET ORAL 2 TIMES DAILY PRN
Qty: 90 TABLET | Refills: 11 | Status: SHIPPED | OUTPATIENT
Start: 2021-09-07 | End: 2021-10-07 | Stop reason: SDUPTHER

## 2021-09-07 RX ORDER — CETIRIZINE HYDROCHLORIDE 10 MG/1
10 TABLET ORAL DAILY
Qty: 90 TABLET | Refills: 3 | Status: SHIPPED | OUTPATIENT
Start: 2021-09-07 | End: 2021-09-13

## 2021-09-07 RX ORDER — LISINOPRIL 10 MG/1
20 TABLET ORAL DAILY
Qty: 180 TABLET | Refills: 3 | Status: SHIPPED | OUTPATIENT
Start: 2021-09-07 | End: 2022-12-16 | Stop reason: SDUPTHER

## 2021-09-08 ENCOUNTER — TELEPHONE (OUTPATIENT)
Dept: PAIN MEDICINE | Facility: CLINIC | Age: 76
End: 2021-09-08

## 2021-09-08 ENCOUNTER — TELEPHONE (OUTPATIENT)
Dept: INTERNAL MEDICINE | Facility: CLINIC | Age: 76
End: 2021-09-08

## 2021-09-09 ENCOUNTER — TELEPHONE (OUTPATIENT)
Dept: PAIN MEDICINE | Facility: CLINIC | Age: 76
End: 2021-09-09

## 2021-09-15 ENCOUNTER — TELEPHONE (OUTPATIENT)
Dept: PAIN MEDICINE | Facility: CLINIC | Age: 76
End: 2021-09-15

## 2021-09-27 DIAGNOSIS — G89.4 CHRONIC PAIN DISORDER: ICD-10-CM

## 2021-09-27 RX ORDER — ACETAMINOPHEN AND CODEINE PHOSPHATE 300; 30 MG/1; MG/1
1 TABLET ORAL 2 TIMES DAILY PRN
Qty: 60 TABLET | Refills: 0 | Status: CANCELLED | OUTPATIENT
Start: 2021-09-27

## 2021-09-27 RX ORDER — ACETAMINOPHEN AND CODEINE PHOSPHATE 300; 30 MG/1; MG/1
1 TABLET ORAL 2 TIMES DAILY PRN
Qty: 60 TABLET | Refills: 0 | OUTPATIENT
Start: 2021-09-27

## 2021-09-29 DIAGNOSIS — G89.4 CHRONIC PAIN DISORDER: ICD-10-CM

## 2021-09-29 RX ORDER — ACETAMINOPHEN AND CODEINE PHOSPHATE 300; 30 MG/1; MG/1
1 TABLET ORAL 2 TIMES DAILY PRN
Qty: 60 TABLET | Refills: 0 | OUTPATIENT
Start: 2021-09-29

## 2021-09-30 ENCOUNTER — TELEPHONE (OUTPATIENT)
Dept: PAIN MEDICINE | Facility: CLINIC | Age: 76
End: 2021-09-30

## 2021-10-05 ENCOUNTER — TELEPHONE (OUTPATIENT)
Dept: PAIN MEDICINE | Facility: CLINIC | Age: 76
End: 2021-10-05

## 2021-10-07 ENCOUNTER — TELEPHONE (OUTPATIENT)
Dept: INTERNAL MEDICINE | Facility: CLINIC | Age: 76
End: 2021-10-07

## 2021-10-07 ENCOUNTER — TELEPHONE (OUTPATIENT)
Dept: PAIN MEDICINE | Facility: CLINIC | Age: 76
End: 2021-10-07

## 2021-10-07 DIAGNOSIS — F41.1 GAD (GENERALIZED ANXIETY DISORDER): ICD-10-CM

## 2021-10-07 RX ORDER — BUSPIRONE HYDROCHLORIDE 5 MG/1
5-10 TABLET ORAL 2 TIMES DAILY PRN
Qty: 90 TABLET | Refills: 11 | Status: SHIPPED | OUTPATIENT
Start: 2021-10-07 | End: 2022-03-03 | Stop reason: SDUPTHER

## 2021-10-13 DIAGNOSIS — G89.4 CHRONIC PAIN DISORDER: Primary | ICD-10-CM

## 2021-10-13 RX ORDER — ACETAMINOPHEN AND CODEINE PHOSPHATE 300; 30 MG/1; MG/1
1 TABLET ORAL 2 TIMES DAILY PRN
Qty: 60 TABLET | Refills: 0 | Status: SHIPPED | OUTPATIENT
Start: 2021-10-17 | End: 2021-10-26 | Stop reason: SDUPTHER

## 2021-10-25 ENCOUNTER — TELEPHONE (OUTPATIENT)
Dept: PAIN MEDICINE | Facility: CLINIC | Age: 76
End: 2021-10-25
Payer: COMMERCIAL

## 2021-10-25 DIAGNOSIS — G89.4 CHRONIC PAIN DISORDER: ICD-10-CM

## 2021-10-25 RX ORDER — ACETAMINOPHEN AND CODEINE PHOSPHATE 300; 30 MG/1; MG/1
1 TABLET ORAL 2 TIMES DAILY PRN
Qty: 60 TABLET | Refills: 0 | Status: CANCELLED | OUTPATIENT
Start: 2021-10-25 | End: 2021-11-24

## 2021-10-26 DIAGNOSIS — G89.4 CHRONIC PAIN DISORDER: ICD-10-CM

## 2021-10-26 RX ORDER — ACETAMINOPHEN AND CODEINE PHOSPHATE 300; 30 MG/1; MG/1
1 TABLET ORAL 2 TIMES DAILY PRN
Qty: 60 TABLET | Refills: 0 | Status: SHIPPED | OUTPATIENT
Start: 2021-10-26 | End: 2021-11-29 | Stop reason: SDUPTHER

## 2021-11-10 ENCOUNTER — TELEPHONE (OUTPATIENT)
Dept: PAIN MEDICINE | Facility: CLINIC | Age: 76
End: 2021-11-10
Payer: COMMERCIAL

## 2021-11-10 ENCOUNTER — PATIENT OUTREACH (OUTPATIENT)
Dept: ADMINISTRATIVE | Facility: OTHER | Age: 76
End: 2021-11-10
Payer: COMMERCIAL

## 2021-11-12 ENCOUNTER — IMMUNIZATION (OUTPATIENT)
Dept: PRIMARY CARE CLINIC | Facility: CLINIC | Age: 76
End: 2021-11-12
Payer: MEDICAID

## 2021-11-12 DIAGNOSIS — Z23 NEED FOR VACCINATION: Primary | ICD-10-CM

## 2021-11-12 PROCEDURE — 0004A COVID-19, MRNA, LNP-S, PF, 30 MCG/0.3 ML DOSE VACCINE: CPT | Mod: CV19,PBBFAC | Performed by: INTERNAL MEDICINE

## 2021-11-29 DIAGNOSIS — G89.4 CHRONIC PAIN DISORDER: ICD-10-CM

## 2021-11-29 RX ORDER — ACETAMINOPHEN AND CODEINE PHOSPHATE 300; 30 MG/1; MG/1
1 TABLET ORAL 2 TIMES DAILY PRN
Qty: 60 TABLET | Refills: 0 | Status: SHIPPED | OUTPATIENT
Start: 2021-11-29 | End: 2022-01-08 | Stop reason: SDUPTHER

## 2021-12-27 ENCOUNTER — TELEPHONE (OUTPATIENT)
Dept: PAIN MEDICINE | Facility: CLINIC | Age: 76
End: 2021-12-27
Payer: COMMERCIAL

## 2021-12-29 ENCOUNTER — TELEPHONE (OUTPATIENT)
Dept: PAIN MEDICINE | Facility: CLINIC | Age: 76
End: 2021-12-29
Payer: COMMERCIAL

## 2022-01-03 ENCOUNTER — TELEPHONE (OUTPATIENT)
Dept: PAIN MEDICINE | Facility: CLINIC | Age: 77
End: 2022-01-03
Payer: COMMERCIAL

## 2022-01-03 NOTE — TELEPHONE ENCOUNTER
----- Message from Geni Escalante sent at 1/3/2022 10:31 AM CST -----  Lesli Gong calling regarding Appointment Access  (message) for #follow up; medication refill.  [phone]

## 2022-01-03 NOTE — TELEPHONE ENCOUNTER
Patient was contacted staff left a message on VM for patient to contact the office to schedule an appt for her med refill.

## 2022-01-03 NOTE — TELEPHONE ENCOUNTER
----- Message from Janis Atkins sent at 1/3/2022 11:09 AM CST -----  Contact: Pt  Pt's requesting a call back re: scheduling.    Confirmed patient's contact info below:  Contact Name: Lesli Gong  Phone Number: [phone]

## 2022-01-04 ENCOUNTER — OFFICE VISIT (OUTPATIENT)
Dept: PAIN MEDICINE | Facility: CLINIC | Age: 77
End: 2022-01-04
Payer: COMMERCIAL

## 2022-01-04 VITALS
SYSTOLIC BLOOD PRESSURE: 123 MMHG | HEIGHT: 64 IN | TEMPERATURE: 98 F | RESPIRATION RATE: 18 BRPM | BODY MASS INDEX: 31.41 KG/M2 | WEIGHT: 184 LBS | DIASTOLIC BLOOD PRESSURE: 86 MMHG | HEART RATE: 98 BPM

## 2022-01-04 DIAGNOSIS — G89.4 CHRONIC PAIN DISORDER: ICD-10-CM

## 2022-01-04 DIAGNOSIS — M47.812 CERVICAL SPONDYLOSIS: ICD-10-CM

## 2022-01-04 DIAGNOSIS — M25.519 SHOULDER PAIN, UNSPECIFIED CHRONICITY, UNSPECIFIED LATERALITY: ICD-10-CM

## 2022-01-04 DIAGNOSIS — Q85.01 NEUROFIBROMATOSIS, TYPE 1: ICD-10-CM

## 2022-01-04 DIAGNOSIS — M51.34 DDD (DEGENERATIVE DISC DISEASE), THORACIC: ICD-10-CM

## 2022-01-04 DIAGNOSIS — G89.29 CHRONIC SCAPULAR PAIN: ICD-10-CM

## 2022-01-04 DIAGNOSIS — G89.29 CHRONIC LEFT SHOULDER PAIN: Primary | ICD-10-CM

## 2022-01-04 DIAGNOSIS — M19.012 PRIMARY OSTEOARTHRITIS OF LEFT SHOULDER: ICD-10-CM

## 2022-01-04 DIAGNOSIS — M89.8X1 CHRONIC SCAPULAR PAIN: ICD-10-CM

## 2022-01-04 DIAGNOSIS — M54.12 RADICULOPATHY, CERVICAL REGION: ICD-10-CM

## 2022-01-04 DIAGNOSIS — M25.512 CHRONIC LEFT SHOULDER PAIN: Primary | ICD-10-CM

## 2022-01-04 PROCEDURE — 99999 PR PBB SHADOW E&M-EST. PATIENT-LVL IV: ICD-10-PCS | Mod: PBBFAC,,, | Performed by: NURSE PRACTITIONER

## 2022-01-04 PROCEDURE — 99213 PR OFFICE/OUTPT VISIT, EST, LEVL III, 20-29 MIN: ICD-10-PCS | Mod: S$GLB,,, | Performed by: NURSE PRACTITIONER

## 2022-01-04 PROCEDURE — 99214 OFFICE O/P EST MOD 30 MIN: CPT | Mod: PBBFAC | Performed by: NURSE PRACTITIONER

## 2022-01-04 PROCEDURE — 99213 OFFICE O/P EST LOW 20 MIN: CPT | Mod: S$GLB,,, | Performed by: NURSE PRACTITIONER

## 2022-01-04 PROCEDURE — 99999 PR PBB SHADOW E&M-EST. PATIENT-LVL IV: CPT | Mod: PBBFAC,,, | Performed by: NURSE PRACTITIONER

## 2022-01-04 RX ORDER — ACETAMINOPHEN AND CODEINE PHOSPHATE 300; 30 MG/1; MG/1
1 TABLET ORAL EVERY 8 HOURS PRN
Qty: 90 TABLET | Refills: 0 | Status: CANCELLED | OUTPATIENT
Start: 2022-01-04 | End: 2022-02-03
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
## 2022-01-05 ENCOUNTER — TELEPHONE (OUTPATIENT)
Dept: PAIN MEDICINE | Facility: CLINIC | Age: 77
End: 2022-01-05
Payer: COMMERCIAL

## 2022-01-05 NOTE — TELEPHONE ENCOUNTER
----- Message from Mercy Ludwig sent at 1/5/2022  9:38 AM CST -----  Regarding: Medication  Name of Who is Calling:JERRI DERAS [[id number]]    What is the request in detail:Pt stated she is in severe pain. Pt stated a prescription for Tylenol 3 was supposed to be sent but Pt is requesting a call back.    Can the clinic reply by [facility]:No    What [facility] to [facility] Back [facility]:[phone]    Preferred [facility]/pharmacy #[id number] - [place], [place] - [facility] [address]   Phone:  [phone]

## 2022-01-05 NOTE — TELEPHONE ENCOUNTER
Patient was contacted staff informed patient that her medication is post dated to be filled 01/08/22. Patient verbalized understanding

## 2022-01-05 NOTE — TELEPHONE ENCOUNTER
----- Message from Josue Pitt sent at 1/5/2022 10:01 AM CST -----  Regarding: Refill  Contact: JERRI DERAS [[id number]]      Can the clinic reply in [facility]: no      Please refill the medication(s) listed below. Please call the patient when the prescription(s) is ready for  at this phone number    [phone]       Medication #1 acetaminophen-codeine 300-30mg (TYLENOL #3) 300-30 mg Tab    Preferred Pharmacy: [facility]/PHARMACY #[id number] - [facility] [address]

## 2022-01-06 ENCOUNTER — TELEPHONE (OUTPATIENT)
Dept: PAIN MEDICINE | Facility: CLINIC | Age: 77
End: 2022-01-06
Payer: COMMERCIAL

## 2022-01-06 NOTE — TELEPHONE ENCOUNTER
----- Message from Oralia Fletcher sent at 1/6/2022  9:46 AM CST -----  Can the clinic reply in [facility]: No         Please refill the medication(s) listed below. Please call the patient when the prescription(s) is ready for  at this phone number    [phone]         Medication #1 acetaminophen-codeine 300-30mg (TYLENOL #3) 300-30 mg Tab                  Preferred Pharmacy: [facility]/pharmacy #[id number] - [place] - [address]

## 2022-01-08 DIAGNOSIS — G89.4 CHRONIC PAIN DISORDER: ICD-10-CM

## 2022-01-08 RX ORDER — ACETAMINOPHEN AND CODEINE PHOSPHATE 300; 30 MG/1; MG/1
1 TABLET ORAL 2 TIMES DAILY PRN
Qty: 60 TABLET | Refills: 0 | Status: CANCELLED | OUTPATIENT
Start: 2022-01-08 | End: 2022-02-07

## 2022-01-10 DIAGNOSIS — G89.4 CHRONIC PAIN DISORDER: Primary | ICD-10-CM

## 2022-01-10 RX ORDER — ACETAMINOPHEN AND CODEINE PHOSPHATE 300; 30 MG/1; MG/1
1 TABLET ORAL 2 TIMES DAILY PRN
Qty: 60 TABLET | Refills: 0 | Status: SHIPPED | OUTPATIENT
Start: 2022-01-10 | End: 2022-02-04 | Stop reason: SDUPTHER

## 2022-01-10 NOTE — TELEPHONE ENCOUNTER
Patient was last seen in the office 01/04/22 by Sravanthi Quiroz. This is a  patient. Patient last received this medication 12/09/21. Patient completed UDS 01/05/21 and was consistent for codeine and Norco. Patient has a pain contract on file. Patient has a follow up 02/02/22

## 2022-01-27 ENCOUNTER — TELEPHONE (OUTPATIENT)
Dept: PAIN MEDICINE | Facility: CLINIC | Age: 77
End: 2022-01-27
Payer: COMMERCIAL

## 2022-01-27 DIAGNOSIS — G89.4 CHRONIC PAIN DISORDER: ICD-10-CM

## 2022-01-27 NOTE — TELEPHONE ENCOUNTER
----- Message from Marta Fofana sent at 1/27/2022  3:06 PM CST -----  Who Called:        Refill or New Rx: refill         RX Name and Strength:   acetaminophen-codeine 300-30mg (TYLENOL #3) 300-30 mg Tab        How is the patient currently taking it? (ex. 1XDay)        Is this a 30 day or 90 day RX        Preferred Pharmacy with phone number:   [facility]        Local or Mail Order: local         Ordering Provider:        Would the patient rather a call back or a response via [facility]? Call back         Best Call Back Number:   [phone]        Additional Information:

## 2022-01-27 NOTE — TELEPHONE ENCOUNTER
Patient was contacted and informed that her medication is due 02/09/22. Patient verbalized understanding

## 2022-02-01 ENCOUNTER — PATIENT MESSAGE (OUTPATIENT)
Dept: ADMINISTRATIVE | Facility: OTHER | Age: 77
End: 2022-02-01
Payer: COMMERCIAL

## 2022-02-01 ENCOUNTER — PATIENT OUTREACH (OUTPATIENT)
Dept: ADMINISTRATIVE | Facility: OTHER | Age: 77
End: 2022-02-01
Payer: COMMERCIAL

## 2022-02-01 DIAGNOSIS — Z12.31 ENCOUNTER FOR SCREENING MAMMOGRAM FOR MALIGNANT NEOPLASM OF BREAST: Primary | ICD-10-CM

## 2022-02-02 NOTE — PROGRESS NOTES
Care Everywhere: updated  Immunization: updated  Health Maintenance: updated  Media Review:   Legacy Review:   DIS:  Order placed: mammogram   Upcoming appts:  EFAX:  Task Tickets:Mammogram scheduling ticket sent to patient's portal   Referrals:

## 2022-02-04 DIAGNOSIS — G89.4 CHRONIC PAIN DISORDER: ICD-10-CM

## 2022-02-04 RX ORDER — ACETAMINOPHEN AND CODEINE PHOSPHATE 300; 30 MG/1; MG/1
1 TABLET ORAL 2 TIMES DAILY PRN
Qty: 60 TABLET | Refills: 0 | Status: SHIPPED | OUTPATIENT
Start: 2022-02-09 | End: 2022-03-02 | Stop reason: SDUPTHER

## 2022-02-15 DIAGNOSIS — G89.4 CHRONIC PAIN DISORDER: ICD-10-CM

## 2022-02-15 RX ORDER — ACETAMINOPHEN AND CODEINE PHOSPHATE 300; 30 MG/1; MG/1
1 TABLET ORAL 2 TIMES DAILY PRN
Qty: 60 TABLET | Refills: 0 | OUTPATIENT
Start: 2022-02-15 | End: 2022-03-17

## 2022-02-23 DIAGNOSIS — G89.4 CHRONIC PAIN DISORDER: ICD-10-CM

## 2022-02-23 RX ORDER — ACETAMINOPHEN AND CODEINE PHOSPHATE 300; 30 MG/1; MG/1
1 TABLET ORAL 2 TIMES DAILY PRN
Qty: 60 TABLET | Refills: 0 | Status: CANCELLED | OUTPATIENT
Start: 2022-02-23 | End: 2022-03-25

## 2022-02-23 NOTE — TELEPHONE ENCOUNTER
Ms. Gong continues to request early refills nearly weekly. Her last UTOX was negative for the prescribed medication. She will be brought in for an office visit and a discussion about these issues before any additional prescriptions are provided.

## 2022-02-28 ENCOUNTER — HOSPITAL ENCOUNTER (EMERGENCY)
Facility: HOSPITAL | Age: 77
Discharge: HOME | End: 2022-02-28
Attending: EMERGENCY MEDICINE
Payer: COMMERCIAL

## 2022-02-28 VITALS
OXYGEN SATURATION: 99 % | BODY MASS INDEX: 29.18 KG/M2 | WEIGHT: 170 LBS | RESPIRATION RATE: 18 BRPM | DIASTOLIC BLOOD PRESSURE: 85 MMHG | HEART RATE: 92 BPM | TEMPERATURE: 99 F | SYSTOLIC BLOOD PRESSURE: 139 MMHG

## 2022-02-28 DIAGNOSIS — S61.011A LACERATION OF RIGHT THUMB WITHOUT FOREIGN BODY WITHOUT DAMAGE TO NAIL, INITIAL ENCOUNTER: Primary | ICD-10-CM

## 2022-02-28 PROCEDURE — 99282 PR EMERGENCY DEPT VISIT,LEVEL II: ICD-10-PCS | Mod: ,,, | Performed by: EMERGENCY MEDICINE

## 2022-02-28 PROCEDURE — 99281 EMR DPT VST MAYX REQ PHY/QHP: CPT

## 2022-02-28 PROCEDURE — 99282 EMERGENCY DEPT VISIT SF MDM: CPT | Mod: ,,, | Performed by: EMERGENCY MEDICINE

## 2022-02-28 NOTE — ED NOTES
Pt identifiers Lesli Bustosclay  Were and arw correct  LOC: The patient is awake, alert, aware of environment with an appropriate affect. Oriented x3, speaking appropriately  APPEARANCE: Pt rates laceration pain to right thumb a 6/10 , in no acute distress, pt is clean and well groomed, clothing properly fastened  SKIN: Skin warm, dry and intact, normal skin turgor, moist mucus membranes  RESPIRATORY: Airway is open and patent, respirations are spontaneous, even and unlabored, normal effort and rate  CARDIAC: Normal rate and rhythm, no peripheral edema noted, capillary refill < 3 seconds, bilateral radial pulses 2+  ABDOMEN: Soft, nontender, nondistended  NEUROLOGIC:  facial expression is symmetrical, patient moving all extremities spontaneously, normal sensation in all extremities when touched with a finger.  Follows all commands appropriately  MUSCULOSKELETAL: No obvious deformities.

## 2022-02-28 NOTE — ED PROVIDER NOTES
Encounter Date: 2022       History     Chief Complaint   Patient presents with    Laceration     To right thumb from with knife while cooking     77 y/o f, h/o HTN, GIST, neurofibromatosis, L handed, c/o R thumb lac x 5 hours, accidentally cut thumb while using knife while cooking.  Difficulty controlling bleeding initially so family was concerned.  Eventually able to control with dressing placed.      The history is provided by the patient and a relative.     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, malignant 2015    History of hepatitis C, s/p successful Rx w/ SVR - 2018 3/17/2017    Completed zepatier + RBV w/ svr     History of psychiatric hospitalization     Hx of psychiatric care     Mass 10-10-14    excision of mass/left shoulder    Neurofibromatosis, type 1 (von Recklinghausen's disease) 2014    Pheochromocytoma     S/p resection in     Psychiatric problem     Soft tissue sarcoma of chest wall 2014    Therapy      Past Surgical History:   Procedure Laterality Date    APPENDECTOMY      BILATERAL SALPINGOOPHORECTOMY      BREAST BIOPSY Right     BREAST BIOPSY Left     BUNIONECTOMY Right      SECTION      COLONOSCOPY N/A 2018    Procedure: COLONOSCOPY;  Surgeon: Oscar Medley MD;  Location: [address]);  Service: Endoscopy;  Laterality: N/A;    EXPLORATORY LAPAROTOMY W/ BOWEL RESECTION      HYSTERECTOMY N/A     pheochromocytoma excision N/A     TONSILLECTOMY Bilateral      Family History   Problem Relation Age of Onset    Breast cancer Mother      Neurofibromatosis Father     Cancer Sister         GIST    Neurofibromatosis Sister      Social History     Tobacco Use    Smoking status: Never Smoker    Smokeless tobacco: Never Used   Substance Use Topics    Alcohol use: No    Drug use: No     Review of Systems   Neurological: Negative for weakness and numbness.       Physical Exam     Initial Vitals [02/28/22 1623]   BP Pulse Resp Temp SpO2   139/85 92 18 98.6 °F (37 °C) 99 %      MAP       --         Physical Exam    Nursing note and vitals reviewed.  Constitutional: Vital signs are normal. She appears well-developed and well-nourished. She is not diaphoretic.  Non-toxic appearance. She does not appear ill. No distress.   HENT:   Head: Normocephalic and atraumatic.   Mouth/Throat: Mucous membranes are normal. Mucous membranes are not dry.   Eyes: Conjunctivae and lids are normal.   Neck: Neck supple.   Normal range of motion.  Cardiovascular: Normal rate.   Musculoskeletal:      Cervical back: Normal range of motion and neck supple.      Comments: R hand: 1 cm curved shallow laceration to ulnar aspect of R thumb, distal phalanx, approaching tip.  No loss of sensation to LT distally, strength 5/5, flex/ext.  Bleeding resolved on my evaluation     Neurological: She is alert and oriented to person, place, and time.   Skin: Skin is dry and intact. No pallor.   Psychiatric: She has a normal mood and affect. Her speech is normal and behavior is normal.         ED Course   Procedures  Labs Reviewed - No data to display       Imaging Results    None          Medications - No data to display  Medical Decision Making:   History:   Old Medical Records: I decided to obtain old medical records.  Initial Assessment:   Shallow R thumb lac, hemostatic on my assessment, NV intact  No blunt trauma or suspicion for FB so no imaging needed  Given size and shallow nature of lac, no repair indicated\  Tetanus UTD  ED Management:  Local wound care                      Clinical  Impression:   Final diagnoses:  [S61.011A] Laceration of right thumb without foreign body without damage to nail, initial encounter (Primary)          ED Disposition Condition    Discharge Stable        ED Prescriptions     None        Follow-up Information     Follow up With Specialties Details Why Contact Info    Patti Brian MD Internal Medicine Schedule an appointment as soon as possible for a visit   [address]  [place] [address]  [phone]             Alpa Vega MD  02/28/22 1231

## 2022-02-28 NOTE — ED TRIAGE NOTES
Pt states she lacerated her right thumb with a knife while cutting onions around 12:00 noon today  States the bleeding stopped and around 14:00 it started bleeding again

## 2022-03-02 DIAGNOSIS — G89.4 CHRONIC PAIN DISORDER: ICD-10-CM

## 2022-03-02 RX ORDER — ACETAMINOPHEN AND CODEINE PHOSPHATE 300; 30 MG/1; MG/1
1 TABLET ORAL 2 TIMES DAILY PRN
Qty: 60 TABLET | Refills: 0 | Status: SHIPPED | OUTPATIENT
Start: 2022-03-08 | End: 2022-04-04 | Stop reason: SDUPTHER

## 2022-03-03 ENCOUNTER — OFFICE VISIT (OUTPATIENT)
Dept: INTERNAL MEDICINE | Facility: CLINIC | Age: 77
End: 2022-03-03
Payer: COMMERCIAL

## 2022-03-03 ENCOUNTER — LAB VISIT (OUTPATIENT)
Dept: LAB | Facility: HOSPITAL | Age: 77
End: 2022-03-03
Attending: INTERNAL MEDICINE
Payer: COMMERCIAL

## 2022-03-03 VITALS
BODY MASS INDEX: 31.97 KG/M2 | HEIGHT: 64 IN | WEIGHT: 187.25 LBS | OXYGEN SATURATION: 98 % | HEART RATE: 90 BPM | SYSTOLIC BLOOD PRESSURE: 132 MMHG | DIASTOLIC BLOOD PRESSURE: 88 MMHG

## 2022-03-03 DIAGNOSIS — F41.1 GAD (GENERALIZED ANXIETY DISORDER): ICD-10-CM

## 2022-03-03 DIAGNOSIS — R32 URINARY INCONTINENCE, UNSPECIFIED TYPE: ICD-10-CM

## 2022-03-03 DIAGNOSIS — R41.3 OTHER AMNESIA: ICD-10-CM

## 2022-03-03 DIAGNOSIS — R26.9 GAIT ABNORMALITY: ICD-10-CM

## 2022-03-03 DIAGNOSIS — R32 URINARY INCONTINENCE, UNSPECIFIED TYPE: Primary | ICD-10-CM

## 2022-03-03 PROCEDURE — 99999 PR PBB SHADOW E&M-EST. PATIENT-LVL IV: CPT | Mod: PBBFAC,,, | Performed by: INTERNAL MEDICINE

## 2022-03-03 PROCEDURE — 99999 PR PBB SHADOW E&M-EST. PATIENT-LVL IV: ICD-10-PCS | Mod: PBBFAC,,, | Performed by: INTERNAL MEDICINE

## 2022-03-03 PROCEDURE — 99214 OFFICE O/P EST MOD 30 MIN: CPT | Mod: PBBFAC | Performed by: INTERNAL MEDICINE

## 2022-03-03 PROCEDURE — 99214 PR OFFICE/OUTPT VISIT, EST, LEVL IV, 30-39 MIN: ICD-10-PCS | Mod: S$GLB,,, | Performed by: INTERNAL MEDICINE

## 2022-03-03 PROCEDURE — 99214 OFFICE O/P EST MOD 30 MIN: CPT | Mod: S$GLB,,, | Performed by: INTERNAL MEDICINE

## 2022-03-03 RX ORDER — CITALOPRAM 10 MG/1
10 TABLET ORAL DAILY
Qty: 90 TABLET | Refills: 3 | Status: SHIPPED | OUTPATIENT
Start: 2022-03-03 | End: 2023-11-09 | Stop reason: SDUPTHER

## 2022-03-03 RX ORDER — BUSPIRONE HYDROCHLORIDE 5 MG/1
5-10 TABLET ORAL 2 TIMES DAILY PRN
Qty: 90 TABLET | Refills: 11 | Status: SHIPPED | OUTPATIENT
Start: 2022-03-03 | End: 2022-07-05 | Stop reason: SDUPTHER
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
## 2022-03-04 ENCOUNTER — PATIENT OUTREACH (OUTPATIENT)
Dept: ADMINISTRATIVE | Facility: OTHER | Age: 77
End: 2022-03-04
Payer: COMMERCIAL

## 2022-03-04 NOTE — PROGRESS NOTES
Care Everywhere: updated  Immunization: updated  Health Maintenance: updated  Media Review: review for outside mammogram report   Legacy Review:   DIS: no profile in portal   Order placed:   Upcoming appts:  EFAX:  Task Tickets:Mammogram scheduling ticket sent to patient's portal on 2.1.2022  Referrals:

## 2022-03-12 ENCOUNTER — HOSPITAL ENCOUNTER (OUTPATIENT)
Dept: RADIOLOGY | Facility: HOSPITAL | Age: 77
Discharge: HOME | End: 2022-03-12
Attending: INTERNAL MEDICINE
Payer: COMMERCIAL

## 2022-03-12 DIAGNOSIS — R41.3 OTHER AMNESIA: ICD-10-CM

## 2022-03-12 PROCEDURE — 70553 MRI BRAIN STEM W/O & W/DYE: CPT | Mod: TC

## 2022-03-12 PROCEDURE — 25500020 PHARM REV CODE 255: Performed by: INTERNAL MEDICINE

## 2022-03-12 PROCEDURE — 70553 MRI BRAIN W WO CONTRAST: ICD-10-PCS | Mod: 26,,, | Performed by: RADIOLOGY

## 2022-03-12 PROCEDURE — 70553 MRI BRAIN STEM W/O & W/DYE: CPT | Mod: 26,,, | Performed by: RADIOLOGY

## 2022-03-12 PROCEDURE — A9585 GADOBUTROL INJECTION: HCPCS | Performed by: INTERNAL MEDICINE

## 2022-03-12 RX ORDER — GADOBUTROL 604.72 MG/ML
9 INJECTION INTRAVENOUS
Status: COMPLETED | OUTPATIENT
Start: 2022-03-12 | End: 2022-03-12

## 2022-03-12 RX ADMIN — GADOBUTROL 9 ML: 604.72 INJECTION INTRAVENOUS at 05:03

## 2022-04-04 DIAGNOSIS — G89.4 CHRONIC PAIN DISORDER: ICD-10-CM

## 2022-04-04 RX ORDER — ACETAMINOPHEN AND CODEINE PHOSPHATE 300; 30 MG/1; MG/1
1 TABLET ORAL 2 TIMES DAILY PRN
Qty: 60 TABLET | Refills: 0 | Status: SHIPPED | OUTPATIENT
Start: 2022-04-04 | End: 2022-04-22 | Stop reason: SDUPTHER

## 2022-04-04 RX ORDER — ACETAMINOPHEN AND CODEINE PHOSPHATE 300; 30 MG/1; MG/1
1 TABLET ORAL 2 TIMES DAILY PRN
Qty: 60 TABLET | Refills: 0 | Status: CANCELLED | OUTPATIENT
Start: 2022-04-04

## 2022-04-04 NOTE — TELEPHONE ENCOUNTER
----- Message from Keyur Joseph sent at 4/4/2022  3:01 PM CDT -----  Regarding: Refill  Can the clinic reply in [facility]: NO           Please refill the medication(s) listed below. Please call the patient when the prescription(s) is ready for  at this phone number   [phone]           Medication #1 acetaminophen-codeine 300-30mg (TYLENOL #3) 300-30 mg Tab         Medication #2            Preferred Pharmacy: [facility]

## 2022-04-04 NOTE — TELEPHONE ENCOUNTER
----- Message from Keyur Joseph sent at 4/4/2022  4:20 PM CDT -----  Regarding: Appt Request  Name of Who is Calling: JERRI ELLER [[id number]]           What is the request in detail: Patient is requesting a call back to schedule a f/u appointment.  Please assist.           Can the clinic reply by [facility]: NO           What Number [facility] Call Back [facility]: [phone]

## 2022-04-04 NOTE — TELEPHONE ENCOUNTER
Patient requesting refill on Tylenol #3  Last office visit 01.04.22   shows last refill on 03.02.22  Patient does have a pain contract on file with [facility]  Patient last UDS 07.12.21 was consistent with current therapy    CODEINE  Not Detected  Present  Not Detected     MORPHINE  Not Detected  Present  Not Detected     6-ACETYLMORPHINE  Not Detected  Not Detected  Not Detected     OXYCODONE  Not Detected  Not Detected  Not Detected     NOROYXCODONE  Not Detected  Not Detected  Not Detected     OXYMORPHONE  Not Detected  Not Detected  Not Detected     NOROXYMORPHONE  Not Detected  Not Detected  Not Detected     HYDROCODONE  Not Detected  Present  Not Detected     NORHYDROCODONE  Not Detected  Present  Not Detected     HYDROMORPHONE  Not Detected  Not Detected  Not Detected     BUPRENORPHINE  Not Detected  Not Detected  Not Detected     NORUBPRENORPHINE  Not Detected  Not Detected  Not Detected     FENTANYL  Not Detected  Not Detected  Not Detected     NORFENTANYL  Not Detected  Not Detected  Not Detected     MEPERIDINE METABOLITE  Not Detected  Not Detected  Not Detected     TAPENTADOL  Not Detected  Not Detected  Not Detected     TAPENTADOL-O-SULF  Not Detected  Not Detected  Not Detected     METHADONE  Not Detected  Not Detected  Not Detected     TRAMADOL  Not Detected  Not Detected  Not Detected     AMPHETAMINE  Not Detected  Not Detected  Not Detected     METHAMPHETAMINE  Not Detected  Not Detected  Not Detected     MDMA- ECSTASY  Not Detected  Not Detected  Not Detected     MDA  Not Detected  Not Detected  Not Detected     MDEA- Rachel  Not Detected  Not Detected  Not Detected     METHYLPHENIDATE  Not Detected  Not Detected  Not Detected     PHENTERMINE  Not Detected  Not Detected  Not Detected     BENZOYLECGONINE  Not Detected  Not Detected  Not Detected     ALPRAZOLAM  Not Detected  Not Detected  Not Detected     ALPHA-OH-ALPRAZOLAM  Not Detected  Not Detected  Not Detected      CLONAZEPAM  Not Detected  Not Detected  Not Detected     7-AMINOCLONAZEPAM  Not Detected  Not Detected  Not Detected     DIAZEPAM  Not Detected  Not Detected  Not Detected     NORDIAZEPAM  Not Detected  Not Detected  Not Detected     OXAZEPAM  Not Detected  Not Detected  Present     TEMAZEPAM  Not Detected  Not Detected  Not Detected     Lorazepam  Not Detected  Not Detected  Not Detected     MIDAZOLAM  Not Detected  Not Detected  Not Detected     ZOLPIDEM  Not Detected  Not Detected  Not Detected     BARBITURATES  Not Detected  Not Detected  Not Detected     Creatinine, Urine 20.0 - 400.0 mg/dL 160.7  250.4  140.1  94.0 R, CM    ETHYL GLUCURONIDE  Not Detected  Not Detected  Not Detected     MARIJUANA METABOLITE  Not Detected  Not Detected  Not Detected     PCP  Not Detected  Not Detected  Not Detected     CARISOPRODOL  Not Detected  Not Detected CM  Not Detected CM     Comment: The carisoprodol immunoassay has cross-reactivity to   carisoprodol and meprobamate.    Naloxone  Not Detected       Gabapentin  Present       Pregabalin  Not Detected       Alpha-OH-Midazolam  Not Detected       Zolpidem Metabolite  Not Detecte

## 2022-04-22 DIAGNOSIS — G89.4 CHRONIC PAIN DISORDER: ICD-10-CM

## 2022-04-22 RX ORDER — ACETAMINOPHEN AND CODEINE PHOSPHATE 300; 30 MG/1; MG/1
1 TABLET ORAL 2 TIMES DAILY PRN
Qty: 60 TABLET | Refills: 0 | Status: SHIPPED | OUTPATIENT
Start: 2022-05-04 | End: 2022-06-07 | Stop reason: SDUPTHER

## 2022-05-09 DIAGNOSIS — G89.4 CHRONIC PAIN DISORDER: ICD-10-CM

## 2022-05-09 RX ORDER — ACETAMINOPHEN AND CODEINE PHOSPHATE 300; 30 MG/1; MG/1
1 TABLET ORAL 2 TIMES DAILY PRN
Qty: 60 TABLET | Refills: 0 | OUTPATIENT
Start: 2022-05-09

## 2022-05-23 ENCOUNTER — HOSPITAL ENCOUNTER (EMERGENCY)
Facility: HOSPITAL | Age: 77
Discharge: HOME | End: 2022-05-23
Attending: EMERGENCY MEDICINE
Payer: COMMERCIAL

## 2022-05-23 VITALS
BODY MASS INDEX: 27.31 KG/M2 | OXYGEN SATURATION: 97 % | HEART RATE: 96 BPM | HEIGHT: 64 IN | TEMPERATURE: 98 F | RESPIRATION RATE: 18 BRPM | WEIGHT: 160 LBS | DIASTOLIC BLOOD PRESSURE: 65 MMHG | SYSTOLIC BLOOD PRESSURE: 110 MMHG

## 2022-05-23 DIAGNOSIS — U07.1 COVID-19 VIRUS DETECTED: ICD-10-CM

## 2022-05-23 DIAGNOSIS — U07.1 COVID-19 VIRUS INFECTION: Primary | ICD-10-CM

## 2022-05-23 LAB
CTP QC/QA: YES
SARS-COV-2 RDRP RESP QL NAA+PROBE: POSITIVE

## 2022-05-23 PROCEDURE — U0002 COVID-19 LAB TEST NON-CDC: HCPCS | Performed by: EMERGENCY MEDICINE

## 2022-05-23 PROCEDURE — 99284 PR EMERGENCY DEPT VISIT,LEVEL IV: ICD-10-PCS | Mod: CR,CS,, | Performed by: EMERGENCY MEDICINE

## 2022-05-23 PROCEDURE — 99283 EMERGENCY DEPT VISIT LOW MDM: CPT | Mod: 25

## 2022-05-23 PROCEDURE — 99284 EMERGENCY DEPT VISIT MOD MDM: CPT | Mod: CR,CS,, | Performed by: EMERGENCY MEDICINE

## 2022-05-23 NOTE — ED NOTES
Pt requested to have son, who is also a pt, come in room with her. Both parties agreed they would like to see the drCatrina Together. Pts placed in room together and notified MD

## 2022-05-23 NOTE — DISCHARGE INSTRUCTIONS
I have prescribed you Paxlovid a medication that is being used to prevent serious COVID medication in those within 5 days of symptom onset with at least one risk factor to develop worsening COVID.

## 2022-05-23 NOTE — ED PROVIDER NOTES
Encounter Date: 5/23/2022    SCRIBE #1 NOTE: I, Nury Ni, am scribing for, and in the presence of,  Albina Driver MD. I have scribed the following portions of the note - Other sections scribed: HPI, ROS, PE.       History     Chief Complaint   Patient presents with    COVID-19 Concerns     Time patient was seen by the provider: 11:57 PM    The patient is a 76 y.o. female with co-morbidities including: pheochromocytoma, HTN, GIST, Type I neurofibromatosis, and soft tissue sarcoma of chest wall who presents to the ED with a complaint of COVID concerns today. Patient was exposed to her son who tested positive for COVID 5 days ago. In addition, her son was exposed to COVID while at work and suspects the patient had also contracted the infection while at home. She reports fever, cough, congestion, headache, and SOB.    The history is provided by the patient, a relative and medical records. No  was used.     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    History of hepatitis C, s/p successful Rx w/ SVR24 - 2/2018 3/17/2017    Completed zepatier + RBV w/ svr     History of psychiatric hospitalization     Hx of psychiatric care     Mass 10-10-14    excision of mass/left shoulder    Neurofibromatosis, type 1 (von Recklinghausen's disease) 7/30/2014    Pheochromocytoma     S/p resection in 80's    Psychiatric problem     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    Therapy      Past Surgical History:   Procedure Laterality Date    APPENDECTOMY      BILATERAL SALPINGOOPHORECTOMY       BREAST BIOPSY Right     BREAST BIOPSY Left     BUNIONECTOMY Right      SECTION      COLONOSCOPY N/A 2018    Procedure: COLONOSCOPY;  Surgeon: Oscar Medley MD;  Location: [facility] ([address] [place] [address]);  Service: Endoscopy;  Laterality: N/A;    EXPLORATORY LAPAROTOMY W/ BOWEL RESECTION      HYSTERECTOMY N/A     pheochromocytoma excision N/A     TONSILLECTOMY Bilateral      Family History   Problem Relation Age of Onset    Breast cancer Mother     Neurofibromatosis Father     Cancer Sister         GIST    Neurofibromatosis Sister      Social History     Tobacco Use    Smoking status: Never Smoker    Smokeless tobacco: Never Used   Substance Use Topics    Alcohol use: No    Drug use: No     Review of Systems   Constitutional: Positive for fever.   HENT: Positive for congestion. Negative for sore throat.    Respiratory: Positive for cough and shortness of breath.    Cardiovascular: Negative for chest pain.   Gastrointestinal: Negative for nausea.   Genitourinary: Negative for dysuria.   Musculoskeletal: Negative for back pain.   Skin: Negative for rash.   Neurological: Positive for headaches. Negative for weakness.   Hematological: Does not bruise/bleed easily.       Physical Exam     Initial Vitals [22 1111]   BP Pulse Resp Temp SpO2   110/65 96 18 97.8 °F (36.6 °C) 97 %      MAP       --         Physical Exam    Nursing note and vitals reviewed.  Constitutional: She appears well-developed and well-nourished. She is not diaphoretic. No distress.   HENT:   Head: Normocephalic and atraumatic.   Eyes: Conjunctivae and EOM are normal.   Neck: Neck supple.   Normal range of motion.  Cardiovascular: Normal rate and regular rhythm.   Pulmonary/Chest: No respiratory distress.   Abdominal: She exhibits no distension.   Musculoskeletal:         General: Normal range of motion.      Cervical back: Normal range of motion and neck supple.     Neurological: She is alert and oriented to  person, place, and time. GCS score is 15. GCS eye subscore is 4. GCS verbal subscore is 5. GCS motor subscore is 6.   Skin: Skin is warm and dry.   Psychiatric: She has a normal mood and affect. Her behavior is normal. Judgment and thought content normal.         ED Course   Procedures  Labs Reviewed   SARS-COV-2 RDRP GENE - Abnormal; Notable for the following components:       Result Value    POC Rapid COVID Positive (*)     All other components within normal limits    Narrative:     This test utilizes isothermal nucleic acid amplification   technology to detect the SARS-CoV-2 RdRp nucleic acid segment.   The analytical sensitivity (limit of detection) is 125 genome   equivalents/mL.   A POSITIVE result implies infection with the SARS-CoV-2 virus;   the patient is presumed to be contagious.     A NEGATIVE result means that SARS-CoV-2 nucleic acids are not   present above the limit of detection. A NEGATIVE result should be   treated as presumptive. It does not rule out the possibility of   COVID-19 and should not be the sole basis for treatment decisions.   If COVID-19 is strongly suspected based on clinical and exposure   history, re-testing using an alternate molecular assay should be   considered.   This test is only for use under the Food and Drug   Administration s Emergency Use Authorization (EUA).   Commercial kits are provided by [employer].   Performance characteristics of the EUA have been independently   verified by [facility] Department of   Pathology and Laboratory Medicine.   _________________________________________________________________   The authorized Fact Sheet for Healthcare Providers and the authorized Fact   Sheet for Patients of the ID NOW COVID-19 are available on the FDA   website:     https://www.fda.gov/media/147586/download  https://www.fda.gov/media/773836/download                 Imaging Results    None          Medications - No data to display  Medical Decision  Making:   History:   Old Medical Records: I decided to obtain old medical records.  Differential Diagnosis:   Covid, flu, other viral syndrome, pna, uti  Clinical Tests:   Lab Tests: Ordered and Reviewed  ED Management:  Patient with covid  No hypoxia with ambulation  Otherwise well appearing  Does meet criteria for paxlovid and reviewed her medications for any contraindications  Also discussed with pharmacy and they will review her meds            Scribe Attestation:   Scribe #1: I performed the above scribed service and the documentation accurately describes the services I performed. I attest to the accuracy of the note.                 Clinical Impression:   Final diagnoses:  [U07.1] COVID-19 virus infection (Primary)          ED Disposition Condition    Discharge Stable        ED Prescriptions     Medication Sig Dispense Start Date End Date Auth. Provider    nirmatrelvir-ritonavir 150 mg x 2- 100 mg copackaged tablets (EUA) TAKE 3 TABLETS BY MOUTH TWICE DAILY 30 tablet 5/23/2022 5/28/2022 Albina Driver MD        Follow-up Information     Follow up With Specialties Details Why Contact Info    Patti Brian MD Internal Medicine   [address]  [place] [address]  [phone]      [facility] - Emergency Dept Emergency Medicine  As needed, If symptoms worsen [address] [facility] [id number] [phone]           Albina Driver MD  05/24/22 1348       Albina Driver MD  05/24/22 7712

## 2022-05-29 ENCOUNTER — NURSE TRIAGE (OUTPATIENT)
Dept: ADMINISTRATIVE | Facility: CLINIC | Age: 77
End: 2022-05-29
Payer: COMMERCIAL

## 2022-05-29 ENCOUNTER — HOSPITAL ENCOUNTER (EMERGENCY)
Facility: HOSPITAL | Age: 77
Discharge: HOME | End: 2022-05-29
Attending: EMERGENCY MEDICINE
Payer: COMMERCIAL

## 2022-05-29 VITALS
TEMPERATURE: 99 F | SYSTOLIC BLOOD PRESSURE: 155 MMHG | WEIGHT: 150 LBS | BODY MASS INDEX: 25.61 KG/M2 | OXYGEN SATURATION: 98 % | RESPIRATION RATE: 16 BRPM | DIASTOLIC BLOOD PRESSURE: 82 MMHG | HEART RATE: 98 BPM | HEIGHT: 64 IN

## 2022-05-29 DIAGNOSIS — R41.0 DELIRIUM: Primary | ICD-10-CM

## 2022-05-29 DIAGNOSIS — U07.1 COVID-19 VIRUS INFECTION: ICD-10-CM

## 2022-05-29 LAB
ALBUMIN SERPL BCP-MCNC: 4.1 G/DL (ref 3.5–5.2)
ALP SERPL-CCNC: 85 U/L (ref 55–135)
ALT SERPL W/O P-5'-P-CCNC: 16 U/L (ref 10–44)
ANION GAP SERPL CALC-SCNC: 12 MMOL/L (ref 8–16)
AST SERPL-CCNC: 17 U/L (ref 10–40)
BASOPHILS # BLD AUTO: 0.02 K/UL (ref 0–0.2)
BASOPHILS NFR BLD: 0.3 % (ref 0–1.9)
BILIRUB SERPL-MCNC: 0.5 MG/DL (ref 0.1–1)
BILIRUB UR QL STRIP: NEGATIVE
BUN SERPL-MCNC: 8 MG/DL (ref 8–23)
CALCIUM SERPL-MCNC: 10 MG/DL (ref 8.7–10.5)
CHLORIDE SERPL-SCNC: 110 MMOL/L (ref 95–110)
CLARITY UR REFRACT.AUTO: CLEAR
CO2 SERPL-SCNC: 24 MMOL/L (ref 23–29)
COLOR UR AUTO: COLORLESS
CREAT SERPL-MCNC: 0.8 MG/DL (ref 0.5–1.4)
DIFFERENTIAL METHOD: ABNORMAL
EOSINOPHIL # BLD AUTO: 0 K/UL (ref 0–0.5)
EOSINOPHIL NFR BLD: 0.2 % (ref 0–8)
ERYTHROCYTE [DISTWIDTH] IN BLOOD BY AUTOMATED COUNT: 14 % (ref 11.5–14.5)
EST. GFR  (AFRICAN AMERICAN): >60 ML/MIN/1.73 M^2
EST. GFR  (NON AFRICAN AMERICAN): >60 ML/MIN/1.73 M^2
GLUCOSE SERPL-MCNC: 108 MG/DL (ref 70–110)
GLUCOSE UR QL STRIP: NEGATIVE
HCT VFR BLD AUTO: 44.1 % (ref 37–48.5)
HGB BLD-MCNC: 14.5 G/DL (ref 12–16)
HGB UR QL STRIP: NEGATIVE
IMM GRANULOCYTES # BLD AUTO: 0.07 K/UL (ref 0–0.04)
IMM GRANULOCYTES NFR BLD AUTO: 1.1 % (ref 0–0.5)
KETONES UR QL STRIP: NEGATIVE
LEUKOCYTE ESTERASE UR QL STRIP: NEGATIVE
LYMPHOCYTES # BLD AUTO: 1.5 K/UL (ref 1–4.8)
LYMPHOCYTES NFR BLD: 24 % (ref 18–48)
MCH RBC QN AUTO: 29.6 PG (ref 27–31)
MCHC RBC AUTO-ENTMCNC: 32.9 G/DL (ref 32–36)
MCV RBC AUTO: 90 FL (ref 82–98)
MONOCYTES # BLD AUTO: 0.6 K/UL (ref 0.3–1)
MONOCYTES NFR BLD: 9.4 % (ref 4–15)
NEUTROPHILS # BLD AUTO: 4.2 K/UL (ref 1.8–7.7)
NEUTROPHILS NFR BLD: 65 % (ref 38–73)
NITRITE UR QL STRIP: NEGATIVE
NRBC BLD-RTO: 0 /100 WBC
PH UR STRIP: 6 [PH] (ref 5–8)
PLATELET # BLD AUTO: 217 K/UL (ref 150–450)
PMV BLD AUTO: 9.7 FL (ref 9.2–12.9)
POTASSIUM SERPL-SCNC: 4.2 MMOL/L (ref 3.5–5.1)
PROT SERPL-MCNC: 7.8 G/DL (ref 6–8.4)
PROT UR QL STRIP: NEGATIVE
RBC # BLD AUTO: 4.9 M/UL (ref 4–5.4)
SODIUM SERPL-SCNC: 146 MMOL/L (ref 136–145)
SP GR UR STRIP: 1 (ref 1–1.03)
URN SPEC COLLECT METH UR: ABNORMAL
WBC # BLD AUTO: 6.38 K/UL (ref 3.9–12.7)

## 2022-05-29 PROCEDURE — 85025 COMPLETE CBC W/AUTO DIFF WBC: CPT | Performed by: EMERGENCY MEDICINE

## 2022-05-29 PROCEDURE — 99284 EMERGENCY DEPT VISIT MOD MDM: CPT | Mod: CR,,, | Performed by: EMERGENCY MEDICINE

## 2022-05-29 PROCEDURE — 80053 COMPREHEN METABOLIC PANEL: CPT | Performed by: EMERGENCY MEDICINE

## 2022-05-29 PROCEDURE — 99285 EMERGENCY DEPT VISIT HI MDM: CPT | Mod: 25

## 2022-05-29 PROCEDURE — 81003 URINALYSIS AUTO W/O SCOPE: CPT | Performed by: EMERGENCY MEDICINE

## 2022-05-29 PROCEDURE — 25000003 PHARM REV CODE 250: Performed by: EMERGENCY MEDICINE

## 2022-05-29 PROCEDURE — 99284 PR EMERGENCY DEPT VISIT,LEVEL IV: ICD-10-PCS | Mod: CR,,, | Performed by: EMERGENCY MEDICINE

## 2022-05-29 PROCEDURE — 96360 HYDRATION IV INFUSION INIT: CPT

## 2022-05-29 RX ADMIN — SODIUM CHLORIDE 1000 ML: 0.9 INJECTION, SOLUTION INTRAVENOUS at 04:05

## 2022-05-29 NOTE — ED PROVIDER NOTES
"Encounter Date: 5/29/2022       History     Chief Complaint   Patient presents with    Altered Mental Status     Covid positive. Seen here prescribed Pavlovid. Pt is now confused.      75 y/o f, h/o neurofibromatosis, HTN, GIST, HCV, pheochromocytoma, BIB son 2/2 concerns about confusion.  Son reports that yesterday and overnight, pt was confused, saying that "I want to go home" and wasn't making sense.  Pt denies auditory or visual hallucinations and remembers feeling confused.  No focal weakness/numbness/aphasia.  Confusion has since resolved.  No HA or visual changes.  No neck pain.  She does report some nausea and vomiting, denies diarrhea or abd pain.  Pt was dx'd with COVID on 5/23, 6 days ago, and was rx'd paxlovid which she has since completed.  No other new meds.  No etoh or drug use.    The history is provided by the patient and a relative.     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    History of hepatitis C, s/p successful Rx w/ SVR24 - 2/2018 3/17/2017    Completed zepatier + RBV w/ svr     History of psychiatric hospitalization     Hx of psychiatric care     Mass 10-10-14    excision of mass/left shoulder    Neurofibromatosis, type 1 (von Recklinghausen's disease) 7/30/2014    Pheochromocytoma     S/p resection in 80's    Psychiatric problem     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    Therapy      Past Surgical History:   Procedure Laterality Date    APPENDECTOMY      BILATERAL SALPINGOOPHORECTOMY      BREAST BIOPSY Right     BREAST BIOPSY Left     BUNIONECTOMY Right     "  SECTION      COLONOSCOPY N/A 2018    Procedure: COLONOSCOPY;  Surgeon: Oscar Medley MD;  Location: [facility] ([address] [place] [address]);  Service: Endoscopy;  Laterality: N/A;    EXPLORATORY LAPAROTOMY W/ BOWEL RESECTION      HYSTERECTOMY N/A     pheochromocytoma excision N/A     TONSILLECTOMY Bilateral      Family History   Problem Relation Age of Onset    Breast cancer Mother     Neurofibromatosis Father     Cancer Sister         GIST    Neurofibromatosis Sister      Social History     Tobacco Use    Smoking status: Never Smoker    Smokeless tobacco: Never Used   Substance Use Topics    Alcohol use: No    Drug use: No     Review of Systems   Constitutional: Negative for chills and fever.   Gastrointestinal: Positive for nausea and vomiting. Negative for abdominal distention, abdominal pain, constipation and diarrhea.   Genitourinary: Negative for difficulty urinating and dysuria.   Neurological: Negative for headaches.   Psychiatric/Behavioral: Positive for confusion.       Physical Exam     Initial Vitals [22 1447]   BP Pulse Resp Temp SpO2   (!) 141/84 88 16 98.3 °F (36.8 °C) 96 %      MAP       --         Physical Exam    Nursing note and vitals reviewed.  Constitutional: Vital signs are normal. She appears well-developed and well-nourished. She is not diaphoretic.  Non-toxic appearance. She does not appear ill. No distress.   HENT:   Head: Normocephalic and atraumatic.   Mouth/Throat: Mucous membranes are normal. Mucous membranes are not dry.   Eyes: Conjunctivae and lids are normal.   Neck: Neck supple.   Normal range of motion.  Cardiovascular: Normal rate.   Pulmonary/Chest: No respiratory distress.   Abdominal: Abdomen is soft. She exhibits no distension. There is no abdominal tenderness. There is no rebound and no guarding.   Musculoskeletal:         General: No edema.      Cervical back: Normal range of motion and neck supple.     Neurological: She is alert. She has normal  strength. She displays no tremor. No cranial nerve deficit or sensory deficit. She exhibits normal muscle tone. Coordination normal. GCS score is 15. GCS eye subscore is 4. GCS verbal subscore is 5. GCS motor subscore is 6.   Alert, answers most questions appropriately  Oriented to person and place but not time - which son states in baseline  Able to follow commands well\  No sensory deficit  Speech no aphasia or dysarthria   Skin: Skin is dry and intact. No pallor.   Psychiatric: She has a normal mood and affect. Her speech is normal and behavior is normal.         ED Course   Procedures  Labs Reviewed   CBC W/ AUTO DIFFERENTIAL - Abnormal; Notable for the following components:       Result Value    Immature Granulocytes 1.1 (*)     Immature Grans (Abs) 0.07 (*)     All other components within normal limits   COMPREHENSIVE METABOLIC PANEL - Abnormal; Notable for the following components:    Sodium 146 (*)     All other components within normal limits   URINALYSIS, REFLEX TO URINE CULTURE - Abnormal; Notable for the following components:    Color, UA Colorless (*)     Specific [place], UA 1.000 (*)     All other components within normal limits    Narrative:     Specimen Source->Urine          Imaging Results          CT Head Without Contrast (Final result)  Result time 05/29/22 16:28:47    Final result by Dilan Arndt DO (05/29/22 16:28:47)                 Impression:      Allowing for artifact from motion no evidence for acute intracranial findings specifically without evidence for acute intracranial hemorrhage or sulcal effacement to suggest large territory recent infarction.    Clinical correlation and further evaluation with MRI as warranted.    Electronically signed by resident: Reynold Tejada  Date:    05/29/2022  Time:    15:44    Electronically signed by: Dilan Arndt DO  Date:    05/29/2022  Time:    16:28             Narrative:    EXAMINATION:  CT HEAD WITHOUT CONTRAST    CLINICAL HISTORY:  Mental  status change, unknown cause;    TECHNIQUE:  Low dose axial CT images obtained throughout the head without the use of intravenous contrast.  Axial, sagittal and coronal reconstructions were performed.    COMPARISON:  MRI brain 03/12/2020    FINDINGS:  Mild generalized cerebral volume loss.  Compensatory enlargement ventricles sulci and cisterns without hydrocephalus.  Patchy the ill-defined region of decreased attenuation supratentorial white matter which are nonspecific and may represent mild chronic ischemic change.  No evidence for acute intracranial hemorrhage or sulcal effacement to suggest large territory recent infarction with slight distortion by motion artifacts.  Visualized paranasal sinuses and mastoid air cells are clear.                                 Medications   sodium chloride 0.9% bolus 1,000 mL (0 mLs Intravenous Stopped 5/29/22 1730)     Medical Decision Making:   History:   Old Medical Records: I decided to obtain old medical records.  Initial Assessment:   Well-appearing  HD stable  Neuro exam at baseline  Differential Diagnosis:   History seems most c/w delirium.  Patient's clinical syndrome is consistent with delirium    Common causes of delirium include uncontrolled pain/infection/constipation, new medicines, environment change, alcohol withdrawal and dehydration.  Clinical Tests:   Lab Tests: Ordered and Reviewed  Radiological Study: Ordered and Reviewed  ED Management:  Labs  CT head  IVF  Reassess               ED Course as of 05/30/22 1105   Sun May 29, 2022   1738 Sodium(!): 146 [AS]   1738 Potassium: 4.2 [AS]   1738 BUN: 8 [AS]   1738 Creatinine: 0.8 [AS]   1738 WBC: 6.38  Extensive observation in the ED.  No episodes of confusion, HD stable and remains well-appearing. Ct head negative.  Awaiting UA but if negative, anticipate d/c home with supportive care and ED return precautions.  Spoke to pt and her son and both are comfortable w this plan [AS]      ED Course User Index  [AS]  Alpa Vega MD             Clinical Impression:   Final diagnoses:  [R41.0] Delirium (Primary)  [U07.1] COVID-19 virus infection          ED Disposition Condition    Discharge Stable        ED Prescriptions     None        Follow-up Information     Follow up With Specialties Details Why Contact Info    Patti Brian MD Internal Medicine Call in 2 days  [address]  [place] [address]  [phone]      [facility] - Emergency Dept Emergency Medicine  Return to ED for worsening symptoms, inability to eat/drink, fever greater than 100.4, or any other concerns. [address] [facility] [id number] [phone]           Alpa Vega MD  05/30/22 8365

## 2022-05-29 NOTE — ED NOTES
Patient identifiers for Lesli Gong 76 y.o. female checked and correct.  Chief Complaint   Patient presents with    Altered Mental Status     Covid positive. Seen here prescribed Pavlovid. Pt is now confused.      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   SKIN: The skin is warm, dry and intact  MUSKULOSKELETAL: moves all extremities without difficulty   RESPIRATORY: BBS-clear   CARDIAC:  cardiac monitor intact to chest wall (NSR) No peripheral edema noted.   ABDOMEN: soft and non-distended with bs present in al 4 quads .  NEUROLOGICAL: , PERRL

## 2022-05-29 NOTE — TELEPHONE ENCOUNTER
Son calling on behalf of patient, who is present. Reports recent dx of Covid and patient is acting confused. Advised, per protocol. Verbalizes understanding.     Reason for Disposition   Difficult to awaken or acting confused (e.g., disoriented, slurred speech)    Protocols used: CORONAVIRUS (COVID-19) DIAGNOSED OR [id number]

## 2022-06-07 DIAGNOSIS — G89.4 CHRONIC PAIN DISORDER: ICD-10-CM

## 2022-06-07 RX ORDER — ACETAMINOPHEN AND CODEINE PHOSPHATE 300; 30 MG/1; MG/1
1 TABLET ORAL 2 TIMES DAILY PRN
Qty: 60 TABLET | Refills: 0 | Status: SHIPPED | OUTPATIENT
Start: 2022-06-07 | End: 2022-07-06 | Stop reason: SDUPTHER

## 2022-06-18 DIAGNOSIS — G89.4 CHRONIC PAIN DISORDER: ICD-10-CM

## 2022-06-18 RX ORDER — ACETAMINOPHEN AND CODEINE PHOSPHATE 300; 30 MG/1; MG/1
1 TABLET ORAL 2 TIMES DAILY PRN
Qty: 60 TABLET | Refills: 0 | OUTPATIENT
Start: 2022-06-18

## 2022-07-02 DIAGNOSIS — G89.4 CHRONIC PAIN DISORDER: ICD-10-CM

## 2022-07-02 RX ORDER — ACETAMINOPHEN AND CODEINE PHOSPHATE 300; 30 MG/1; MG/1
1 TABLET ORAL 2 TIMES DAILY PRN
Qty: 60 TABLET | Refills: 0 | Status: CANCELLED | OUTPATIENT
Start: 2022-07-02

## 2022-07-05 ENCOUNTER — TELEPHONE (OUTPATIENT)
Dept: PAIN MEDICINE | Facility: CLINIC | Age: 77
End: 2022-07-05
Payer: COMMERCIAL

## 2022-07-05 DIAGNOSIS — G89.4 CHRONIC PAIN DISORDER: Primary | ICD-10-CM

## 2022-07-05 DIAGNOSIS — F41.1 GAD (GENERALIZED ANXIETY DISORDER): ICD-10-CM

## 2022-07-05 DIAGNOSIS — G89.4 CHRONIC PAIN DISORDER: ICD-10-CM

## 2022-07-05 RX ORDER — BUSPIRONE HYDROCHLORIDE 5 MG/1
5-10 TABLET ORAL 2 TIMES DAILY PRN
Qty: 90 TABLET | Refills: 11 | Status: SHIPPED | OUTPATIENT
Start: 2022-07-05 | End: 2022-11-01 | Stop reason: SDUPTHER

## 2022-07-05 RX ORDER — ACETAMINOPHEN AND CODEINE PHOSPHATE 300; 30 MG/1; MG/1
1 TABLET ORAL 2 TIMES DAILY PRN
Qty: 60 TABLET | Refills: 0 | OUTPATIENT
Start: 2022-07-05

## 2022-07-05 NOTE — TELEPHONE ENCOUNTER
----- Message from Skylar James sent at 7/5/2022 11:33 AM CDT -----  Regarding: refill    Who Called:JERRI ELLER [[id number]]        Refill or New Rx: Refill        RX Name and Strength:acetaminophen-codeine 300-30mg (TYLENOL #3) 300-30 mg Tab        How is the patient currently taking it?Take 1 tablet by mouth 2 (two) times daily as needed (        Is this a 30 day or 90 day RX:        Preferred Pharmacy with phone number:[facility]          Local or Mail Order: Local        Ordering Provider:Georgie Joseph MD        Would the patient rather a call back or a response via [facility]? No        Best Call Back Number:[phone]        Additional Information:

## 2022-07-05 NOTE — TELEPHONE ENCOUNTER
Staff tried to reach patient in regards to her message requesting refill on Tylenol #3        Staff was unable to reach pt(staff wanted to informed pt she haven't been seen in 6 months and needs to follow up in clinic with an NP or Pain Provider Dr. Joseph).

## 2022-07-05 NOTE — TELEPHONE ENCOUNTER
----- Message from Domitila Sifuentes sent at 7/5/2022  2:04 PM CDT -----  Regarding: refill request  Who Called:JERRI ELLER [[id number]]        Refill or New Rx refill         RX Name and Strength: acetaminophen-codeine 300-30mg (TYLENOL #3) 300-30 mg Tab        How is the patient currently taking it? (ex. 1XDay): Route: Take 1 tablet by mouth 2 (two) times daily as needed (pain). - Oral        Is this a 30 day or 90 day RX:        Preferred Pharmacy with phone number:[facility] [phone]        Local or Mail Order: local         Ordering Provider: luis aldana         Would the patient rather a call back or a response via [facility]? No         Best Call Back Number:[phone]        Additional Information:

## 2022-07-05 NOTE — TELEPHONE ENCOUNTER
Patient was contacted staff left a message on VM informing patient that her refill request has been denied due to her requiring an office visit

## 2022-07-05 NOTE — TELEPHONE ENCOUNTER
----- Message from Skylar James sent at 7/5/2022 11:36 AM CDT -----  Regarding: Refill    Who Called:JERRI ELLER [[id number]]        Refill or New Rx: Refill        RX Name and Strength:busPIRone (BUSPAR) 5 MG Tab        How is the patient currently taking it?Take 1 to 2 tablets (5-10 mg total) by mouth 2 (two) times daily as needed (anxiety        Is this a 30 day or 90 day RX: 90        Preferred Pharmacy with phone number:[facility]          Local or Mail Order:Local        Ordering Provider:Patti Brian MD        Would the patient rather a call back or a response via [facility]? No        Best Call Back Number:[phone]        Additional Information:

## 2022-07-05 NOTE — TELEPHONE ENCOUNTER
----- Message from Domitila Sifuentes sent at 7/5/2022  2:04 PM CDT -----  Regarding: refill request  Who Called:JERRI ELLER [[id number]]        Refill or New Rx refill         RX Name and Strength: acetaminophen-codeine 300-30mg (TYLENOL #3) 300-30 mg Tab        How is the patient currently taking it? (ex. 1XDay): Route: Take 1 tablet by mouth 2 (two) times daily as needed (pain). - Oral        Is this a 30 day or 90 day RX:        Preferred Pharmacy with phone number:[facility] [phone]        Local or Mail Order: local         Ordering Provider: luis aldana         Would the patient rather a call back or a response via [facility]? No         Best Call Back Number:[phone]        Additional Information:

## 2022-07-06 ENCOUNTER — TELEPHONE (OUTPATIENT)
Dept: PAIN MEDICINE | Facility: CLINIC | Age: 77
End: 2022-07-06
Payer: COMMERCIAL

## 2022-07-06 DIAGNOSIS — G89.4 CHRONIC PAIN DISORDER: ICD-10-CM

## 2022-07-06 NOTE — TELEPHONE ENCOUNTER
----- Message from Gayathri Vidal sent at 7/6/2022  1:11 PM CDT -----  Contact: [phone]  Requesting an RX refill or new RX.  Is this a refill or new RX: Refill 1  RX name and strength (copy/paste from chart):  acetaminophen-codeine 300-30mg (TYLENOL #3) 300-30 mg Tab  Is this a 30 day or 90 day RX:   Pharmacy name and phone # (copy/paste from chart):  [facility]   Phone:  [phone]  Fax:  [phone]              The doctors have asked that we provide their patients with the following 2 reminders -- prescription refills can take up to 72 hours, and a friendly reminder that in the future you can use your [facility] account to request refills:

## 2022-07-06 NOTE — TELEPHONE ENCOUNTER
No new care gaps identified.  [facility] Embedded Care Gaps. Reference number: [id number]. 7/06/2022   4:44:56 PM CDT

## 2022-07-06 NOTE — TELEPHONE ENCOUNTER
This message is for patient in regards to his/her appointment 07/07/22 at 3:20 p   With Sravanthi Quiroz NP.      [facility] Policy: For the safety of all patients and staff members.     Patient Visitor policy: During this visit we're informing all patients that face mask are now optional, upon visit you have the options of requesting clinical staff to wear a mask for your protection and thiers.      If you have any questions or concerns please contact [phone]      Staff ever hayward

## 2022-07-06 NOTE — TELEPHONE ENCOUNTER
----- Message from Marta Fofana sent at 7/6/2022  3:25 PM CDT -----  Who Called:        Refill or New Rx: refill         RX Name and Strength:   acetaminophen-codeine 300-30mg (TYLENOL #3) 300-30 mg Tab        How is the patient currently taking it? (ex. 1XDay)        Is this a 30 day or 90 day RX        Preferred Pharmacy with phone number:  [facility]        Local or Mail Order: local         Ordering Provider:        Would the patient rather a call back or a response via [facility]? Call back         Best Call Back Number:  [phone]        Additional Information: patient request home delivery

## 2022-07-07 RX ORDER — ACETAMINOPHEN AND CODEINE PHOSPHATE 300; 30 MG/1; MG/1
1 TABLET ORAL 2 TIMES DAILY PRN
Qty: 60 TABLET | Refills: 0 | Status: SHIPPED | OUTPATIENT
Start: 2022-07-07 | End: 2022-08-22 | Stop reason: SDUPTHER

## 2022-08-22 DIAGNOSIS — G89.4 CHRONIC PAIN DISORDER: ICD-10-CM

## 2022-08-22 NOTE — TELEPHONE ENCOUNTER
No new care gaps identified.  [facility] Embedded Care Gaps. Reference number: [id number]. 8/22/2022   4:08:46 PM CDT

## 2022-08-22 NOTE — TELEPHONE ENCOUNTER
No new care gaps identified.  [facility] Embedded Care Gaps. Reference number: [id number]. 8/22/2022   4:09:12 PM CDT

## 2022-08-23 RX ORDER — ACETAMINOPHEN AND CODEINE PHOSPHATE 300; 30 MG/1; MG/1
1 TABLET ORAL 2 TIMES DAILY PRN
Qty: 60 TABLET | Refills: 0 | Status: SHIPPED | OUTPATIENT
Start: 2022-08-23 | End: 2022-09-15 | Stop reason: SDUPTHER

## 2022-08-31 DIAGNOSIS — Z78.0 MENOPAUSE: ICD-10-CM

## 2022-09-26 ENCOUNTER — TELEPHONE (OUTPATIENT)
Dept: PAIN MEDICINE | Facility: CLINIC | Age: 77
End: 2022-09-26
Payer: COMMERCIAL

## 2022-09-26 NOTE — TELEPHONE ENCOUNTER
Staff spoke with pt and confirmed appt on 9/27/22 @ 9:40 am with Sravanthi boucher Np . Pt verbalized understanding.

## 2022-09-27 ENCOUNTER — OFFICE VISIT (OUTPATIENT)
Dept: PAIN MEDICINE | Facility: CLINIC | Age: 77
End: 2022-09-27
Payer: COMMERCIAL

## 2022-09-27 VITALS
SYSTOLIC BLOOD PRESSURE: 157 MMHG | DIASTOLIC BLOOD PRESSURE: 91 MMHG | HEART RATE: 94 BPM | WEIGHT: 189.63 LBS | TEMPERATURE: 98 F | BODY MASS INDEX: 32.37 KG/M2 | HEIGHT: 64 IN | OXYGEN SATURATION: 100 % | RESPIRATION RATE: 18 BRPM

## 2022-09-27 DIAGNOSIS — Q85.01 NEUROFIBROMATOSIS, TYPE 1: ICD-10-CM

## 2022-09-27 DIAGNOSIS — G89.4 CHRONIC PAIN DISORDER: Primary | ICD-10-CM

## 2022-09-27 DIAGNOSIS — M54.2 CERVICALGIA: ICD-10-CM

## 2022-09-27 DIAGNOSIS — M19.012 PRIMARY OSTEOARTHRITIS OF LEFT SHOULDER: ICD-10-CM

## 2022-09-27 DIAGNOSIS — M79.18 MYOFASCIAL PAIN: ICD-10-CM

## 2022-09-27 DIAGNOSIS — G89.29 CHRONIC LEFT SHOULDER PAIN: ICD-10-CM

## 2022-09-27 DIAGNOSIS — G89.29 CHRONIC SCAPULAR PAIN: ICD-10-CM

## 2022-09-27 DIAGNOSIS — M47.812 CERVICAL SPONDYLOSIS: ICD-10-CM

## 2022-09-27 DIAGNOSIS — M25.512 CHRONIC LEFT SHOULDER PAIN: ICD-10-CM

## 2022-09-27 DIAGNOSIS — M89.8X1 CHRONIC SCAPULAR PAIN: ICD-10-CM

## 2022-09-27 PROCEDURE — 3288F FALL RISK ASSESSMENT DOCD: CPT | Mod: CPTII,S$GLB,, | Performed by: NURSE PRACTITIONER

## 2022-09-27 PROCEDURE — 99999 PR PBB SHADOW E&M-EST. PATIENT-LVL V: CPT | Mod: PBBFAC,,, | Performed by: NURSE PRACTITIONER

## 2022-09-27 PROCEDURE — 1160F RVW MEDS BY RX/DR IN RCRD: CPT | Mod: CPTII,S$GLB,, | Performed by: NURSE PRACTITIONER

## 2022-09-27 PROCEDURE — 4010F ACE/ARB THERAPY RXD/TAKEN: CPT | Mod: CPTII,S$GLB,, | Performed by: NURSE PRACTITIONER

## 2022-09-27 PROCEDURE — 99999 PR PBB SHADOW E&M-EST. PATIENT-LVL V: ICD-10-PCS | Mod: PBBFAC,,, | Performed by: NURSE PRACTITIONER

## 2022-09-27 PROCEDURE — 3077F PR MOST RECENT SYSTOLIC BLOOD PRESSURE >= 140 MM HG: ICD-10-PCS | Mod: CPTII,S$GLB,, | Performed by: NURSE PRACTITIONER

## 2022-09-27 PROCEDURE — 99499 RISK ADDL DX/OHS AUDIT: ICD-10-PCS | Mod: S$GLB,,, | Performed by: NURSE PRACTITIONER

## 2022-09-27 PROCEDURE — 4010F PR ACE/ARB THEARPY RXD/TAKEN: ICD-10-PCS | Mod: CPTII,S$GLB,, | Performed by: NURSE PRACTITIONER

## 2022-09-27 PROCEDURE — 3288F PR FALLS RISK ASSESSMENT DOCUMENTED: ICD-10-PCS | Mod: CPTII,S$GLB,, | Performed by: NURSE PRACTITIONER

## 2022-09-27 PROCEDURE — 3080F DIAST BP >= 90 MM HG: CPT | Mod: CPTII,S$GLB,, | Performed by: NURSE PRACTITIONER

## 2022-09-27 PROCEDURE — 99214 OFFICE O/P EST MOD 30 MIN: CPT | Mod: S$GLB,,, | Performed by: NURSE PRACTITIONER

## 2022-09-27 PROCEDURE — 1101F PR PT FALLS ASSESS DOC 0-1 FALLS W/OUT INJ PAST YR: ICD-10-PCS | Mod: CPTII,S$GLB,, | Performed by: NURSE PRACTITIONER

## 2022-09-27 PROCEDURE — 99499 UNLISTED E&M SERVICE: CPT | Mod: S$GLB,,, | Performed by: NURSE PRACTITIONER

## 2022-09-27 PROCEDURE — 1159F PR MEDICATION LIST DOCUMENTED IN MEDICAL RECORD: ICD-10-PCS | Mod: CPTII,S$GLB,, | Performed by: NURSE PRACTITIONER

## 2022-09-27 PROCEDURE — 3008F PR BODY MASS INDEX (BMI) DOCUMENTED: ICD-10-PCS | Mod: CPTII,S$GLB,, | Performed by: NURSE PRACTITIONER

## 2022-09-27 PROCEDURE — 99214 PR OFFICE/OUTPT VISIT, EST, LEVL IV, 30-39 MIN: ICD-10-PCS | Mod: S$GLB,,, | Performed by: NURSE PRACTITIONER

## 2022-09-27 PROCEDURE — 1125F AMNT PAIN NOTED PAIN PRSNT: CPT | Mod: CPTII,S$GLB,, | Performed by: NURSE PRACTITIONER

## 2022-09-27 PROCEDURE — 1125F PR PAIN SEVERITY QUANTIFIED, PAIN PRESENT: ICD-10-PCS | Mod: CPTII,S$GLB,, | Performed by: NURSE PRACTITIONER

## 2022-09-27 PROCEDURE — 1160F PR REVIEW ALL MEDS BY PRESCRIBER/CLIN PHARMACIST DOCUMENTED: ICD-10-PCS | Mod: CPTII,S$GLB,, | Performed by: NURSE PRACTITIONER

## 2022-09-27 PROCEDURE — 3080F PR MOST RECENT DIASTOLIC BLOOD PRESSURE >= 90 MM HG: ICD-10-PCS | Mod: CPTII,S$GLB,, | Performed by: NURSE PRACTITIONER

## 2022-09-27 PROCEDURE — 3008F BODY MASS INDEX DOCD: CPT | Mod: CPTII,S$GLB,, | Performed by: NURSE PRACTITIONER

## 2022-09-27 PROCEDURE — 3077F SYST BP >= 140 MM HG: CPT | Mod: CPTII,S$GLB,, | Performed by: NURSE PRACTITIONER

## 2022-09-27 PROCEDURE — 1159F MED LIST DOCD IN RCRD: CPT | Mod: CPTII,S$GLB,, | Performed by: NURSE PRACTITIONER

## 2022-09-27 PROCEDURE — 1101F PT FALLS ASSESS-DOCD LE1/YR: CPT | Mod: CPTII,S$GLB,, | Performed by: NURSE PRACTITIONER

## 2022-09-27 RX ORDER — TIZANIDINE 2 MG/1
2 TABLET ORAL NIGHTLY PRN
Qty: 30 TABLET | Refills: 1 | Status: SHIPPED | OUTPATIENT
Start: 2022-09-27 | End: 2022-11-25
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
## 2022-10-21 ENCOUNTER — TELEPHONE (OUTPATIENT)
Dept: PAIN MEDICINE | Facility: CLINIC | Age: 77
End: 2022-10-21
Payer: COMMERCIAL

## 2022-10-21 NOTE — TELEPHONE ENCOUNTER
Patient requesting refill on acetaminophen-codeine 300-30mg  Last office visit 09/27/2022   shows last refill on 09/18/2022  Patient does not have a pain contract on file with [facility] department  Patient last UDS 07/12/2021 was not consistent with current therapy

## 2022-10-21 NOTE — TELEPHONE ENCOUNTER
----- Message from Patti Livingston sent at 10/21/2022  1:42 PM CDT -----  Contact: JERRI ELLER [[id number]]  Type: RX Refill Request    Who Called: JERRI ELLER [[id number]]  Refill or New Rx: Refill   RX Name and Strength:acetaminophen-codeine 300-30mg (TYLENOL #3) 300-30 mg Tab      Is this a 30 day or 90 day RX:  Preferred Pharmacy with phone number:   [facility]/pharmacy #[id number] - [place] - [address]  [address]  [place] [address]  Phone: [phone] Fax: [phone]    Local or Mail Order: local      Would the patient rather a call back or a response via My [facility]?   Best Call Back Number: [phone] (home) [phone] (work)  Additional Information:

## 2022-10-21 NOTE — TELEPHONE ENCOUNTER
Staff tried to reach pt to inform that the med refill has been received and will be submitted to the provider and once a response is  received  pt will be updated.

## 2022-10-26 ENCOUNTER — PATIENT MESSAGE (OUTPATIENT)
Dept: RESEARCH | Facility: HOSPITAL | Age: 77
End: 2022-10-26
Payer: COMMERCIAL

## 2022-11-01 DIAGNOSIS — F41.1 GAD (GENERALIZED ANXIETY DISORDER): ICD-10-CM

## 2022-11-01 DIAGNOSIS — G89.4 CHRONIC PAIN DISORDER: ICD-10-CM

## 2022-11-01 RX ORDER — ACETAMINOPHEN AND CODEINE PHOSPHATE 300; 30 MG/1; MG/1
1 TABLET ORAL 2 TIMES DAILY PRN
Qty: 60 TABLET | Refills: 0 | Status: SHIPPED | OUTPATIENT
Start: 2022-11-01 | End: 2022-12-27 | Stop reason: SDUPTHER

## 2022-11-01 NOTE — TELEPHONE ENCOUNTER
----- Message from Annalee Flores sent at 11/1/2022  3:26 PM CDT -----  Contact: Claudette/ [phone]  Requesting an RX refill or new RX.  Is this a refill or new RX: New  RX name and strength :  busPIRone (BUSPAR) 5 MG Tab  Is this a 30 day or 90 day RX: 90  [facility]/pharmacy #[id number] - [place] - [address]  [address]  [place] [address]  Phone: [phone] Fax: [phone]      The doctors have asked that we provide their patients with the following 2 reminders -- prescription refills can take up to 72 hours, and a friendly reminder that in the future you can use your [facility] account to request refills:

## 2022-11-01 NOTE — TELEPHONE ENCOUNTER
Refill Routing Note   Medication(s) are not appropriate for processing by [facility] for the following reason(s):      - Outside of protocol    [facility] action(s):  Route          Medication reconciliation completed: No     Appointments  past 12m or future 3m with PCP    Date Provider   Last Visit   3/3/2022 Patti Brian MD   Next Visit   12/29/2022 Patti Brian MD   ED visits in past 90 days: 0        Note composed:4:13 PM 11/01/2022

## 2022-11-02 RX ORDER — BUSPIRONE HYDROCHLORIDE 5 MG/1
5-10 TABLET ORAL 2 TIMES DAILY PRN
Qty: 90 TABLET | Refills: 0 | Status: SHIPPED | OUTPATIENT
Start: 2022-11-02 | End: 2022-11-08 | Stop reason: SDUPTHER

## 2022-11-08 ENCOUNTER — OFFICE VISIT (OUTPATIENT)
Dept: INTERNAL MEDICINE | Facility: CLINIC | Age: 77
End: 2022-11-08
Payer: COMMERCIAL

## 2022-11-08 DIAGNOSIS — M25.521 ELBOW PAIN, RIGHT: ICD-10-CM

## 2022-11-08 DIAGNOSIS — F41.9 ANXIETY: Primary | ICD-10-CM

## 2022-11-08 DIAGNOSIS — Z87.828 HISTORY OF MOTOR VEHICLE ACCIDENT: ICD-10-CM

## 2022-11-08 DIAGNOSIS — F41.1 GAD (GENERALIZED ANXIETY DISORDER): ICD-10-CM

## 2022-11-08 DIAGNOSIS — F32.A DEPRESSION, UNSPECIFIED DEPRESSION TYPE: ICD-10-CM

## 2022-11-08 DIAGNOSIS — M54.2 NECK PAIN: ICD-10-CM

## 2022-11-08 PROCEDURE — 1160F PR REVIEW ALL MEDS BY PRESCRIBER/CLIN PHARMACIST DOCUMENTED: ICD-10-PCS | Mod: CPTII,95,, | Performed by: INTERNAL MEDICINE

## 2022-11-08 PROCEDURE — 1159F PR MEDICATION LIST DOCUMENTED IN MEDICAL RECORD: ICD-10-PCS | Mod: CPTII,95,, | Performed by: INTERNAL MEDICINE

## 2022-11-08 PROCEDURE — 1160F RVW MEDS BY RX/DR IN RCRD: CPT | Mod: CPTII,95,, | Performed by: INTERNAL MEDICINE

## 2022-11-08 PROCEDURE — 1159F MED LIST DOCD IN RCRD: CPT | Mod: CPTII,95,, | Performed by: INTERNAL MEDICINE

## 2022-11-08 PROCEDURE — 99441 PR PHYSICIAN TELEPHONE EVALUATION 5-10 MIN: CPT | Mod: 95,,, | Performed by: INTERNAL MEDICINE

## 2022-11-08 PROCEDURE — 4010F PR ACE/ARB THEARPY RXD/TAKEN: ICD-10-PCS | Mod: CPTII,95,, | Performed by: INTERNAL MEDICINE

## 2022-11-08 PROCEDURE — 4010F ACE/ARB THERAPY RXD/TAKEN: CPT | Mod: CPTII,95,, | Performed by: INTERNAL MEDICINE

## 2022-11-08 PROCEDURE — 99441 PR PHYSICIAN TELEPHONE EVALUATION 5-10 MIN: ICD-10-PCS | Mod: 95,,, | Performed by: INTERNAL MEDICINE

## 2022-11-08 RX ORDER — DICLOFENAC SODIUM 10 MG/G
2 GEL TOPICAL 2 TIMES DAILY PRN
Qty: 100 G | Refills: 1 | Status: SHIPPED | OUTPATIENT
Start: 2022-11-08 | End: 2023-01-04

## 2022-11-08 RX ORDER — BUSPIRONE HYDROCHLORIDE 5 MG/1
5-10 TABLET ORAL 2 TIMES DAILY PRN
Qty: 90 TABLET | Refills: 3 | Status: SHIPPED | OUTPATIENT
Start: 2022-11-08 | End: 2022-12-01 | Stop reason: SDUPTHER

## 2022-11-08 RX ORDER — DICLOFENAC SODIUM 50 MG/1
50 TABLET, DELAYED RELEASE ORAL 2 TIMES DAILY PRN
Qty: 20 TABLET | Refills: 0 | Status: SHIPPED | OUTPATIENT
Start: 2022-11-08 | End: 2022-12-12
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
## 2022-11-12 DIAGNOSIS — F41.1 GAD (GENERALIZED ANXIETY DISORDER): ICD-10-CM

## 2022-11-14 RX ORDER — BUSPIRONE HYDROCHLORIDE 5 MG/1
5-10 TABLET ORAL 2 TIMES DAILY PRN
Qty: 90 TABLET | Refills: 3 | OUTPATIENT
Start: 2022-11-14 | End: 2023-11-14

## 2022-11-14 NOTE — TELEPHONE ENCOUNTER
Refill Decision Note   Lesli Gong  is requesting a refill authorization.  Brief Assessment and Rationale for Refill:  Quick Discontinue     Medication Therapy Plan:  Receipt confirmed by pharmacy (11/8/2022 11:29 AM CST)    Medication Reconciliation Completed: No   Comments:     No Care Gaps recommended.     Note composed:9:41 AM 11/14/2022

## 2022-11-16 ENCOUNTER — TELEPHONE (OUTPATIENT)
Dept: INTERNAL MEDICINE | Facility: CLINIC | Age: 77
End: 2022-11-16
Payer: COMMERCIAL

## 2022-11-16 ENCOUNTER — TELEPHONE (OUTPATIENT)
Dept: PAIN MEDICINE | Facility: CLINIC | Age: 77
End: 2022-11-16
Payer: COMMERCIAL

## 2022-11-16 NOTE — TELEPHONE ENCOUNTER
----- Message from Merlyn Mae sent at 11/16/2022 10:50 AM CST -----  Contact: [phone]  Pt would like a call back about a sinus infection. Please call pt back.

## 2022-11-16 NOTE — TELEPHONE ENCOUNTER
Staff reached out to pt in regards to pt requesting a call back for medication refill staff informed pt its too soon to request a refill pt verbalized understanding and thanked staff for returning her call

## 2022-11-16 NOTE — TELEPHONE ENCOUNTER
Recommend zyrtec and nasal saline over the counter. If needs evaluation for antibiotics will need appt.

## 2022-11-16 NOTE — TELEPHONE ENCOUNTER
----- Message from Silvia Stanford sent at 11/16/2022 12:26 PM CST -----  Regarding: Refill request  Type:  RX Refill Request    Who Called: JERRI ELLER [[id number]]    Refill or New Rx: Refill request     RX Name and Strength:acetaminophen-codeine 300-30mg (TYLENOL #3) 300-30 mg Tab    How is the patient currently taking it? (ex. 1XDay) 2xday     Is this a 30 day or 90 day RX: 30 days     Preferred Pharmacy with phone number: [facility]/PHARMACY #[id number] - [place], [place] - [address]    Local or Mail Order: local     Ordering Provider: Opal Bragg Call Back Number: [phone]     Additional Information:

## 2022-11-16 NOTE — TELEPHONE ENCOUNTER
Patient requesting refill on acetaminophen codeine 300-30mg  Last office visit 10/21/22   shows last refill on   Patient does have a pain contract on file with [facility] department  Patient last UDS 7/12/22 was consistent with current therapy     CODEINE  Not Detected  Present  Not Detected     MORPHINE  Not Detected  Present  Not Detected     6-ACETYLMORPHINE  Not Detected  Not Detected  Not Detected     OXYCODONE  Not Detected  Not Detected  Not Detected     NOROYXCODONE  Not Detected  Not Detected  Not Detected     OXYMORPHONE  Not Detected  Not Detected  Not Detected     NOROXYMORPHONE  Not Detected  Not Detected  Not Detected     HYDROCODONE  Not Detected  Present  Not Detected     NORHYDROCODONE  Not Detected  Present  Not Detected     HYDROMORPHONE  Not Detected  Not Detected  Not Detected     BUPRENORPHINE  Not Detected  Not Detected  Not Detected     NORUBPRENORPHINE  Not Detected  Not Detected  Not Detected     FENTANYL  Not Detected  Not Detected  Not Detected     NORFENTANYL  Not Detected  Not Detected  Not Detected     MEPERIDINE METABOLITE  Not Detected  Not Detected  Not Detected     TAPENTADOL  Not Detected  Not Detected  Not Detected     TAPENTADOL-O-SULF  Not Detected  Not Detected  Not Detected     METHADONE  Not Detected  Not Detected  Not Detected     TRAMADOL  Not Detected  Not Detected  Not Detected     AMPHETAMINE  Not Detected  Not Detected  Not Detected     METHAMPHETAMINE  Not Detected  Not Detected  Not Detected     MDMA- ECSTASY  Not Detected  Not Detected  Not Detected     MDA  Not Detected  Not Detected  Not Detected     MDEA- Rachel  Not Detected  Not Detected  Not Detected     METHYLPHENIDATE  Not Detected  Not Detected  Not Detected     PHENTERMINE  Not Detected  Not Detected  Not Detected     BENZOYLECGONINE  Not Detected  Not Detected  Not Detected     ALPRAZOLAM  Not Detected  Not Detected  Not Detected     ALPHA-OH-ALPRAZOLAM  Not Detected  Not Detected   Not Detected     CLONAZEPAM  Not Detected  Not Detected  Not Detected     7-AMINOCLONAZEPAM  Not Detected  Not Detected  Not Detected     DIAZEPAM  Not Detected  Not Detected  Not Detected     NORDIAZEPAM  Not Detected  Not Detected  Not Detected     OXAZEPAM  Not Detected  Not Detected  Present     TEMAZEPAM  Not Detected  Not Detected  Not Detected     Lorazepam  Not Detected  Not Detected  Not Detected     MIDAZOLAM  Not Detected  Not Detected  Not Detected     ZOLPIDEM  Not Detected  Not Detected  Not Detected     BARBITURATES  Not Detected  Not Detected  Not Detected     Creatinine, Urine 20.0 - 400.0 mg/dL 160.7  250.4  140.1  94.0 R, CM    ETHYL GLUCURONIDE  Not Detected  Not Detected  Not Detected     MARIJUANA METABOLITE  Not Detected  Not Detected  Not Detected     PCP  Not Detected  Not Detected  Not Detected     CARISOPRODOL  Not Detected  Not Detected CM  Not Detected CM     Comment: The carisoprodol immunoassay has cross-reactivity to   carisoprodol and meprobamate.    Naloxone  Not Detected       Gabapentin  Present       Pregabalin  Not Detected       Alpha-OH-Midazolam  Not Detected       Zolpidem Metabolite  Not Detected       PAIN MANAGEMENT DRUG PANEL  See Below

## 2022-12-09 ENCOUNTER — TELEPHONE (OUTPATIENT)
Dept: PAIN MEDICINE | Facility: CLINIC | Age: 77
End: 2022-12-09
Payer: COMMERCIAL

## 2022-12-09 NOTE — TELEPHONE ENCOUNTER
Patient was contacted she wanted to know when was her appt with Sravanthi. Staff provided patient with this info.

## 2022-12-09 NOTE — TELEPHONE ENCOUNTER
----- Message from Jada Kate sent at 12/9/2022 12:28 PM CST -----  Regarding: Advice  Contact: [phone]  Patient is calling to speak with someone in office. Please contact pt

## 2022-12-12 DIAGNOSIS — F41.1 GAD (GENERALIZED ANXIETY DISORDER): ICD-10-CM

## 2022-12-12 RX ORDER — BUSPIRONE HYDROCHLORIDE 5 MG/1
5-10 TABLET ORAL 2 TIMES DAILY PRN
Qty: 90 TABLET | Refills: 11 | Status: CANCELLED | OUTPATIENT
Start: 2022-12-12 | End: 2023-12-12

## 2022-12-16 DIAGNOSIS — I10 ESSENTIAL HYPERTENSION: ICD-10-CM

## 2022-12-16 NOTE — TELEPHONE ENCOUNTER
No new care gaps identified.  [facility] Embedded Care Gaps. Reference number: [id number]. 12/16/2022   12:43:38 PM CST

## 2022-12-17 NOTE — TELEPHONE ENCOUNTER
Refill Routing Note   Medication(s) are not appropriate for processing by [facility] for the following reason(s):      - Required vitals are abnormal      [facility] action(s):  Defer          Medication reconciliation completed: No     Appointments  past 12m or future 3m with PCP    Date Provider   Last Visit   11/8/2022 Patti Brian MD   Next Visit   12/29/2022 Patti Brian MD   ED visits in past 90 days: 0        Note composed:12:34 PM 12/17/2022

## 2022-12-19 RX ORDER — LISINOPRIL 10 MG/1
20 TABLET ORAL DAILY
Qty: 180 TABLET | Refills: 3 | Status: SHIPPED | OUTPATIENT
Start: 2022-12-19 | End: 2023-11-09 | Stop reason: SDUPTHER

## 2022-12-21 ENCOUNTER — TELEPHONE (OUTPATIENT)
Dept: PAIN MEDICINE | Facility: CLINIC | Age: 77
End: 2022-12-21
Payer: COMMERCIAL

## 2022-12-21 NOTE — TELEPHONE ENCOUNTER
Staff contacted pt regarding her appointment on 12/23/22 with Darci Figueroa NP. Staff informed pt that her appointment will need to be switched to a virtual appointment due to the weather on Friday. Pt appointment was switched to virtual for 11:00 am.

## 2022-12-22 ENCOUNTER — TELEPHONE (OUTPATIENT)
Dept: PAIN MEDICINE | Facility: CLINIC | Age: 77
End: 2022-12-22
Payer: COMMERCIAL

## 2022-12-27 DIAGNOSIS — G89.4 CHRONIC PAIN DISORDER: Primary | ICD-10-CM

## 2022-12-27 RX ORDER — ACETAMINOPHEN AND CODEINE PHOSPHATE 300; 30 MG/1; MG/1
1 TABLET ORAL 2 TIMES DAILY PRN
Qty: 60 TABLET | Refills: 0 | Status: SHIPPED | OUTPATIENT
Start: 2022-12-27 | End: 2023-01-20

## 2022-12-27 NOTE — TELEPHONE ENCOUNTER
----- Message from Merle Gongora sent at 12/27/2022 11:43 AM CST -----  Regarding: Refill Request  Who Called: JERRI ELLER [[id number]]          New Prescription or Refill : Refill      RX Name and Strength:  acetaminophen-codeine 300-30mg (TYLENOL #3) 300-30 mg Tab      30 day or 90 day RX: 30      Preferred Pharmacy: [facility]/pharmacy #[id number] - [place] - [facility] [address]   Phone:  [phone]  Fax:  [phone]            Would the patient rather a call back or a response via [facility]? Call back        Best Call Back Number:   [phone]        Additional Information:

## 2022-12-27 NOTE — TELEPHONE ENCOUNTER
Patient requesting refill on acetaminophen-codeine 300-30mg  Last office visit 9/27/22   shows last refill on 11/2/22  Patient does have [facility] pain [facility]  Patient last UDS 7/21/21 was consistent with current therapy     CODEINE  Not Detected  Present  Not Detected     MORPHINE  Not Detected  Present  Not Detected     6-ACETYLMORPHINE  Not Detected  Not Detected  Not Detected     OXYCODONE  Not Detected  Not Detected  Not Detected     NOROYXCODONE  Not Detected  Not Detected  Not Detected     OXYMORPHONE  Not Detected  Not Detected  Not Detected     NOROXYMORPHONE  Not Detected  Not Detected  Not Detected     HYDROCODONE  Not Detected  Present  Not Detected     NORHYDROCODONE  Not Detected  Present  Not Detected     HYDROMORPHONE  Not Detected  Not Detected  Not Detected     BUPRENORPHINE  Not Detected  Not Detected  Not Detected     NORUBPRENORPHINE  Not Detected  Not Detected  Not Detected     FENTANYL  Not Detected  Not Detected  Not Detected     NORFENTANYL  Not Detected  Not Detected  Not Detected     MEPERIDINE METABOLITE  Not Detected  Not Detected  Not Detected     TAPENTADOL  Not Detected  Not Detected  Not Detected     TAPENTADOL-O-SULF  Not Detected  Not Detected  Not Detected     METHADONE  Not Detected  Not Detected  Not Detected     TRAMADOL  Not Detected  Not Detected  Not Detected     AMPHETAMINE  Not Detected  Not Detected  Not Detected     METHAMPHETAMINE  Not Detected  Not Detected  Not Detected     MDMA- ECSTASY  Not Detected  Not Detected  Not Detected     MDA  Not Detected  Not Detected  Not Detected     MDEA- Rachel  Not Detected  Not Detected  Not Detected     METHYLPHENIDATE  Not Detected  Not Detected  Not Detected     PHENTERMINE  Not Detected  Not Detected  Not Detected     BENZOYLECGONINE  Not Detected  Not Detected  Not Detected     ALPRAZOLAM  Not Detected  Not Detected  Not Detected     ALPHA-OH-ALPRAZOLAM  Not Detected  Not  Detected  Not Detected     CLONAZEPAM  Not Detected  Not Detected  Not Detected     7-AMINOCLONAZEPAM  Not Detected  Not Detected  Not Detected     DIAZEPAM  Not Detected  Not Detected  Not Detected     NORDIAZEPAM  Not Detected  Not Detected  Not Detected     OXAZEPAM  Not Detected  Not Detected  Present     TEMAZEPAM  Not Detected  Not Detected  Not Detected     Lorazepam  Not Detected  Not Detected  Not Detected     MIDAZOLAM  Not Detected  Not Detected  Not Detected     ZOLPIDEM  Not Detected  Not Detected  Not Detected     BARBITURATES  Not Detected  Not Detected  Not Detected     Creatinine, Urine 20.0 - 400.0 mg/dL 160.7  250.4  140.1  94.0 R, CM    ETHYL GLUCURONIDE  Not Detected  Not Detected  Not Detected     MARIJUANA METABOLITE  Not Detected  Not Detected  Not Detected     PCP  Not Detected  Not Detected  Not Detected     CARISOPRODOL  Not Detected  Not Detected CM  Not Detected CM     Comment: The carisoprodol immunoassay has cross-reactivity to   carisoprodol and meprobamate.    Naloxone  Not Detected       Gabapentin  Present       Pregabalin  Not Detected       Alpha-OH-Midazolam  Not Detected       Zolpidem Metabolite  Not Detected       PAIN MANAGEMENT DRUG PANEL  See Below  See Below CM  See Below CM

## 2022-12-29 ENCOUNTER — TELEPHONE (OUTPATIENT)
Dept: INTERNAL MEDICINE | Facility: CLINIC | Age: 77
End: 2022-12-29
Payer: COMMERCIAL

## 2023-01-12 ENCOUNTER — TELEPHONE (OUTPATIENT)
Dept: PAIN MEDICINE | Facility: CLINIC | Age: 78
End: 2023-01-12
Payer: COMMERCIAL

## 2023-01-12 NOTE — TELEPHONE ENCOUNTER
This message is for patient in regards to his/her appointment 01/13/223 at 9:20a   With  Darci Figueroa NP.         If you have any questions or concerns please contact [phone]     Pt verbalized understanding and has confirmed appt

## 2023-01-13 ENCOUNTER — OFFICE VISIT (OUTPATIENT)
Dept: PAIN MEDICINE | Facility: CLINIC | Age: 78
End: 2023-01-13
Payer: COMMERCIAL

## 2023-01-13 VITALS
DIASTOLIC BLOOD PRESSURE: 97 MMHG | RESPIRATION RATE: 19 BRPM | WEIGHT: 201.25 LBS | HEIGHT: 64 IN | TEMPERATURE: 98 F | HEART RATE: 89 BPM | BODY MASS INDEX: 34.36 KG/M2 | SYSTOLIC BLOOD PRESSURE: 156 MMHG

## 2023-01-13 DIAGNOSIS — M25.512 CHRONIC LEFT SHOULDER PAIN: ICD-10-CM

## 2023-01-13 DIAGNOSIS — G89.29 CHRONIC LEFT SHOULDER PAIN: ICD-10-CM

## 2023-01-13 DIAGNOSIS — M19.012 PRIMARY OSTEOARTHRITIS OF LEFT SHOULDER: ICD-10-CM

## 2023-01-13 DIAGNOSIS — Z51.81 ENCOUNTER FOR MONITORING OPIOID MAINTENANCE THERAPY: Primary | ICD-10-CM

## 2023-01-13 DIAGNOSIS — Q85.01 NEUROFIBROMATOSIS, TYPE 1: ICD-10-CM

## 2023-01-13 DIAGNOSIS — Z79.891 ENCOUNTER FOR MONITORING OPIOID MAINTENANCE THERAPY: Primary | ICD-10-CM

## 2023-01-13 DIAGNOSIS — M54.2 CERVICALGIA: ICD-10-CM

## 2023-01-13 DIAGNOSIS — G89.29 CHRONIC SCAPULAR PAIN: ICD-10-CM

## 2023-01-13 DIAGNOSIS — M89.8X1 CHRONIC SCAPULAR PAIN: ICD-10-CM

## 2023-01-13 PROCEDURE — 99215 OFFICE O/P EST HI 40 MIN: CPT | Mod: S$GLB,,, | Performed by: NURSE PRACTITIONER

## 2023-01-13 PROCEDURE — 1159F MED LIST DOCD IN RCRD: CPT | Mod: CPTII,S$GLB,, | Performed by: NURSE PRACTITIONER

## 2023-01-13 PROCEDURE — 1125F PR PAIN SEVERITY QUANTIFIED, PAIN PRESENT: ICD-10-PCS | Mod: CPTII,S$GLB,, | Performed by: NURSE PRACTITIONER

## 2023-01-13 PROCEDURE — 80326 AMPHETAMINES 5 OR MORE: CPT | Performed by: NURSE PRACTITIONER

## 2023-01-13 PROCEDURE — 3080F DIAST BP >= 90 MM HG: CPT | Mod: CPTII,S$GLB,, | Performed by: NURSE PRACTITIONER

## 2023-01-13 PROCEDURE — 1101F PT FALLS ASSESS-DOCD LE1/YR: CPT | Mod: CPTII,S$GLB,, | Performed by: NURSE PRACTITIONER

## 2023-01-13 PROCEDURE — 99499 RISK ADDL DX/OHS AUDIT: ICD-10-PCS | Mod: S$GLB,,, | Performed by: NURSE PRACTITIONER

## 2023-01-13 PROCEDURE — 1159F PR MEDICATION LIST DOCUMENTED IN MEDICAL RECORD: ICD-10-PCS | Mod: CPTII,S$GLB,, | Performed by: NURSE PRACTITIONER

## 2023-01-13 PROCEDURE — 3077F PR MOST RECENT SYSTOLIC BLOOD PRESSURE >= 140 MM HG: ICD-10-PCS | Mod: CPTII,S$GLB,, | Performed by: NURSE PRACTITIONER

## 2023-01-13 PROCEDURE — 99499 UNLISTED E&M SERVICE: CPT | Mod: S$GLB,,, | Performed by: NURSE PRACTITIONER

## 2023-01-13 PROCEDURE — 1125F AMNT PAIN NOTED PAIN PRSNT: CPT | Mod: CPTII,S$GLB,, | Performed by: NURSE PRACTITIONER

## 2023-01-13 PROCEDURE — 3077F SYST BP >= 140 MM HG: CPT | Mod: CPTII,S$GLB,, | Performed by: NURSE PRACTITIONER

## 2023-01-13 PROCEDURE — 99999 PR PBB SHADOW E&M-EST. PATIENT-LVL IV: CPT | Mod: PBBFAC,,, | Performed by: NURSE PRACTITIONER

## 2023-01-13 PROCEDURE — 99999 PR PBB SHADOW E&M-EST. PATIENT-LVL IV: ICD-10-PCS | Mod: PBBFAC,,, | Performed by: NURSE PRACTITIONER

## 2023-01-13 PROCEDURE — 3288F PR FALLS RISK ASSESSMENT DOCUMENTED: ICD-10-PCS | Mod: CPTII,S$GLB,, | Performed by: NURSE PRACTITIONER

## 2023-01-13 PROCEDURE — 3288F FALL RISK ASSESSMENT DOCD: CPT | Mod: CPTII,S$GLB,, | Performed by: NURSE PRACTITIONER

## 2023-01-13 PROCEDURE — 99215 PR OFFICE/OUTPT VISIT, EST, LEVL V, 40-54 MIN: ICD-10-PCS | Mod: S$GLB,,, | Performed by: NURSE PRACTITIONER

## 2023-01-13 PROCEDURE — 1101F PR PT FALLS ASSESS DOC 0-1 FALLS W/OUT INJ PAST YR: ICD-10-PCS | Mod: CPTII,S$GLB,, | Performed by: NURSE PRACTITIONER

## 2023-01-13 PROCEDURE — 3080F PR MOST RECENT DIASTOLIC BLOOD PRESSURE >= 90 MM HG: ICD-10-PCS | Mod: CPTII,S$GLB,, | Performed by: NURSE PRACTITIONER

## 2023-01-13 RX ORDER — GABAPENTIN 100 MG/1
100 CAPSULE ORAL 2 TIMES DAILY
Qty: 60 CAPSULE | Refills: 2 | Status: SHIPPED | OUTPATIENT
Start: 2023-01-13 | End: 2023-04-14 | Stop reason: SDUPTHER
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
## 2023-01-19 LAB
6MAM UR QL: NOT DETECTED
7AMINOCLONAZEPAM UR QL: NOT DETECTED
A-OH ALPRAZ UR QL: NOT DETECTED
ALPHA-OH-MIDAZOLAM: NOT DETECTED
ALPRAZ UR QL: NOT DETECTED
AMPHET UR QL SCN: NOT DETECTED
ANNOTATION COMMENT IMP: NORMAL
ANNOTATION COMMENT IMP: NORMAL
BARBITURATES UR QL: NOT DETECTED
BUPRENORPHINE UR QL: NOT DETECTED
BZE UR QL: NOT DETECTED
CARBOXYTHC UR QL: NOT DETECTED
CARISOPRODOL UR QL: NOT DETECTED
CLONAZEPAM UR QL: NOT DETECTED
CODEINE UR QL: NOT DETECTED
CREAT UR-MCNC: 57 MG/DL (ref 20–400)
DIAZEPAM UR QL: NOT DETECTED
ETHYL GLUCURONIDE UR QL: NOT DETECTED
FENTANYL UR QL: NOT DETECTED
GABAPENTIN: PRESENT
HYDROCODONE UR QL: NOT DETECTED
HYDROMORPHONE UR QL: NOT DETECTED
LORAZEPAM UR QL: NOT DETECTED
MDA UR QL: NOT DETECTED
MDEA UR QL: NOT DETECTED
MDMA UR QL: NOT DETECTED
ME-PHENIDATE UR QL: NOT DETECTED
METHADONE UR QL: NOT DETECTED
METHAMPHET UR QL: NOT DETECTED
MIDAZOLAM UR QL SCN: NOT DETECTED
MORPHINE UR QL: NOT DETECTED
NALOXONE: NOT DETECTED
NORBUPRENORPHINE UR QL CFM: NOT DETECTED
NORDIAZEPAM UR QL: NOT DETECTED
NORFENTANYL UR QL: NOT DETECTED
NORHYDROCODONE UR QL CFM: NOT DETECTED
NORMEPERIDINE UR QL CFM: NOT DETECTED
NOROXYCODONE UR QL CFM: NOT DETECTED
NOROXYMORPHONE UR QL SCN: NOT DETECTED
OXAZEPAM UR QL: NOT DETECTED
OXYCODONE UR QL: NOT DETECTED
OXYMORPHONE UR QL: NOT DETECTED
PATHOLOGY STUDY: NORMAL
PCP UR QL: NOT DETECTED
PHENTERMINE UR QL: NOT DETECTED
PREGABALIN: NOT DETECTED
SERVICE CMNT-IMP: NORMAL
TAPENTADOL UR QL SCN: NOT DETECTED
TAPENTADOL UR QL SCN: NOT DETECTED
TEMAZEPAM UR QL: NOT DETECTED
TRAMADOL UR QL: NOT DETECTED
ZOLPIDEM METABOLITE: NOT DETECTED
ZOLPIDEM UR QL: NOT DETECTED

## 2023-01-20 DIAGNOSIS — F41.1 GAD (GENERALIZED ANXIETY DISORDER): ICD-10-CM

## 2023-01-20 RX ORDER — BUSPIRONE HYDROCHLORIDE 5 MG/1
5-10 TABLET ORAL 2 TIMES DAILY PRN
Qty: 90 TABLET | Refills: 0 | Status: SHIPPED | OUTPATIENT
Start: 2023-01-20 | End: 2023-02-11 | Stop reason: SDUPTHER

## 2023-02-07 DIAGNOSIS — G89.4 CHRONIC PAIN DISORDER: ICD-10-CM

## 2023-02-07 RX ORDER — ACETAMINOPHEN AND CODEINE PHOSPHATE 300; 30 MG/1; MG/1
1 TABLET ORAL 2 TIMES DAILY PRN
Qty: 60 TABLET | Refills: 0 | Status: SHIPPED | OUTPATIENT
Start: 2023-02-07 | End: 2023-03-15 | Stop reason: SDUPTHER

## 2023-02-07 NOTE — TELEPHONE ENCOUNTER
Pt prescription was sent to [facility] on [address] but pt requesting to have sent to [facility].    Refill Request.

## 2023-02-07 NOTE — TELEPHONE ENCOUNTER
No new care gaps identified.  [facility] Embedded Care Gaps. Reference number: [id number]. 2/07/2023   10:59:56 AM CST

## 2023-02-11 DIAGNOSIS — F41.1 GAD (GENERALIZED ANXIETY DISORDER): ICD-10-CM

## 2023-02-13 RX ORDER — BUSPIRONE HYDROCHLORIDE 5 MG/1
5-10 TABLET ORAL 2 TIMES DAILY PRN
Qty: 90 TABLET | Refills: 0 | Status: SHIPPED | OUTPATIENT
Start: 2023-02-13 | End: 2023-03-03 | Stop reason: SDUPTHER

## 2023-02-15 NOTE — PROGRESS NOTES
"Subjective:       Patient ID: Lesli Gong is a 77 y.o. female.    Chief Complaint: Follow-up    Pt of Dr. Brian, here for, "mri test result, dementia review, anxiety medication / Dr Brian"    Last visit with PCP was virtual on 11-8-22. Was prescribed the buspar 5 mg she has been on for anxiety, 1-2 tabs as needed.    Saw PCP on 3-3-22 last year and daughter was concerned about dementia, see note. Plan of care was the following:  Memory difficulty, gait disturbance, tremor, urinary incontinence  -     Ambulatory referral/consult to Adult Neuropsychology; Future  -     Ambulatory referral/consult to Neurology; Future  -     MRI Brain W WO Contrast; Future  -     Comprehensive Metabolic Panel; Future  -     TSH; Future  -     CBC Auto Differential; Future  -     Vitamin B12; Future  -     Urinalysis, Reflex to Urine Culture Urine, Clean Catch; Future  Safety precautions discussed with patient and daughter. They feel she is safe to continue cooking as long as someone is home but will stop using knives due to tremor.     Dr. Brian sent message regarding MRI results that stated. "Your MRI shows evidence of chronic small vessel blockages but no sign of stroke. Please see neurology like we discussed. You can call [phone] to schedule with them."    Reviewed record, does not appear that pt was ever see by adult neuropsychology or neurology  -----------------------------------------------------------------------------------------------------------------------------------------------------------------------------------------------------------------------------------------------------------------------------------------------------------------------------------  Present with daughter Gianna. Still concerned about memory issues with her mother, urinary incontinence, and unsteady gait. Wanted an explanation of the MRI results from last year, showed chronic changes as mentioned above. She was unaware that Dr." Snehal wanted her     Review of Systems   Constitutional:  Negative for chills and fever.   Respiratory:  Negative for chest tightness and shortness of breath.    Cardiovascular:  Negative for chest pain.   Gastrointestinal:  Negative for abdominal pain, constipation, diarrhea, nausea and vomiting.   Genitourinary:  Positive for bladder incontinence and frequency.   Musculoskeletal:  Positive for gait problem.   Allergic/Immunologic: Negative for environmental allergies, food allergies and immunocompromised state.   Neurological:  Positive for weakness and memory loss. Negative for dizziness, light-headedness and headaches.   Hematological:  Negative for adenopathy. Does not bruise/bleed easily.   Psychiatric/Behavioral:  Negative for suicidal ideas.        Review of patient's allergies indicates:   Allergen Reactions    Anaprox [naproxen sodium] Nausea Only and Palpitations    Motrin [ibuprofen] Nausea Only and Palpitations    Neomycin-polymyxin-hc      Itchy (skin)^    Sulfa (sulfonamide antibiotics)      Hives (skin)^        Current Outpatient Medications:     acetaminophen-codeine 300-30mg (TYLENOL #3) 300-30 mg Tab, Take 1 tablet by mouth 2 (two) times daily as needed (pain)., Disp: 60 tablet, Rfl: 0    busPIRone (BUSPAR) 5 MG Tab, TAKE 1 TO 2 TABLETS (5-10 MG TOTAL) BY MOUTH 2 (TWO) TIMES DAILY AS NEEDED (ANXIETY)., Disp: 90 tablet, Rfl: 3    busPIRone (BUSPAR) 5 MG Tab, Take 1 to 2 tablets (5-10 mg total) by mouth 2 (two) times daily as needed (anxiety)., Disp: 90 tablet, Rfl: 0    calcium-vitamin D (OSCAL) 250 (625)-125 mg-unit per tablet, Take 1 tablet by mouth once daily., Disp: , Rfl:     cetirizine (ZYRTEC) 10 MG tablet, TAKE 1 TABLET BY MOUTH EVERY DAY, Disp: 30 tablet, Rfl: 11    cimetidine (TAGAMET) 400 MG tablet, TAKE 1 TABLET BY MOUTH TWICE A DAY, Disp: 180 tablet, Rfl: 3    citalopram (CELEXA) 10 MG tablet, Take 1 tablet (10 mg total) by mouth once daily., Disp: 90 tablet, Rfl: 3     cyanocobalamin, vitamin B-12, 50 mcg tablet, Take 50 mcg by mouth once daily., Disp: , Rfl:     diclofenac (VOLTAREN) 50 MG EC tablet, TAKE 1 TABLET (50 MG TOTAL) BY MOUTH 2 (TWO) TIMES DAILY AS NEEDED (BACK/NECK PAIN)., Disp: 20 tablet, Rfl: 0    diclofenac sodium (VOLTAREN) 1 % Gel, APPLY 2 G TOPICALLY 2 (TWO) TIMES DAILY AS NEEDED. TO RIGHT ARM/ ELBOW., Disp: 100 g, Rfl: 1    lisinopriL 10 MG tablet, Take 2 tablets (20 mg total) by mouth once daily., Disp: 180 tablet, Rfl: 3    multivitamin capsule, Take 1 capsule by mouth once daily., Disp: , Rfl:     tiZANidine (ZANAFLEX) 2 MG tablet, TAKE 1 TABLET (2 MG TOTAL) BY MOUTH NIGHTLY AS NEEDED (MUSCLE PAIN)., Disp: 30 tablet, Rfl: 1    esomeprazole (NEXIUM) 20 MG capsule, Take 1 capsule (20 mg total) by mouth 2 (two) times daily before meals. for 14 days, Disp: 28 capsule, Rfl: 0    gabapentin (NEURONTIN) 100 MG capsule, Take 1 capsule (100 mg total) by mouth 2 (two) times daily., Disp: 60 capsule, Rfl: 2     Patient Active Problem List   Diagnosis    History of sarcoma of soft tissue    Neurofibromatosis, type 1    Essential hypertension    History of gastrointestinal stromal tumor (GIST)    Sensation of fullness in both ears    Impacted cerumen of both ears    Left shoulder pain    Chronic left shoulder pain    Anxiety    History of hepatitis C, s/p successful Rx w/ SVR24 - 2/2018    Adjustment disorder with anxiety    Colon cancer screening    HELGA (generalized anxiety disorder)    Depression    Radiculopathy, cervical region    Chronic scapular pain      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    History of hepatitis C, s/p successful Rx w/ SVR24 - 2/2018 3/17/2017    Completed zepatier + RBV w/ svr     History of psychiatric hospitalization     Hx of psychiatric care     Mass 10-10-14    excision of mass/left shoulder     "Neurofibromatosis, type 1 (von Recklinghausen's disease) 2014    Pheochromocytoma     S/p resection in s    Psychiatric problem     Soft tissue sarcoma of chest wall 2014    Therapy       Past Surgical History:   Procedure Laterality Date    APPENDECTOMY      BILATERAL SALPINGOOPHORECTOMY      BREAST BIOPSY Right     BREAST BIOPSY Left     BUNIONECTOMY Right      SECTION      COLONOSCOPY N/A 2018    Procedure: COLONOSCOPY;  Surgeon: Osacr Medley MD;  Location: [address]);  Service: Endoscopy;  Laterality: N/A;    EXPLORATORY LAPAROTOMY W/ BOWEL RESECTION      HYSTERECTOMY N/A     pheochromocytoma excision N/A     TONSILLECTOMY Bilateral       Social History     Socioeconomic History    Marital status:    Tobacco Use    Smoking status: Never    Smokeless tobacco: Never   Substance and Sexual Activity    Alcohol use: No    Drug use: No    Sexual activity: Not Currently      Family History   Problem Relation Age of Onset    Breast cancer Mother     Neurofibromatosis Father     Cancer Sister         GIST    Neurofibromatosis Sister       Objective:      Vitals:    23 1337   BP: (!) 158/100--did not take BP meds today   Pulse: 90   Resp: 16   SpO2: 98%   Weight: 87.3 kg (192 lb 7.4 oz)   Height: 5' 4" (1.626 m)   PainSc:   7   PainLoc: Shoulder      Body mass index is 33.04 kg/m².     Physical Exam  Vitals and nursing note reviewed.   Constitutional:       Appearance: She is obese.   HENT:      Head: Normocephalic.      Nose: Nose normal.      Mouth/Throat:      Mouth: Mucous membranes are moist.   Eyes:      Conjunctiva/sclera: Conjunctivae normal.   Cardiovascular:      Rate and Rhythm: Normal rate and regular rhythm.      Pulses: Normal pulses.      Heart sounds: Normal heart sounds.   Pulmonary:      Effort: Pulmonary effort is normal.      Breath sounds: Normal breath sounds.   Musculoskeletal:      Cervical back: Normal range of motion.      Comments: On " wheelchair today   Neurological:      Mental Status: She is alert. Mental status is at baseline.   Psychiatric:         Mood and Affect: Mood normal.         Behavior: Behavior normal.         Thought Content: Thought content normal.         Judgment: Judgment normal.       Assessment:       Problem List Items Addressed This Visit          Psychiatric    HELGA (generalized anxiety disorder) - Primary     Other Visit Diagnoses       Memory difficulty        Relevant Orders    Ambulatory referral/consult to Adult Neuropsychology    Ambulatory referral/consult to Neurology    Overactive bladder        Relevant Medications    oxybutynin (DITROPAN-XL) 5 MG TR24    Gait disturbance        Relevant Medications    cane Amanda    Other Relevant Orders    Ambulatory referral/consult to Adult Neuropsychology    Ambulatory referral/consult to Neurology    Other amnesia        Relevant Orders    Ambulatory referral/consult to Adult Neuropsychology    Ambulatory referral/consult to Neurology    BMI 33.0-33.9,adult        Obesity (BMI 30.0-34.9)                Plan:       Lesli was seen today for follow-up.    Diagnoses and all orders for this visit:    HELGA (generalized anxiety disorder)  Continue Buspar for anxiety as prescribed    Memory difficulty  -     Ambulatory referral/consult to Adult Neuropsychology; Future  -     Ambulatory referral/consult to Neurology; Future    Overactive bladder  -     oxybutynin (DITROPAN-XL) 5 MG TR24; Take 1 tablet (5 mg total) by mouth once daily.    Gait disturbance  -     Ambulatory referral/consult to Adult Neuropsychology; Future  -     Ambulatory referral/consult to Neurology; Future  -     cane Amanda; For use with walking    Other amnesia  -     Ambulatory referral/consult to Adult Neuropsychology; Future  -     Ambulatory referral/consult to Neurology; Future    BMI 33.0-33.9,adult  BMI reviewed    Obesity (BMI 30.0-34.9)  BMI reviewed.    Diet and exercise to lose weight.    Referral to  Neurology and Neuropsychology for more in dept eval on memory issues    Walking cane ordered for gait    Try ditropan 5mg XL daily for overactive bladder    Avoid liquids before bedtime and excess caffeine    Follow up if symptoms worsen or fail to improve.

## 2023-02-16 ENCOUNTER — OFFICE VISIT (OUTPATIENT)
Dept: INTERNAL MEDICINE | Facility: CLINIC | Age: 78
End: 2023-02-16
Payer: MEDICARE

## 2023-02-16 VITALS
SYSTOLIC BLOOD PRESSURE: 158 MMHG | DIASTOLIC BLOOD PRESSURE: 100 MMHG | OXYGEN SATURATION: 98 % | RESPIRATION RATE: 16 BRPM | HEIGHT: 64 IN | WEIGHT: 192.44 LBS | HEART RATE: 90 BPM | BODY MASS INDEX: 32.85 KG/M2

## 2023-02-16 DIAGNOSIS — R41.3 MEMORY DIFFICULTY: ICD-10-CM

## 2023-02-16 DIAGNOSIS — E66.9 OBESITY (BMI 30.0-34.9): ICD-10-CM

## 2023-02-16 DIAGNOSIS — F41.1 GAD (GENERALIZED ANXIETY DISORDER): Primary | ICD-10-CM

## 2023-02-16 DIAGNOSIS — R26.9 GAIT DISTURBANCE: ICD-10-CM

## 2023-02-16 DIAGNOSIS — N32.81 OVERACTIVE BLADDER: ICD-10-CM

## 2023-02-16 DIAGNOSIS — R41.3 OTHER AMNESIA: ICD-10-CM

## 2023-02-16 PROCEDURE — 3080F PR MOST RECENT DIASTOLIC BLOOD PRESSURE >= 90 MM HG: ICD-10-PCS | Mod: CPTII,S$GLB,, | Performed by: NURSE PRACTITIONER

## 2023-02-16 PROCEDURE — 3288F FALL RISK ASSESSMENT DOCD: CPT | Mod: CPTII,S$GLB,, | Performed by: NURSE PRACTITIONER

## 2023-02-16 PROCEDURE — 1125F PR PAIN SEVERITY QUANTIFIED, PAIN PRESENT: ICD-10-PCS | Mod: CPTII,S$GLB,, | Performed by: NURSE PRACTITIONER

## 2023-02-16 PROCEDURE — 1159F MED LIST DOCD IN RCRD: CPT | Mod: CPTII,S$GLB,, | Performed by: NURSE PRACTITIONER

## 2023-02-16 PROCEDURE — 3288F PR FALLS RISK ASSESSMENT DOCUMENTED: ICD-10-PCS | Mod: CPTII,S$GLB,, | Performed by: NURSE PRACTITIONER

## 2023-02-16 PROCEDURE — 3080F DIAST BP >= 90 MM HG: CPT | Mod: CPTII,S$GLB,, | Performed by: NURSE PRACTITIONER

## 2023-02-16 PROCEDURE — 1101F PT FALLS ASSESS-DOCD LE1/YR: CPT | Mod: CPTII,S$GLB,, | Performed by: NURSE PRACTITIONER

## 2023-02-16 PROCEDURE — 99214 PR OFFICE/OUTPT VISIT, EST, LEVL IV, 30-39 MIN: ICD-10-PCS | Mod: S$GLB,,, | Performed by: NURSE PRACTITIONER

## 2023-02-16 PROCEDURE — 1101F PR PT FALLS ASSESS DOC 0-1 FALLS W/OUT INJ PAST YR: ICD-10-PCS | Mod: CPTII,S$GLB,, | Performed by: NURSE PRACTITIONER

## 2023-02-16 PROCEDURE — 3077F PR MOST RECENT SYSTOLIC BLOOD PRESSURE >= 140 MM HG: ICD-10-PCS | Mod: CPTII,S$GLB,, | Performed by: NURSE PRACTITIONER

## 2023-02-16 PROCEDURE — 1125F AMNT PAIN NOTED PAIN PRSNT: CPT | Mod: CPTII,S$GLB,, | Performed by: NURSE PRACTITIONER

## 2023-02-16 PROCEDURE — 99215 OFFICE O/P EST HI 40 MIN: CPT | Mod: PBBFAC | Performed by: NURSE PRACTITIONER

## 2023-02-16 PROCEDURE — 3077F SYST BP >= 140 MM HG: CPT | Mod: CPTII,S$GLB,, | Performed by: NURSE PRACTITIONER

## 2023-02-16 PROCEDURE — 1159F PR MEDICATION LIST DOCUMENTED IN MEDICAL RECORD: ICD-10-PCS | Mod: CPTII,S$GLB,, | Performed by: NURSE PRACTITIONER

## 2023-02-16 PROCEDURE — 99999 PR PBB SHADOW E&M-EST. PATIENT-LVL V: CPT | Mod: PBBFAC,,, | Performed by: NURSE PRACTITIONER

## 2023-02-16 PROCEDURE — 99214 OFFICE O/P EST MOD 30 MIN: CPT | Mod: S$GLB,,, | Performed by: NURSE PRACTITIONER

## 2023-02-16 PROCEDURE — 99999 PR PBB SHADOW E&M-EST. PATIENT-LVL V: ICD-10-PCS | Mod: PBBFAC,,, | Performed by: NURSE PRACTITIONER

## 2023-02-16 RX ORDER — CANE
EACH MISCELLANEOUS
Qty: 1 EACH | Refills: 0 | Status: ON HOLD | OUTPATIENT
Start: 2023-02-16 | End: 2023-12-27 | Stop reason: HOSPADM

## 2023-02-16 RX ORDER — OXYBUTYNIN CHLORIDE 5 MG/1
5 TABLET, EXTENDED RELEASE ORAL DAILY
Qty: 30 TABLET | Refills: 2 | Status: SHIPPED | OUTPATIENT
Start: 2023-02-16 | End: 2023-11-09 | Stop reason: SDUPTHER

## 2023-02-16 NOTE — PATIENT INSTRUCTIONS
Referral to Neurology and Neuropsychology for more in dept eval on memory issues    Walking cane ordered for gait    Try ditropan 5mg XL daily for overactive bladder    Avoid liquids before bedtime and excess caffeine    Continue Buspar for anxiety as prescribed

## 2023-03-03 DIAGNOSIS — F41.1 GAD (GENERALIZED ANXIETY DISORDER): ICD-10-CM

## 2023-03-03 RX ORDER — BUSPIRONE HYDROCHLORIDE 5 MG/1
5-10 TABLET ORAL 2 TIMES DAILY PRN
Qty: 90 TABLET | Refills: 0 | Status: SHIPPED | OUTPATIENT
Start: 2023-03-03 | End: 2023-04-14 | Stop reason: SDUPTHER

## 2023-03-15 DIAGNOSIS — G89.4 CHRONIC PAIN DISORDER: ICD-10-CM

## 2023-03-15 RX ORDER — ACETAMINOPHEN AND CODEINE PHOSPHATE 300; 30 MG/1; MG/1
1 TABLET ORAL 2 TIMES DAILY PRN
Qty: 60 TABLET | Refills: 0 | Status: SHIPPED | OUTPATIENT
Start: 2023-03-15 | End: 2023-04-14 | Stop reason: SDUPTHER

## 2023-03-15 NOTE — TELEPHONE ENCOUNTER
Care Due:                  Date            Visit Type   Department     Provider  --------------------------------------------------------------------------------                                VIRTUAL      [facility] INTERNAL  Last Visit: 11-      AUDIO ONLY   MEDICINE       TYREE DOMINGUEZ                               -                              PRIMARY      [facility] INTERNAL  Next Visit: 07-      CARE ([facility])   MEDICINE       TYREE DOMINGUEZ                                                            Last  Test          Frequency    Reason                     Performed    Due Date  --------------------------------------------------------------------------------    CMP.........  12 months..  lisinopriL...............  05- 05-    Health [facility] Embedded Care Gaps. Reference number: [id number]. 3/15/2023   10:43:11 AM CDT

## 2023-04-14 DIAGNOSIS — F41.1 GAD (GENERALIZED ANXIETY DISORDER): ICD-10-CM

## 2023-04-14 DIAGNOSIS — G89.4 CHRONIC PAIN DISORDER: ICD-10-CM

## 2023-04-14 RX ORDER — GABAPENTIN 100 MG/1
100 CAPSULE ORAL 2 TIMES DAILY
Qty: 60 CAPSULE | Refills: 2 | Status: SHIPPED | OUTPATIENT
Start: 2023-04-14 | End: 2023-11-09 | Stop reason: SDUPTHER

## 2023-04-14 RX ORDER — BUSPIRONE HYDROCHLORIDE 5 MG/1
5-10 TABLET ORAL 2 TIMES DAILY PRN
Qty: 90 TABLET | Refills: 0 | Status: SHIPPED | OUTPATIENT
Start: 2023-04-14 | End: 2023-11-09

## 2023-04-14 RX ORDER — ACETAMINOPHEN AND CODEINE PHOSPHATE 300; 30 MG/1; MG/1
1 TABLET ORAL 2 TIMES DAILY PRN
Qty: 60 TABLET | Refills: 0 | Status: SHIPPED | OUTPATIENT
Start: 2023-04-14 | End: 2023-05-18 | Stop reason: SDUPTHER

## 2023-04-14 RX ORDER — BUSPIRONE HYDROCHLORIDE 5 MG/1
5-10 TABLET ORAL 2 TIMES DAILY PRN
Qty: 90 TABLET | Refills: 0 | Status: CANCELLED | OUTPATIENT
Start: 2023-04-14 | End: 2024-04-13

## 2023-04-14 NOTE — TELEPHONE ENCOUNTER
No new care gaps identified.  [facility] Embedded Care Gaps. Reference number: [id number]. 4/14/2023   11:07:10 AM CDT

## 2023-04-21 DIAGNOSIS — F41.1 GAD (GENERALIZED ANXIETY DISORDER): ICD-10-CM

## 2023-04-21 RX ORDER — BUSPIRONE HYDROCHLORIDE 5 MG/1
5-10 TABLET ORAL 2 TIMES DAILY PRN
Qty: 90 TABLET | Refills: 0 | Status: SHIPPED | OUTPATIENT
Start: 2023-04-21 | End: 2023-06-02 | Stop reason: SDUPTHER

## 2023-04-21 NOTE — TELEPHONE ENCOUNTER
----- Message from Lakshmi Templeton sent at 4/20/2023 12:15 PM CDT -----  Contact: [phone]  Requesting an RX refill or new RX.  Is this a refill or new RX: refill  RX name and strength   busPIRone (BUSPAR) 10 MG Tab  Is this a 30 day or 90 day RX: 30  Pharmacy name and phone # :  [facility]   Phone:  [phone]  Fax:  [phone]        The doctors have asked that we provide their patients with the following 2 reminders -- prescription refills can take up to 72 hours, and a friendly reminder that in the future you can use your [facility] account to request refills: yes

## 2023-05-18 DIAGNOSIS — G89.4 CHRONIC PAIN DISORDER: ICD-10-CM

## 2023-05-18 RX ORDER — ACETAMINOPHEN AND CODEINE PHOSPHATE 300; 30 MG/1; MG/1
1 TABLET ORAL 2 TIMES DAILY PRN
Qty: 60 TABLET | Refills: 0 | Status: SHIPPED | OUTPATIENT
Start: 2023-05-18 | End: 2023-06-16 | Stop reason: SDUPTHER

## 2023-05-18 NOTE — TELEPHONE ENCOUNTER
Care Due:                  Date            Visit Type   Department     Provider  --------------------------------------------------------------------------------                                VIRTUAL      [facility] INTERNAL  Last Visit: 11-      AUDIO ONLY   MEDICINE       TYREE DOMINGUEZ                               -                              PRIMARY      [facility] INTERNAL  Next Visit: 07-      CARE ([facility])   MEDICINE       TYREE DOMINGUEZ                                                            Last  Test          Frequency    Reason                     Performed    Due Date  --------------------------------------------------------------------------------    CMP.........  12 months..  lisinopriL...............  05- 05-    Health [facility] Embedded Care Due Messages. Reference number: [id number].   5/18/2023 12:10:25 PM CDT

## 2023-05-18 NOTE — TELEPHONE ENCOUNTER
----- Message from Jesse Figueroa sent at 5/18/2023 11:52 AM CDT -----  Contact: [phone]  Requesting an RX refill or new RX.  Is this a refill or new RX: refill  RX name and strength (copy/paste from chart):  acetaminophen-codeine 300-30mg (TYLENOL #3) 300-30 mg Tab  Is this a 30 day or 90 day RX: 90  Pharmacy name and phone # (copy/paste from chart):      [facility]  [address] [place] [address]  Phone: [phone] Fax: [phone]      The doctors have asked that we provide their patients with the following 2 reminders -- prescription refills can take up to 72 hours, and a friendly reminder that in the future you can use your [facility] account to request refills:

## 2023-06-02 DIAGNOSIS — F41.1 GAD (GENERALIZED ANXIETY DISORDER): ICD-10-CM

## 2023-06-05 RX ORDER — BUSPIRONE HYDROCHLORIDE 5 MG/1
5-10 TABLET ORAL 2 TIMES DAILY PRN
Qty: 90 TABLET | Refills: 3 | Status: SHIPPED | OUTPATIENT
Start: 2023-06-05 | End: 2023-08-02 | Stop reason: SDUPTHER

## 2023-06-07 DIAGNOSIS — I10 ESSENTIAL HYPERTENSION: ICD-10-CM

## 2023-06-16 DIAGNOSIS — G89.4 CHRONIC PAIN DISORDER: ICD-10-CM

## 2023-06-16 RX ORDER — ACETAMINOPHEN AND CODEINE PHOSPHATE 300; 30 MG/1; MG/1
1 TABLET ORAL 2 TIMES DAILY PRN
Qty: 60 TABLET | Refills: 0 | Status: SHIPPED | OUTPATIENT
Start: 2023-06-16 | End: 2023-07-11 | Stop reason: SDUPTHER

## 2023-06-16 NOTE — TELEPHONE ENCOUNTER
No care due was identified.  [facility] Embedded Care Due Messages. Reference number: [id number].   6/16/2023 8:17:31 AM CDT

## 2023-06-27 ENCOUNTER — PES CALL (OUTPATIENT)
Dept: ADMINISTRATIVE | Facility: CLINIC | Age: 78
End: 2023-06-27
Payer: MEDICARE

## 2023-07-11 DIAGNOSIS — G89.4 CHRONIC PAIN DISORDER: ICD-10-CM

## 2023-07-11 RX ORDER — ACETAMINOPHEN AND CODEINE PHOSPHATE 300; 30 MG/1; MG/1
1 TABLET ORAL 2 TIMES DAILY PRN
Qty: 60 TABLET | Refills: 0 | Status: SHIPPED | OUTPATIENT
Start: 2023-07-11 | End: 2023-08-15 | Stop reason: SDUPTHER

## 2023-07-11 NOTE — TELEPHONE ENCOUNTER
No care due was identified.  [facility] Embedded Care Due Messages. Reference number: [id number].   7/11/2023 2:10:41 PM CDT

## 2023-07-20 ENCOUNTER — TELEPHONE (OUTPATIENT)
Dept: INTERNAL MEDICINE | Facility: CLINIC | Age: 78
End: 2023-07-20
Payer: MEDICARE

## 2023-07-24 DIAGNOSIS — G89.4 CHRONIC PAIN DISORDER: ICD-10-CM

## 2023-07-24 NOTE — TELEPHONE ENCOUNTER
----- Message from Annalee Flores sent at 7/24/2023  4:14 PM CDT -----  Contact: Self/[phone]  Requesting an RX refill or new RX.  Is this a refill or new RX: New  RX name and strength :  acetaminophen-codeine 300-30mg (TYLENOL #3) 300-30 mg Tab  Is this a 30 day or 90 day RX: 30  Pharmacy name and phone # :  [facility]  [address] [place] [address]  Phone: [phone] Fax: [phone]        The doctors have asked that we provide their patients with the following 2 reminders -- prescription refills can take up to 72 hours, and a friendly reminder that in the future you can use your [facility] account to request refills:

## 2023-07-24 NOTE — TELEPHONE ENCOUNTER
Care Due:                  Date            Visit Type   Department     Provider  --------------------------------------------------------------------------------                                VIRTUAL      [facility] INTERNAL  Last Visit: 11-      AUDIO ONLY   MEDICINE       TYREE DOMINGUEZ                               -                              PRIMARY      [facility] INTERNAL  Next Visit: 10-      CARE ([facility])   MEDICINE       TYREE DOMINGUEZ                                                            Last  Test          Frequency    Reason                     Performed    Due Date  --------------------------------------------------------------------------------    CMP.........  12 months..  lisinopriL...............  05- 05-    Health [facility] Embedded Care Due Messages. Reference number: [id number].   7/24/2023 4:21:44 PM CDT

## 2023-07-25 RX ORDER — ACETAMINOPHEN AND CODEINE PHOSPHATE 300; 30 MG/1; MG/1
1 TABLET ORAL 2 TIMES DAILY PRN
Qty: 60 TABLET | Refills: 0 | OUTPATIENT
Start: 2023-07-25

## 2023-08-02 DIAGNOSIS — F41.1 GAD (GENERALIZED ANXIETY DISORDER): ICD-10-CM

## 2023-08-02 RX ORDER — BUSPIRONE HYDROCHLORIDE 5 MG/1
5-10 TABLET ORAL 2 TIMES DAILY PRN
Qty: 90 TABLET | Refills: 3 | Status: SHIPPED | OUTPATIENT
Start: 2023-08-02 | End: 2023-09-27 | Stop reason: SDUPTHER

## 2023-08-14 RX ORDER — BUSPIRONE HYDROCHLORIDE 5 MG/1
TABLET ORAL
Qty: 120 TABLET | Refills: 1 | OUTPATIENT
Start: 2023-08-14

## 2023-08-15 DIAGNOSIS — G89.4 CHRONIC PAIN DISORDER: ICD-10-CM

## 2023-08-15 RX ORDER — ACETAMINOPHEN AND CODEINE PHOSPHATE 300; 30 MG/1; MG/1
1 TABLET ORAL 2 TIMES DAILY PRN
Qty: 60 TABLET | Refills: 0 | Status: SHIPPED | OUTPATIENT
Start: 2023-08-15 | End: 2023-09-11 | Stop reason: SDUPTHER

## 2023-08-15 NOTE — TELEPHONE ENCOUNTER
----- Message from Nasreen Roblero sent at 8/15/2023  9:37 AM CDT -----  Contact: [phone]  Requesting an RX refill or new RX.  Is this a refill or new RX:   RX name and strength (copy/paste from chart):  acetaminophen-codeine 300-30mg (TYLENOL #3) 300-30 mg Tab  Is this a 30 day or 90 day RX:   Pharmacy name and phone # (copy/paste from chart):    [facility]  [address] [place] [address]  Phone: [phone] Fax: [phone]

## 2023-08-15 NOTE — TELEPHONE ENCOUNTER
No care due was identified.  Health [facility] Embedded Care Due Messages. Reference number: [id number].   8/15/2023 10:12:08 AM CDT

## 2023-09-11 DIAGNOSIS — G89.4 CHRONIC PAIN DISORDER: ICD-10-CM

## 2023-09-11 RX ORDER — ACETAMINOPHEN AND CODEINE PHOSPHATE 300; 30 MG/1; MG/1
1 TABLET ORAL 2 TIMES DAILY PRN
Qty: 60 TABLET | Refills: 0 | Status: SHIPPED | OUTPATIENT
Start: 2023-09-11 | End: 2023-10-18

## 2023-09-11 NOTE — TELEPHONE ENCOUNTER
No care due was identified.  [facility] Embedded Care Due Messages. Reference number: [id number].   9/11/2023 11:33:55 AM CDT

## 2023-09-12 ENCOUNTER — OFFICE VISIT (OUTPATIENT)
Dept: HOME HEALTH SERVICES | Facility: CLINIC | Age: 78
End: 2023-09-12
Payer: MEDICARE

## 2023-09-12 DIAGNOSIS — R41.3 MEMORY DIFFICULTY: ICD-10-CM

## 2023-09-12 DIAGNOSIS — E66.9 OBESITY (BMI 30.0-34.9): ICD-10-CM

## 2023-09-12 DIAGNOSIS — I10 ESSENTIAL HYPERTENSION: ICD-10-CM

## 2023-09-12 DIAGNOSIS — F32.A DEPRESSION, UNSPECIFIED DEPRESSION TYPE: ICD-10-CM

## 2023-09-12 DIAGNOSIS — Z00.00 ENCOUNTER FOR PREVENTIVE HEALTH EXAMINATION: Primary | ICD-10-CM

## 2023-09-12 DIAGNOSIS — G89.29 CHRONIC LEFT SHOULDER PAIN: ICD-10-CM

## 2023-09-12 DIAGNOSIS — F11.20 UNCOMPLICATED OPIOID DEPENDENCE: ICD-10-CM

## 2023-09-12 DIAGNOSIS — F41.1 GAD (GENERALIZED ANXIETY DISORDER): ICD-10-CM

## 2023-09-12 DIAGNOSIS — M25.512 CHRONIC LEFT SHOULDER PAIN: ICD-10-CM

## 2023-09-12 DIAGNOSIS — Q85.01 NEUROFIBROMATOSIS, TYPE 1: ICD-10-CM

## 2023-09-12 PROCEDURE — 1101F PT FALLS ASSESS-DOCD LE1/YR: CPT | Mod: CPTII,S$GLB,, | Performed by: NURSE PRACTITIONER

## 2023-09-12 PROCEDURE — 1101F PR PT FALLS ASSESS DOC 0-1 FALLS W/OUT INJ PAST YR: ICD-10-PCS | Mod: CPTII,S$GLB,, | Performed by: NURSE PRACTITIONER

## 2023-09-12 PROCEDURE — 3080F PR MOST RECENT DIASTOLIC BLOOD PRESSURE >= 90 MM HG: ICD-10-PCS | Mod: CPTII,S$GLB,, | Performed by: NURSE PRACTITIONER

## 2023-09-12 PROCEDURE — 3288F PR FALLS RISK ASSESSMENT DOCUMENTED: ICD-10-PCS | Mod: CPTII,S$GLB,, | Performed by: NURSE PRACTITIONER

## 2023-09-12 PROCEDURE — 3080F DIAST BP >= 90 MM HG: CPT | Mod: CPTII,S$GLB,, | Performed by: NURSE PRACTITIONER

## 2023-09-12 PROCEDURE — 3077F SYST BP >= 140 MM HG: CPT | Mod: CPTII,S$GLB,, | Performed by: NURSE PRACTITIONER

## 2023-09-12 PROCEDURE — 3288F FALL RISK ASSESSMENT DOCD: CPT | Mod: CPTII,S$GLB,, | Performed by: NURSE PRACTITIONER

## 2023-09-12 PROCEDURE — 1160F RVW MEDS BY RX/DR IN RCRD: CPT | Mod: CPTII,S$GLB,, | Performed by: NURSE PRACTITIONER

## 2023-09-12 PROCEDURE — G9919 PR SCREENING AND POSITIVE: ICD-10-PCS | Mod: CPTII,S$GLB,, | Performed by: NURSE PRACTITIONER

## 2023-09-12 PROCEDURE — 3077F PR MOST RECENT SYSTOLIC BLOOD PRESSURE >= 140 MM HG: ICD-10-PCS | Mod: CPTII,S$GLB,, | Performed by: NURSE PRACTITIONER

## 2023-09-12 PROCEDURE — 1159F MED LIST DOCD IN RCRD: CPT | Mod: CPTII,S$GLB,, | Performed by: NURSE PRACTITIONER

## 2023-09-12 PROCEDURE — 1160F PR REVIEW ALL MEDS BY PRESCRIBER/CLIN PHARMACIST DOCUMENTED: ICD-10-PCS | Mod: CPTII,S$GLB,, | Performed by: NURSE PRACTITIONER

## 2023-09-12 PROCEDURE — 1125F AMNT PAIN NOTED PAIN PRSNT: CPT | Mod: CPTII,S$GLB,, | Performed by: NURSE PRACTITIONER

## 2023-09-12 PROCEDURE — 1125F PR PAIN SEVERITY QUANTIFIED, PAIN PRESENT: ICD-10-PCS | Mod: CPTII,S$GLB,, | Performed by: NURSE PRACTITIONER

## 2023-09-12 PROCEDURE — 1159F PR MEDICATION LIST DOCUMENTED IN MEDICAL RECORD: ICD-10-PCS | Mod: CPTII,S$GLB,, | Performed by: NURSE PRACTITIONER

## 2023-09-12 PROCEDURE — G0439 PPPS, SUBSEQ VISIT: HCPCS | Mod: S$GLB,,, | Performed by: NURSE PRACTITIONER

## 2023-09-12 PROCEDURE — G9919 SCRN ND POS ND PROV OF REC: HCPCS | Mod: CPTII,S$GLB,, | Performed by: NURSE PRACTITIONER

## 2023-09-12 PROCEDURE — G0439 PR MEDICARE ANNUAL WELLNESS SUBSEQUENT VISIT: ICD-10-PCS | Mod: S$GLB,,, | Performed by: NURSE PRACTITIONER

## 2023-09-12 NOTE — PATIENT INSTRUCTIONS
Counseling and Referral of Other Preventative  (Italic type indicates deductible and co-insurance are waived)    Patient Name: Lesli Gong  Today's Date: 9/12/2023    Health Maintenance       Date Due Completion Date    DEXA Scan 01/27/2020 1/27/2015 (Done)    Override on 1/27/2015: Done    Shingles Vaccine (3 of 3) 02/02/2021 12/8/2020    COVID-19 Vaccine (4 - Moderna series) 01/07/2022 11/12/2021    Mammogram 01/28/2022 1/28/2021    Lipid Panel 03/06/2022 3/6/2017    Influenza Vaccine (1) 09/01/2023 11/17/2020    TETANUS VACCINE 07/12/2027 7/12/2017        No orders of the defined types were placed in this encounter.    The following information is provided to all patients.  This information is to help you find resources for any of the problems found today that may be affecting your health:                Living healthy guide: www.[facility].[place].[facility]      Understanding Diabetes: www.diabetes.org      Eating healthy: www.cdc.gov/healthyweight      [facility] home safety checklist: www.cdc.gov/steadi/patient.html      Agency on Aging: www.goea.[place].gov      Alcoholics anonymous (AA): www.aa.org      Physical Activity: www.doug.nih.gov/ts3jpme      Tobacco use: www.quitwithusla.org

## 2023-09-12 NOTE — PROGRESS NOTES
"  Lesli Gong presented for a  Medicare AWV and comprehensive Health Risk Assessment today. The following components were reviewed and updated:    Medical history  Family History  Social history  Allergies and Current Medications  Health Risk Assessment  Health Maintenance  Care Team     Patient screened moderate and/or high risk for one or more social determinants of health (SDOH). Patient connected to community resources through the ED Navigator.      ** See Completed Assessments for Annual Wellness Visit within the encounter summary.**         The following assessments were completed:  Living Situation  CAGE  Depression Screening  Timed Get Up and Go  Whisper Test  Cognitive Function Screening      Nutrition Screening  ADL Screening  PAQ Screening        Vitals:    09/12/23 1019   BP: (!) 150/90   Pulse: 82   Weight: 82.6 kg (182 lb)   Height: 5' 4" (1.626 m)     Body mass index is 31.24 kg/m².  Physical Exam  Constitutional:       Appearance: She is obese.   HENT:      Head: Normocephalic and atraumatic.      Nose: Nose normal.      Mouth/Throat:      Mouth: Mucous membranes are moist.   Eyes:      Extraocular Movements: Extraocular movements intact.   Cardiovascular:      Rate and Rhythm: Normal rate and regular rhythm.      Heart sounds: Normal heart sounds.   Pulmonary:      Effort: Pulmonary effort is normal. No respiratory distress.      Breath sounds: Normal breath sounds.   Abdominal:      General: Bowel sounds are normal. There is no distension.      Palpations: Abdomen is soft.   Musculoskeletal:         General: No swelling. Normal range of motion.      Cervical back: Normal range of motion.   Skin:     General: Skin is warm and dry.   Neurological:      Mental Status: She is alert. Mental status is at baseline.      Gait: Gait normal.   Psychiatric:         Mood and Affect: Mood normal.         Speech: Speech normal.         Behavior: Behavior normal. Behavior is cooperative.         " Cognition and Memory: Memory is impaired.               Diagnoses and health risks identified today and associated recommendations/orders:    1. Encounter for preventive health examination  Assessments completed. Preventive measures and health maintenance reviewed with patient and patients son.  Review for opioid screening: Patient on chronic opioids. Risk factors reviewed for any potential opioid use disorder. Risk factors such as misuse and/or abuse. Pain evaluated during visit.  Current treatment plan documented. Patient being followed by treated by PCP and Pain Management.  Will refer to specialist, as appropriate.       2. Neurofibromatosis, type 1  Stable, followed by PCP.    3. Chronic left shoulder pain  Stable, patient on Tylenol #3 and Gabapentin. Followed by PCP and Pain Management.    4. Uncomplicated opioid dependence  Stable, followed by PCP and Pain Management.    5. Memory difficulty  Stable, followed by PCP. Patient failed Mini Cog assessment. Patient has pending referral for Neurology.    6. HELGA (generalized anxiety disorder)  Stable, patient on Buspar. Followed by PCP.    7. Depression, unspecified depression type  Stable, followed by PCP.    8. Essential hypertension  Stable, patient on Lisinopril. Followed by PCP. Patient BP elevated at time of visit. Patient has not taken BP medications as of this am. Patient denies any symptoms at this time. Patient and patients son educated on the importance and compliance of taking BP medication.    9. Obesity (BMI 30.0-34.9)  Stable, followed by PCP. Healthy diet and tolerable exercises encouraged.      Provided Lesli with a 5-10 year written screening schedule and personal prevention plan. Recommendations were developed using the USPSTF age appropriate recommendations. Education, counseling, and referrals were provided as needed. After Visit Summary printed and given to patient which includes a list of additional screenings\tests needed.    Follow up in  about 1 year (around 9/12/2024) for your next annual wellness visit.    Jena Trevino, NP  I offered to discuss advanced care planning, including how to pick a person who would make decisions for you if you were unable to make them for yourself, called a health care power of , and what kind of decisions you might make such as use of life sustaining treatments such as ventilators and tube feeding when faced with a life limiting illness recorded on a living will that they will need to know. (How you want to be cared for as you near the end of your natural life)     X Patient is interested in learning more about how to make advanced directives.  I provided them paperwork and offered to discuss this with them.

## 2023-09-14 VITALS
DIASTOLIC BLOOD PRESSURE: 90 MMHG | SYSTOLIC BLOOD PRESSURE: 150 MMHG | WEIGHT: 182 LBS | HEART RATE: 82 BPM | BODY MASS INDEX: 31.07 KG/M2 | HEIGHT: 64 IN

## 2023-09-14 PROBLEM — R41.3 MEMORY DIFFICULTY: Status: ACTIVE | Noted: 2023-09-14

## 2023-09-14 PROBLEM — F11.20 OPIOID DEPENDENCE: Status: ACTIVE | Noted: 2023-09-14

## 2023-09-14 PROBLEM — E66.9 OBESITY (BMI 30.0-34.9): Status: ACTIVE | Noted: 2023-09-14

## 2023-09-18 ENCOUNTER — PATIENT MESSAGE (OUTPATIENT)
Dept: INTERNAL MEDICINE | Facility: CLINIC | Age: 78
End: 2023-09-18
Payer: MEDICARE

## 2023-09-20 DIAGNOSIS — Z78.0 MENOPAUSE: ICD-10-CM

## 2023-09-21 DIAGNOSIS — F41.1 GAD (GENERALIZED ANXIETY DISORDER): ICD-10-CM

## 2023-09-21 RX ORDER — BUSPIRONE HYDROCHLORIDE 5 MG/1
5-10 TABLET ORAL 2 TIMES DAILY PRN
Qty: 90 TABLET | Refills: 3 | Status: CANCELLED | OUTPATIENT
Start: 2023-09-21 | End: 2024-09-20

## 2023-09-27 DIAGNOSIS — F41.1 GAD (GENERALIZED ANXIETY DISORDER): ICD-10-CM

## 2023-09-27 RX ORDER — BUSPIRONE HYDROCHLORIDE 5 MG/1
5-10 TABLET ORAL 2 TIMES DAILY PRN
Qty: 90 TABLET | Refills: 3 | Status: SHIPPED | OUTPATIENT
Start: 2023-09-27 | End: 2023-11-09 | Stop reason: SDUPTHER

## 2023-10-12 DIAGNOSIS — G89.4 CHRONIC PAIN DISORDER: ICD-10-CM

## 2023-10-12 RX ORDER — ACETAMINOPHEN AND CODEINE PHOSPHATE 300; 30 MG/1; MG/1
1 TABLET ORAL 2 TIMES DAILY PRN
Qty: 60 TABLET | Refills: 0 | OUTPATIENT
Start: 2023-10-12

## 2023-10-12 NOTE — TELEPHONE ENCOUNTER
Care Due:                  Date            Visit Type   Department     Provider  --------------------------------------------------------------------------------                                VIRTUAL      [facility] INTERNAL  Last Visit: 11-      AUDIO ONLY   MEDICINE       TYREE DOMINGUEZ  Next Visit: None Scheduled  None         None Found                                                            Last  Test          Frequency    Reason                     Performed    Due Date  --------------------------------------------------------------------------------    Office Visit  12 months..  busPIRone, diclofenac....  11- 11-    CBC.........  12 months..  diclofenac...............  05- 05-    CMP.........  12 months..  diclofenac, lisinopriL...  05- 05-    [facility] Embedded Care Due Messages. Reference number: [id number].   10/12/2023 10:55:20 AM CDT

## 2023-10-17 ENCOUNTER — TELEPHONE (OUTPATIENT)
Dept: INTERNAL MEDICINE | Facility: CLINIC | Age: 78
End: 2023-10-17
Payer: MEDICARE

## 2023-10-17 NOTE — TELEPHONE ENCOUNTER
----- Message from Kathy Yee sent at 10/17/2023  3:07 PM CDT -----  Regarding: refill  Name of caller: Vladimir ( son)       What is the requesting detail: Pt is requesting a call back to schedule a sooner appt. States she needs to be seen in order to get refill for medication. Please advise       Can the clinic reply by [facility]:       What number to call back:-[phone]

## 2023-10-17 NOTE — MR AVS SNAPSHOT
[facility]  [address], [address]  [facility] [address]  Phone: [phone]  Fax: [phone]                  Lesli Gong   2017 2:30 PM   Office Visit    Description:  Female : 1945   Provider:  Livia Cheney MD   Department:  [facility]           Reason for Visit     Back Pain     Knee Pain           Diagnoses this Visit        Comments    Chronic left shoulder pain    -  Primary     Neurofibromatosis, type 1 (von Recklinghausen's disease)         Other chronic pain                To Do List           Future Appointments        Provider Department Dept Phone    2017 9:45 AM Carlin Grayson MD [facility] [phone]    2017 10:00 AM LAB, APPOINT[place] [facility] [phone]    2017 2:20 PM Patti Brian MD [facility] - Internal Medicine [phone]    6/15/2017 9:40 AM LAB, APPOINTMENT [place] [facility] [phone]    6/15/2017 10:40 AM Jennifer B. Scheuermann, PA [facility] - Hepatology [phone]      Goals (5 Years of Data)     None      [facility]     [facility] Care Line -  Assistance  Unless otherwise directed by your provider, please contact [facility], our nurse care line that is available for  assistance.     Registered nurses in the [facility] provide: appointment scheduling, clinical advisement, health education, and other advisory services.  Call: [phone] (toll free)               Medications           Message regarding Medications     Verify the changes and/or additions to your medication regime listed below are the same as discussed with your clinician today.  If any of these changes or additions are incorrect, please notify your healthcare provider.             Verify that the below list of medications is an accurate representation of the medications you are currently taking.  If none  "reported, the list may be blank. If incorrect, please contact your healthcare provider. Carry this list with you in case of emergency.           Current Medications     calcium-vitamin D (OSCAL) 250 (625)-125 mg-unit per tablet Take 1 tablet by mouth once daily.    cyanocobalamin, vitamin B-12, (VITAMIN B-12) 50 mcg tablet Take 50 mcg by mouth once daily.    hydrocodone-acetaminophen 5-325mg (NORCO) 5-325 mg per tablet TAKE ONE TABLET BY MOUTH EVERY 12 HOURS AS NEEDED FOR PAIN    lisinopril 10 MG tablet Take 10 mg by mouth 2 (two) times daily.    lorazepam (ATIVAN) 1 MG tablet TAKE ONE TABLET BY MOUTH TWICE A DAY AS NEEDED FOR ANXIETY/PANIC ATTACK    multivitamin capsule Take 1 capsule by mouth once daily.    ribavirin (COPEGUS) 200 MG tablet Take 3 tablets (600 mg total) by mouth 2 (two) times daily with meals.    ZEPATIER  mg Tab Take 1 tablet by mouth once daily.           Clinical Reference Information           Your Vitals Were     BP Pulse Height Weight BMI    145/72 91 5' 4" (1.626 m) 78.9 kg (174 lb) 29.87 kg/m2      Blood Pressure          Most Recent Value    BP  (!)  145/72      Allergies as of 5/18/2017     Anaprox [Naproxen Sodium]    Motrin [Ibuprofen]    Neomycin-polymyxin-hc    Sulfa (Sulfonamide Antibiotics)      Immunizations Administered on Date of Encounter - 5/18/2017     None      [facility] Sign-Up     Activating your [facility] account is as easy as 1-2-3!     1) Visit [email or web address], select Sign Up Now, enter this activation code and your date of birth, then select Next.  [email or web address]  Expires: 5/25/2017 12:16 PM      2) Create a username and password to use when you visit [facility] in the future and select a security question in case you lose your password and select Next.    3) Enter your e-mail address and click Sign Up!    Additional Information  If you have questions, please e-mail [email or web address] or call [phone] to talk to our [facility] staff. Remember, [facility] is " NOT to be used for urgent needs. For medical emergencies, dial 911.         Language Assistance Services     ATTENTION: Language assistance services are available, free of charge. Please call [phone].      ATENCIÓN: Si xiomara chinchilla, tiene a gipson disposición servicios gratuitos de asistencia lingüística. Llame al [phone].     CHÚ Ý: N?u b?n nói Ti?ng Vi?t, có các d?ch v? h? tr? ngôn ng? mi?n phí dành cho b?n. G?i s? [phone].         [facility] complies with applicable Federal civil rights laws and does not discriminate on the basis of race, color, national origin, age, disability, or sex.         Oculoplastic Surgeon Procedure Text (A): After obtaining clear surgical margins the patient was sent to oculoplastics for surgical repair.  The patient understands they will receive post-surgical care and follow-up from the referring physician's office.

## 2023-10-18 ENCOUNTER — HOSPITAL ENCOUNTER (OUTPATIENT)
Dept: RADIOLOGY | Facility: OTHER | Age: 78
Discharge: HOME | End: 2023-10-18
Attending: ANESTHESIOLOGY
Payer: MEDICARE

## 2023-10-18 ENCOUNTER — OFFICE VISIT (OUTPATIENT)
Dept: PAIN MEDICINE | Facility: CLINIC | Age: 78
End: 2023-10-18
Payer: MEDICARE

## 2023-10-18 VITALS
RESPIRATION RATE: 18 BRPM | DIASTOLIC BLOOD PRESSURE: 80 MMHG | BODY MASS INDEX: 32.85 KG/M2 | TEMPERATURE: 97 F | SYSTOLIC BLOOD PRESSURE: 142 MMHG | HEART RATE: 87 BPM | OXYGEN SATURATION: 100 % | WEIGHT: 192.44 LBS | HEIGHT: 64 IN

## 2023-10-18 DIAGNOSIS — M79.18 MYOFASCIAL PAIN: Primary | ICD-10-CM

## 2023-10-18 DIAGNOSIS — M54.9 BACK PAIN, UNSPECIFIED BACK LOCATION, UNSPECIFIED BACK PAIN LATERALITY, UNSPECIFIED CHRONICITY: ICD-10-CM

## 2023-10-18 DIAGNOSIS — G89.4 CHRONIC PAIN DISORDER: ICD-10-CM

## 2023-10-18 DIAGNOSIS — G89.29 CHRONIC MIDLINE THORACIC BACK PAIN: ICD-10-CM

## 2023-10-18 DIAGNOSIS — M54.6 CHRONIC MIDLINE THORACIC BACK PAIN: ICD-10-CM

## 2023-10-18 PROCEDURE — 99999 PR PBB SHADOW E&M-EST. PATIENT-LVL V: CPT | Mod: PBBFAC,HCNC,, | Performed by: ANESTHESIOLOGY

## 2023-10-18 PROCEDURE — 3077F SYST BP >= 140 MM HG: CPT | Mod: HCNC,CPTII,S$GLB, | Performed by: ANESTHESIOLOGY

## 2023-10-18 PROCEDURE — 99214 PR OFFICE/OUTPT VISIT, EST, LEVL IV, 30-39 MIN: ICD-10-PCS | Mod: HCNC,S$GLB,, | Performed by: ANESTHESIOLOGY

## 2023-10-18 PROCEDURE — 72070 X-RAY EXAM THORAC SPINE 2VWS: CPT | Mod: TC,HCNC,FY

## 2023-10-18 PROCEDURE — 1125F AMNT PAIN NOTED PAIN PRSNT: CPT | Mod: HCNC,CPTII,S$GLB, | Performed by: ANESTHESIOLOGY

## 2023-10-18 PROCEDURE — 99214 OFFICE O/P EST MOD 30 MIN: CPT | Mod: HCNC,S$GLB,, | Performed by: ANESTHESIOLOGY

## 2023-10-18 PROCEDURE — 3288F FALL RISK ASSESSMENT DOCD: CPT | Mod: HCNC,CPTII,S$GLB, | Performed by: ANESTHESIOLOGY

## 2023-10-18 PROCEDURE — 3079F DIAST BP 80-89 MM HG: CPT | Mod: HCNC,CPTII,S$GLB, | Performed by: ANESTHESIOLOGY

## 2023-10-18 PROCEDURE — 3079F PR MOST RECENT DIASTOLIC BLOOD PRESSURE 80-89 MM HG: ICD-10-PCS | Mod: HCNC,CPTII,S$GLB, | Performed by: ANESTHESIOLOGY

## 2023-10-18 PROCEDURE — 1159F MED LIST DOCD IN RCRD: CPT | Mod: HCNC,CPTII,S$GLB, | Performed by: ANESTHESIOLOGY

## 2023-10-18 PROCEDURE — 72070 XR THORACIC SPINE AP LATERAL: ICD-10-PCS | Mod: 26,HCNC,, | Performed by: RADIOLOGY

## 2023-10-18 PROCEDURE — 1159F PR MEDICATION LIST DOCUMENTED IN MEDICAL RECORD: ICD-10-PCS | Mod: HCNC,CPTII,S$GLB, | Performed by: ANESTHESIOLOGY

## 2023-10-18 PROCEDURE — 3288F PR FALLS RISK ASSESSMENT DOCUMENTED: ICD-10-PCS | Mod: HCNC,CPTII,S$GLB, | Performed by: ANESTHESIOLOGY

## 2023-10-18 PROCEDURE — 72070 X-RAY EXAM THORAC SPINE 2VWS: CPT | Mod: 26,HCNC,, | Performed by: RADIOLOGY

## 2023-10-18 PROCEDURE — 1101F PT FALLS ASSESS-DOCD LE1/YR: CPT | Mod: HCNC,CPTII,S$GLB, | Performed by: ANESTHESIOLOGY

## 2023-10-18 PROCEDURE — 99999 PR PBB SHADOW E&M-EST. PATIENT-LVL V: ICD-10-PCS | Mod: PBBFAC,HCNC,, | Performed by: ANESTHESIOLOGY

## 2023-10-18 PROCEDURE — 1101F PR PT FALLS ASSESS DOC 0-1 FALLS W/OUT INJ PAST YR: ICD-10-PCS | Mod: HCNC,CPTII,S$GLB, | Performed by: ANESTHESIOLOGY

## 2023-10-18 PROCEDURE — 1125F PR PAIN SEVERITY QUANTIFIED, PAIN PRESENT: ICD-10-PCS | Mod: HCNC,CPTII,S$GLB, | Performed by: ANESTHESIOLOGY

## 2023-10-18 PROCEDURE — 3077F PR MOST RECENT SYSTOLIC BLOOD PRESSURE >= 140 MM HG: ICD-10-PCS | Mod: HCNC,CPTII,S$GLB, | Performed by: ANESTHESIOLOGY

## 2023-10-18 RX ORDER — METHOCARBAMOL 500 MG/1
500 TABLET, FILM COATED ORAL 2 TIMES DAILY PRN
Qty: 60 TABLET | Refills: 1 | Status: SHIPPED | OUTPATIENT
Start: 2023-10-18 | End: 2023-11-09 | Stop reason: SINTOL

## 2023-10-18 RX ORDER — ACETAMINOPHEN AND CODEINE PHOSPHATE 300; 30 MG/1; MG/1
1 TABLET ORAL 2 TIMES DAILY PRN
Qty: 10 TABLET | Refills: 0 | Status: SHIPPED | OUTPATIENT
Start: 2023-10-18 | End: 2023-10-24 | Stop reason: SDUPTHER

## 2023-10-18 NOTE — TELEPHONE ENCOUNTER
Can pt see another provider to get medication refilled or did you want her to see you for follow up ?

## 2023-10-18 NOTE — PROGRESS NOTES
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 neurofibroma excision in 2017. On exam, she exhibits tenderness with palpation of the upper thoracic spine and tenderness in the musculature medial to the scapula. The patient previously had relief of her pain with Tylenol #3 started by Dr. Cheney and requests a refill.       PLAN:  1) Prior records and imaging reviewed  2) We will order thoracic x-rays to rule out acute abnormality given spinous process tenderness.   3) Continue Voltaren gel as she notes this helps   4) Continue home exercise routine.   5) We will order physical therapy for the left shoulder.  6) We will try 500mg methocarbamol bid PRN.   7) Patient requests additional Tylenol #3 for acute pain. Prescribed #10.   8) Return to the office in 4-6 weeks   9) If pain not controlled with conservative measures, may restart Tylenol #3 BID.     The above plan and management options were discussed at length with patient. Patient is in agreement with the above and verbalized understanding.     Shon Babb, DO  10/18/2023

## 2023-10-18 NOTE — TELEPHONE ENCOUNTER
----- Message from Sid Melissa sent at 10/18/2023  1:25 PM CDT -----  Type:  Sooner Apoointment Request    Caller is requesting a sooner appointment.  Caller declined first available appointment listed below.  Caller will not accept being placed on the waitlist and is requesting a message be sent to doctor.  Name of Caller:  Gianna Lopez, pt's daughter  When is the first available appointment?  2-  Symptoms:  medication refills/memory loss  Would the patient rather a call back or a response via [facility]? Call back   Best Call Back Number:  [phone]  Additional Information:  no

## 2023-10-19 ENCOUNTER — TELEPHONE (OUTPATIENT)
Dept: SPINE | Facility: CLINIC | Age: 78
End: 2023-10-19
Payer: MEDICARE

## 2023-10-19 DIAGNOSIS — S22.000A COMPRESSION FRACTURE OF BODY OF THORACIC VERTEBRA: Primary | ICD-10-CM

## 2023-10-23 ENCOUNTER — NURSE TRIAGE (OUTPATIENT)
Dept: ADMINISTRATIVE | Facility: CLINIC | Age: 78
End: 2023-10-23
Payer: MEDICARE

## 2023-10-23 DIAGNOSIS — G89.4 CHRONIC PAIN DISORDER: ICD-10-CM

## 2023-10-23 RX ORDER — ACETAMINOPHEN AND CODEINE PHOSPHATE 300; 30 MG/1; MG/1
1 TABLET ORAL 2 TIMES DAILY PRN
Qty: 10 TABLET | Refills: 0 | Status: CANCELLED | OUTPATIENT
Start: 2023-10-23

## 2023-10-23 NOTE — TELEPHONE ENCOUNTER
----- Message from Kathydelma Yee sent at 10/23/2023  2:45 PM CDT -----  Regarding: call back  Name of caller: barry       What is the requesting detail: pt is requesting a call back to regarding medication that was supposed to sent over to the pharmacy. States the pt had cracked bones in her neck and is in a lot of pain.Please advise       Can the clinic reply by [facility]:       What number to call [phone]

## 2023-10-23 NOTE — TELEPHONE ENCOUNTER
----- Message from Emily Olea sent at 10/23/2023  9:57 AM CDT -----  Regarding: Refill Request    Who Called: Gianna reno daughter        New Prescription or Refill : Refill    RX Name and Strength:   acetaminophen-codeine 300-30mg (TYLENOL #3) 300-30 mg Tab      RX Name and Strength:         RX Name and Strength:      30 day or 90 day RX:     Preferred Pharmacy:[facility][id number] - [place] [address] [address]    Would the patient rather a call back or a response via [facility]?    Best Call Back Number:  [phone]    Additional Information:

## 2023-10-23 NOTE — TELEPHONE ENCOUNTER
Lesli Katz's daughter states Lesli c/o back pain. States Lesli had recent xray that shows pt with fractures. Waiting for call to schedule MRI. Triage offered and declined.  Requesting refill for Tylenol #3 be sent to [facility]. Gianna states Lesli had to take an extra tablet one day due to pain. Robaxin caused stomach upset and slight lightheadedness. Advised pt per triage protocol to discuss with physician and call back by office nurse today. Informed pt that a high prioirty message will be sent to physician's office now. V/u.   Reason for Disposition   Caller requesting a CONTROLLED substance prescription refill (e.g., narcotics, ADHD medicines)    Protocols used: Medication Refill and Renewal Call-A-[place]

## 2023-10-24 DIAGNOSIS — G89.4 CHRONIC PAIN DISORDER: ICD-10-CM

## 2023-10-24 RX ORDER — ACETAMINOPHEN AND CODEINE PHOSPHATE 300; 30 MG/1; MG/1
1 TABLET ORAL 2 TIMES DAILY PRN
Qty: 60 TABLET | Refills: 0 | Status: SHIPPED | OUTPATIENT
Start: 2023-10-24 | End: 2023-11-21 | Stop reason: SDUPTHER

## 2023-10-24 NOTE — TELEPHONE ENCOUNTER
----- Message from Makeda Mcanir sent at 10/24/2023 12:07 PM CDT -----  Regarding: Rx status  Name of Who is Calling: Gianna,  daughter           What is the request in detail: Gianna is requesting a call back ASAP about patient Rx for pain.           Can the clinic reply by [facility]: No           What Number to Call Back if not in [facility]: [phone]

## 2023-10-24 NOTE — TELEPHONE ENCOUNTER
Spoke with patients daughter and let her know that I sent the prescription and that I had no authority to sign off on medication. Also patient daughter was giving her 3 pills a day when instructed to take 2 a day. Wanted to speak with management. (Alma Fofana) on sending the medication or changing doctors. I told her that the medication could be sent but was not guaranteed to be signed off on right away.

## 2023-11-09 ENCOUNTER — OFFICE VISIT (OUTPATIENT)
Dept: INTERNAL MEDICINE | Facility: CLINIC | Age: 78
End: 2023-11-09
Payer: MEDICARE

## 2023-11-09 VITALS
HEIGHT: 64 IN | BODY MASS INDEX: 32.03 KG/M2 | WEIGHT: 187.63 LBS | OXYGEN SATURATION: 97 % | DIASTOLIC BLOOD PRESSURE: 84 MMHG | HEART RATE: 86 BPM | SYSTOLIC BLOOD PRESSURE: 128 MMHG

## 2023-11-09 DIAGNOSIS — N32.81 OVERACTIVE BLADDER: ICD-10-CM

## 2023-11-09 DIAGNOSIS — H91.93 HEARING DIFFICULTY OF BOTH EARS: ICD-10-CM

## 2023-11-09 DIAGNOSIS — K21.9 GASTROESOPHAGEAL REFLUX DISEASE, UNSPECIFIED WHETHER ESOPHAGITIS PRESENT: ICD-10-CM

## 2023-11-09 DIAGNOSIS — M54.6 CHRONIC MIDLINE THORACIC BACK PAIN: ICD-10-CM

## 2023-11-09 DIAGNOSIS — Z13.6 SCREENING FOR CARDIOVASCULAR CONDITION: ICD-10-CM

## 2023-11-09 DIAGNOSIS — F41.1 GAD (GENERALIZED ANXIETY DISORDER): ICD-10-CM

## 2023-11-09 DIAGNOSIS — Z91.09 ENVIRONMENTAL ALLERGIES: ICD-10-CM

## 2023-11-09 DIAGNOSIS — G89.29 CHRONIC MIDLINE THORACIC BACK PAIN: ICD-10-CM

## 2023-11-09 DIAGNOSIS — I10 ESSENTIAL HYPERTENSION: ICD-10-CM

## 2023-11-09 DIAGNOSIS — R41.3 MEMORY DIFFICULTIES: Primary | ICD-10-CM

## 2023-11-09 PROCEDURE — 1159F MED LIST DOCD IN RCRD: CPT | Mod: HCNC,CPTII,S$GLB, | Performed by: INTERNAL MEDICINE

## 2023-11-09 PROCEDURE — 1160F PR REVIEW ALL MEDS BY PRESCRIBER/CLIN PHARMACIST DOCUMENTED: ICD-10-PCS | Mod: HCNC,CPTII,S$GLB, | Performed by: INTERNAL MEDICINE

## 2023-11-09 PROCEDURE — 1160F RVW MEDS BY RX/DR IN RCRD: CPT | Mod: HCNC,CPTII,S$GLB, | Performed by: INTERNAL MEDICINE

## 2023-11-09 PROCEDURE — 1125F PR PAIN SEVERITY QUANTIFIED, PAIN PRESENT: ICD-10-PCS | Mod: HCNC,CPTII,S$GLB, | Performed by: INTERNAL MEDICINE

## 2023-11-09 PROCEDURE — 1159F PR MEDICATION LIST DOCUMENTED IN MEDICAL RECORD: ICD-10-PCS | Mod: HCNC,CPTII,S$GLB, | Performed by: INTERNAL MEDICINE

## 2023-11-09 PROCEDURE — 99214 OFFICE O/P EST MOD 30 MIN: CPT | Mod: HCNC,S$GLB,, | Performed by: INTERNAL MEDICINE

## 2023-11-09 PROCEDURE — 1125F AMNT PAIN NOTED PAIN PRSNT: CPT | Mod: HCNC,CPTII,S$GLB, | Performed by: INTERNAL MEDICINE

## 2023-11-09 PROCEDURE — 3079F PR MOST RECENT DIASTOLIC BLOOD PRESSURE 80-89 MM HG: ICD-10-PCS | Mod: HCNC,CPTII,S$GLB, | Performed by: INTERNAL MEDICINE

## 2023-11-09 PROCEDURE — 99999 PR PBB SHADOW E&M-EST. PATIENT-LVL V: CPT | Mod: PBBFAC,HCNC,, | Performed by: INTERNAL MEDICINE

## 2023-11-09 PROCEDURE — 3288F FALL RISK ASSESSMENT DOCD: CPT | Mod: HCNC,CPTII,S$GLB, | Performed by: INTERNAL MEDICINE

## 2023-11-09 PROCEDURE — 3079F DIAST BP 80-89 MM HG: CPT | Mod: HCNC,CPTII,S$GLB, | Performed by: INTERNAL MEDICINE

## 2023-11-09 PROCEDURE — 1101F PR PT FALLS ASSESS DOC 0-1 FALLS W/OUT INJ PAST YR: ICD-10-PCS | Mod: HCNC,CPTII,S$GLB, | Performed by: INTERNAL MEDICINE

## 2023-11-09 PROCEDURE — 3074F SYST BP LT 130 MM HG: CPT | Mod: HCNC,CPTII,S$GLB, | Performed by: INTERNAL MEDICINE

## 2023-11-09 PROCEDURE — 99999 PR PBB SHADOW E&M-EST. PATIENT-LVL V: ICD-10-PCS | Mod: PBBFAC,HCNC,, | Performed by: INTERNAL MEDICINE

## 2023-11-09 PROCEDURE — 99214 PR OFFICE/OUTPT VISIT, EST, LEVL IV, 30-39 MIN: ICD-10-PCS | Mod: HCNC,S$GLB,, | Performed by: INTERNAL MEDICINE

## 2023-11-09 PROCEDURE — 3074F PR MOST RECENT SYSTOLIC BLOOD PRESSURE < 130 MM HG: ICD-10-PCS | Mod: HCNC,CPTII,S$GLB, | Performed by: INTERNAL MEDICINE

## 2023-11-09 PROCEDURE — 1101F PT FALLS ASSESS-DOCD LE1/YR: CPT | Mod: HCNC,CPTII,S$GLB, | Performed by: INTERNAL MEDICINE

## 2023-11-09 PROCEDURE — 3288F PR FALLS RISK ASSESSMENT DOCUMENTED: ICD-10-PCS | Mod: HCNC,CPTII,S$GLB, | Performed by: INTERNAL MEDICINE

## 2023-11-09 RX ORDER — GABAPENTIN 100 MG/1
100 CAPSULE ORAL 2 TIMES DAILY
Qty: 60 CAPSULE | Refills: 11 | Status: SHIPPED | OUTPATIENT
Start: 2023-11-09 | End: 2024-03-06 | Stop reason: SINTOL

## 2023-11-09 RX ORDER — CITALOPRAM 10 MG/1
10 TABLET ORAL DAILY
Qty: 90 TABLET | Refills: 3 | Status: SHIPPED | OUTPATIENT
Start: 2023-11-09 | End: 2024-11-08

## 2023-11-09 RX ORDER — LISINOPRIL 10 MG/1
20 TABLET ORAL DAILY
Qty: 180 TABLET | Refills: 3 | Status: SHIPPED | OUTPATIENT
Start: 2023-11-09

## 2023-11-09 RX ORDER — CETIRIZINE HYDROCHLORIDE 10 MG/1
10 TABLET ORAL DAILY
Qty: 30 TABLET | Refills: 11 | Status: SHIPPED | OUTPATIENT
Start: 2023-11-09

## 2023-11-09 RX ORDER — OMEPRAZOLE 20 MG/1
20 CAPSULE, DELAYED RELEASE ORAL DAILY
Qty: 90 CAPSULE | Refills: 0 | Status: SHIPPED | OUTPATIENT
Start: 2023-11-09 | End: 2023-11-21

## 2023-11-09 RX ORDER — BUSPIRONE HYDROCHLORIDE 10 MG/1
10 TABLET ORAL 2 TIMES DAILY
Qty: 90 TABLET | Refills: 3 | Status: SHIPPED | OUTPATIENT
Start: 2023-11-09 | End: 2024-11-08

## 2023-11-09 RX ORDER — OXYBUTYNIN CHLORIDE 5 MG/1
5 TABLET, EXTENDED RELEASE ORAL DAILY
Qty: 90 TABLET | Refills: 3 | Status: SHIPPED | OUTPATIENT
Start: 2023-11-09 | End: 2024-11-08

## 2023-11-09 NOTE — PROGRESS NOTES
"Subjective:       Patient ID: Lesli Gong is a 78 y.o. female.    Chief Complaint: Medication Refill and Follow-up    HPI  78 y.o. female here for follow up of    Son lives with her, daughter accompanies her today.     Anxiety  Buspar 5-10mg bid prn anxiety  Citalopram 10mg    HTN  Lisinopril 20mg    Concern for dementia  MRI 2022 with chronic microvascular, generalized volume loss, no sigm of stroke  Repeating things, forgetting things.   Does not drive.   Daughter manages her meds.     Thoracic and scapula pain  Tylenol #3 previously per pain management, previously Dr. Cheney and now Dr. Joseph.  Last visit:  PLAN:  1) Prior records and imaging reviewed  2) We will order thoracic x-rays to rule out acute abnormality given spinous process tenderness.   3) Continue Voltaren gel as she notes this helps   4) Continue home exercise routine.   5) We will order physical therapy for the left shoulder.  6) We will try 500mg methocarbamol bid PRN. -> upset stomach  7) Patient requests additional Tylenol #3 for acute pain. Prescribed #10.   8) Return to the office in 4-6 weeks   9) If pain not controlled with conservative measures, may restart Tylenol #3 BID.   -> restarted 10/24/23  Xray with height loss T5 and T6; MRI tomorrow  MRI scheduled  PT to start soon    Review of Systems   Constitutional:  Negative for fever.   Respiratory:  Negative for shortness of breath.    Cardiovascular:  Negative for chest pain.   Musculoskeletal: Negative.    Skin: Negative.        Objective:   /84 (BP Location: Right arm, Patient Position: Sitting, BP Method: Large (Manual))   Pulse 86   Ht 5' 4" (1.626 m)   Wt 85.1 kg (187 lb 9.8 oz)   SpO2 97%   BMI 32.20 kg/m²      Physical Exam  Vitals reviewed.   Constitutional:       Appearance: She is well-developed.   HENT:      Head: Normocephalic and atraumatic.   Eyes:      Conjunctiva/sclera: Conjunctivae normal.      Pupils: Pupils are equal, round, and reactive to light. "   Neck:      Thyroid: No thyromegaly.   Cardiovascular:      Rate and Rhythm: Normal rate and regular rhythm.      Heart sounds: Normal heart sounds. No murmur heard.  Pulmonary:      Effort: Pulmonary effort is normal. No respiratory distress.      Breath sounds: Normal breath sounds. No wheezing.   Abdominal:      General: There is no distension.      Palpations: Abdomen is soft.      Tenderness: There is no abdominal tenderness. There is no rebound.   Musculoskeletal:         General: No swelling or deformity. Normal range of motion.      Cervical back: Neck supple.   Lymphadenopathy:      Cervical: No cervical adenopathy.   Skin:     General: Skin is warm and dry.      Findings: No rash.   Neurological:      Mental Status: She is alert and oriented to person, place, and time.   Psychiatric:         Thought Content: Thought content normal.         Judgment: Judgment normal.         Assessment:       1. Memory difficulties    2. HELGA (generalized anxiety disorder)    3. Environmental allergies    4. Essential hypertension    5. Overactive bladder    6. Gastroesophageal reflux disease, unspecified whether esophagitis present    7. Screening for cardiovascular condition    8. Chronic midline thoracic back pain    9. Hearing difficulty of both ears        Plan:       1. Memory difficulties  -     CBC Auto Differential; Future; Expected date: 11/09/2023  -     Ambulatory referral/consult to Adult Neuropsychology; Future; Expected date: 11/16/2023  -     Ambulatory referral/consult to Neurology; Future; Expected date: 11/16/2023  -     Vitamin B12; Future; Expected date: 11/09/2023    2. HELGA (generalized anxiety disorder)  -     busPIRone (BUSPAR) 10 MG tablet; Take 1 tablet (10 mg total) by mouth 2 (two) times daily.  Dispense: 90 tablet; Refill: 3  -     citalopram (CELEXA) 10 MG tablet; Take 1 tablet (10 mg total) by mouth once daily.  Dispense: 90 tablet; Refill: 3    3. Environmental allergies  -     cetirizine  (ZYRTEC) 10 MG tablet; Take 1 tablet (10 mg total) by mouth once daily.  Dispense: 30 tablet; Refill: 11    4. Essential hypertension  -     lisinopriL 10 MG tablet; Take 2 tablets (20 mg total) by mouth once daily.  Dispense: 180 tablet; Refill: 3    5. Overactive bladder  -     oxybutynin (DITROPAN-XL) 5 MG TR24; Take 1 tablet (5 mg total) by mouth once daily.  Dispense: 90 tablet; Refill: 3    6. Gastroesophageal reflux disease, unspecified whether esophagitis present  -     omeprazole (PRILOSEC) 20 MG capsule; Take 1 capsule (20 mg total) by mouth once daily. 90 day course, will restart  Dispense: 90 capsule; Refill: 0    7. Screening for cardiovascular condition  -     Comprehensive Metabolic Panel; Future; Expected date: 11/09/2023  -     Lipid Panel; Future; Expected date: 11/09/2023    8. Chronic midline thoracic back pain  -     gabapentin (NEURONTIN) 100 MG capsule; Take 1 capsule (100 mg total) by mouth 2 (two) times daily.  Dispense: 60 capsule; Refill: 11  -     CANE FOR HOME USE    9. Hearing difficulty of both ears  -     Ambulatory referral/consult to Audiology; Future; Expected date: 11/16/2023           Health Maintenance Due   Topic    Mammogram     Lipid Panel       Rsv, shingrx  Mmg - ordred  Fasting labs  Flu  Dexa- ordered

## 2023-11-10 ENCOUNTER — HOSPITAL ENCOUNTER (OUTPATIENT)
Dept: RADIOLOGY | Facility: OTHER | Age: 78
Discharge: HOME | End: 2023-11-10
Attending: ANESTHESIOLOGY
Payer: MEDICARE

## 2023-11-10 DIAGNOSIS — S22.000A COMPRESSION FRACTURE OF BODY OF THORACIC VERTEBRA: ICD-10-CM

## 2023-11-10 PROCEDURE — 72146 MRI CHEST SPINE W/O DYE: CPT | Mod: 26,HCNC,, | Performed by: RADIOLOGY

## 2023-11-10 PROCEDURE — 72146 MRI THORACIC SPINE WITHOUT CONTRAST: ICD-10-PCS | Mod: 26,HCNC,, | Performed by: RADIOLOGY

## 2023-11-10 PROCEDURE — 72146 MRI CHEST SPINE W/O DYE: CPT | Mod: TC,HCNC

## 2023-11-14 ENCOUNTER — PATIENT OUTREACH (OUTPATIENT)
Dept: ADMINISTRATIVE | Facility: HOSPITAL | Age: 78
End: 2023-11-14
Payer: MEDICARE

## 2023-11-14 NOTE — PROGRESS NOTES
Health Maintenance Due   Topic Date Due    RSV Vaccine (Age 60+) (1 - 1-dose 60+ series) Never done    DEXA Scan  01/27/2020    Shingles Vaccine (3 of 3) 02/02/2021    Mammogram  01/28/2022    Lipid Panel  03/06/2022    Influenza Vaccine (1) 09/01/2023    COVID-19 Vaccine (4 - 2023-24 season) 09/01/2023     HM updated. Advanced directive, Power of  scanned into chart.    Nicolette Sandhu LPN   Clinical Care Coordinator  Primary Care and Wellness

## 2023-11-17 ENCOUNTER — TELEPHONE (OUTPATIENT)
Dept: PAIN MEDICINE | Facility: CLINIC | Age: 78
End: 2023-11-17
Payer: MEDICARE

## 2023-11-17 NOTE — TELEPHONE ENCOUNTER
----- Message from Emily Olea sent at 11/17/2023 11:22 AM CST -----  Regarding: Concern And Questions  Name of Who is Calling:  Patient daughter Gianna          What is the request in detail:  Patient daughter she would like to speak with Dr. Joseph nurse, she stated her mom has great pain she has broken bones on her spine.            Can the clinic reply by [facility]: No             What Number to Call Back [facility] not [facility]:[phone]

## 2023-11-21 DIAGNOSIS — G89.4 CHRONIC PAIN DISORDER: ICD-10-CM

## 2023-11-21 RX ORDER — ACETAMINOPHEN AND CODEINE PHOSPHATE 300; 30 MG/1; MG/1
1 TABLET ORAL 2 TIMES DAILY PRN
Qty: 60 TABLET | Refills: 0 | Status: SHIPPED | OUTPATIENT
Start: 2023-11-22 | End: 2023-12-13

## 2023-11-21 NOTE — TELEPHONE ENCOUNTER
Patient is requesting medication to be changed to 3x a day. Also asking for oxycodone percocet.   Scheduled patient an appointment to come in office to discuss pain medication.   Last visit: 10-18-23

## 2023-12-13 ENCOUNTER — OFFICE VISIT (OUTPATIENT)
Dept: PAIN MEDICINE | Facility: CLINIC | Age: 78
End: 2023-12-13
Payer: MEDICARE

## 2023-12-13 VITALS
RESPIRATION RATE: 12 BRPM | BODY MASS INDEX: 31.66 KG/M2 | HEIGHT: 64 IN | HEART RATE: 73 BPM | WEIGHT: 185.44 LBS | DIASTOLIC BLOOD PRESSURE: 90 MMHG | OXYGEN SATURATION: 100 % | SYSTOLIC BLOOD PRESSURE: 149 MMHG

## 2023-12-13 DIAGNOSIS — M79.2 NEUROPATHIC PAIN: Primary | ICD-10-CM

## 2023-12-13 DIAGNOSIS — Q85.01 NEUROFIBROMATOSIS, TYPE 1: ICD-10-CM

## 2023-12-13 PROCEDURE — 99214 OFFICE O/P EST MOD 30 MIN: CPT | Mod: HCNC,S$GLB,, | Performed by: ANESTHESIOLOGY

## 2023-12-13 PROCEDURE — 1157F PR ADVANCE CARE PLAN OR EQUIV PRESENT IN MEDICAL RECORD: ICD-10-PCS | Mod: HCNC,CPTII,S$GLB, | Performed by: ANESTHESIOLOGY

## 2023-12-13 PROCEDURE — 99999 PR PBB SHADOW E&M-EST. PATIENT-LVL IV: ICD-10-PCS | Mod: PBBFAC,HCNC,, | Performed by: ANESTHESIOLOGY

## 2023-12-13 PROCEDURE — 1101F PT FALLS ASSESS-DOCD LE1/YR: CPT | Mod: HCNC,CPTII,S$GLB, | Performed by: ANESTHESIOLOGY

## 2023-12-13 PROCEDURE — 1159F PR MEDICATION LIST DOCUMENTED IN MEDICAL RECORD: ICD-10-PCS | Mod: HCNC,CPTII,S$GLB, | Performed by: ANESTHESIOLOGY

## 2023-12-13 PROCEDURE — 1125F AMNT PAIN NOTED PAIN PRSNT: CPT | Mod: HCNC,CPTII,S$GLB, | Performed by: ANESTHESIOLOGY

## 2023-12-13 PROCEDURE — 3080F DIAST BP >= 90 MM HG: CPT | Mod: HCNC,CPTII,S$GLB, | Performed by: ANESTHESIOLOGY

## 2023-12-13 PROCEDURE — 3080F PR MOST RECENT DIASTOLIC BLOOD PRESSURE >= 90 MM HG: ICD-10-PCS | Mod: HCNC,CPTII,S$GLB, | Performed by: ANESTHESIOLOGY

## 2023-12-13 PROCEDURE — 1157F ADVNC CARE PLAN IN RCRD: CPT | Mod: HCNC,CPTII,S$GLB, | Performed by: ANESTHESIOLOGY

## 2023-12-13 PROCEDURE — 1125F PR PAIN SEVERITY QUANTIFIED, PAIN PRESENT: ICD-10-PCS | Mod: HCNC,CPTII,S$GLB, | Performed by: ANESTHESIOLOGY

## 2023-12-13 PROCEDURE — 3077F SYST BP >= 140 MM HG: CPT | Mod: HCNC,CPTII,S$GLB, | Performed by: ANESTHESIOLOGY

## 2023-12-13 PROCEDURE — 1101F PR PT FALLS ASSESS DOC 0-1 FALLS W/OUT INJ PAST YR: ICD-10-PCS | Mod: HCNC,CPTII,S$GLB, | Performed by: ANESTHESIOLOGY

## 2023-12-13 PROCEDURE — 1159F MED LIST DOCD IN RCRD: CPT | Mod: HCNC,CPTII,S$GLB, | Performed by: ANESTHESIOLOGY

## 2023-12-13 PROCEDURE — 3077F PR MOST RECENT SYSTOLIC BLOOD PRESSURE >= 140 MM HG: ICD-10-PCS | Mod: HCNC,CPTII,S$GLB, | Performed by: ANESTHESIOLOGY

## 2023-12-13 PROCEDURE — 3288F FALL RISK ASSESSMENT DOCD: CPT | Mod: HCNC,CPTII,S$GLB, | Performed by: ANESTHESIOLOGY

## 2023-12-13 PROCEDURE — 3288F PR FALLS RISK ASSESSMENT DOCUMENTED: ICD-10-PCS | Mod: HCNC,CPTII,S$GLB, | Performed by: ANESTHESIOLOGY

## 2023-12-13 PROCEDURE — 99999 PR PBB SHADOW E&M-EST. PATIENT-LVL IV: CPT | Mod: PBBFAC,HCNC,, | Performed by: ANESTHESIOLOGY

## 2023-12-13 PROCEDURE — 99214 PR OFFICE/OUTPT VISIT, EST, LEVL IV, 30-39 MIN: ICD-10-PCS | Mod: HCNC,S$GLB,, | Performed by: ANESTHESIOLOGY

## 2023-12-13 RX ORDER — TRAMADOL HYDROCHLORIDE 50 MG/1
50 TABLET ORAL EVERY 12 HOURS PRN
Qty: 60 TABLET | Refills: 0 | Status: ON HOLD | OUTPATIENT
Start: 2023-12-20 | End: 2023-12-27 | Stop reason: HOSPADM

## 2023-12-13 RX ORDER — LIDOCAINE 50 MG/G
1 PATCH TOPICAL DAILY
Qty: 30 PATCH | Refills: 1 | Status: SHIPPED | OUTPATIENT
Start: 2023-12-13
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
## 2023-12-20 ENCOUNTER — HOSPITAL ENCOUNTER (OUTPATIENT)
Facility: HOSPITAL | Age: 78
Discharge: NURSING, REHAB OR LONG-TERM CARE | End: 2023-12-28
Attending: STUDENT IN AN ORGANIZED HEALTH CARE EDUCATION/TRAINING PROGRAM | Admitting: STUDENT IN AN ORGANIZED HEALTH CARE EDUCATION/TRAINING PROGRAM
Payer: MEDICARE

## 2023-12-20 DIAGNOSIS — R53.1 WEAKNESS: Primary | ICD-10-CM
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
## 2023-12-20 DIAGNOSIS — R07.9 CHEST PAIN: ICD-10-CM

## 2023-12-20 LAB
ALBUMIN SERPL BCP-MCNC: 4.1 G/DL (ref 3.5–5.2)
ALP SERPL-CCNC: 103 U/L (ref 55–135)
ALT SERPL W/O P-5'-P-CCNC: 24 U/L (ref 10–44)
ANION GAP SERPL CALC-SCNC: 13 MMOL/L (ref 8–16)
AST SERPL-CCNC: 57 U/L (ref 10–40)
BACTERIA #/AREA URNS AUTO: NORMAL /HPF
BASOPHILS # BLD AUTO: 0.03 K/UL (ref 0–0.2)
BASOPHILS NFR BLD: 0.4 % (ref 0–1.9)
BILIRUB SERPL-MCNC: 0.9 MG/DL (ref 0.1–1)
BILIRUB UR QL STRIP: NEGATIVE
BUN SERPL-MCNC: 18 MG/DL (ref 8–23)
CALCIUM SERPL-MCNC: 9.6 MG/DL (ref 8.7–10.5)
CHLORIDE SERPL-SCNC: 103 MMOL/L (ref 95–110)
CLARITY UR REFRACT.AUTO: CLEAR
CO2 SERPL-SCNC: 22 MMOL/L (ref 23–29)
COLOR UR AUTO: YELLOW
CREAT SERPL-MCNC: 0.8 MG/DL (ref 0.5–1.4)
DIFFERENTIAL METHOD: ABNORMAL
EOSINOPHIL # BLD AUTO: 0 K/UL (ref 0–0.5)
EOSINOPHIL NFR BLD: 0.2 % (ref 0–8)
ERYTHROCYTE [DISTWIDTH] IN BLOOD BY AUTOMATED COUNT: 14.5 % (ref 11.5–14.5)
EST. GFR  (NO RACE VARIABLE): >60 ML/MIN/1.73 M^2
GLUCOSE SERPL-MCNC: 101 MG/DL (ref 70–110)
GLUCOSE SERPL-MCNC: 104 MG/DL (ref 70–110)
GLUCOSE UR QL STRIP: NEGATIVE
HCT VFR BLD AUTO: 46.4 % (ref 37–48.5)
HGB BLD-MCNC: 16.3 G/DL (ref 12–16)
HGB UR QL STRIP: NEGATIVE
HYALINE CASTS UR QL AUTO: 0 /LPF
IMM GRANULOCYTES # BLD AUTO: 0.13 K/UL (ref 0–0.04)
IMM GRANULOCYTES NFR BLD AUTO: 1.6 % (ref 0–0.5)
KETONES UR QL STRIP: ABNORMAL
LEUKOCYTE ESTERASE UR QL STRIP: NEGATIVE
LYMPHOCYTES # BLD AUTO: 1.7 K/UL (ref 1–4.8)
LYMPHOCYTES NFR BLD: 20.8 % (ref 18–48)
MCH RBC QN AUTO: 30.6 PG (ref 27–31)
MCHC RBC AUTO-ENTMCNC: 35.1 G/DL (ref 32–36)
MCV RBC AUTO: 87 FL (ref 82–98)
MICROSCOPIC COMMENT: NORMAL
MONOCYTES # BLD AUTO: 0.9 K/UL (ref 0.3–1)
MONOCYTES NFR BLD: 10.9 % (ref 4–15)
NEUTROPHILS # BLD AUTO: 5.5 K/UL (ref 1.8–7.7)
NEUTROPHILS NFR BLD: 66.1 % (ref 38–73)
NITRITE UR QL STRIP: NEGATIVE
NRBC BLD-RTO: 0 /100 WBC
PH UR STRIP: 6 [PH] (ref 5–8)
PLATELET # BLD AUTO: 223 K/UL (ref 150–450)
PMV BLD AUTO: 10 FL (ref 9.2–12.9)
POCT GLUCOSE: 104 MG/DL (ref 70–110)
POTASSIUM SERPL-SCNC: 3.7 MMOL/L (ref 3.5–5.1)
PROT SERPL-MCNC: 8.2 G/DL (ref 6–8.4)
PROT UR QL STRIP: ABNORMAL
RBC # BLD AUTO: 5.33 M/UL (ref 4–5.4)
RBC #/AREA URNS AUTO: 0 /HPF (ref 0–4)
SODIUM SERPL-SCNC: 138 MMOL/L (ref 136–145)
SP GR UR STRIP: 1.03 (ref 1–1.03)
SQUAMOUS #/AREA URNS AUTO: 1 /HPF
TSH SERPL DL<=0.005 MIU/L-ACNC: 1.97 UIU/ML (ref 0.4–4)
URN SPEC COLLECT METH UR: ABNORMAL
WBC # BLD AUTO: 8.26 K/UL (ref 3.9–12.7)
WBC #/AREA URNS AUTO: 1 /HPF (ref 0–5)

## 2023-12-20 PROCEDURE — 80053 COMPREHEN METABOLIC PANEL: CPT | Mod: HCNC

## 2023-12-20 PROCEDURE — 96374 THER/PROPH/DIAG INJ IV PUSH: CPT | Mod: 59,HCNC

## 2023-12-20 PROCEDURE — 93010 ELECTROCARDIOGRAM REPORT: CPT | Mod: HCNC,,, | Performed by: INTERNAL MEDICINE

## 2023-12-20 PROCEDURE — 84443 ASSAY THYROID STIM HORMONE: CPT | Mod: HCNC

## 2023-12-20 PROCEDURE — 81001 URINALYSIS AUTO W/SCOPE: CPT | Mod: HCNC

## 2023-12-20 PROCEDURE — 93005 ELECTROCARDIOGRAM TRACING: CPT | Mod: HCNC

## 2023-12-20 PROCEDURE — P9612 CATHETERIZE FOR URINE SPEC: HCPCS | Mod: HCNC

## 2023-12-20 PROCEDURE — 94761 N-INVAS EAR/PLS OXIMETRY MLT: CPT | Mod: HCNC

## 2023-12-20 PROCEDURE — 82962 GLUCOSE BLOOD TEST: CPT | Mod: HCNC

## 2023-12-20 PROCEDURE — G0378 HOSPITAL OBSERVATION PER HR: HCPCS | Mod: HCNC

## 2023-12-20 PROCEDURE — 63600175 PHARM REV CODE 636 W HCPCS: Mod: HCNC

## 2023-12-20 PROCEDURE — 85025 COMPLETE CBC W/AUTO DIFF WBC: CPT | Mod: HCNC

## 2023-12-20 PROCEDURE — 25000003 PHARM REV CODE 250: Mod: HCNC

## 2023-12-20 PROCEDURE — 93010 EKG 12-LEAD: ICD-10-PCS | Mod: HCNC,,, | Performed by: INTERNAL MEDICINE

## 2023-12-20 PROCEDURE — 99285 EMERGENCY DEPT VISIT HI MDM: CPT | Mod: 25,HCNC

## 2023-12-20 RX ORDER — SODIUM CHLORIDE 0.9 % (FLUSH) 0.9 %
5 SYRINGE (ML) INJECTION
Status: DISCONTINUED | OUTPATIENT
Start: 2023-12-21 | End: 2023-12-28 | Stop reason: HOSPADM

## 2023-12-20 RX ORDER — TALC
6 POWDER (GRAM) TOPICAL NIGHTLY PRN
Status: DISCONTINUED | OUTPATIENT
Start: 2023-12-21 | End: 2023-12-28 | Stop reason: HOSPADM

## 2023-12-20 RX ORDER — METOCLOPRAMIDE HYDROCHLORIDE 5 MG/ML
5 INJECTION INTRAMUSCULAR; INTRAVENOUS
Status: COMPLETED | OUTPATIENT
Start: 2023-12-20 | End: 2023-12-20

## 2023-12-20 RX ORDER — BISACODYL 10 MG
10 SUPPOSITORY, RECTAL RECTAL DAILY PRN
Status: DISCONTINUED | OUTPATIENT
Start: 2023-12-21 | End: 2023-12-28 | Stop reason: HOSPADM

## 2023-12-20 RX ORDER — OXYCODONE HYDROCHLORIDE 5 MG/1
5 TABLET ORAL
Status: COMPLETED | OUTPATIENT
Start: 2023-12-20 | End: 2023-12-20

## 2023-12-20 RX ORDER — ACETAMINOPHEN 500 MG
1000 TABLET ORAL EVERY 8 HOURS PRN
Status: DISCONTINUED | OUTPATIENT
Start: 2023-12-21 | End: 2023-12-24

## 2023-12-20 RX ORDER — ACETAMINOPHEN 325 MG/1
650 TABLET ORAL EVERY 4 HOURS PRN
Status: DISCONTINUED | OUTPATIENT
Start: 2023-12-21 | End: 2023-12-25

## 2023-12-20 RX ORDER — ENOXAPARIN SODIUM 100 MG/ML
40 INJECTION SUBCUTANEOUS EVERY 24 HOURS
Status: DISCONTINUED | OUTPATIENT
Start: 2023-12-21 | End: 2023-12-28 | Stop reason: HOSPADM

## 2023-12-20 RX ORDER — IPRATROPIUM BROMIDE AND ALBUTEROL SULFATE 2.5; .5 MG/3ML; MG/3ML
3 SOLUTION RESPIRATORY (INHALATION) EVERY 4 HOURS PRN
Status: DISCONTINUED | OUTPATIENT
Start: 2023-12-21 | End: 2023-12-28 | Stop reason: HOSPADM

## 2023-12-20 RX ORDER — NALOXONE HCL 0.4 MG/ML
0.02 VIAL (ML) INJECTION
Status: DISCONTINUED | OUTPATIENT
Start: 2023-12-21 | End: 2023-12-28 | Stop reason: HOSPADM

## 2023-12-20 RX ORDER — ONDANSETRON 8 MG/1
8 TABLET, ORALLY DISINTEGRATING ORAL EVERY 8 HOURS PRN
Status: DISCONTINUED | OUTPATIENT
Start: 2023-12-21 | End: 2023-12-20

## 2023-12-20 RX ORDER — MAG HYDROX/ALUMINUM HYD/SIMETH 200-200-20
30 SUSPENSION, ORAL (FINAL DOSE FORM) ORAL 4 TIMES DAILY PRN
Status: DISCONTINUED | OUTPATIENT
Start: 2023-12-21 | End: 2023-12-28 | Stop reason: HOSPADM

## 2023-12-20 RX ORDER — POLYETHYLENE GLYCOL 3350 17 G/17G
17 POWDER, FOR SOLUTION ORAL 2 TIMES DAILY PRN
Status: DISCONTINUED | OUTPATIENT
Start: 2023-12-21 | End: 2023-12-28 | Stop reason: HOSPADM

## 2023-12-20 RX ORDER — PROCHLORPERAZINE EDISYLATE 5 MG/ML
5 INJECTION INTRAMUSCULAR; INTRAVENOUS EVERY 6 HOURS PRN
Status: DISCONTINUED | OUTPATIENT
Start: 2023-12-21 | End: 2023-12-28 | Stop reason: HOSPADM

## 2023-12-20 RX ORDER — SIMETHICONE 80 MG
1 TABLET,CHEWABLE ORAL 4 TIMES DAILY PRN
Status: DISCONTINUED | OUTPATIENT
Start: 2023-12-21 | End: 2023-12-28 | Stop reason: HOSPADM

## 2023-12-20 RX ADMIN — METOCLOPRAMIDE 5 MG: 5 INJECTION, SOLUTION INTRAMUSCULAR; INTRAVENOUS at 05:12

## 2023-12-20 RX ADMIN — OXYCODONE HYDROCHLORIDE 5 MG: 5 TABLET ORAL at 05:12

## 2023-12-21 ENCOUNTER — TELEPHONE (OUTPATIENT)
Dept: INTERNAL MEDICINE | Facility: CLINIC | Age: 78
End: 2023-12-21
Payer: MEDICARE

## 2023-12-21 LAB
ERYTHROCYTE [DISTWIDTH] IN BLOOD BY AUTOMATED COUNT: 14.8 % (ref 11.5–14.5)
HCT VFR BLD AUTO: 45.6 % (ref 37–48.5)
HGB BLD-MCNC: 15.7 G/DL (ref 12–16)
MCH RBC QN AUTO: 29.4 PG (ref 27–31)
MCHC RBC AUTO-ENTMCNC: 34.4 G/DL (ref 32–36)
MCV RBC AUTO: 85 FL (ref 82–98)
PLATELET # BLD AUTO: 205 K/UL (ref 150–450)
PMV BLD AUTO: 11.7 FL (ref 9.2–12.9)
RBC # BLD AUTO: 5.34 M/UL (ref 4–5.4)
WBC # BLD AUTO: 7.02 K/UL (ref 3.9–12.7)

## 2023-12-21 PROCEDURE — 97162 PT EVAL MOD COMPLEX 30 MIN: CPT | Mod: HCNC

## 2023-12-21 PROCEDURE — 97165 OT EVAL LOW COMPLEX 30 MIN: CPT | Mod: HCNC

## 2023-12-21 PROCEDURE — 97535 SELF CARE MNGMENT TRAINING: CPT | Mod: HCNC

## 2023-12-21 PROCEDURE — 63600175 PHARM REV CODE 636 W HCPCS: Mod: HCNC

## 2023-12-21 PROCEDURE — 25000003 PHARM REV CODE 250: Mod: HCNC

## 2023-12-21 PROCEDURE — G0378 HOSPITAL OBSERVATION PER HR: HCPCS | Mod: HCNC

## 2023-12-21 PROCEDURE — 85027 COMPLETE CBC AUTOMATED: CPT | Mod: HCNC

## 2023-12-21 PROCEDURE — 97116 GAIT TRAINING THERAPY: CPT | Mod: HCNC

## 2023-12-21 PROCEDURE — 36415 COLL VENOUS BLD VENIPUNCTURE: CPT | Mod: HCNC

## 2023-12-21 PROCEDURE — 96372 THER/PROPH/DIAG INJ SC/IM: CPT

## 2023-12-21 PROCEDURE — 87040 BLOOD CULTURE FOR BACTERIA: CPT | Mod: 59,HCNC

## 2023-12-21 RX ORDER — OXYBUTYNIN CHLORIDE 5 MG/1
5 TABLET, EXTENDED RELEASE ORAL DAILY
Status: DISCONTINUED | OUTPATIENT
Start: 2023-12-21 | End: 2023-12-28 | Stop reason: HOSPADM

## 2023-12-21 RX ORDER — FERROUS SULFATE, DRIED 160(50) MG
1 TABLET, EXTENDED RELEASE ORAL 2 TIMES DAILY
Status: DISCONTINUED | OUTPATIENT
Start: 2023-12-21 | End: 2023-12-28 | Stop reason: HOSPADM

## 2023-12-21 RX ORDER — PANTOPRAZOLE SODIUM 40 MG/1
40 TABLET, DELAYED RELEASE ORAL DAILY
Status: DISCONTINUED | OUTPATIENT
Start: 2023-12-21 | End: 2023-12-28 | Stop reason: HOSPADM

## 2023-12-21 RX ORDER — CITALOPRAM 10 MG/1
10 TABLET ORAL DAILY
Status: DISCONTINUED | OUTPATIENT
Start: 2023-12-21 | End: 2023-12-28 | Stop reason: HOSPADM

## 2023-12-21 RX ORDER — CYANOCOBALAMIN (VITAMIN B-12) 250 MCG
250 TABLET ORAL DAILY
Status: DISCONTINUED | OUTPATIENT
Start: 2023-12-21 | End: 2023-12-28 | Stop reason: HOSPADM

## 2023-12-21 RX ORDER — LISINOPRIL 20 MG/1
20 TABLET ORAL DAILY
Status: DISCONTINUED | OUTPATIENT
Start: 2023-12-21 | End: 2023-12-26

## 2023-12-21 RX ORDER — CETIRIZINE HYDROCHLORIDE 10 MG/1
10 TABLET ORAL NIGHTLY
Status: DISCONTINUED | OUTPATIENT
Start: 2023-12-21 | End: 2023-12-28 | Stop reason: HOSPADM

## 2023-12-21 RX ORDER — GABAPENTIN 100 MG/1
100 CAPSULE ORAL 2 TIMES DAILY
Status: DISCONTINUED | OUTPATIENT
Start: 2023-12-21 | End: 2023-12-28 | Stop reason: HOSPADM

## 2023-12-21 RX ORDER — BUSPIRONE HYDROCHLORIDE 10 MG/1
10 TABLET ORAL 2 TIMES DAILY
Status: DISCONTINUED | OUTPATIENT
Start: 2023-12-21 | End: 2023-12-28 | Stop reason: HOSPADM

## 2023-12-21 RX ORDER — LIDOCAINE 50 MG/G
1 PATCH TOPICAL
Status: DISCONTINUED | OUTPATIENT
Start: 2023-12-21 | End: 2023-12-28 | Stop reason: HOSPADM

## 2023-12-21 RX ADMIN — ACETAMINOPHEN 1000 MG: 500 TABLET ORAL at 03:12

## 2023-12-21 RX ADMIN — BUSPIRONE HYDROCHLORIDE 10 MG: 10 TABLET ORAL at 08:12

## 2023-12-21 RX ADMIN — LISINOPRIL 20 MG: 20 TABLET ORAL at 08:12

## 2023-12-21 RX ADMIN — Medication 6 MG: at 08:12

## 2023-12-21 RX ADMIN — GABAPENTIN 100 MG: 100 CAPSULE ORAL at 08:12

## 2023-12-21 RX ADMIN — CETIRIZINE HYDROCHLORIDE 10 MG: 10 TABLET, FILM COATED ORAL at 08:12

## 2023-12-21 RX ADMIN — LIDOCAINE 1 PATCH: 50 PATCH CUTANEOUS at 05:12

## 2023-12-21 RX ADMIN — OXYBUTYNIN CHLORIDE 5 MG: 5 TABLET, EXTENDED RELEASE ORAL at 08:12

## 2023-12-21 RX ADMIN — CITALOPRAM HYDROBROMIDE 10 MG: 10 TABLET ORAL at 08:12

## 2023-12-21 RX ADMIN — THERA TABS 1 TABLET: TAB at 08:12

## 2023-12-21 RX ADMIN — Medication 1 TABLET: at 08:12

## 2023-12-21 RX ADMIN — ENOXAPARIN SODIUM 40 MG: 40 INJECTION SUBCUTANEOUS at 05:12

## 2023-12-21 RX ADMIN — Medication 250 MCG: at 08:12

## 2023-12-21 RX ADMIN — PANTOPRAZOLE SODIUM 40 MG: 40 TABLET, DELAYED RELEASE ORAL at 08:12

## 2023-12-21 RX ADMIN — CETIRIZINE HYDROCHLORIDE 10 MG: 10 TABLET, FILM COATED ORAL at 01:12

## 2023-12-21 NOTE — PLAN OF CARE
Pt arrived to unit via stretcher, AAOX2, forgetful episodes with repetitive questions answered. Pt ambulated to bed from stretcher with X1 person assistance. Resp even and unlabored. Skin w/d to touch, intact with moles all over body. No PIV present upon arrival to unit, will place one. Incont of B/B, pericare provided per staff, purewick placed to pt. Instructed pt to call for assistance before ambulating. Bed in lowest position, call light in reach. NAD noted at present. Will continue to monitor.  Problem: Adult Inpatient Plan of Care  Goal: Plan of Care Review  Outcome: Ongoing, Progressing  Goal: Patient-Specific Goal (Individualized)  Outcome: Ongoing, Progressing  Goal: Absence of Hospital-Acquired Illness or Injury  Outcome: Ongoing, Progressing  Goal: Optimal Comfort and Wellbeing  Outcome: Ongoing, Progressing  Goal: Readiness for Transition of Care  Outcome: Ongoing, Progressing     Problem: Infection  Goal: Absence of Infection Signs and Symptoms  Outcome: Ongoing, Progressing     Problem: Skin Injury Risk Increased  Goal: Skin Health and Integrity  Outcome: Ongoing, Progressing     Problem: Fall Injury Risk  Goal: Absence of Fall and Fall-Related Injury  Outcome: Ongoing, Progressing

## 2023-12-21 NOTE — SUBJECTIVE & OBJECTIVE
Interval History: Patient seen and assessed at bedside with daughter present. Afebrile, VSS. Oriented to person only on my exam. Daughter states she has been declining over the last 6mo and has an appointment set up to see a neurologist in June of 2024. Daughter is most concerned with her mobility status. Holding tramadol and gabapentin for concerns of polypharmacy. Will reassess in am. PT/OT ordered.     Review of Systems   Reason unable to perform ROS: dementia.     Objective:     Vital Signs (Most Recent):  Temp: 98.4 °F (36.9 °C) (12/21/23 1201)  Pulse: 84 (12/21/23 1201)  Resp: 18 (12/21/23 1201)  BP: 122/73 (12/21/23 1201)  SpO2: 95 % (12/21/23 1201) Vital Signs (24h Range):  Temp:  [97.3 °F (36.3 °C)-98.5 °F (36.9 °C)] 98.4 °F (36.9 °C)  Pulse:  [] 84  Resp:  [15-20] 18  SpO2:  [93 %-100 %] 95 %  BP: (103-135)/(62-90) 122/73     Weight: 89.5 kg (197 lb 5 oz)  Body mass index is 33.87 kg/m².  No intake or output data in the 24 hours ending 12/21/23 1443      Physical Exam  Vitals and nursing note reviewed.   Constitutional:       General: She is not in acute distress.     Appearance: She is well-developed.   HENT:      Head: Normocephalic and atraumatic.      Mouth/Throat:      Pharynx: No oropharyngeal exudate.   Eyes:      Conjunctiva/sclera: Conjunctivae normal.      Pupils: Pupils are equal, round, and reactive to light.   Cardiovascular:      Rate and Rhythm: Normal rate and regular rhythm.      Heart sounds: Normal heart sounds.   Pulmonary:      Effort: Pulmonary effort is normal. No respiratory distress.      Breath sounds: Normal breath sounds. No wheezing.   Abdominal:      General: Bowel sounds are normal. There is no distension.      Palpations: Abdomen is soft.      Tenderness: There is no abdominal tenderness.   Musculoskeletal:         General: No tenderness. Normal range of motion.      Cervical back: Normal range of motion and neck supple.      Right lower leg: No edema.      Left lower  leg: No edema.   Lymphadenopathy:      Cervical: No cervical adenopathy.   Skin:     General: Skin is warm and dry.      Capillary Refill: Capillary refill takes less than 2 seconds.      Findings: No rash.      Comments: Neurofibromatosis. Well healed surgical scar to L posterior shoulder.    Neurological:      General: No focal deficit present.      Mental Status: She is alert.      Cranial Nerves: No cranial nerve deficit.      Sensory: No sensory deficit.      Motor: Weakness (generalized) present.      Coordination: Coordination normal.      Comments: Pleasantly confused - oriented to person only. 5/5 strength to extremities, but needed much assistance to sit up in bed during exam. Follows commands appropriately.              Significant Labs: All pertinent labs within the past 24 hours have been reviewed.    Significant Imaging: I have reviewed all pertinent imaging results/findings within the past 24 hours.

## 2023-12-21 NOTE — NURSING
Nurses Note -- 4 Eyes      12/21/2023   12:54 AM      Skin assessed during: Admit      [x] No Altered Skin Integrity Present    []Prevention Measures Documented      [] Yes- Altered Skin Integrity Present or Discovered   [] LDA Added if Not in Epic (Describe Wound)   [] New Altered Skin Integrity was Present on Admit and Documented in LDA   [] Wound Image Taken    Wound Care Consulted? No    Attending Nurse:  Chapito Graham RN/Staff Member:   Benigno

## 2023-12-21 NOTE — ASSESSMENT & PLAN NOTE
79 yo F with PMHx of neurofibromatosis, dementia, HTN, GIST who presented for generalized weakness and acute decline in mobility over the past few days. She recently started tramadol earlier this week. At baseline, does not use assistive devices for ambulation. Requiring 2 person assist to ambulate to restroom in ED.    - Afebrile and without leukocytosis. Infectious workup negative thus far.   - CTH with chronic changes, but without acute process  - CT lumbar spine without acute fracture or dislocation  - concern for polypharmacy. hold tramadol and gabapentin  - PT/OT consulted for acute decline in mobility  - continue to monitor closely

## 2023-12-21 NOTE — TELEPHONE ENCOUNTER
Pt daughter called to let us know that pt is currently in ED  Pt has not been eating or drinking  Pt has been vomiting.   Pt fell on sat at home  Pt has not been walking since fall  Pain in shoulder,  told her to just take tramadol and use pain patch.

## 2023-12-21 NOTE — PLAN OF CARE
Problem: Occupational Therapy  Goal: Occupational Therapy Goal  Description: Goals to be met by: 12/28/2023     Patient will increase functional independence with ADLs by performing:    UE Dressing with Set-up Assistance.  LE Dressing with Minimal Assistance.  Grooming while standing at sink with Contact Guard Assistance.  Toileting from toilet with Minimal Assistance for hygiene and clothing management.   Toilet transfer to toilet with Contact Guard Assistance.    Outcome: Ongoing, Progressing

## 2023-12-21 NOTE — ASSESSMENT & PLAN NOTE
S/p resection  - Per chart review, she has not followed up with heme/onc since 2020. Needs f/u on discharge

## 2023-12-21 NOTE — HOSPITAL COURSE
Lesli Gong was placed in observation for weakness. Infectious workup unremarkable. Concern for polypharmacy with tramadol & methocarbamol which was discontinued. Pt noted to have foul-smelling urine later in the admission. No systemic symptoms. UCx with GNR's, no h/o of resistance.  She did a 3 day course of Ceftriaxone.  Emphasized conservative mgmt for chronic pain: tylenol, lidocaine, topicals, home gabapentin. Discharged to SNF.

## 2023-12-21 NOTE — PT/OT/SLP EVAL
"Physical Therapy Co-Evaluation    Patient Name:  Lesli Gong   MRN:  [id number]    Recommendations:     Discharge Recommendations: Low Intensity Therapy   Discharge Equipment Recommendations: to be determined by next level of care   Barriers to discharge: Pt currently requiring increased level of assistance with mobility    Assessment:     Lesli Gong is a 78 y.o. female admitted with a medical diagnosis of Weakness.  She presents with the following impairments/functional limitations: weakness, impaired endurance, impaired functional mobility, gait instability, impaired balance, decreased lower extremity function, decreased coordination, impaired cognition, decreased upper extremity function, decreased safety awareness.    Pleasantly confused, AAOx2, follows simple commands. Pt amb to bedside chair RW with 1 person assist and presented with decreased coordination and florence noted. PLOF pt amb I with  no AD, functioning below baseline at this time. Pt will benefit from skilled PT services while in house in order to address the aforementioned deficits.      Rehab Prognosis: Good; patient would benefit from acute skilled PT services to address these deficits and reach maximum level of function.    Recent Surgery: * No surgery found *      Plan:     During this hospitalization, patient to be seen 4 x/week to address the identified rehab impairments via gait training, therapeutic activities, wheelchair management/training, therapeutic exercises, neuromuscular re-education and progress toward the following goals:    Plan of Care Expires:  01/21/24    Subjective     "I feel cold"    Pain/Comfort:  Pain Rating 1: 0/10    Patients cultural, spiritual, Rastafarian conflicts given the current situation: no    Living Environment:  Pt is poor historian. Per EMR, pt and son reside in [facility] with 4 KATE.   Prior to admission, patients level of function was I.  Equipment used at home: none.  DME owned (not currently used): " none.  Upon discharge, patient will have assistance from unknown.    Objective:     Communicated with RN prior to session.  Patient found HOB elevated with telemetry, PureWick  upon PT entry to room.    General Precautions: Standard, fall  Orthopedic Precautions:N/A   Braces: N/A  Respiratory Status: Room air    Exams:  Cognitive Exam:  Patient is oriented to Person and Place  RLE ROM: WFL  RLE Strength: grossly 3/5  LLE ROM: WFL  LLE Strength: grossly 3/5    Functional Mobility:  Bed Mobility:     Scooting: contact guard assistance  Supine to Sit: minimum assistance  Transfers:     Sit to Stand:  contact guard assistance with rolling walker  Bed to Chair: minimum assistance with  rolling walker  using  Step Transfer  Gait: pt amb 5 steps RW René. Pt presented with decreased florence, B shortened steps, decreased RW management  Balance:   Good sitting balance  Fair-good standing balance      AM-PAC 6 CLICK MOBILITY  Total Score:16       Treatment & Education:  Discussed sitting upright in chair >1hr, pt agreeable; table in front of pt with recliner legs up  Chair alarm and pad in room, however power cord not present; RN aware and notified    Educated pt on PT role/POC  Educated pt on importance of OOB activity and daily ambulation   Pt educated on proper body mechanics, safety techniques, and energy conservation with PT facilitation and cueing throughout session   Pt verbalized understanding      Patient left up in chair with all lines intact, call button in reach, RN notified, and OT present.    GOALS:   Multidisciplinary Problems       Physical Therapy Goals          Problem: Physical Therapy    Goal Priority Disciplines Outcome Goal Variances Interventions   Physical Therapy Goal     PT, PT/OT Ongoing, Progressing     Description: Goals to be met by: 24     Patient will increase functional independence with mobility by performin. Supine to sit with Modified [place]  2. Sit to supine with Modified  [place]  3. Sit to stand transfer with Supervision  4. Bed to chair transfer with Supervision using LRAD  5. Gait  x 100 feet with Supervision using LRAD.                          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, malignant 2015    History of hepatitis C, s/p successful Rx w/ SVR - 2018 3/17/2017    Completed zepatier + RBV w/ svr     History of psychiatric hospitalization     Hx of psychiatric care     Mass 10-10-14    excision of mass/left shoulder    Neurofibromatosis, type 1 (von Recklinghausen's disease) 2014    Pheochromocytoma     S/p resection in     Psychiatric problem     Soft tissue sarcoma of chest wall 2014    Therapy        Past Surgical History:   Procedure Laterality Date    APPENDECTOMY      BILATERAL SALPINGOOPHORECTOMY      BREAST BIOPSY Right     BREAST BIOPSY Left     BUNIONECTOMY Right      SECTION      COLONOSCOPY N/A 2018    Procedure: COLONOSCOPY;  Surgeon: Oscar Medley MD;  Location: [address]);  Service: Endoscopy;  Laterality: N/A;    EXPLORATORY LAPAROTOMY W/ BOWEL RESECTION      HYSTERECTOMY N/A     pheochromocytoma excision N/A     TONSILLECTOMY Bilateral        Time Tracking:     PT Received On: 23  PT Start Time: 934     PT Stop Time: 954  PT Total Time (min): 20 min     Billable Minutes: Evaluation 10 and Gait Training 10    Co-treatment performed due to patient's multiple deficits requiring two skilled therapists to appropriately and safely assess patient's strength and endurance while facilitating functional tasks in addition to accommodating for patient's activity tolerance.       2023

## 2023-12-21 NOTE — ASSESSMENT & PLAN NOTE
Body mass index is 33.87 kg/m². Morbid obesity complicates all aspects of disease management from diagnostic modalities to treatment. Weight loss encouraged and health benefits explained to patient.

## 2023-12-21 NOTE — PLAN OF CARE
PT Evaluation completed and PT POC established.    Problem: Physical Therapy  Goal: Physical Therapy Goal  Description: Goals to be met by: 24     Patient will increase functional independence with mobility by performin. Supine to sit with Modified [place]  2. Sit to supine with Modified [place]  3. Sit to stand transfer with Supervision  4. Bed to chair transfer with Supervision using LRAD  5. Gait  x 100 feet with Supervision using LRAD.     Outcome: Ongoing, Progressing

## 2023-12-21 NOTE — TELEPHONE ENCOUNTER
----- Message from Jocelyne Quiros sent at 12/21/2023  9:11 AM CST -----  Contact: PT daughter [phone]  PT calling to speak with the doctor only regarding some questions  about her mother. Please call to advise.

## 2023-12-21 NOTE — PROGRESS NOTES
"[facility] [address] [place] [address] Medicine  Progress Note    Patient Name: Lesli Gong  MRN: [id number]  Patient Class: OP- Observation   Admission Date: 12/20/2023  Length of Stay: 0 days  Attending Physician: Carol Townsend MD  Primary Care Provider: Patti Brian MD        Subjective:     Principal Problem:Weakness        HPI:  Lesli Gong is a 79 yo F with PMHx of neurofibromatosis, dementia, HTN, GIST who presented for generalized weakness. Patient pleasantly confused; only oriented to self. Family not at bedside to assist with history on my interview. Per ED note: "States that her medication was switched to tramadol approximately 6 days ago.  Since then she has had an acute decline in her mobility.  She was able to walk without any assistive devices but now needs to person support to ambulate.  Patient had a fall but patient and daughter unsure if she hit her head.  He is patient's daughter complains of foul-smelling urine."  Patient denies any complaints on my interview.     In ED: Afebrile. HDS. No leukocytosis. Hgb 16.3. CMP grossly unremarkable except AST 57. TSH wnl. UA noninfectious. L shoulder XR without acute fracture or dislocation. CT lumbar spine No acute fractures or traumatic malalignment of the lumbar spine, Lumbar spondylosis as detailed above, most pronounced at L5-S1 with moderate to severe neural foraminal narrowing, and stable L1 vertebral body hemangioma. CT head with generalized cerebral volume loss and chronic microvascular ischemic changes, but without acute process. Given IV reglan 5 mg and oxy 5 mg. Per notes, "Attempted to ambulate patient to the bathroom, patient required a 2 person assist.  At her baseline, patient walks without any assistive devices."  Placed in observation.     Overview/Hospital Course:  Lesli Gong is a 78 y.o. F who was placed in observation for further evaluation of weakness. Infectious workup negative so far. Concern for polypharmacy " with tramadol and gabapentin. Holding both for now. PT/OT ordered for further evaluation.     Interval History: Patient seen and assessed at bedside with daughter present. Afebrile, VSS. Oriented to person only on my exam. Daughter states she has been declining over the last 6mo and has an appointment set up to see a neurologist in June of 2024. Daughter is most concerned with her mobility status. Holding tramadol and gabapentin for concerns of polypharmacy. Will reassess in am. PT/OT ordered.     Review of Systems   Reason unable to perform ROS: dementia.     Objective:     Vital Signs (Most Recent):  Temp: 98.4 °F (36.9 °C) (12/21/23 1201)  Pulse: 84 (12/21/23 1201)  Resp: 18 (12/21/23 1201)  BP: 122/73 (12/21/23 1201)  SpO2: 95 % (12/21/23 1201) Vital Signs (24h Range):  Temp:  [97.3 °F (36.3 °C)-98.5 °F (36.9 °C)] 98.4 °F (36.9 °C)  Pulse:  [] 84  Resp:  [15-20] 18  SpO2:  [93 %-100 %] 95 %  BP: (103-135)/(62-90) 122/73     Weight: 89.5 kg (197 lb 5 oz)  Body mass index is 33.87 kg/m².  No intake or output data in the 24 hours ending 12/21/23 1443      Physical Exam  Vitals and nursing note reviewed.   Constitutional:       General: She is not in acute distress.     Appearance: She is well-developed.   HENT:      Head: Normocephalic and atraumatic.      Mouth/Throat:      Pharynx: No oropharyngeal exudate.   Eyes:      Conjunctiva/sclera: Conjunctivae normal.      Pupils: Pupils are equal, round, and reactive to light.   Cardiovascular:      Rate and Rhythm: Normal rate and regular rhythm.      Heart sounds: Normal heart sounds.   Pulmonary:      Effort: Pulmonary effort is normal. No respiratory distress.      Breath sounds: Normal breath sounds. No wheezing.   Abdominal:      General: Bowel sounds are normal. There is no distension.      Palpations: Abdomen is soft.      Tenderness: There is no abdominal tenderness.   Musculoskeletal:         General: No tenderness. Normal range of motion.      Cervical  back: Normal range of motion and neck supple.      Right lower leg: No edema.      Left lower leg: No edema.   Lymphadenopathy:      Cervical: No cervical adenopathy.   Skin:     General: Skin is warm and dry.      Capillary Refill: Capillary refill takes less than 2 seconds.      Findings: No rash.      Comments: Neurofibromatosis. Well healed surgical scar to L posterior shoulder.    Neurological:      General: No focal deficit present.      Mental Status: She is alert.      Cranial Nerves: No cranial nerve deficit.      Sensory: No sensory deficit.      Motor: Weakness (generalized) present.      Coordination: Coordination normal.      Comments: Pleasantly confused - oriented to person only. 5/5 strength to extremities, but needed much assistance to sit up in bed during exam. Follows commands appropriately.              Significant Labs: All pertinent labs within the past 24 hours have been reviewed.    Significant Imaging: I have reviewed all pertinent imaging results/findings within the past 24 hours.    Assessment/Plan:      * Weakness  77 yo F with PMHx of neurofibromatosis, dementia, HTN, GIST who presented for generalized weakness and acute decline in mobility over the past few days. She recently started tramadol earlier this week. At baseline, does not use assistive devices for ambulation. Requiring 2 person assist to ambulate to restroom in ED.    - Afebrile and without leukocytosis. Infectious workup negative thus far.   - CTH with chronic changes, but without acute process  - CT lumbar spine without acute fracture or dislocation  - concern for polypharmacy. hold tramadol and gabapentin  - PT/OT consulted for acute decline in mobility  - continue to monitor closely    Obesity (BMI 30.0-34.9)  Body mass index is 33.87 kg/m². Morbid obesity complicates all aspects of disease management from diagnostic modalities to treatment. Weight loss encouraged and health benefits explained to patient.     Adjustment  disorder with anxiety  Depression  - continue buspar and celexa    History of gastrointestinal stromal tumor (GIST)  S/p resection  - Per chart review, she has not followed up with heme/onc since 2020. Needs f/u on discharge    Essential hypertension  - normotensive on admit  - continue lisinopril 20 mg daily    Neurofibromatosis, type 1  - noted      VTE Risk Mitigation (From admission, onward)           Ordered     enoxaparin injection 40 mg  Daily         12/20/23 2318     IP VTE HIGH RISK PATIENT  Once         12/20/23 2318                    Discharge Planning   PETER: 12/21/2023     Code Status: Full Code   Is the patient medically ready for discharge?: No    Reason for patient still in hospital (select all that apply): Patient trending condition and PT / OT recommendations  Discharge Plan A: Home Health                  Ana María Resendiz PA-C  Department of Hospital Medicine   [facility]

## 2023-12-21 NOTE — H&P
"  [facility] - Emergency [facility] Medicine  History & Physical    Patient Name: Lesli Gong  MRN: [id number]  Patient Class: OP- Observation  Admission Date: 12/20/2023  Attending Physician: Carol Brown MD   Primary Care Provider: Patti Brian MD         Patient information was obtained from patient and ER records.     Subjective:     Principal Problem:Weakness    Chief Complaint:   Chief Complaint   Patient presents with    Weakness     Arrived via EMS from home, x3 days, foul dark smelling urination with pain.         HPI: Lesli Gong is a 79 yo F with PMHx of neurofibromatosis, dementia, HTN, GIST who presented for generalized weakness. Patient pleasantly confused; only oriented to self. Family not at bedside to assist with history on my interview. Per ED note: "States that her medication was switched to tramadol approximately 6 days ago.  Since then she has had an acute decline in her mobility.  She was able to walk without any assistive devices but now needs to person support to ambulate.  Patient had a fall but patient and daughter unsure if she hit her head.  He is patient's daughter complains of foul-smelling urine."  Patient denies any complaints on my interview.     In ED: Afebrile. HDS. No leukocytosis. Hgb 16.3. CMP grossly unremarkable except AST 57. TSH wnl. UA noninfectious. L shoulder XR without acute fracture or dislocation. CT lumbar spine No acute fractures or traumatic malalignment of the lumbar spine, Lumbar spondylosis as detailed above, most pronounced at L5-S1 with moderate to severe neural foraminal narrowing, and stable L1 vertebral body hemangioma. CT head with generalized cerebral volume loss and chronic microvascular ischemic changes, but without acute process. Given IV reglan 5 mg and oxy 5 mg. Per notes, "Attempted to ambulate patient to the bathroom, patient required a 2 person assist.  At her baseline, patient walks without any assistive devices."  Placed " in observation.     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, malignant 2015    History of hepatitis C, s/p successful Rx w/ SVR - 2018 3/17/2017    Completed zepatier + RBV w/ svr     History of psychiatric hospitalization     Hx of psychiatric care     Mass 10-10-14    excision of mass/left shoulder    Neurofibromatosis, type 1 (von Recklinghausen's disease) 2014    Pheochromocytoma     S/p resection in     Psychiatric problem     Soft tissue sarcoma of chest wall 2014    Therapy        Past Surgical History:   Procedure Laterality Date    APPENDECTOMY      BILATERAL SALPINGOOPHORECTOMY      BREAST BIOPSY Right     BREAST BIOPSY Left     BUNIONECTOMY Right      SECTION      COLONOSCOPY N/A 2018    Procedure: COLONOSCOPY;  Surgeon: Oscar Medley MD;  Location: [address]);  Service: Endoscopy;  Laterality: N/A;    EXPLORATORY LAPAROTOMY W/ BOWEL RESECTION      HYSTERECTOMY N/A     pheochromocytoma excision N/A     TONSILLECTOMY Bilateral        Review of patient's allergies indicates:   Allergen Reactions    Anaprox [naproxen sodium] Nausea Only and Palpitations    Motrin [ibuprofen] Nausea Only and Palpitations    Neomycin-polymyxin-hc      Itchy (skin)^    Sulfa (sulfonamide antibiotics)      Hives (skin)^       No current facility-administered medications on file prior to encounter.     Current Outpatient Medications on File Prior to Encounter   Medication Sig    busPIRone (BUSPAR) 10 MG tablet Take 1 tablet (10 mg total) by mouth 2 (two) times daily.    busPIRone (BUSPAR) 5 MG Tab TAKE 1 TO 2 TABLETS (5-10 MG TOTAL) BY MOUTH 2 (TWO) TIMES DAILY AS NEEDED (ANXIETY).    calcium-vitamin D (OSCAL) 250 (625)-125 mg-unit per tablet Take 1 tablet by mouth once daily.    cane Amanda For use with walking    cetirizine (ZYRTEC) 10 MG tablet Take 1 tablet (10 mg  total) by mouth once daily.    citalopram (CELEXA) 10 MG tablet Take 1 tablet (10 mg total) by mouth once daily.    cyanocobalamin, vitamin B-12, 50 mcg tablet Take 50 mcg by mouth once daily.    diclofenac sodium (VOLTAREN) 1 % Gel APPLY 2 G TOPICALLY 2 (TWO) TIMES DAILY AS NEEDED. TO RIGHT ARM/ ELBOW.    gabapentin (NEURONTIN) 100 MG capsule Take 1 capsule (100 mg total) by mouth 2 (two) times daily.    LIDOcaine (LIDODERM) 5 % Place 1 patch onto the skin once daily. Remove & Discard patch within 12 hours or as directed by MD    lisinopriL 10 MG tablet Take 2 tablets (20 mg total) by mouth once daily.    multivitamin capsule Take 1 capsule by mouth once daily.    omeprazole (PRILOSEC) 20 MG capsule TAKE 1 CAPSULE (20 MG TOTAL) BY MOUTH ONCE DAILY. 90 DAY COURSE, WILL RESTART    oxybutynin (DITROPAN-XL) 5 MG TR24 Take 1 tablet (5 mg total) by mouth once daily.    traMADoL (ULTRAM) 50 mg tablet Take 1 tablet (50 mg total) by mouth every 12 (twelve) hours as needed for Pain (pain).     Family History       Problem Relation (Age of Onset)    Breast cancer Mother    Cancer Sister    Neurofibromatosis Father, Sister          Tobacco Use    Smoking status: Never    Smokeless tobacco: Never   Substance and Sexual Activity    Alcohol use: No    Drug use: No    Sexual activity: Not Currently     Review of Systems   Reason unable to perform ROS: dementia.     Objective:     Vital Signs (Most Recent):  Temp: 98.2 °F (36.8 °C) (12/21/23 0014)  Pulse: 93 (12/21/23 0014)  Resp: 20 (12/21/23 0014)  BP: 103/69 (12/21/23 0014)  SpO2: (!) 94 % (12/21/23 0014) Vital Signs (24h Range):  Temp:  [98.2 °F (36.8 °C)-98.5 °F (36.9 °C)] 98.2 °F (36.8 °C)  Pulse:  [] 93  Resp:  [15-20] 20  SpO2:  [94 %-99 %] 94 %  BP: (103-133)/(62-86) 103/69     Weight: 83.9 kg (184 lb 15.5 oz)  Body mass index is 31.75 kg/m².     Physical Exam  Vitals and nursing note reviewed.   Constitutional:       General: She is not in acute distress.      Appearance: She is well-developed.   HENT:      Head: Normocephalic and atraumatic.      Mouth/Throat:      Pharynx: No oropharyngeal exudate.   Eyes:      Conjunctiva/sclera: Conjunctivae normal.      Pupils: Pupils are equal, round, and reactive to light.   Cardiovascular:      Rate and Rhythm: Normal rate and regular rhythm.      Heart sounds: Normal heart sounds.   Pulmonary:      Effort: Pulmonary effort is normal. No respiratory distress.      Breath sounds: Normal breath sounds. No wheezing.   Abdominal:      General: Bowel sounds are normal. There is no distension.      Palpations: Abdomen is soft.      Tenderness: There is no abdominal tenderness.   Musculoskeletal:         General: No tenderness. Normal range of motion.      Cervical back: Normal range of motion and neck supple.      Right lower leg: No edema.      Left lower leg: No edema.   Lymphadenopathy:      Cervical: No cervical adenopathy.   Skin:     General: Skin is warm and dry.      Capillary Refill: Capillary refill takes less than 2 seconds.      Findings: No rash.      Comments: Neurofibromatosis. Well healed surgical scar to L posterior shoulder.    Neurological:      General: No focal deficit present.      Mental Status: She is alert. She is disoriented.      Cranial Nerves: No cranial nerve deficit.      Sensory: No sensory deficit.      Motor: Weakness (generalized) present.      Coordination: Coordination normal.      Comments: Pleasantly confused. 5/5 strength to extremities, but needed much assistance to sit up in bed during exam. Follows commands appropriately.               CRANIAL NERVES     CN III, IV, VI   Pupils are equal, round, and reactive to light.       Significant Labs: All pertinent labs within the past 24 hours have been reviewed.  CBC:   Recent Labs   Lab 12/20/23  1706   WBC 8.26   HGB 16.3*   HCT 46.4        CMP:   Recent Labs   Lab 12/20/23  1706      K 3.7      CO2 22*      BUN 18    CREATININE 0.8   CALCIUM 9.6   PROT 8.2   ALBUMIN 4.1   BILITOT 0.9   ALKPHOS 103   AST 57*   ALT 24   ANIONGAP 13     Urine Studies:   Recent Labs   Lab 12/20/23 2035   COLORU Yellow   APPEARANCEUA Clear   PHUR 6.0   SPECGRAV 1.030   PROTEINUA 1+*   GLUCUA Negative   KETONESU Trace*   BILIRUBINUA Negative   OCCULTUA Negative   NITRITE Negative   LEUKOCYTESUR Negative   RBCUA 0   WBCUA 1   BACTERIA Rare   SQUAMEPITHEL 1   HYALINECASTS 0       Significant Imaging: I have reviewed all pertinent imaging results/findings within the past 24 hours.  Imaging Results              CT Lumbar Spine Without Contrast (Final result)  Result time 12/20/23 20:40:43      Final result by Ramakrishna Best MD (12/20/23 20:40:43)                   Impression:      No acute fractures or traumatic malalignment of the lumbar spine    Lumbar spondylosis as detailed above, most pronounced at L5-S1 with moderate to severe neural foraminal narrowing.    Stable L1 vertebral body hemangioma.    Additional findings as above.    Electronically signed by resident: Iliana Post  Date:    12/20/2023  Time:    20:12    Electronically signed by: Ramakrishna Best MD  Date:    12/20/2023  Time:    20:40               Narrative:    EXAMINATION:  CT LUMBAR SPINE WITHOUT CONTRAST    CLINICAL HISTORY:  Low back pain, trauma;    TECHNIQUE:  Low-dose axial, sagittal and coronal reformations are obtained through the lumbar spine.  Contrast was not administered.    COMPARISON:  CT 02/27/2020, 08/23/2019; x-ray 12/04/2017    FINDINGS:  Alignment: Normal lumbar lordosis.  No spondylolisthesis or spondylolysis.    Vertebrae: Diffuse osteopenia.  Vertebral body heights are maintained.  No acute fractures.  L1 vertebral body hemangioma extending into the posterior elements, grossly stable when compared to prior CT 2020.    Discs: Disc height loss at L5-S1 with associated vacuum phenomenon.  Prominent Schmorl's node at the inferior endplate of L5, new when compared to  prior CT 2020.    Sacroiliac joints: Bilateral degenerative changes.    Degenerative findings:    T12-L1: Bilateral facet arthropathy.  Mild-to-moderate bilateral neural foraminal narrowing.  No spinal canal stenosis.    L1-L2: Bilateral facet arthropathy.  Mild bilateral neural foraminal narrowing.  No spinal canal stenosis.    L2-L3: Circumferential disc bulge and bilateral facet arthropathy.  Mild bilateral neural foraminal narrowing.  No spinal canal stenosis.    L3-L4: Circumferential disc bulge, ligamentum flavum hypertrophy, and bilateral facet arthropathy.  Mild-to-moderate bilateral neural foraminal narrowing.  Mild spinal canal stenosis.    L4-L5: Circumferential disc bulge, ligamentum flavum hypertrophy, and bilateral facet arthropathy.  Mild bilateral neural foraminal narrowing.  Mild spinal canal stenosis.    L5-S1: Circumferential disc bulge, ligamentum flavum hypertrophy, and bilateral facet arthropathy.  Moderate to severe neural foraminal narrowing.  No spinal canal stenosis.    Paraspinal muscles & soft tissues: Surgical clips throughout the upper abdomen.  Partially visualized postsurgical changes of small-bowel resection.                                       CT Head Without Contrast (Final result)  Result time 12/20/23 20:19:42      Final result by Ramakrishna Best MD (12/20/23 20:19:42)                   Impression:      No acute intracranial abnormality identified, specifically no major vascular distribution infarct or acute hemorrhage.    Generalized cerebral volume loss with chronic microvascular ischemic changes.    Additional findings as above.    Electronically signed by resident: Iliana Post  Date:    12/20/2023  Time:    19:50    Electronically signed by: Ramakrishna Best MD  Date:    12/20/2023  Time:    20:19               Narrative:    EXAMINATION:  CT HEAD WITHOUT CONTRAST    CLINICAL HISTORY:  Head trauma, minor (Age >= 65y);Fall with dementia;    TECHNIQUE:  Low dose axial CT images  obtained throughout the head without intravenous contrast. Sagittal and coronal reconstructions were performed.    COMPARISON:  CT 05/29/2022; MRI 03/12/2022.    FINDINGS:  Mild generalized cerebral volume loss with compensatory enlargement the ventricles and sulci.  Findings similar when compared to prior CT.  No hydrocephalus.    Mild patchy hypoattenuation in the supratentorial white matter, nonspecific but most likely reflecting chronic small vessel ischemic changes. No parenchymal mass effect, hemorrhage, edema or major vascular distribution infarct.No extra-axial blood or fluid collections.    No displaced calvarial fracture.  Mastoid air cells and paranasal sinuses are essentially clear.                                       X-Ray Shoulder Trauma Left (Final result)  Result time 12/20/23 18:24:48      Final result by Darci Villasenor MD (12/20/23 18:24:48)                   Impression:      1. No acute displaced fracture or dislocation of the left shoulder.      Electronically signed by: Darci Villasenor MD  Date:    12/20/2023  Time:    18:24               Narrative:    EXAMINATION:  XR SHOULDER TRAUMA 3 VIEW LEFT    CLINICAL HISTORY:  Unspecified fall, initial encounter    TECHNIQUE:  Three views of the left shoulder were performed.    COMPARISON  08/15/2019    FINDINGS:  Three views left shoulder.    There is osteopenia.  The left humeral head maintains appropriate relationship with the glenoid.  The acromioclavicular joint is intact.  No acute displaced left rib fracture.  The left lung zones are clear noting elevation of the left hemidiaphragm.                                    Assessment/Plan:     * Weakness  77 yo F with PMHx of neurofibromatosis, dementia, HTN, GIST who presented for generalized weakness and acute decline in mobility over the past few days. She recently started tramadol earlier this week. At baseline, does not use assistive devices for ambulation. Requiring 2 person assist to  ambulate to restroom in ED.    - Afebrile and without leukocytosis. Infectious workup negative thus far.   - CTH with chronic changes, but without acute process  - CT lumbar spine without acute fracture or dislocation  - concern for polypharmacy. hold tramadol and gabapentin  - PT/OT consulted for acute decline in mobility    Obesity (BMI 30.0-34.9)  Body mass index is 33.87 kg/m². Morbid obesity complicates all aspects of disease management from diagnostic modalities to treatment. Weight loss encouraged and health benefits explained to patient.     Adjustment disorder with anxiety  Depression  - continue buspar and celexa    History of gastrointestinal stromal tumor (GIST)  S/p resection  - Per chart review, she has not followed up with heme/onc since 2020. Needs f/u on discharge    Essential hypertension  - normotensive on admit  - continue lisinopril 20 mg daily    Neurofibromatosis, type 1  - noted      VTE Risk Mitigation (From admission, onward)           Ordered     enoxaparin injection 40 mg  Daily         12/20/23 2318     IP VTE HIGH RISK PATIENT  Once         12/20/23 2318                         On 12/21/2023, patient should be placed in hospital observation services under my care in collaboration with Dr. Jeff Tompkins.           Madison Acosta PA-C  Department of Hospital Medicine  [facility] Dept

## 2023-12-21 NOTE — ED PROVIDER NOTES
Encounter Date: 2023       History     Chief Complaint   Patient presents with    Weakness     Arrived via EMS from home, x3 days, foul dark smelling urination with pain.      Lesli Gong is a 78 y.o. female with PMH of neurofibromatosis, dementia, hypertension, [place] [facility].  Patient accompanied by daughter in the room provides majority of the history.  States that her medication was switched to tramadol approximately 6 days ago.  Since then she has had an acute decline in her mobility.  She was able to walk without any assistive devices but now needs to person support to ambulate.  Patient had a fall but patient and daughter unsure if she hit her head.  He is patient's daughter complains of foul-smelling urine.  No other complaints at this time.      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, malignant 2015    History of hepatitis C, s/p successful Rx w/ SVR24 - 2018 3/17/2017    Completed zepatier + RBV w/ svr     History of psychiatric hospitalization     Hx of psychiatric care     Mass 10-10-14    excision of mass/left shoulder    Neurofibromatosis, type 1 (von Recklinghausen's disease) 2014    Pheochromocytoma     S/p resection in 80's    Psychiatric problem     Soft tissue sarcoma of chest wall 2014    Therapy      Past Surgical History:   Procedure Laterality Date    APPENDECTOMY      BILATERAL SALPINGOOPHORECTOMY      BREAST BIOPSY Right     BREAST BIOPSY Left     BUNIONECTOMY Right      SECTION      COLONOSCOPY N/A 2018    Procedure: COLONOSCOPY;  Surgeon:  Oscar Medley MD;  Location: [facility] ([address] [place] [address]);  Service: Endoscopy;  Laterality: N/A;    EXPLORATORY LAPAROTOMY W/ BOWEL RESECTION      HYSTERECTOMY N/A     pheochromocytoma excision N/A 1980's    TONSILLECTOMY Bilateral      Family History   Problem Relation Age of Onset    Breast cancer Mother     Neurofibromatosis Father     Cancer Sister         GIST    Neurofibromatosis Sister      Social History     Tobacco Use    Smoking status: Never    Smokeless tobacco: Never   Substance Use Topics    Alcohol use: No    Drug use: No     Review of Systems   Constitutional:  Negative for fever.   HENT:  Negative for congestion, rhinorrhea and sore throat.    Eyes:  Negative for visual disturbance.   Respiratory:  Negative for cough and shortness of breath.    Cardiovascular:  Negative for chest pain and leg swelling.   Gastrointestinal:  Negative for abdominal pain, diarrhea, nausea and vomiting.   Genitourinary:  Positive for dysuria. Negative for hematuria.   Neurological:  Negative for weakness.       Physical Exam     Initial Vitals [12/20/23 1446]   BP Pulse Resp Temp SpO2   122/86 94 16 98.5 °F (36.9 °C) 99 %      MAP       --         Physical Exam    Constitutional: Vital signs are normal. She appears well-developed. Airway: Normal. Breathing: Normal. Circulation: Normal. Pulses:Radial palpable. She is cooperative.  Non-toxic appearance. No distress.   HENT:   Head: Normocephalic and atraumatic.   Nose: Nose abnormal.   Eyes: Pupils: Normal pupils.   Slit lamp exam:       The right eye shows no corneal abrasion.        The left eye shows no corneal abrasion.   Neck: No tracheal deviation present.   Normal range of motion.  Cardiovascular:  Normal rate and normal heart sounds.     Exam reveals no gallop and no friction rub.       No murmur heard.  Pulmonary/Chest: Breath sounds normal. She has no wheezes. She has no rhonchi. She has no rales.   Abdominal: Abdomen is soft. Bowel sounds are normal. There is no  abdominal tenderness. The pelvis is stable. There is no rebound and no guarding.   Musculoskeletal:         General: Normal range of motion.      Right shoulder: No deformity or bony tenderness.      Left shoulder: Bony tenderness present. No deformity.      Right upper arm: No swelling, deformity or bony tenderness.      Left upper arm: No swelling, deformity or bony tenderness.      Right forearm: No swelling, deformity or bony tenderness.      Left forearm: No swelling, deformity or bony tenderness.      Cervical back: Normal range of motion. No deformity or bony tenderness. Normal.      Thoracic back: Normal. No deformity or bony tenderness.      Lumbar back: Bony tenderness present. No deformity.      Right upper leg: No swelling, deformity or bony tenderness.      Left upper leg: No swelling, deformity or bony tenderness.      Right lower leg: No swelling, deformity or bony tenderness.      Left lower leg: No swelling, deformity or bony tenderness.     Neurological: She is alert and oriented to person, place, and time. She has normal strength.   Skin: Skin is warm, dry and intact.         ED Course   Procedures  Labs Reviewed   CBC W/ AUTO DIFFERENTIAL - Abnormal; Notable for the following components:       Result Value    Hemoglobin 16.3 (*)     Immature Granulocytes 1.6 (*)     Immature Grans (Abs) 0.13 (*)     All other components within normal limits   COMPREHENSIVE METABOLIC PANEL - Abnormal; Notable for the following components:    CO2 22 (*)     AST 57 (*)     All other components within normal limits   URINALYSIS, REFLEX TO URINE CULTURE - Abnormal; Notable for the following components:    Protein, UA 1+ (*)     Ketones, UA Trace (*)     All other components within normal limits    Narrative:     Specimen Source->Urine   TSH   URINALYSIS MICROSCOPIC    Narrative:     Specimen Source->Urine   POCT GLUCOSE MONITORING CONTINUOUS   POCT GLUCOSE     EKG Readings: (Independently Interpreted)   Initial  Reading: No STEMI. Rhythm: Normal Sinus Rhythm. Heart Rate: 94. Ectopy: No Ectopy. ST Segments: Normal ST Segments. Clinical Impression: Normal Sinus Rhythm Other Impression: Prolonged QT       Imaging Results              CT Lumbar Spine Without Contrast (Final result)  Result time 12/20/23 20:40:43      Final result by Ramakrishna Best MD (12/20/23 20:40:43)                   Impression:      No acute fractures or traumatic malalignment of the lumbar spine    Lumbar spondylosis as detailed above, most pronounced at L5-S1 with moderate to severe neural foraminal narrowing.    Stable L1 vertebral body hemangioma.    Additional findings as above.    Electronically signed by resident: Iliana Post  Date:    12/20/2023  Time:    20:12    Electronically signed by: Ramakrishna Best MD  Date:    12/20/2023  Time:    20:40               Narrative:    EXAMINATION:  CT LUMBAR SPINE WITHOUT CONTRAST    CLINICAL HISTORY:  Low back pain, trauma;    TECHNIQUE:  Low-dose axial, sagittal and coronal reformations are obtained through the lumbar spine.  Contrast was not administered.    COMPARISON:  CT 02/27/2020, 08/23/2019; x-ray 12/04/2017    FINDINGS:  Alignment: Normal lumbar lordosis.  No spondylolisthesis or spondylolysis.    Vertebrae: Diffuse osteopenia.  Vertebral body heights are maintained.  No acute fractures.  L1 vertebral body hemangioma extending into the posterior elements, grossly stable when compared to prior CT 2020.    Discs: Disc height loss at L5-S1 with associated vacuum phenomenon.  Prominent Schmorl's node at the inferior endplate of L5, new when compared to prior CT 2020.    Sacroiliac joints: Bilateral degenerative changes.    Degenerative findings:    T12-L1: Bilateral facet arthropathy.  Mild-to-moderate bilateral neural foraminal narrowing.  No spinal canal stenosis.    L1-L2: Bilateral facet arthropathy.  Mild bilateral neural foraminal narrowing.  No spinal canal stenosis.    L2-L3: Circumferential disc  bulge and bilateral facet arthropathy.  Mild bilateral neural foraminal narrowing.  No spinal canal stenosis.    L3-L4: Circumferential disc bulge, ligamentum flavum hypertrophy, and bilateral facet arthropathy.  Mild-to-moderate bilateral neural foraminal narrowing.  Mild spinal canal stenosis.    L4-L5: Circumferential disc bulge, ligamentum flavum hypertrophy, and bilateral facet arthropathy.  Mild bilateral neural foraminal narrowing.  Mild spinal canal stenosis.    L5-S1: Circumferential disc bulge, ligamentum flavum hypertrophy, and bilateral facet arthropathy.  Moderate to severe neural foraminal narrowing.  No spinal canal stenosis.    Paraspinal muscles & soft tissues: Surgical clips throughout the upper abdomen.  Partially visualized postsurgical changes of small-bowel resection.                                       CT Head Without Contrast (Final result)  Result time 12/20/23 20:19:42      Final result by Ramakrishna Best MD (12/20/23 20:19:42)                   Impression:      No acute intracranial abnormality identified, specifically no major vascular distribution infarct or acute hemorrhage.    Generalized cerebral volume loss with chronic microvascular ischemic changes.    Additional findings as above.    Electronically signed by resident: Iliana Post  Date:    12/20/2023  Time:    19:50    Electronically signed by: Ramakrishna Best MD  Date:    12/20/2023  Time:    20:19               Narrative:    EXAMINATION:  CT HEAD WITHOUT CONTRAST    CLINICAL HISTORY:  Head trauma, minor (Age >= 65y);Fall with dementia;    TECHNIQUE:  Low dose axial CT images obtained throughout the head without intravenous contrast. Sagittal and coronal reconstructions were performed.    COMPARISON:  CT 05/29/2022; MRI 03/12/2022.    FINDINGS:  Mild generalized cerebral volume loss with compensatory enlargement the ventricles and sulci.  Findings similar when compared to prior CT.  No hydrocephalus.    Mild patchy hypoattenuation  in the supratentorial white matter, nonspecific but most likely reflecting chronic small vessel ischemic changes. No parenchymal mass effect, hemorrhage, edema or major vascular distribution infarct.No extra-axial blood or fluid collections.    No displaced calvarial fracture.  Mastoid air cells and paranasal sinuses are essentially clear.                                       X-Ray Shoulder Trauma Left (Final result)  Result time 12/20/23 18:24:48      Final result by Darci Villasenor MD (12/20/23 18:24:48)                   Impression:      1. No acute displaced fracture or dislocation of the left shoulder.      Electronically signed by: Darci Villasenor MD  Date:    12/20/2023  Time:    18:24               Narrative:    EXAMINATION:  XR SHOULDER TRAUMA 3 VIEW LEFT    CLINICAL HISTORY:  Unspecified fall, initial encounter    TECHNIQUE:  Three views of the left shoulder were performed.    COMPARISON  08/15/2019    FINDINGS:  Three views left shoulder.    There is osteopenia.  The left humeral head maintains appropriate relationship with the glenoid.  The acromioclavicular joint is intact.  No acute displaced left rib fracture.  The left lung zones are clear noting elevation of the left hemidiaphragm.                                       Medications   sodium chloride 0.9% flush 5 mL (has no administration in time range)   albuterol-ipratropium 2.5 mg-0.5 mg/3 mL nebulizer solution 3 mL (has no administration in time range)   melatonin tablet 6 mg (has no administration in time range)   prochlorperazine injection Soln 5 mg (has no administration in time range)   polyethylene glycol packet 17 g (has no administration in time range)   bisacodyL suppository 10 mg (has no administration in time range)   simethicone chewable tablet 80 mg (has no administration in time range)   aluminum-magnesium hydroxide-simethicone 200-200-20 mg/5 mL suspension 30 mL (has no administration in time range)   acetaminophen tablet 650 mg  (has no administration in time range)   acetaminophen tablet 1,000 mg (has no administration in time range)   naloxone 0.4 mg/mL injection 0.02 mg (has no administration in time range)   enoxaparin injection 40 mg (has no administration in time range)   metoclopramide injection 5 mg (5 mg Intravenous Given 12/20/23 1711)   oxyCODONE immediate release tablet 5 mg (5 mg Oral Given 12/20/23 1709)     Medical Decision Making  78-year-old female with history of neurofibromatosis, dementia, chronic left shoulder pain presenting for generalized weakness.  Initially, patient is hemodynamically stable, well-appearing, at her neurologic baseline.      Most likely polypharmacy as causes of patient's generalized weakness due to increasing symptoms after switching medications to tramadol.  Will also considered UTI since patient's daughter is reporting foul-smelling urine.  Based on physical exam, patient with a recent fall and tenderness to her left shoulder, will obtain x-ray images to evaluate for fracture.  Additionally, patient is endorsing midline tenderness in the lumbar spine.  Patient had a recent fall and is unable to report whether she hit her head or lost consciousness due to her dementia, will obtain imaging of her head.  Will give Reglan for nausea and oxycodone for pain..    Workup is overall reassuring.  No significant electrolyte abnormalities, no evidence of thyroid disease, no anemia.  No evidence of fracture on imaging.  No acute intracranial pathology.  Attempted to ambulate patient to the bathroom, patient required a 2 person assist.  At her baseline, patient walks without any assistive devices.    Discussed with Hospital Medicine who agreed to admit patient to their service for undifferentiated generalized weakness.  Patient stable at time of transfer of care.    Amount and/or Complexity of Data Reviewed  Independent Historian: caregiver  External Data Reviewed: labs, radiology, ECG and notes.  Labs:  ordered.  Radiology: ordered. Decision-making details documented in ED Course.    Risk  Prescription drug management.               ED Course as of 12/21/23 0004   Wed Dec 20, 2023   1928 X-Ray Shoulder Trauma Left  No acute displaced fracture or dislocation of the left shoulder. [ES]   2359 CT Lumbar Spine Without Contrast  No acute fractures or traumatic malalignment of the lumbar spine     Lumbar spondylosis as detailed above, most pronounced at L5-S1 with moderate to severe neural foraminal narrowing.   [ES]   2359 CT Head Without Contrast  No acute intracranial abnormality identified, specifically no major vascular distribution infarct or acute hemorrhage.     Generalized cerebral volume loss with chronic microvascular ischemic changes.   [ES]   2359 Urinalysis Microscopic  No UTI [ES]   2359 TSH: 1.973 [ES]   2359 Comprehensive metabolic panel(!)  No significant electrolyte abnormality [ES]   2359 CBC auto differential(!)  No leukocytosis, anemia     [ES]      ED Course User Index  [ES] Demetria Hernadez MD                           Clinical Impression:  Final diagnoses:  [R53.1] Weakness  [W19.XXXA] Fall          ED Disposition Condition    Observation Stable                Demetria Hernadez MD  Resident  12/21/23 0004

## 2023-12-21 NOTE — ED NOTES
Telemetry Verification   Patient placed on Telemetry Box  Verified with War Room  Box #    Monitor Tech marvin   Rate 96   Rhythm nsr

## 2023-12-21 NOTE — ASSESSMENT & PLAN NOTE
77 yo F with PMHx of neurofibromatosis, dementia, HTN, GIST who presented for generalized weakness and acute decline in mobility over the past few days. She recently started tramadol earlier this week. At baseline, does not use assistive devices for ambulation. Requiring 2 person assist to ambulate to restroom in ED.    - Afebrile and without leukocytosis. Infectious workup negative thus far.   - CTH with chronic changes, but without acute process  - CT lumbar spine without acute fracture or dislocation  - concern for polypharmacy. hold tramadol and gabapentin  - PT/OT consulted for acute decline in mobility

## 2023-12-21 NOTE — PT/OT/SLP EVAL
"Occupational Therapy  Co-Evaluation with Physical Therapy    Name: Lesli Gong  MRN: [id number]  Admitting Diagnosis: Weakness  Recent Surgery: * No surgery found *      Recommendations:     Discharge Recommendations: Moderate Intensity Therapy  Discharge Equipment Recommendations:  to be determined by next level of care  Barriers to discharge:       Assessment:     Lesli Gong is a 78 y.o. female with a medical diagnosis of Weakness.  She presents with the following performance deficits affecting function: weakness, impaired endurance, impaired self care skills, impaired functional mobility, gait instability, decreased lower extremity function, impaired cognition, impaired balance, decreased safety awareness.  Pt willing to participate and cooperative throughout session. VC's needed for initiation and orientation to ADLs, functional mobility but able to perform appropriately with assistance.     Rehab Prognosis: Good; patient would benefit from acute skilled OT services to address these deficits and reach maximum level of function.       Plan:     Patient to be seen 4 x/week to address the above listed problems via self-care/home management, therapeutic activities, therapeutic exercises, neuromuscular re-education  Plan of Care Expires: 01/21/24  Plan of Care Reviewed with: patient    Subjective     Chief Complaint: none stated  Patient/Family Comments/goals: "Can I lay back down now?"    Occupational Profile:  Living Environment: pt poor historian; per chart review pt lives c/ son in [facility] c/ [address]  Previous level of function: indep per chart review  Roles and Routines: mother, retired  Equipment Used at Home: none  Assistance upon Discharge: son per chart review; family members not at bedside    Pain/Comfort:  Pain Rating 1: 0/10    Patients cultural, spiritual, Church conflicts given the current situation: no    Objective:     Communicated with: RN prior to session.  Patient found supine with PureWick, " telemetry upon OT entry to room.    General Precautions: Standard, fall  Orthopedic Precautions: N/A  Braces: N/A  Respiratory Status: Room air    Occupational Performance:    Bed Mobility:    Patient completed Rolling/Turning to Right with minimum assistance  Patient completed Supine to Sit with minimum assistance    Functional Mobility/Transfers:  Patient completed Sit <> Stand Transfer with contact guard assistance  with  rolling walker   Chair t/f: CGA c/ RW  Functional Mobility: from bed to chair Min A; mod vc's for initiation, walker management    Activities of Daily Living:  Grooming: stand by assistance pt washed face sitting in chair; no assistance or cueing  Upper Body Dressing: minimum assistance donned gown as robe sitting eob; assistance c/ bringing around back  Lower Body Dressing: maximal assistance doffed/donned socks sitting eob; assistance c/ doffing R sock, donning B socks  Toileting: pt not needing to have BM at this time      Cognitive/Visual Perceptual:  Cognitive/Psychosocial Skills:     -       Oriented to: Person and Place   -       Follows Commands/attention:Follows one-step commands  -       Safety awareness/insight to disability: impaired     Physical Exam:  Balance:    -       sitting balance eob: SPV ~ 8 min; no LOB  Dominant hand:    -       right  Upper Extremity Range of Motion:     -       Right Upper Extremity: WFL  -       Left Upper Extremity: decreased ROM in shoulder planes 2/2 to chronic pain  Upper Extremity Strength:    -       Right Upper Extremity: WFL; grossly 4/5  -       Left Upper Extremity: WFL; grossly 4/5   Strength: -       Right Upper Extremity: WFL  -       Left Upper Extremity: WFL  Fine Motor Coordination:    -       Intact  Left hand thumb/finger opposition skills and Right hand thumb/finger opposition skills    AMPAC 6 Click ADL:  AMPAC Total Score: 18    Treatment & Education:  Pt edu on role of OT, POC, safety when performing self care tasks , benefit  of performing OOB activity, and safety when performing functional transfers and mobility.  - Self care tasks completed-- as noted above      Patient left up in chair with all lines intact, call button in reach, and RN notified    Co-eval with PT to have 2 skilled therapists present to safely assess pt's functional mobility.       GOALS:   Multidisciplinary Problems       Occupational Therapy Goals          Problem: Occupational Therapy    Goal Priority Disciplines Outcome Interventions   Occupational Therapy Goal     OT, PT/OT Ongoing, Progressing    Description: Goals to be met by: 2023     Patient will increase functional independence with ADLs by performing:    UE Dressing with Set-up Assistance.  LE Dressing with Minimal Assistance.  Grooming while standing at sink with Contact Guard Assistance.  Toileting from toilet with Minimal Assistance for hygiene and clothing management.   Toilet transfer to toilet with Contact Guard Assistance.                         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, malignant 2015    History of hepatitis C, s/p successful Rx w/ SVR - 2018 3/17/2017    Completed zepatier + RBV w/ svr     History of psychiatric hospitalization     Hx of psychiatric care     Mass 10-10-14    excision of mass/left shoulder    Neurofibromatosis, type 1 (von Recklinghausen's disease) 2014    Pheochromocytoma     S/p resection in 's    Psychiatric problem     Soft tissue sarcoma of chest wall 2014    Therapy          Past Surgical History:   Procedure Laterality Date    APPENDECTOMY      BILATERAL SALPINGOOPHORECTOMY      BREAST BIOPSY Right     BREAST BIOPSY Left     BUNIONECTOMY Right      SECTION      COLONOSCOPY N/A 2018    Procedure: COLONOSCOPY;  Surgeon: Oscar Medley MD;  Location: [facility] ([address] [place] [address]);  Service: Endoscopy;   Laterality: N/A;    EXPLORATORY LAPAROTOMY W/ BOWEL RESECTION      HYSTERECTOMY N/A     pheochromocytoma excision N/A 1980's    TONSILLECTOMY Bilateral        Time Tracking:     OT Date of Treatment: 12/21/23  OT Start Time: 0934  OT Stop Time: 0954  OT Total Time (min): 20 min    Billable Minutes:Evaluation 10  Self Care/Home Management 10    12/21/2023

## 2023-12-21 NOTE — PLAN OF CARE
[facility] - Observation 11H  Initial Discharge Assessment       Primary Care Provider: Patti Brian MD    Admission Diagnosis: Weakness [R53.1]  Fall [W19.XXXA]  Chest pain [R07.9]    Admission Date: 12/20/2023  Expected Discharge Date: 12/21/2023         Payor: [employer] MEDICARE / Plan: [employer] H[employer] PPO SPECIAL NEEDS / Product Type: Medicare Advantage /     Extended Emergency Contact Information  Primary Emergency Contact: Gianna Lopez  Mobile Phone: [phone]  Relation: Daughter   needed? No    Discharge Plan A: (P) Home Health  Discharge Plan B: (P) Home with family      [id number] - [place], [place] - [address]  [address]  [place] [address]  Phone: [phone] Fax: [phone]    [id number] - [place] - [address]  [address] [facility] [address]  Phone: [phone] Fax: [phone]    [facility]  [address]  [facility] [address]  Phone: [phone] Fax: [phone]    [id number] [place] - [address] AT [facility] [address]  [address] [place] [address]  Phone: [phone] Fax: [phone]                completed Discharge Planning Assessment with patient and daughter, Gianna via bedside. Discharge planning booklet given to patient/family and whiteboard updated with PETER and phone #. All questions answered.    Patient may need assistance with transportation upon discharge.     Gianna reported that patient lives with her son, and prior to hospitalization patient needed assistance with her ADL's. Gianna reported that patient currently does not use any assistive devices; however, would benefit from a wheelchair and hospital bed. Gianna reported that patient is not on dialysis and does not go to a Coumadin clinic.      Gianna reported that patient would benefit from receiving home health services. Gianna also inquired about sitter  services. JAKI provided her with a list of agencies.    Patient lives in a [facility] with four steps to enter.     Discharge Plan A and Plan B have been determined by review of patient's clinical status, future medical and therapeutic needs, and coverage/benefits for post-acute care in coordination with multidisciplinary team members.

## 2023-12-21 NOTE — SUBJECTIVE & OBJECTIVE
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, malignant 2015    History of hepatitis C, s/p successful Rx w/ SVR - 2018 3/17/2017    Completed zepatier + RBV w/ svr     History of psychiatric hospitalization     Hx of psychiatric care     Mass 10-10-14    excision of mass/left shoulder    Neurofibromatosis, type 1 (von Recklinghausen's disease) 2014    Pheochromocytoma     S/p resection in     Psychiatric problem     Soft tissue sarcoma of chest wall 2014    Therapy        Past Surgical History:   Procedure Laterality Date    APPENDECTOMY      BILATERAL SALPINGOOPHORECTOMY      BREAST BIOPSY Right     BREAST BIOPSY Left     BUNIONECTOMY Right      SECTION      COLONOSCOPY N/A 2018    Procedure: COLONOSCOPY;  Surgeon: Oscar Medley MD;  Location: [address]);  Service: Endoscopy;  Laterality: N/A;    EXPLORATORY LAPAROTOMY W/ BOWEL RESECTION      HYSTERECTOMY N/A     pheochromocytoma excision N/A     TONSILLECTOMY Bilateral        Review of patient's allergies indicates:   Allergen Reactions    Anaprox [naproxen sodium] Nausea Only and Palpitations    Motrin [ibuprofen] Nausea Only and Palpitations    Neomycin-polymyxin-hc      Itchy (skin)^    Sulfa (sulfonamide antibiotics)      Hives (skin)^       No current facility-administered medications on file prior to encounter.     Current Outpatient Medications on File Prior to Encounter   Medication Sig    busPIRone (BUSPAR) 10 MG tablet Take 1 tablet (10 mg total) by mouth 2 (two) times daily.    busPIRone (BUSPAR) 5 MG Tab TAKE 1 TO 2 TABLETS (5-10 MG TOTAL) BY MOUTH 2 (TWO) TIMES DAILY AS NEEDED (ANXIETY).    calcium-vitamin D (OSCAL) 250 (625)-125 mg-unit per tablet Take 1 tablet by mouth once daily.    cane Amanda For use with walking    cetirizine (ZYRTEC) 10 MG tablet Take 1 tablet (10 mg total) by mouth once  daily.    citalopram (CELEXA) 10 MG tablet Take 1 tablet (10 mg total) by mouth once daily.    cyanocobalamin, vitamin B-12, 50 mcg tablet Take 50 mcg by mouth once daily.    diclofenac sodium (VOLTAREN) 1 % Gel APPLY 2 G TOPICALLY 2 (TWO) TIMES DAILY AS NEEDED. TO RIGHT ARM/ ELBOW.    gabapentin (NEURONTIN) 100 MG capsule Take 1 capsule (100 mg total) by mouth 2 (two) times daily.    LIDOcaine (LIDODERM) 5 % Place 1 patch onto the skin once daily. Remove & Discard patch within 12 hours or as directed by MD    lisinopriL 10 MG tablet Take 2 tablets (20 mg total) by mouth once daily.    multivitamin capsule Take 1 capsule by mouth once daily.    omeprazole (PRILOSEC) 20 MG capsule TAKE 1 CAPSULE (20 MG TOTAL) BY MOUTH ONCE DAILY. 90 DAY COURSE, WILL RESTART    oxybutynin (DITROPAN-XL) 5 MG TR24 Take 1 tablet (5 mg total) by mouth once daily.    traMADoL (ULTRAM) 50 mg tablet Take 1 tablet (50 mg total) by mouth every 12 (twelve) hours as needed for Pain (pain).     Family History       Problem Relation (Age of Onset)    Breast cancer Mother    Cancer Sister    Neurofibromatosis Father, Sister          Tobacco Use    Smoking status: Never    Smokeless tobacco: Never   Substance and Sexual Activity    Alcohol use: No    Drug use: No    Sexual activity: Not Currently     Review of Systems   Reason unable to perform ROS: dementia.     Objective:     Vital Signs (Most Recent):  Temp: 98.2 °F (36.8 °C) (12/21/23 0014)  Pulse: 93 (12/21/23 0014)  Resp: 20 (12/21/23 0014)  BP: 103/69 (12/21/23 0014)  SpO2: (!) 94 % (12/21/23 0014) Vital Signs (24h Range):  Temp:  [98.2 °F (36.8 °C)-98.5 °F (36.9 °C)] 98.2 °F (36.8 °C)  Pulse:  [] 93  Resp:  [15-20] 20  SpO2:  [94 %-99 %] 94 %  BP: (103-133)/(62-86) 103/69     Weight: 83.9 kg (184 lb 15.5 oz)  Body mass index is 31.75 kg/m².     Physical Exam  Vitals and nursing note reviewed.   Constitutional:       General: She is not in acute distress.     Appearance: She is  well-developed.   HENT:      Head: Normocephalic and atraumatic.      Mouth/Throat:      Pharynx: No oropharyngeal exudate.   Eyes:      Conjunctiva/sclera: Conjunctivae normal.      Pupils: Pupils are equal, round, and reactive to light.   Cardiovascular:      Rate and Rhythm: Normal rate and regular rhythm.      Heart sounds: Normal heart sounds.   Pulmonary:      Effort: Pulmonary effort is normal. No respiratory distress.      Breath sounds: Normal breath sounds. No wheezing.   Abdominal:      General: Bowel sounds are normal. There is no distension.      Palpations: Abdomen is soft.      Tenderness: There is no abdominal tenderness.   Musculoskeletal:         General: No tenderness. Normal range of motion.      Cervical back: Normal range of motion and neck supple.      Right lower leg: No edema.      Left lower leg: No edema.   Lymphadenopathy:      Cervical: No cervical adenopathy.   Skin:     General: Skin is warm and dry.      Capillary Refill: Capillary refill takes less than 2 seconds.      Findings: No rash.      Comments: Neurofibromatosis. Well healed surgical scar to L posterior shoulder.    Neurological:      General: No focal deficit present.      Mental Status: She is alert. She is disoriented.      Cranial Nerves: No cranial nerve deficit.      Sensory: No sensory deficit.      Motor: Weakness (generalized) present.      Coordination: Coordination normal.      Comments: Pleasantly confused. 5/5 strength to extremities, but needed much assistance to sit up in bed during exam. Follows commands appropriately.               CRANIAL NERVES     CN III, IV, VI   Pupils are equal, round, and reactive to light.       Significant Labs: All pertinent labs within the past 24 hours have been reviewed.  CBC:   Recent Labs   Lab 12/20/23  1706   WBC 8.26   HGB 16.3*   HCT 46.4        CMP:   Recent Labs   Lab 12/20/23  1706      K 3.7      CO2 22*      BUN 18   CREATININE 0.8   CALCIUM  9.6   PROT 8.2   ALBUMIN 4.1   BILITOT 0.9   ALKPHOS 103   AST 57*   ALT 24   ANIONGAP 13     Urine Studies:   Recent Labs   Lab 12/20/23 2035   COLORU Yellow   APPEARANCEUA Clear   PHUR 6.0   SPECGRAV 1.030   PROTEINUA 1+*   GLUCUA Negative   KETONESU Trace*   BILIRUBINUA Negative   OCCULTUA Negative   NITRITE Negative   LEUKOCYTESUR Negative   RBCUA 0   WBCUA 1   BACTERIA Rare   SQUAMEPITHEL 1   HYALINECASTS 0       Significant Imaging: I have reviewed all pertinent imaging results/findings within the past 24 hours.  Imaging Results              CT Lumbar Spine Without Contrast (Final result)  Result time 12/20/23 20:40:43      Final result by Ramakrishna Best MD (12/20/23 20:40:43)                   Impression:      No acute fractures or traumatic malalignment of the lumbar spine    Lumbar spondylosis as detailed above, most pronounced at L5-S1 with moderate to severe neural foraminal narrowing.    Stable L1 vertebral body hemangioma.    Additional findings as above.    Electronically signed by resident: Iliana Post  Date:    12/20/2023  Time:    20:12    Electronically signed by: Ramakrishna Best MD  Date:    12/20/2023  Time:    20:40               Narrative:    EXAMINATION:  CT LUMBAR SPINE WITHOUT CONTRAST    CLINICAL HISTORY:  Low back pain, trauma;    TECHNIQUE:  Low-dose axial, sagittal and coronal reformations are obtained through the lumbar spine.  Contrast was not administered.    COMPARISON:  CT 02/27/2020, 08/23/2019; x-ray 12/04/2017    FINDINGS:  Alignment: Normal lumbar lordosis.  No spondylolisthesis or spondylolysis.    Vertebrae: Diffuse osteopenia.  Vertebral body heights are maintained.  No acute fractures.  L1 vertebral body hemangioma extending into the posterior elements, grossly stable when compared to prior CT 2020.    Discs: Disc height loss at L5-S1 with associated vacuum phenomenon.  Prominent Schmorl's node at the inferior endplate of L5, new when compared to prior CT 2020.    Sacroiliac  joints: Bilateral degenerative changes.    Degenerative findings:    T12-L1: Bilateral facet arthropathy.  Mild-to-moderate bilateral neural foraminal narrowing.  No spinal canal stenosis.    L1-L2: Bilateral facet arthropathy.  Mild bilateral neural foraminal narrowing.  No spinal canal stenosis.    L2-L3: Circumferential disc bulge and bilateral facet arthropathy.  Mild bilateral neural foraminal narrowing.  No spinal canal stenosis.    L3-L4: Circumferential disc bulge, ligamentum flavum hypertrophy, and bilateral facet arthropathy.  Mild-to-moderate bilateral neural foraminal narrowing.  Mild spinal canal stenosis.    L4-L5: Circumferential disc bulge, ligamentum flavum hypertrophy, and bilateral facet arthropathy.  Mild bilateral neural foraminal narrowing.  Mild spinal canal stenosis.    L5-S1: Circumferential disc bulge, ligamentum flavum hypertrophy, and bilateral facet arthropathy.  Moderate to severe neural foraminal narrowing.  No spinal canal stenosis.    Paraspinal muscles & soft tissues: Surgical clips throughout the upper abdomen.  Partially visualized postsurgical changes of small-bowel resection.                                       CT Head Without Contrast (Final result)  Result time 12/20/23 20:19:42      Final result by Ramakrishna Best MD (12/20/23 20:19:42)                   Impression:      No acute intracranial abnormality identified, specifically no major vascular distribution infarct or acute hemorrhage.    Generalized cerebral volume loss with chronic microvascular ischemic changes.    Additional findings as above.    Electronically signed by resident: Iliana Post  Date:    12/20/2023  Time:    19:50    Electronically signed by: Ramakrishna Best MD  Date:    12/20/2023  Time:    20:19               Narrative:    EXAMINATION:  CT HEAD WITHOUT CONTRAST    CLINICAL HISTORY:  Head trauma, minor (Age >= 65y);Fall with dementia;    TECHNIQUE:  Low dose axial CT images obtained throughout the head  without intravenous contrast. Sagittal and coronal reconstructions were performed.    COMPARISON:  CT 05/29/2022; MRI 03/12/2022.    FINDINGS:  Mild generalized cerebral volume loss with compensatory enlargement the ventricles and sulci.  Findings similar when compared to prior CT.  No hydrocephalus.    Mild patchy hypoattenuation in the supratentorial white matter, nonspecific but most likely reflecting chronic small vessel ischemic changes. No parenchymal mass effect, hemorrhage, edema or major vascular distribution infarct.No extra-axial blood or fluid collections.    No displaced calvarial fracture.  Mastoid air cells and paranasal sinuses are essentially clear.                                       X-Ray Shoulder Trauma Left (Final result)  Result time 12/20/23 18:24:48      Final result by Darci Villasenor MD (12/20/23 18:24:48)                   Impression:      1. No acute displaced fracture or dislocation of the left shoulder.      Electronically signed by: Darci Villasenor MD  Date:    12/20/2023  Time:    18:24               Narrative:    EXAMINATION:  XR SHOULDER TRAUMA 3 VIEW LEFT    CLINICAL HISTORY:  Unspecified fall, initial encounter    TECHNIQUE:  Three views of the left shoulder were performed.    COMPARISON  08/15/2019    FINDINGS:  Three views left shoulder.    There is osteopenia.  The left humeral head maintains appropriate relationship with the glenoid.  The acromioclavicular joint is intact.  No acute displaced left rib fracture.  The left lung zones are clear noting elevation of the left hemidiaphragm.

## 2023-12-21 NOTE — HPI
"Lesli Gong is a 77 yo F with PMHx of neurofibromatosis, dementia, HTN, GIST who presented for generalized weakness. Patient pleasantly confused; only oriented to self. Family not at bedside to assist with history on my interview. Per ED note: "States that her medication was switched to tramadol approximately 6 days ago.  Since then she has had an acute decline in her mobility.  She was able to walk without any assistive devices but now needs to person support to ambulate.  Patient had a fall but patient and daughter unsure if she hit her head.  He is patient's daughter complains of foul-smelling urine."  Patient denies any complaints on my interview.     In ED: Afebrile. HDS. No leukocytosis. Hgb 16.3. CMP grossly unremarkable except AST 57. TSH wnl. UA noninfectious. L shoulder XR without acute fracture or dislocation. CT lumbar spine No acute fractures or traumatic malalignment of the lumbar spine, Lumbar spondylosis as detailed above, most pronounced at L5-S1 with moderate to severe neural foraminal narrowing, and stable L1 vertebral body hemangioma. CT head with generalized cerebral volume loss and chronic microvascular ischemic changes, but without acute process. Given IV reglan 5 mg and oxy 5 mg. Per notes, "Attempted to ambulate patient to the bathroom, patient required a 2 person assist.  At her baseline, patient walks without any assistive devices."  Placed in observation.   "

## 2023-12-22 LAB
ALBUMIN SERPL BCP-MCNC: 3.8 G/DL (ref 3.5–5.2)
ALP SERPL-CCNC: 93 U/L (ref 55–135)
ALT SERPL W/O P-5'-P-CCNC: 23 U/L (ref 10–44)
ANION GAP SERPL CALC-SCNC: 11 MMOL/L (ref 8–16)
AST SERPL-CCNC: 34 U/L (ref 10–40)
BILIRUB SERPL-MCNC: 0.7 MG/DL (ref 0.1–1)
BUN SERPL-MCNC: 17 MG/DL (ref 8–23)
CALCIUM SERPL-MCNC: 9.6 MG/DL (ref 8.7–10.5)
CHLORIDE SERPL-SCNC: 101 MMOL/L (ref 95–110)
CO2 SERPL-SCNC: 23 MMOL/L (ref 23–29)
CREAT SERPL-MCNC: 0.8 MG/DL (ref 0.5–1.4)
ERYTHROCYTE [DISTWIDTH] IN BLOOD BY AUTOMATED COUNT: 14.5 % (ref 11.5–14.5)
EST. GFR  (NO RACE VARIABLE): >60 ML/MIN/1.73 M^2
GLUCOSE SERPL-MCNC: 103 MG/DL (ref 70–110)
HCT VFR BLD AUTO: 42.4 % (ref 37–48.5)
HGB BLD-MCNC: 14.7 G/DL (ref 12–16)
MAGNESIUM SERPL-MCNC: 2 MG/DL (ref 1.6–2.6)
MCH RBC QN AUTO: 29.8 PG (ref 27–31)
MCHC RBC AUTO-ENTMCNC: 34.7 G/DL (ref 32–36)
MCV RBC AUTO: 86 FL (ref 82–98)
PLATELET # BLD AUTO: 218 K/UL (ref 150–450)
PMV BLD AUTO: 10.5 FL (ref 9.2–12.9)
POTASSIUM SERPL-SCNC: 3.2 MMOL/L (ref 3.5–5.1)
PROT SERPL-MCNC: 7 G/DL (ref 6–8.4)
RBC # BLD AUTO: 4.93 M/UL (ref 4–5.4)
SARS-COV-2 RNA RESP QL NAA+PROBE: NOT DETECTED
SODIUM SERPL-SCNC: 135 MMOL/L (ref 136–145)
WBC # BLD AUTO: 6.74 K/UL (ref 3.9–12.7)

## 2023-12-22 PROCEDURE — 87635 SARS-COV-2 COVID-19 AMP PRB: CPT | Mod: HCNC

## 2023-12-22 PROCEDURE — 86580 TB INTRADERMAL TEST: CPT | Mod: HCNC

## 2023-12-22 PROCEDURE — 80053 COMPREHEN METABOLIC PANEL: CPT | Mod: HCNC

## 2023-12-22 PROCEDURE — 36415 COLL VENOUS BLD VENIPUNCTURE: CPT | Mod: HCNC

## 2023-12-22 PROCEDURE — 97530 THERAPEUTIC ACTIVITIES: CPT | Mod: HCNC

## 2023-12-22 PROCEDURE — 85027 COMPLETE CBC AUTOMATED: CPT | Mod: HCNC

## 2023-12-22 PROCEDURE — 96372 THER/PROPH/DIAG INJ SC/IM: CPT | Mod: 59

## 2023-12-22 PROCEDURE — 25000003 PHARM REV CODE 250: Mod: HCNC

## 2023-12-22 PROCEDURE — 97116 GAIT TRAINING THERAPY: CPT | Mod: HCNC

## 2023-12-22 PROCEDURE — G0378 HOSPITAL OBSERVATION PER HR: HCPCS | Mod: HCNC

## 2023-12-22 PROCEDURE — 30200315 PPD INTRADERMAL TEST REV CODE 302: Mod: HCNC

## 2023-12-22 PROCEDURE — 25000003 PHARM REV CODE 250: Mod: HCNC | Performed by: NURSE PRACTITIONER

## 2023-12-22 PROCEDURE — 63600175 PHARM REV CODE 636 W HCPCS: Mod: HCNC

## 2023-12-22 PROCEDURE — 83735 ASSAY OF MAGNESIUM: CPT | Mod: HCNC

## 2023-12-22 RX ORDER — POTASSIUM CHLORIDE 20 MEQ/1
40 TABLET, EXTENDED RELEASE ORAL
Status: COMPLETED | OUTPATIENT
Start: 2023-12-22 | End: 2023-12-22

## 2023-12-22 RX ORDER — METHOCARBAMOL 500 MG/1
500 TABLET, FILM COATED ORAL ONCE
Status: COMPLETED | OUTPATIENT
Start: 2023-12-22 | End: 2023-12-22

## 2023-12-22 RX ORDER — ACETAMINOPHEN AND CODEINE PHOSPHATE 300; 30 MG/1; MG/1
1 TABLET ORAL ONCE
Status: COMPLETED | OUTPATIENT
Start: 2023-12-22 | End: 2023-12-22

## 2023-12-22 RX ADMIN — CETIRIZINE HYDROCHLORIDE 10 MG: 10 TABLET, FILM COATED ORAL at 08:12

## 2023-12-22 RX ADMIN — Medication 1 TABLET: at 08:12

## 2023-12-22 RX ADMIN — POTASSIUM CHLORIDE 40 MEQ: 1500 TABLET, EXTENDED RELEASE ORAL at 01:12

## 2023-12-22 RX ADMIN — ACETAMINOPHEN AND CODEINE PHOSPHATE 1 TABLET: 300; 30 TABLET ORAL at 10:12

## 2023-12-22 RX ADMIN — OXYBUTYNIN CHLORIDE 5 MG: 5 TABLET, EXTENDED RELEASE ORAL at 08:12

## 2023-12-22 RX ADMIN — CITALOPRAM HYDROBROMIDE 10 MG: 10 TABLET ORAL at 08:12

## 2023-12-22 RX ADMIN — Medication 250 MCG: at 08:12

## 2023-12-22 RX ADMIN — PANTOPRAZOLE SODIUM 40 MG: 40 TABLET, DELAYED RELEASE ORAL at 08:12

## 2023-12-22 RX ADMIN — ENOXAPARIN SODIUM 40 MG: 40 INJECTION SUBCUTANEOUS at 04:12

## 2023-12-22 RX ADMIN — LISINOPRIL 20 MG: 20 TABLET ORAL at 08:12

## 2023-12-22 RX ADMIN — POTASSIUM CHLORIDE 40 MEQ: 1500 TABLET, EXTENDED RELEASE ORAL at 04:12

## 2023-12-22 RX ADMIN — Medication 6 MG: at 08:12

## 2023-12-22 RX ADMIN — POTASSIUM CHLORIDE 40 MEQ: 1500 TABLET, EXTENDED RELEASE ORAL at 11:12

## 2023-12-22 RX ADMIN — GABAPENTIN 100 MG: 100 CAPSULE ORAL at 08:12

## 2023-12-22 RX ADMIN — THERA TABS 1 TABLET: TAB at 08:12

## 2023-12-22 RX ADMIN — METHOCARBAMOL 500 MG: 500 TABLET ORAL at 08:12

## 2023-12-22 RX ADMIN — TUBERCULIN PURIFIED PROTEIN DERIVATIVE 5 UNITS: 5 INJECTION, SOLUTION INTRADERMAL at 04:12

## 2023-12-22 RX ADMIN — BUSPIRONE HYDROCHLORIDE 10 MG: 10 TABLET ORAL at 08:12

## 2023-12-22 RX ADMIN — LIDOCAINE 1 PATCH: 50 PATCH CUTANEOUS at 04:12

## 2023-12-22 NOTE — PT/OT/SLP PROGRESS
Physical Therapy Treatment    Patient Name:  Lesli Gong   MRN:  [id number]  Admitting Diagnosis:  Weakness   Recent Surgery: * No surgery found *    Admit Date: 12/20/2023  Length of Stay: 0 days    Recommendations:     Discharge Recommendations:    Moderate Intensity Therapy   Discharge Equipment Recommendations: wheelchair, bedside commode, walker, rolling   Barriers to discharge: Decreased caregiver support and increased need for assistance    Appropriate transfer level with nursing staff: Safe to transfer to bedside chair/bedside commode: stand pivot with 1 person with RW.    Plan:     During this hospitalization, patient to be seen 4 x/week to address the identified rehab impairments via gait training, therapeutic activities, therapeutic exercises, neuromuscular re-education and progress towards the established goals.  Plan of Care Expires:  01/21/24  Plan of Care Reviewed with: patient    Assessment:     Lesli Gong is a 78 y.o. female admitted with a medical diagnosis of Weakness. Pt agreeable to therapy session. Pt oriented x2 during session and pleasantly confused asking therapist repetitive and non relevant questions throughout. Patient required decreased assistance with bed mobility and gait this session and pt increased ambulation distance. Pt still limited by decreased endurance, weakness, deconditioning and poor safety awareness resulting in CGA for most mobility for safety. Patient would benefit from skilled therapy services to maximize safety and independence, increase activity tolerance, decrease fall risk, decrease caregiver burden, improve QOL, improve patient's functional mobility, and decrease risk of contractures and pressure sores.     Patient has a mobility limitation that significantly impairs their ability to participate in one or more mobility related activities of daily living in customary locations in the home. The mobility limitation cannot be sufficiently resolved by the use  of a cane or walker. The use of a manual wheelchair will greatly improve the patient's ability to participate in MRADLs. The patient will use the wheelchair on a regular basis at home. They have expressed their willingness to use a manual wheelchair in the home, and have a caregiver who is available and willing to assist with the wheelchair if needed.    Patient has a mobility limitation that significantly impairs their ability to participate in one or more mobility related activities of daily living, including toileting. This deficit can be resolved by using a bedside commode. Patient demonstrates mobility limitations that will cause them to be confined to one room at home, and a bathroom will not be accessible. Using a bedside commode will greatly improve the patient's ability to participate in MRADLs.    Problem List: weakness, impaired endurance, impaired self care skills, impaired functional mobility, gait instability, impaired balance, impaired cognition, decreased lower extremity function, decreased upper extremity function, decreased safety awareness.  Rehab Prognosis: Good; patient would benefit from acute skilled PT services to address these deficits and reach maximum level of function.      Goals:   Multidisciplinary Problems       Physical Therapy Goals          Problem: Physical Therapy    Goal Priority Disciplines Outcome Goal Variances Interventions   Physical Therapy Goal     PT, PT/OT Ongoing, Progressing     Description: Goals to be met by: 24     Patient will increase functional independence with mobility by performin. Supine to sit with Modified [place]  2. Sit to supine with Modified [place]  3. Sit to stand transfer with Supervision  4. Bed to chair transfer with Supervision using LRAD  5. Gait  x 100 feet with Supervision using LRAD.                          Subjective     RN notified prior to session. No one present upon PT entrance into room. Patient agreeable to PT  "treatment session.    Chief Complaint: "What did you have for dinner last night?"  "Do you eat here?"  Patient/Family Comments/goals: none stated  Pain/Comfort:  Pain Rating 1: 0/10  Pain Rating Post-Intervention 1: 0/10      Objective:       Patient found HOB elevated with: telemetry   Cognition:   Alert, Cooperative, and Confused  Patient is oriented to Person, Place  General Precautions: Standard, Cardiac fall   Orthopedic Precautions:N/A   Braces: N/A   Body mass index is 33.87 kg/m².  Oxygen Device: Room Air  Vitals: /72 (Patient Position: Sitting)   Pulse 82   Temp 97.9 °F (36.6 °C) (Oral)   Resp 20   Ht 5' 4" (1.626 m)   Wt 89.5 kg (197 lb 5 oz)   SpO2 95%   BMI 33.87 kg/m²     Outcome Measures:  AM-PAC 6 CLICK MOBILITY  Turning over in bed (including adjusting bedclothes, sheets and blankets)?: 3  Sitting down on and standing up from a chair with arms (e.g., wheelchair, bedside commode, etc.): 3  Moving from lying on back to sitting on the side of the bed?: 3  Moving to and from a bed to a chair (including a wheelchair)?: 3  Need to walk in hospital room?: 3  Climbing 3-5 steps with a railing?: 1  Basic Mobility Total Score: 16     Functional Mobility:    Bed Mobility:   Scooting with stand by assistance  Supine > Sit with stand by assistance (from HOB elevated)    Transfers:   Sit <> Stand Transfer: minimum assistance with rolling walker x1 trial from EOB, CGA with RW x1 trial from BSC and x1 trial from chair   Bed <> BSC Transfer: Step Transfer technique with contact guard assistance with rolling walker  BSC <> Chair Transfer: Step Transfer technique with contact guard assistance with rolling walker    Balance:  Sitting Balance  Static: stand by assistance  Dynamic: stand by assistance  Standing:  Static: contact guard assistance  Time: ~20 seconds  Comments: Pt stood with PT providing CGA while pt completed pericare.   Dynamic: contact guard assistance      Gait:  Patient ambulated: 6 steps " for step transfers x2 trials; 6' fwd/back (seated rest break between all trials)   Patient required: contact guard  Patient used:  rolling walker   Gait Deviation(s): occasional unsteady gait, decreased step length, flexed posture, and decreased flornece  all lines remained intact throughout ambulation trial  Chair follow for patient safety  Gait belt utilized  Comments: Patient  required VC/TC for RW management. Pt with increased fwd lean onto RW.       Education:  Time provided for education, counseling and discussion of health disposition in regards to patient's current status  All questions answered within PT scope of practice and to patient's satisfaction  PT role in POC to address current functional deficits  Pt educated on proper body mechanics, safety techniques, and energy conservation with PT facilitation and cueing throughout session  Call nursing/pct to transfer to chair/use bathroom. Pt stated understanding.    Patient left up in chair with legs elevated and tray table in front with all lines intact, call button in reach, and RN notified. Chair alarm not working so RN notified directly after session pt in chair.     Time Tracking:     PT Received On: 12/22/23  PT Start Time: 0949     PT Stop Time: 1013  PT Total Time (min): 24 min     Billable Minutes:   Gait Training 12 minutes and Therapeutic Activity 12 minutes    Treatment Type: Treatment  PT/PTA: PT       12/22/2023

## 2023-12-22 NOTE — SUBJECTIVE & OBJECTIVE
Interval History: Patient seen and assessed at bedside. Afebrile, VSS. Patient without complaints this morning. Had discussion with patient's son about possible rehab facility following discharge. He states he does not feel comfortable with her being sent home with HH due to the fact he works and no one can be there with her all the time. Spoke with case management who sent referrals.       Review of Systems   Reason unable to perform ROS: dementia.     Objective:     Vital Signs (Most Recent):  Temp: 98 °F (36.7 °C) (12/22/23 1515)  Pulse: 92 (12/22/23 1515)  Resp: 18 (12/22/23 1515)  BP: 117/76 (12/22/23 1515)  SpO2: 99 % (12/22/23 1515) Vital Signs (24h Range):  Temp:  [97.9 °F (36.6 °C)-98.3 °F (36.8 °C)] 98 °F (36.7 °C)  Pulse:  [77-95] 92  Resp:  [18-20] 18  SpO2:  [93 %-99 %] 99 %  BP: (108-136)/(66-76) 117/76     Weight: 89.5 kg (197 lb 5 oz)  Body mass index is 33.87 kg/m².    Intake/Output Summary (Last 24 hours) at 12/22/2023 1702  Last data filed at 12/22/2023 0548  Gross per 24 hour   Intake 240 ml   Output --   Net 240 ml         Physical Exam  Vitals and nursing note reviewed.   Constitutional:       General: She is not in acute distress.     Appearance: She is well-developed.   HENT:      Head: Normocephalic and atraumatic.      Mouth/Throat:      Pharynx: No oropharyngeal exudate.   Eyes:      Conjunctiva/sclera: Conjunctivae normal.      Pupils: Pupils are equal, round, and reactive to light.   Cardiovascular:      Rate and Rhythm: Normal rate and regular rhythm.      Heart sounds: Normal heart sounds.   Pulmonary:      Effort: Pulmonary effort is normal. No respiratory distress.      Breath sounds: Normal breath sounds. No wheezing.   Abdominal:      General: Bowel sounds are normal. There is no distension.      Palpations: Abdomen is soft.      Tenderness: There is no abdominal tenderness.   Musculoskeletal:         General: No tenderness. Normal range of motion.      Cervical back: Normal range  of motion and neck supple.      Right lower leg: No edema.      Left lower leg: No edema.   Lymphadenopathy:      Cervical: No cervical adenopathy.   Skin:     General: Skin is warm and dry.      Capillary Refill: Capillary refill takes less than 2 seconds.      Findings: No rash.      Comments: Neurofibromatosis. Well healed surgical scar to L posterior shoulder.    Neurological:      General: No focal deficit present.      Mental Status: She is alert.      Cranial Nerves: No cranial nerve deficit.      Sensory: No sensory deficit.      Motor: Weakness (generalized) present.      Coordination: Coordination normal.      Comments: Pleasantly confused - oriented to person only. 5/5 strength to extremities, but needed much assistance to sit up in bed during exam. Follows commands appropriately.              Significant Labs: All pertinent labs within the past 24 hours have been reviewed.    Significant Imaging: I have reviewed all pertinent imaging results/findings within the past 24 hours.

## 2023-12-22 NOTE — PROGRESS NOTES
"[facility] [address] [place] [address] Medicine  Progress Note    Patient Name: Lesli Gong  MRN: [id number]  Patient Class: OP- Observation   Admission Date: 12/20/2023  Length of Stay: 0 days  Attending Physician: Carol Townsend MD  Primary Care Provider: Ptati Brian MD        Subjective:     Principal Problem:Weakness        HPI:  Lesli Gong is a 79 yo F with PMHx of neurofibromatosis, dementia, HTN, GIST who presented for generalized weakness. Patient pleasantly confused; only oriented to self. Family not at bedside to assist with history on my interview. Per ED note: "States that her medication was switched to tramadol approximately 6 days ago.  Since then she has had an acute decline in her mobility.  She was able to walk without any assistive devices but now needs to person support to ambulate.  Patient had a fall but patient and daughter unsure if she hit her head.  He is patient's daughter complains of foul-smelling urine."  Patient denies any complaints on my interview.     In ED: Afebrile. HDS. No leukocytosis. Hgb 16.3. CMP grossly unremarkable except AST 57. TSH wnl. UA noninfectious. L shoulder XR without acute fracture or dislocation. CT lumbar spine No acute fractures or traumatic malalignment of the lumbar spine, Lumbar spondylosis as detailed above, most pronounced at L5-S1 with moderate to severe neural foraminal narrowing, and stable L1 vertebral body hemangioma. CT head with generalized cerebral volume loss and chronic microvascular ischemic changes, but without acute process. Given IV reglan 5 mg and oxy 5 mg. Per notes, "Attempted to ambulate patient to the bathroom, patient required a 2 person assist.  At her baseline, patient walks without any assistive devices."  Placed in observation.     Overview/Hospital Course:  Lesli Gong is a 78 y.o. F who was placed in observation for further evaluation of weakness. Infectious workup negative so far. Concern for polypharmacy " with tramadol and gabapentin. Holding both for now. PT/OT ordered for further evaluation. PT with recommendations of low intensity. OT with recommendations of moderate intensity. Sending referrals for possible SNF placement.     Interval History: Patient seen and assessed at bedside. Afebrile, VSS. Patient without complaints this morning. Had discussion with patient's son about possible rehab facility following discharge. He states he does not feel comfortable with her being sent home with HH due to the fact he works and no one can be there with her all the time. Spoke with case management who sent referrals.       Review of Systems   Reason unable to perform ROS: dementia.     Objective:     Vital Signs (Most Recent):  Temp: 98 °F (36.7 °C) (12/22/23 1515)  Pulse: 92 (12/22/23 1515)  Resp: 18 (12/22/23 1515)  BP: 117/76 (12/22/23 1515)  SpO2: 99 % (12/22/23 1515) Vital Signs (24h Range):  Temp:  [97.9 °F (36.6 °C)-98.3 °F (36.8 °C)] 98 °F (36.7 °C)  Pulse:  [77-95] 92  Resp:  [18-20] 18  SpO2:  [93 %-99 %] 99 %  BP: (108-136)/(66-76) 117/76     Weight: 89.5 kg (197 lb 5 oz)  Body mass index is 33.87 kg/m².    Intake/Output Summary (Last 24 hours) at 12/22/2023 1702  Last data filed at 12/22/2023 0548  Gross per 24 hour   Intake 240 ml   Output --   Net 240 ml         Physical Exam  Vitals and nursing note reviewed.   Constitutional:       General: She is not in acute distress.     Appearance: She is well-developed.   HENT:      Head: Normocephalic and atraumatic.      Mouth/Throat:      Pharynx: No oropharyngeal exudate.   Eyes:      Conjunctiva/sclera: Conjunctivae normal.      Pupils: Pupils are equal, round, and reactive to light.   Cardiovascular:      Rate and Rhythm: Normal rate and regular rhythm.      Heart sounds: Normal heart sounds.   Pulmonary:      Effort: Pulmonary effort is normal. No respiratory distress.      Breath sounds: Normal breath sounds. No wheezing.   Abdominal:      General: Bowel sounds  are normal. There is no distension.      Palpations: Abdomen is soft.      Tenderness: There is no abdominal tenderness.   Musculoskeletal:         General: No tenderness. Normal range of motion.      Cervical back: Normal range of motion and neck supple.      Right lower leg: No edema.      Left lower leg: No edema.   Lymphadenopathy:      Cervical: No cervical adenopathy.   Skin:     General: Skin is warm and dry.      Capillary Refill: Capillary refill takes less than 2 seconds.      Findings: No rash.      Comments: Neurofibromatosis. Well healed surgical scar to L posterior shoulder.    Neurological:      General: No focal deficit present.      Mental Status: She is alert.      Cranial Nerves: No cranial nerve deficit.      Sensory: No sensory deficit.      Motor: Weakness (generalized) present.      Coordination: Coordination normal.      Comments: Pleasantly confused - oriented to person only. 5/5 strength to extremities, but needed much assistance to sit up in bed during exam. Follows commands appropriately.              Significant Labs: All pertinent labs within the past 24 hours have been reviewed.    Significant Imaging: I have reviewed all pertinent imaging results/findings within the past 24 hours.    Assessment/Plan:      * Weakness  79 yo F with PMHx of neurofibromatosis, dementia, HTN, GIST who presented for generalized weakness and acute decline in mobility over the past few days. She recently started tramadol earlier this week. At baseline, does not use assistive devices for ambulation. Requiring 2 person assist to ambulate to restroom in ED.    - Afebrile and without leukocytosis. Infectious workup negative thus far.   - CTH with chronic changes, but without acute process  - CT lumbar spine without acute fracture or dislocation  - concern for polypharmacy. hold tramadol and gabapentin  - PT/OT consulted for acute decline in mobility   -PT with recommendations for low intensity    -OT with  recommendations for moderate intensity   - continue to monitor closely    Obesity (BMI 30.0-34.9)  Body mass index is 33.87 kg/m². Morbid obesity complicates all aspects of disease management from diagnostic modalities to treatment. Weight loss encouraged and health benefits explained to patient.     Adjustment disorder with anxiety  Depression  - continue buspar and celexa    History of gastrointestinal stromal tumor (GIST)  S/p resection  - Per chart review, she has not followed up with heme/onc since 2020. Needs f/u on discharge    Essential hypertension  - normotensive on admit  - continue lisinopril 20 mg daily    Neurofibromatosis, type 1  - noted      VTE Risk Mitigation (From admission, onward)           Ordered     enoxaparin injection 40 mg  Daily         12/20/23 2318     IP VTE HIGH RISK PATIENT  Once         12/20/23 2318                    Discharge Planning   PETER: 12/26/2023     Code Status: Full Code   Is the patient medically ready for discharge?: No    Reason for patient still in hospital (select all that apply): Patient trending condition, PT / OT recommendations, and Pending disposition  Discharge Plan A: Home Health                  Ana María Resendiz PA-C  Department of Hospital Medicine   [facility]
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
## 2023-12-22 NOTE — ASSESSMENT & PLAN NOTE
79 yo F with PMHx of neurofibromatosis, dementia, HTN, GIST who presented for generalized weakness and acute decline in mobility over the past few days. She recently started tramadol earlier this week. At baseline, does not use assistive devices for ambulation. Requiring 2 person assist to ambulate to restroom in ED.    - Afebrile and without leukocytosis. Infectious workup negative thus far.   - CTH with chronic changes, but without acute process  - CT lumbar spine without acute fracture or dislocation  - concern for polypharmacy. hold tramadol and gabapentin  - PT/OT consulted for acute decline in mobility   -PT with recommendations for low intensity    -OT with recommendations for moderate intensity   - continue to monitor closely

## 2023-12-22 NOTE — NURSING
Pt is sitting up in bed. Tele sitter present in the room for safety. No acute distress noted. Tolerates medications well. Bed in lowest position call light within reach. [facility]

## 2023-12-22 NOTE — PLAN OF CARE
12/22/23 1614   Post-Acute Status   Post-Acute Authorization Placement   Post-Acute Placement Status Referrals Sent     Discharge recommendations for pt are for post acute therapy. JAKI sent referrals via [facility] and is waiting for an accepting facility.     JAKI completed LOCET and faxed PASRR to state.    PASRR and 142 uploaded to [facility] ([employer]).    JAKI will continue to follow up.    UPDATE    JAKI submitted for authorization with [employer]. Pre-Authorization# [id number]     Kaylynn Whiteside LMSW  [facility]  Ext. [phone]

## 2023-12-23 LAB
ALBUMIN SERPL BCP-MCNC: 3.5 G/DL (ref 3.5–5.2)
ALP SERPL-CCNC: 82 U/L (ref 55–135)
ALT SERPL W/O P-5'-P-CCNC: 19 U/L (ref 10–44)
ANION GAP SERPL CALC-SCNC: 8 MMOL/L (ref 8–16)
AST SERPL-CCNC: 22 U/L (ref 10–40)
BILIRUB SERPL-MCNC: 0.4 MG/DL (ref 0.1–1)
BUN SERPL-MCNC: 17 MG/DL (ref 8–23)
CALCIUM SERPL-MCNC: 9.5 MG/DL (ref 8.7–10.5)
CHLORIDE SERPL-SCNC: 109 MMOL/L (ref 95–110)
CO2 SERPL-SCNC: 25 MMOL/L (ref 23–29)
CREAT SERPL-MCNC: 0.8 MG/DL (ref 0.5–1.4)
ERYTHROCYTE [DISTWIDTH] IN BLOOD BY AUTOMATED COUNT: 14.7 % (ref 11.5–14.5)
EST. GFR  (NO RACE VARIABLE): >60 ML/MIN/1.73 M^2
GLUCOSE SERPL-MCNC: 89 MG/DL (ref 70–110)
HCT VFR BLD AUTO: 41.1 % (ref 37–48.5)
HGB BLD-MCNC: 13.7 G/DL (ref 12–16)
MAGNESIUM SERPL-MCNC: 1.9 MG/DL (ref 1.6–2.6)
MCH RBC QN AUTO: 29.5 PG (ref 27–31)
MCHC RBC AUTO-ENTMCNC: 33.3 G/DL (ref 32–36)
MCV RBC AUTO: 88 FL (ref 82–98)
PLATELET # BLD AUTO: 207 K/UL (ref 150–450)
PMV BLD AUTO: 10.4 FL (ref 9.2–12.9)
POTASSIUM SERPL-SCNC: 4.5 MMOL/L (ref 3.5–5.1)
PROT SERPL-MCNC: 6.5 G/DL (ref 6–8.4)
RBC # BLD AUTO: 4.65 M/UL (ref 4–5.4)
SODIUM SERPL-SCNC: 142 MMOL/L (ref 136–145)
WBC # BLD AUTO: 5.71 K/UL (ref 3.9–12.7)

## 2023-12-23 PROCEDURE — 83735 ASSAY OF MAGNESIUM: CPT | Mod: HCNC

## 2023-12-23 PROCEDURE — 94761 N-INVAS EAR/PLS OXIMETRY MLT: CPT | Mod: HCNC

## 2023-12-23 PROCEDURE — G0378 HOSPITAL OBSERVATION PER HR: HCPCS | Mod: HCNC

## 2023-12-23 PROCEDURE — 99900035 HC TECH TIME PER 15 MIN (STAT): Mod: HCNC

## 2023-12-23 PROCEDURE — 36415 COLL VENOUS BLD VENIPUNCTURE: CPT | Mod: HCNC

## 2023-12-23 PROCEDURE — 96372 THER/PROPH/DIAG INJ SC/IM: CPT

## 2023-12-23 PROCEDURE — 25000003 PHARM REV CODE 250: Mod: HCNC

## 2023-12-23 PROCEDURE — 63600175 PHARM REV CODE 636 W HCPCS: Mod: HCNC

## 2023-12-23 PROCEDURE — 80053 COMPREHEN METABOLIC PANEL: CPT | Mod: HCNC

## 2023-12-23 PROCEDURE — 85027 COMPLETE CBC AUTOMATED: CPT | Mod: HCNC

## 2023-12-23 RX ADMIN — LIDOCAINE 1 PATCH: 50 PATCH CUTANEOUS at 04:12

## 2023-12-23 RX ADMIN — PANTOPRAZOLE SODIUM 40 MG: 40 TABLET, DELAYED RELEASE ORAL at 09:12

## 2023-12-23 RX ADMIN — CITALOPRAM HYDROBROMIDE 10 MG: 10 TABLET ORAL at 09:12

## 2023-12-23 RX ADMIN — BUSPIRONE HYDROCHLORIDE 10 MG: 10 TABLET ORAL at 09:12

## 2023-12-23 RX ADMIN — ENOXAPARIN SODIUM 40 MG: 40 INJECTION SUBCUTANEOUS at 04:12

## 2023-12-23 RX ADMIN — Medication 1 TABLET: at 08:12

## 2023-12-23 RX ADMIN — GABAPENTIN 100 MG: 100 CAPSULE ORAL at 09:12

## 2023-12-23 RX ADMIN — LISINOPRIL 20 MG: 20 TABLET ORAL at 09:12

## 2023-12-23 RX ADMIN — ACETAMINOPHEN 1000 MG: 500 TABLET ORAL at 09:12

## 2023-12-23 RX ADMIN — Medication 250 MCG: at 09:12

## 2023-12-23 RX ADMIN — OXYBUTYNIN CHLORIDE 5 MG: 5 TABLET, EXTENDED RELEASE ORAL at 09:12

## 2023-12-23 RX ADMIN — CETIRIZINE HYDROCHLORIDE 10 MG: 10 TABLET, FILM COATED ORAL at 08:12

## 2023-12-23 RX ADMIN — BUSPIRONE HYDROCHLORIDE 10 MG: 10 TABLET ORAL at 08:12

## 2023-12-23 RX ADMIN — THERA TABS 1 TABLET: TAB at 09:12

## 2023-12-23 RX ADMIN — Medication 1 TABLET: at 09:12

## 2023-12-23 RX ADMIN — GABAPENTIN 100 MG: 100 CAPSULE ORAL at 08:12

## 2023-12-23 NOTE — PROGRESS NOTES
"[facility] [address] [place] [address] Medicine  Progress Note    Patient Name: Lesli Gong  MRN: [id number]  Patient Class: OP- Observation   Admission Date: 12/20/2023  Length of Stay: 0 days  Attending Physician: Carol Townsend MD  Primary Care Provider: Patti Brian MD        Subjective:     Principal Problem:Weakness        HPI:  Lesli Gong is a 79 yo F with PMHx of neurofibromatosis, dementia, HTN, GIST who presented for generalized weakness. Patient pleasantly confused; only oriented to self. Family not at bedside to assist with history on my interview. Per ED note: "States that her medication was switched to tramadol approximately 6 days ago.  Since then she has had an acute decline in her mobility.  She was able to walk without any assistive devices but now needs to person support to ambulate.  Patient had a fall but patient and daughter unsure if she hit her head.  He is patient's daughter complains of foul-smelling urine."  Patient denies any complaints on my interview.     In ED: Afebrile. HDS. No leukocytosis. Hgb 16.3. CMP grossly unremarkable except AST 57. TSH wnl. UA noninfectious. L shoulder XR without acute fracture or dislocation. CT lumbar spine No acute fractures or traumatic malalignment of the lumbar spine, Lumbar spondylosis as detailed above, most pronounced at L5-S1 with moderate to severe neural foraminal narrowing, and stable L1 vertebral body hemangioma. CT head with generalized cerebral volume loss and chronic microvascular ischemic changes, but without acute process. Given IV reglan 5 mg and oxy 5 mg. Per notes, "Attempted to ambulate patient to the bathroom, patient required a 2 person assist.  At her baseline, patient walks without any assistive devices."  Placed in observation.     Overview/Hospital Course:  Lesli Gong is a 78 y.o. F who was placed in observation for further evaluation of weakness. Infectious workup negative so far. Concern for polypharmacy " with tramadol and gabapentin. Holding both for now. PT/OT ordered for further evaluation. PT with recommendations of low intensity. OT with recommendations of moderate intensity. Sending referrals for possible SNF placement.     Interval History: Patient seen and assessed at bedside. Afebrile, VSS. Patient with no complaints. Patient awaiting SNF placement.       Review of Systems   Reason unable to perform ROS: dementia.     Objective:     Vital Signs (Most Recent):  Temp: 96.2 °F (35.7 °C) (12/23/23 0805)  Pulse: 76 (12/23/23 1534)  Resp: 18 (12/23/23 0805)  BP: 128/74 (12/23/23 0805)  SpO2: (!) 94 % (12/23/23 0805) Vital Signs (24h Range):  Temp:  [96.2 °F (35.7 °C)-98.1 °F (36.7 °C)] 96.2 °F (35.7 °C)  Pulse:  [73-90] 76  Resp:  [17-18] 18  SpO2:  [94 %-100 %] 94 %  BP: (117-128)/(65-75) 128/74     Weight: 89.5 kg (197 lb 5 oz)  Body mass index is 33.87 kg/m².    Intake/Output Summary (Last 24 hours) at 12/23/2023 1611  Last data filed at 12/23/2023 0359  Gross per 24 hour   Intake 118 ml   Output --   Net 118 ml         Physical Exam  Vitals and nursing note reviewed.   Constitutional:       General: She is not in acute distress.     Appearance: She is well-developed.   HENT:      Head: Normocephalic and atraumatic.      Mouth/Throat:      Pharynx: No oropharyngeal exudate.   Eyes:      Conjunctiva/sclera: Conjunctivae normal.      Pupils: Pupils are equal, round, and reactive to light.   Cardiovascular:      Rate and Rhythm: Normal rate and regular rhythm.      Heart sounds: Normal heart sounds.   Pulmonary:      Effort: Pulmonary effort is normal. No respiratory distress.      Breath sounds: Normal breath sounds. No wheezing.   Abdominal:      General: Bowel sounds are normal. There is no distension.      Palpations: Abdomen is soft.      Tenderness: There is no abdominal tenderness.   Musculoskeletal:         General: No tenderness. Normal range of motion.      Cervical back: Normal range of motion and neck  supple.      Right lower leg: No edema.      Left lower leg: No edema.   Lymphadenopathy:      Cervical: No cervical adenopathy.   Skin:     General: Skin is warm and dry.      Capillary Refill: Capillary refill takes less than 2 seconds.      Findings: No rash.      Comments: Neurofibromatosis. Well healed surgical scar to L posterior shoulder.    Neurological:      General: No focal deficit present.      Mental Status: She is alert.      Cranial Nerves: No cranial nerve deficit.      Sensory: No sensory deficit.      Motor: Weakness (generalized) present.      Coordination: Coordination normal.      Comments: Pleasantly confused - oriented to person only. 5/5 strength to extremities, but needed much assistance to sit up in bed during exam. Follows commands appropriately.              Significant Labs: All pertinent labs within the past 24 hours have been reviewed.    Significant Imaging: I have reviewed all pertinent imaging results/findings within the past 24 hours.    Assessment/Plan:      * Weakness  79 yo F with PMHx of neurofibromatosis, dementia, HTN, GIST who presented for generalized weakness and acute decline in mobility over the past few days. She recently started tramadol earlier this week. At baseline, does not use assistive devices for ambulation. Requiring 2 person assist to ambulate to restroom in ED.    - Afebrile and without leukocytosis. Infectious workup negative thus far.   - CTH with chronic changes, but without acute process  - CT lumbar spine without acute fracture or dislocation  - concern for polypharmacy. hold tramadol and gabapentin  - PT/OT consulted for acute decline in mobility   -PT with recommendations for low intensity    -OT with recommendations for moderate intensity   - continue to monitor closely    Obesity (BMI 30.0-34.9)  Body mass index is 33.87 kg/m². Morbid obesity complicates all aspects of disease management from diagnostic modalities to treatment. Weight loss encouraged  and health benefits explained to patient.     Adjustment disorder with anxiety  Depression  - continue buspar and celexa    History of gastrointestinal stromal tumor (GIST)  S/p resection  - Per chart review, she has not followed up with heme/onc since 2020. Needs f/u on discharge    Essential hypertension  - normotensive on admit  - continue lisinopril 20 mg daily    Neurofibromatosis, type 1  - noted      VTE Risk Mitigation (From admission, onward)           Ordered     enoxaparin injection 40 mg  Daily         12/20/23 2318     IP VTE HIGH RISK PATIENT  Once         12/20/23 2318                    Discharge Planning   PETER: 12/26/2023     Code Status: Full Code   Is the patient medically ready for discharge?: No    Reason for patient still in hospital (select all that apply): Patient trending condition and Pending disposition  Discharge Plan A: Home Health                  Ana María Resendiz PA-C  Department of Hospital Medicine   [facility]

## 2023-12-23 NOTE — ASSESSMENT & PLAN NOTE
77 yo F with PMHx of neurofibromatosis, dementia, HTN, GIST who presented for generalized weakness and acute decline in mobility over the past few days. She recently started tramadol earlier this week. At baseline, does not use assistive devices for ambulation. Requiring 2 person assist to ambulate to restroom in ED.    - Afebrile and without leukocytosis. Infectious workup negative thus far.   - CTH with chronic changes, but without acute process  - CT lumbar spine without acute fracture or dislocation  - concern for polypharmacy. hold tramadol and gabapentin  - PT/OT consulted for acute decline in mobility   -PT with recommendations for low intensity    -OT with recommendations for moderate intensity   - continue to monitor closely

## 2023-12-23 NOTE — NURSING
Pt is sitting up in bed,Tele sitter present for safety. Tolerates medications well. No acute distress noted. Bed in lowest position call light within reach.[facility]

## 2023-12-23 NOTE — SUBJECTIVE & OBJECTIVE
Interval History: Patient seen and assessed at bedside. Afebrile, VSS. Patient with no complaints. Patient awaiting SNF placement.       Review of Systems   Reason unable to perform ROS: dementia.     Objective:     Vital Signs (Most Recent):  Temp: 96.2 °F (35.7 °C) (12/23/23 0805)  Pulse: 76 (12/23/23 1534)  Resp: 18 (12/23/23 0805)  BP: 128/74 (12/23/23 0805)  SpO2: (!) 94 % (12/23/23 0805) Vital Signs (24h Range):  Temp:  [96.2 °F (35.7 °C)-98.1 °F (36.7 °C)] 96.2 °F (35.7 °C)  Pulse:  [73-90] 76  Resp:  [17-18] 18  SpO2:  [94 %-100 %] 94 %  BP: (117-128)/(65-75) 128/74     Weight: 89.5 kg (197 lb 5 oz)  Body mass index is 33.87 kg/m².    Intake/Output Summary (Last 24 hours) at 12/23/2023 1611  Last data filed at 12/23/2023 0359  Gross per 24 hour   Intake 118 ml   Output --   Net 118 ml         Physical Exam  Vitals and nursing note reviewed.   Constitutional:       General: She is not in acute distress.     Appearance: She is well-developed.   HENT:      Head: Normocephalic and atraumatic.      Mouth/Throat:      Pharynx: No oropharyngeal exudate.   Eyes:      Conjunctiva/sclera: Conjunctivae normal.      Pupils: Pupils are equal, round, and reactive to light.   Cardiovascular:      Rate and Rhythm: Normal rate and regular rhythm.      Heart sounds: Normal heart sounds.   Pulmonary:      Effort: Pulmonary effort is normal. No respiratory distress.      Breath sounds: Normal breath sounds. No wheezing.   Abdominal:      General: Bowel sounds are normal. There is no distension.      Palpations: Abdomen is soft.      Tenderness: There is no abdominal tenderness.   Musculoskeletal:         General: No tenderness. Normal range of motion.      Cervical back: Normal range of motion and neck supple.      Right lower leg: No edema.      Left lower leg: No edema.   Lymphadenopathy:      Cervical: No cervical adenopathy.   Skin:     General: Skin is warm and dry.      Capillary Refill: Capillary refill takes less than 2  seconds.      Findings: No rash.      Comments: Neurofibromatosis. Well healed surgical scar to L posterior shoulder.    Neurological:      General: No focal deficit present.      Mental Status: She is alert.      Cranial Nerves: No cranial nerve deficit.      Sensory: No sensory deficit.      Motor: Weakness (generalized) present.      Coordination: Coordination normal.      Comments: Pleasantly confused - oriented to person only. 5/5 strength to extremities, but needed much assistance to sit up in bed during exam. Follows commands appropriately.              Significant Labs: All pertinent labs within the past 24 hours have been reviewed.    Significant Imaging: I have reviewed all pertinent imaging results/findings within the past 24 hours.

## 2023-12-24 LAB
ALBUMIN SERPL BCP-MCNC: 3.6 G/DL (ref 3.5–5.2)
ALP SERPL-CCNC: 79 U/L (ref 55–135)
ALT SERPL W/O P-5'-P-CCNC: 18 U/L (ref 10–44)
ANION GAP SERPL CALC-SCNC: 8 MMOL/L (ref 8–16)
AST SERPL-CCNC: 16 U/L (ref 10–40)
BILIRUB SERPL-MCNC: 0.5 MG/DL (ref 0.1–1)
BUN SERPL-MCNC: 14 MG/DL (ref 8–23)
CALCIUM SERPL-MCNC: 9.6 MG/DL (ref 8.7–10.5)
CHLORIDE SERPL-SCNC: 104 MMOL/L (ref 95–110)
CO2 SERPL-SCNC: 25 MMOL/L (ref 23–29)
CREAT SERPL-MCNC: 0.6 MG/DL (ref 0.5–1.4)
EST. GFR  (NO RACE VARIABLE): >60 ML/MIN/1.73 M^2
GLUCOSE SERPL-MCNC: 86 MG/DL (ref 70–110)
POTASSIUM SERPL-SCNC: 3.9 MMOL/L (ref 3.5–5.1)
PROT SERPL-MCNC: 6.6 G/DL (ref 6–8.4)
SODIUM SERPL-SCNC: 137 MMOL/L (ref 136–145)

## 2023-12-24 PROCEDURE — 80053 COMPREHEN METABOLIC PANEL: CPT | Mod: HCNC

## 2023-12-24 PROCEDURE — 97530 THERAPEUTIC ACTIVITIES: CPT | Mod: HCNC

## 2023-12-24 PROCEDURE — G0378 HOSPITAL OBSERVATION PER HR: HCPCS | Mod: HCNC

## 2023-12-24 PROCEDURE — 36415 COLL VENOUS BLD VENIPUNCTURE: CPT | Mod: HCNC

## 2023-12-24 PROCEDURE — 25000003 PHARM REV CODE 250: Mod: HCNC

## 2023-12-24 PROCEDURE — 96372 THER/PROPH/DIAG INJ SC/IM: CPT

## 2023-12-24 PROCEDURE — 63600175 PHARM REV CODE 636 W HCPCS: Mod: HCNC

## 2023-12-24 RX ORDER — OXYCODONE HYDROCHLORIDE 5 MG/1
5 TABLET ORAL ONCE AS NEEDED
Status: COMPLETED | OUTPATIENT
Start: 2023-12-24 | End: 2023-12-24

## 2023-12-24 RX ORDER — ACETAMINOPHEN 500 MG
1000 TABLET ORAL EVERY 8 HOURS
Status: COMPLETED | OUTPATIENT
Start: 2023-12-24 | End: 2023-12-25

## 2023-12-24 RX ORDER — OXYCODONE HYDROCHLORIDE 5 MG/1
5 TABLET ORAL ONCE
Status: DISCONTINUED | OUTPATIENT
Start: 2023-12-24 | End: 2023-12-24

## 2023-12-24 RX ORDER — ACETAMINOPHEN 500 MG
1000 TABLET ORAL EVERY 8 HOURS
Status: DISCONTINUED | OUTPATIENT
Start: 2023-12-24 | End: 2023-12-24

## 2023-12-24 RX ORDER — METHOCARBAMOL 500 MG/1
500 TABLET, FILM COATED ORAL 4 TIMES DAILY
Status: DISCONTINUED | OUTPATIENT
Start: 2023-12-24 | End: 2023-12-24

## 2023-12-24 RX ADMIN — BUSPIRONE HYDROCHLORIDE 10 MG: 10 TABLET ORAL at 09:12

## 2023-12-24 RX ADMIN — ALUMINUM HYDROXIDE, MAGNESIUM HYDROXIDE, AND SIMETHICONE 30 ML: 200; 200; 20 SUSPENSION ORAL at 02:12

## 2023-12-24 RX ADMIN — CETIRIZINE HYDROCHLORIDE 10 MG: 10 TABLET, FILM COATED ORAL at 09:12

## 2023-12-24 RX ADMIN — ACETAMINOPHEN 1000 MG: 500 TABLET ORAL at 09:12

## 2023-12-24 RX ADMIN — Medication 6 MG: at 09:12

## 2023-12-24 RX ADMIN — OXYCODONE HYDROCHLORIDE 5 MG: 5 TABLET ORAL at 06:12

## 2023-12-24 RX ADMIN — THERA TABS 1 TABLET: TAB at 09:12

## 2023-12-24 RX ADMIN — Medication 250 MCG: at 09:12

## 2023-12-24 RX ADMIN — ACETAMINOPHEN 1000 MG: 500 TABLET ORAL at 02:12

## 2023-12-24 RX ADMIN — Medication 1 TABLET: at 09:12

## 2023-12-24 RX ADMIN — GABAPENTIN 100 MG: 100 CAPSULE ORAL at 09:12

## 2023-12-24 RX ADMIN — LIDOCAINE 1 PATCH: 50 PATCH CUTANEOUS at 06:12

## 2023-12-24 RX ADMIN — PANTOPRAZOLE SODIUM 40 MG: 40 TABLET, DELAYED RELEASE ORAL at 09:12

## 2023-12-24 RX ADMIN — CITALOPRAM HYDROBROMIDE 10 MG: 10 TABLET ORAL at 09:12

## 2023-12-24 RX ADMIN — OXYBUTYNIN CHLORIDE 5 MG: 5 TABLET, EXTENDED RELEASE ORAL at 09:12

## 2023-12-24 RX ADMIN — ENOXAPARIN SODIUM 40 MG: 40 INJECTION SUBCUTANEOUS at 06:12

## 2023-12-24 RX ADMIN — LISINOPRIL 20 MG: 20 TABLET ORAL at 09:12

## 2023-12-24 RX ADMIN — ACETAMINOPHEN 650 MG: 325 TABLET ORAL at 02:12

## 2023-12-24 NOTE — NURSING
Pt is sitting up in bed, tele sitter present in room for pt safety. Pt is stable, no acute distress noted. Bed in lowest position call light within reach. [facility]

## 2023-12-24 NOTE — PLAN OF CARE
Problem: Adult Inpatient Plan of Care  Goal: Plan of Care Review  Outcome: Ongoing, Progressing  Goal: Patient-Specific Goal (Individualized)  Outcome: Ongoing, Progressing  Goal: Absence of Hospital-Acquired Illness or Injury  Outcome: Ongoing, Progressing  Goal: Optimal Comfort and Wellbeing  Outcome: Ongoing, Progressing  Goal: Readiness for Transition of Care  Outcome: Ongoing, Progressing     Problem: Infection  Goal: Absence of Infection Signs and Symptoms  Outcome: Ongoing, Progressing     Problem: Skin Injury Risk Increased  Goal: Skin Health and Integrity  Outcome: Ongoing, Progressing     Problem: Fall Injury Risk  Goal: Absence of Fall and Fall-Related Injury  Outcome: Ongoing, Progressing     Problem: Confusion Acute  Goal: Optimal Cognitive Function  Outcome: Ongoing, Progressing     Problem: Behavioral Health Comorbidity  Goal: Maintenance of Behavioral Health Symptom Control  Outcome: Ongoing, Progressing     Problem: Hypertension Comorbidity  Goal: Blood Pressure in Desired Range  Outcome: Ongoing, Progressing     Problem: Pain Chronic (Persistent) (Comorbidity Management)  Goal: Acceptable Pain Control and Functional Ability  Outcome: Ongoing, Progressing   Pt progressing towards goals. Telemetry maintained,NSR. Continues to climb oob to get to bsc, telesitter and bed alarm in use.pt has an unsteady gait and requires assistance when up. Continue to reinforce safety instructions. Remains confused.awaiting SNF Placement. Left shoulder and low back pain relieved with tylenol and Lidocaine Patches. Continue plan of care.

## 2023-12-24 NOTE — PLAN OF CARE
Pt engaged well in therapy this date.     Problem: Occupational Therapy  Goal: Occupational Therapy Goal  Description: Goals to be met by: 12/28/2023     Patient will increase functional independence with ADLs by performing:    UE Dressing with Set-up Assistance.  LE Dressing with Minimal Assistance.  Grooming while standing at sink with Contact Guard Assistance.  Toileting from toilet with Minimal Assistance for hygiene and clothing management.   Toilet transfer to toilet with Contact Guard Assistance.    Outcome: Ongoing, Progressing

## 2023-12-24 NOTE — SUBJECTIVE & OBJECTIVE
Interval History: Patient seen and assessed at bedside. Afebrile, VSS. Patient complains of mild L shoulder pain, ROM and strength intact. Started on scheduled tylenol for a few doses. Patient awaiting SNF placement.     Review of Systems   Reason unable to perform ROS: dementia.     Objective:     Vital Signs (Most Recent):  Temp: 96.2 °F (35.7 °C) (12/24/23 0700)  Pulse: 96 (12/24/23 1127)  Resp: 20 (12/24/23 0700)  BP: (!) 169/87 (12/24/23 0700)  SpO2: 98 % (12/24/23 0700) Vital Signs (24h Range):  Temp:  [96.2 °F (35.7 °C)-98.3 °F (36.8 °C)] 96.2 °F (35.7 °C)  Pulse:  [75-96] 96  Resp:  [16-24] 20  SpO2:  [95 %-98 %] 98 %  BP: (138-187)/(78-88) 169/87     Weight: 89.5 kg (197 lb 5 oz)  Body mass index is 33.87 kg/m².    Intake/Output Summary (Last 24 hours) at 12/24/2023 1145  Last data filed at 12/24/2023 0608  Gross per 24 hour   Intake 240 ml   Output --   Net 240 ml           Physical Exam  Vitals and nursing note reviewed.   Constitutional:       General: She is not in acute distress.     Appearance: She is well-developed.   HENT:      Head: Normocephalic and atraumatic.      Mouth/Throat:      Pharynx: No oropharyngeal exudate.   Eyes:      Conjunctiva/sclera: Conjunctivae normal.      Pupils: Pupils are equal, round, and reactive to light.   Cardiovascular:      Rate and Rhythm: Normal rate and regular rhythm.      Heart sounds: Normal heart sounds.   Pulmonary:      Effort: Pulmonary effort is normal. No respiratory distress.      Breath sounds: Normal breath sounds. No wheezing.   Abdominal:      General: Bowel sounds are normal. There is no distension.      Palpations: Abdomen is soft.      Tenderness: There is no abdominal tenderness.   Musculoskeletal:         General: No tenderness. Normal range of motion.      Cervical back: Normal range of motion and neck supple.      Right lower leg: No edema.      Left lower leg: No edema.   Lymphadenopathy:      Cervical: No cervical adenopathy.   Skin:      General: Skin is warm and dry.      Capillary Refill: Capillary refill takes less than 2 seconds.      Findings: No rash.      Comments: Neurofibromatosis. Well healed surgical scar to L posterior shoulder.    Neurological:      General: No focal deficit present.      Mental Status: She is alert.      Cranial Nerves: No cranial nerve deficit.      Sensory: No sensory deficit.      Motor: Weakness (generalized) present.      Coordination: Coordination normal.      Comments: Pleasantly confused - oriented to person only. 5/5 strength to extremities, but needed much assistance to sit up in bed during exam. Follows commands appropriately.              Significant Labs: All pertinent labs within the past 24 hours have been reviewed.    Significant Imaging: I have reviewed all pertinent imaging results/findings within the past 24 hours.

## 2023-12-24 NOTE — ASSESSMENT & PLAN NOTE
79 yo F with PMHx of neurofibromatosis, dementia, HTN, GIST who presented for generalized weakness and acute decline in mobility over the past few days. She recently started tramadol earlier this week. At baseline, does not use assistive devices for ambulation. Requiring 2 person assist to ambulate to restroom in ED.    - Afebrile and without leukocytosis. Infectious workup negative.   - CTH with chronic changes, but without acute process  - CT lumbar spine without acute fracture or dislocation  - Concern for polypharmacy. Continue holding tramadol and gabapentin  - PT/OT consulted for acute decline in mobility   -PT with recommendations for low intensity    -OT with recommendations for moderate intensity   - continue to monitor closely  - CM following to aid in SNF placement

## 2023-12-24 NOTE — PT/OT/SLP PROGRESS
"Occupational Therapy   Treatment    Name: Lesli Gong  MRN: [id number]  Admitting Diagnosis:  Weakness       Recommendations:     Discharge Recommendations: Moderate Intensity Therapy  Discharge Equipment Recommendations:  wheelchair, bedside commode, walker, rolling  Barriers to discharge:   increased skilled A needed    Assessment:     Lesli Gong is a 78 y.o. female with a medical diagnosis of Weakness.  She presents with performance deficits affecting function are weakness, impaired endurance, impaired self care skills, impaired functional mobility, gait instability, impaired balance, impaired cognition, decreased coordination, decreased upper extremity function, decreased lower extremity function, decreased safety awareness.  Pt engaged well in therapy session this date, demonstrating some forgetfulness. Pt encountered with HOB raised, completed functional mobility walking out to hallway with RW/CGA increasing to SBA. Pt on pathway to receive moderately intense therapy.     Rehab Prognosis:  Good; patient would benefit from acute skilled OT services to address these deficits and reach maximum level of function.       Plan:     Patient to be seen 4 x/week to address the above listed problems via self-care/home management, therapeutic activities, therapeutic exercises, neuromuscular re-education  Plan of Care Expires: 01/21/24  Plan of Care Reviewed with: patient    Subjective     Chief Complaint: "When is lunch coming? I'm so hungry"   Patient/Family Comments/goals: Get better, return home  Pain/Comfort:  Pain Rating 1: 0/10  Pain Rating Post-Intervention 1: 0/10    Objective:     Communicated with: RN prior to session.  Patient found HOB elevated with telemetry upon OT entry to room.    General Precautions: Standard, fall    Orthopedic Precautions:N/A  Braces: N/A  Respiratory Status: Room air     Occupational Performance:     Bed Mobility:    Patient completed Rolling/Turning to Left with  stand by " assistance  Patient completed Rolling/Turning to Right with stand by assistance  Patient completed Scooting/Bridging with stand by assistance  Patient completed Supine to Sit with stand by assistance     Functional Mobility/Transfers:  Patient completed Sit <> Stand Transfer with contact guard assistance  with  rolling walker   Functional Mobility: Pt engaging in functional mobility to simulate household/community distances with CGA decreasing to SBA and utilizing RW in order to maximize functional activity tolerance and standing balance required for engagement in occupations of choice. Pt able to walk from EOB and 1/4 of hallway before turning around to return to room.     Activities of Daily Living:  Grooming: politely declined  Upper Body Dressing: minimum assistance donning second robe as gown  Lower Body Dressing: minimum assistance adjusting bilateral socks to maximize coverage    [facility] 6 Click ADL: 18    Treatment & Education:  Pt educated on role of OT, POC, and goals for therapy.    POC was dicussed with patient/caregiver, who was included in its development and is in agreement with the identified goals and treatment plan.   Patient and family aware of patient's deficits and therapy progression.   Time provided for therapeutic counseling and discussion of health disposition.   Educated on importance of EOB/OOB mobility, maintaining routine, sitting up in chair, and maximizing independence with ADLs during admission   Pt completed ADLs and functional mobility for treatment session as noted above   Pt/caregiver verbalized understanding and expressed no further concerns/questions.  Updated communication board with level of assist required       Patient left up in chair with all lines intact, call button in reach, chair alarm on, and RN notified    GOALS:   Multidisciplinary Problems       Occupational Therapy Goals          Problem: Occupational Therapy    Goal Priority Disciplines Outcome Interventions    Occupational Therapy Goal     OT, PT/OT Ongoing, Progressing    Description: Goals to be met by: 12/28/2023     Patient will increase functional independence with ADLs by performing:    UE Dressing with Set-up Assistance.  LE Dressing with Minimal Assistance.  Grooming while standing at sink with Contact Guard Assistance.  Toileting from toilet with Minimal Assistance for hygiene and clothing management.   Toilet transfer to toilet with Contact Guard Assistance.                         Time Tracking:     OT Date of Treatment: 12/24/23  OT Start Time: 1158  OT Stop Time: 1213  OT Total Time (min): 15 min    Billable Minutes:Therapeutic Activity 15    OT/ELIZABETH: OT          12/24/2023

## 2023-12-24 NOTE — PROGRESS NOTES
"[facility] [address] [place] [address] Medicine  Progress Note    Patient Name: Lesli Gong  MRN: [id number]  Patient Class: OP- Observation   Admission Date: 12/20/2023  Length of Stay: 0 days  Attending Physician: Carol Townsend MD  Primary Care Provider: Patti Brian MD        Subjective:     Principal Problem:Weakness        HPI:  Lesli Gong is a 77 yo F with PMHx of neurofibromatosis, dementia, HTN, GIST who presented for generalized weakness. Patient pleasantly confused; only oriented to self. Family not at bedside to assist with history on my interview. Per ED note: "States that her medication was switched to tramadol approximately 6 days ago.  Since then she has had an acute decline in her mobility.  She was able to walk without any assistive devices but now needs to person support to ambulate.  Patient had a fall but patient and daughter unsure if she hit her head.  He is patient's daughter complains of foul-smelling urine."  Patient denies any complaints on my interview.     In ED: Afebrile. HDS. No leukocytosis. Hgb 16.3. CMP grossly unremarkable except AST 57. TSH wnl. UA noninfectious. L shoulder XR without acute fracture or dislocation. CT lumbar spine No acute fractures or traumatic malalignment of the lumbar spine, Lumbar spondylosis as detailed above, most pronounced at L5-S1 with moderate to severe neural foraminal narrowing, and stable L1 vertebral body hemangioma. CT head with generalized cerebral volume loss and chronic microvascular ischemic changes, but without acute process. Given IV reglan 5 mg and oxy 5 mg. Per notes, "Attempted to ambulate patient to the bathroom, patient required a 2 person assist.  At her baseline, patient walks without any assistive devices."  Placed in observation.     Overview/Hospital Course:  Lesli Gong was placed in observation for weakness. Infectious workup unremarkable. Concern for polypharmacy with tramadol and gabapentin, which are " currently held. PT with recommendations of low intensity therapy. OT with recommendations of moderate intensity. CM following to aid in SNF placement.     Interval History: Patient seen and assessed at bedside. Afebrile, VSS. Patient complains of mild L shoulder pain, ROM and strength intact. Started on scheduled tylenol for a few doses. Patient awaiting SNF placement.     Review of Systems   Reason unable to perform ROS: dementia.     Objective:     Vital Signs (Most Recent):  Temp: 96.2 °F (35.7 °C) (12/24/23 0700)  Pulse: 96 (12/24/23 1127)  Resp: 20 (12/24/23 0700)  BP: (!) 169/87 (12/24/23 0700)  SpO2: 98 % (12/24/23 0700) Vital Signs (24h Range):  Temp:  [96.2 °F (35.7 °C)-98.3 °F (36.8 °C)] 96.2 °F (35.7 °C)  Pulse:  [75-96] 96  Resp:  [16-24] 20  SpO2:  [95 %-98 %] 98 %  BP: (138-187)/(78-88) 169/87     Weight: 89.5 kg (197 lb 5 oz)  Body mass index is 33.87 kg/m².    Intake/Output Summary (Last 24 hours) at 12/24/2023 1145  Last data filed at 12/24/2023 0608  Gross per 24 hour   Intake 240 ml   Output --   Net 240 ml           Physical Exam  Vitals and nursing note reviewed.   Constitutional:       General: She is not in acute distress.     Appearance: She is well-developed.   HENT:      Head: Normocephalic and atraumatic.      Mouth/Throat:      Pharynx: No oropharyngeal exudate.   Eyes:      Conjunctiva/sclera: Conjunctivae normal.      Pupils: Pupils are equal, round, and reactive to light.   Cardiovascular:      Rate and Rhythm: Normal rate and regular rhythm.      Heart sounds: Normal heart sounds.   Pulmonary:      Effort: Pulmonary effort is normal. No respiratory distress.      Breath sounds: Normal breath sounds. No wheezing.   Abdominal:      General: Bowel sounds are normal. There is no distension.      Palpations: Abdomen is soft.      Tenderness: There is no abdominal tenderness.   Musculoskeletal:         General: No tenderness. Normal range of motion.      Cervical back: Normal range of  motion and neck supple.      Right lower leg: No edema.      Left lower leg: No edema.   Lymphadenopathy:      Cervical: No cervical adenopathy.   Skin:     General: Skin is warm and dry.      Capillary Refill: Capillary refill takes less than 2 seconds.      Findings: No rash.      Comments: Neurofibromatosis. Well healed surgical scar to L posterior shoulder.    Neurological:      General: No focal deficit present.      Mental Status: She is alert.      Cranial Nerves: No cranial nerve deficit.      Sensory: No sensory deficit.      Motor: Weakness (generalized) present.      Coordination: Coordination normal.      Comments: Pleasantly confused - oriented to person only. 5/5 strength to extremities, but needed much assistance to sit up in bed during exam. Follows commands appropriately.              Significant Labs: All pertinent labs within the past 24 hours have been reviewed.    Significant Imaging: I have reviewed all pertinent imaging results/findings within the past 24 hours.    Assessment/Plan:      * Weakness  79 yo F with PMHx of neurofibromatosis, dementia, HTN, GIST who presented for generalized weakness and acute decline in mobility over the past few days. She recently started tramadol earlier this week. At baseline, does not use assistive devices for ambulation. Requiring 2 person assist to ambulate to restroom in ED.    - Afebrile and without leukocytosis. Infectious workup negative.   - CTH with chronic changes, but without acute process  - CT lumbar spine without acute fracture or dislocation  - Concern for polypharmacy. Continue holding tramadol and gabapentin  - PT/OT consulted for acute decline in mobility   -PT with recommendations for low intensity    -OT with recommendations for moderate intensity   - continue to monitor closely  - CM following to aid in SNF placement     Obesity (BMI 30.0-34.9)  Body mass index is 33.87 kg/m². Morbid obesity complicates all aspects of disease management  from diagnostic modalities to treatment. Weight loss encouraged and health benefits explained to patient.     Adjustment disorder with anxiety  Depression  - continue buspar and celexa    History of gastrointestinal stromal tumor (GIST)  S/p resection  - Per chart review, she has not followed up with heme/onc since 2020. Needs f/u on discharge    Essential hypertension  - normotensive on admit  - continue lisinopril 20 mg daily, up titrate as indicated     Neurofibromatosis, type 1  - noted      VTE Risk Mitigation (From admission, onward)           Ordered     enoxaparin injection 40 mg  Daily         12/20/23 2318     IP VTE HIGH RISK PATIENT  Once         12/20/23 2318                    Discharge Planning   PETER: 12/26/2023     Code Status: Full Code   Is the patient medically ready for discharge?: Yes    Reason for patient still in hospital (select all that apply): Patient trending condition and Pending disposition  Discharge Plan A: Home Health                  Demetria Templeton PA-C  Department of Hospital Medicine   [facility]

## 2023-12-24 NOTE — PLAN OF CARE
Problem: Adult Inpatient Plan of Care  Goal: Plan of Care Review  Outcome: Ongoing, Progressing  Goal: Patient-Specific Goal (Individualized)  Outcome: Ongoing, Progressing  Goal: Absence of Hospital-Acquired Illness or Injury  Outcome: Ongoing, Progressing  Goal: Optimal Comfort and Wellbeing  Outcome: Ongoing, Progressing  Goal: Readiness for Transition of Care  Outcome: Ongoing, Progressing     Problem: Infection  Goal: Absence of Infection Signs and Symptoms  Outcome: Ongoing, Progressing     Problem: Skin Injury Risk Increased  Goal: Skin Health and Integrity  Outcome: Ongoing, Progressing     Problem: Fall Injury Risk  Goal: Absence of Fall and Fall-Related Injury  Outcome: Ongoing, Progressing     Problem: Confusion Acute  Goal: Optimal Cognitive Function  Outcome: Ongoing, Progressing     Problem: Behavioral Health Comorbidity  Goal: Maintenance of Behavioral Health Symptom Control  Outcome: Ongoing, Progressing     Problem: Hypertension Comorbidity  Goal: Blood Pressure in Desired Range  Outcome: Ongoing, Progressing     Problem: Pain Chronic (Persistent) (Comorbidity Management)  Goal: Acceptable Pain Control and Functional Ability  Outcome: Ongoing, Progressing

## 2023-12-25 LAB
ALBUMIN SERPL BCP-MCNC: 3.6 G/DL (ref 3.5–5.2)
ALP SERPL-CCNC: 81 U/L (ref 55–135)
ALT SERPL W/O P-5'-P-CCNC: 16 U/L (ref 10–44)
ANION GAP SERPL CALC-SCNC: 10 MMOL/L (ref 8–16)
AST SERPL-CCNC: 17 U/L (ref 10–40)
BASOPHILS # BLD AUTO: 0.01 K/UL (ref 0–0.2)
BASOPHILS NFR BLD: 0.2 % (ref 0–1.9)
BILIRUB SERPL-MCNC: 0.5 MG/DL (ref 0.1–1)
BUN SERPL-MCNC: 10 MG/DL (ref 8–23)
CALCIUM SERPL-MCNC: 9.1 MG/DL (ref 8.7–10.5)
CHLORIDE SERPL-SCNC: 103 MMOL/L (ref 95–110)
CO2 SERPL-SCNC: 22 MMOL/L (ref 23–29)
CREAT SERPL-MCNC: 0.6 MG/DL (ref 0.5–1.4)
DIFFERENTIAL METHOD: ABNORMAL
EOSINOPHIL # BLD AUTO: 0 K/UL (ref 0–0.5)
EOSINOPHIL NFR BLD: 0.2 % (ref 0–8)
ERYTHROCYTE [DISTWIDTH] IN BLOOD BY AUTOMATED COUNT: 14.4 % (ref 11.5–14.5)
EST. GFR  (NO RACE VARIABLE): >60 ML/MIN/1.73 M^2
GLUCOSE SERPL-MCNC: 89 MG/DL (ref 70–110)
HCT VFR BLD AUTO: 40.9 % (ref 37–48.5)
HGB BLD-MCNC: 13.8 G/DL (ref 12–16)
IMM GRANULOCYTES # BLD AUTO: 0.08 K/UL (ref 0–0.04)
IMM GRANULOCYTES NFR BLD AUTO: 1.7 % (ref 0–0.5)
LYMPHOCYTES # BLD AUTO: 1.2 K/UL (ref 1–4.8)
LYMPHOCYTES NFR BLD: 26.1 % (ref 18–48)
MCH RBC QN AUTO: 30.1 PG (ref 27–31)
MCHC RBC AUTO-ENTMCNC: 33.7 G/DL (ref 32–36)
MCV RBC AUTO: 89 FL (ref 82–98)
MONOCYTES # BLD AUTO: 0.5 K/UL (ref 0.3–1)
MONOCYTES NFR BLD: 11.2 % (ref 4–15)
NEUTROPHILS # BLD AUTO: 2.8 K/UL (ref 1.8–7.7)
NEUTROPHILS NFR BLD: 60.6 % (ref 38–73)
NRBC BLD-RTO: 0 /100 WBC
PLATELET # BLD AUTO: 200 K/UL (ref 150–450)
PMV BLD AUTO: 10.3 FL (ref 9.2–12.9)
POTASSIUM SERPL-SCNC: 3.8 MMOL/L (ref 3.5–5.1)
PROT SERPL-MCNC: 6.6 G/DL (ref 6–8.4)
RBC # BLD AUTO: 4.58 M/UL (ref 4–5.4)
SODIUM SERPL-SCNC: 135 MMOL/L (ref 136–145)
WBC # BLD AUTO: 4.63 K/UL (ref 3.9–12.7)

## 2023-12-25 PROCEDURE — 36415 COLL VENOUS BLD VENIPUNCTURE: CPT | Mod: HCNC

## 2023-12-25 PROCEDURE — 63600175 PHARM REV CODE 636 W HCPCS: Mod: HCNC

## 2023-12-25 PROCEDURE — 80053 COMPREHEN METABOLIC PANEL: CPT | Mod: HCNC

## 2023-12-25 PROCEDURE — G0378 HOSPITAL OBSERVATION PER HR: HCPCS | Mod: HCNC

## 2023-12-25 PROCEDURE — 25000003 PHARM REV CODE 250: Mod: HCNC

## 2023-12-25 PROCEDURE — 96372 THER/PROPH/DIAG INJ SC/IM: CPT

## 2023-12-25 PROCEDURE — 85025 COMPLETE CBC W/AUTO DIFF WBC: CPT | Mod: HCNC

## 2023-12-25 RX ORDER — ACETAMINOPHEN 325 MG/1
650 TABLET ORAL EVERY 4 HOURS PRN
Status: DISCONTINUED | OUTPATIENT
Start: 2023-12-25 | End: 2023-12-28 | Stop reason: HOSPADM

## 2023-12-25 RX ORDER — OXYCODONE HYDROCHLORIDE 5 MG/1
5 TABLET ORAL EVERY 6 HOURS PRN
Status: DISCONTINUED | OUTPATIENT
Start: 2023-12-25 | End: 2023-12-27

## 2023-12-25 RX ADMIN — PANTOPRAZOLE SODIUM 40 MG: 40 TABLET, DELAYED RELEASE ORAL at 08:12

## 2023-12-25 RX ADMIN — CETIRIZINE HYDROCHLORIDE 10 MG: 10 TABLET, FILM COATED ORAL at 09:12

## 2023-12-25 RX ADMIN — CITALOPRAM HYDROBROMIDE 10 MG: 10 TABLET ORAL at 08:12

## 2023-12-25 RX ADMIN — Medication 250 MCG: at 08:12

## 2023-12-25 RX ADMIN — OXYBUTYNIN CHLORIDE 5 MG: 5 TABLET, EXTENDED RELEASE ORAL at 08:12

## 2023-12-25 RX ADMIN — THERA TABS 1 TABLET: TAB at 08:12

## 2023-12-25 RX ADMIN — OXYCODONE HYDROCHLORIDE 5 MG: 5 TABLET ORAL at 04:12

## 2023-12-25 RX ADMIN — GABAPENTIN 100 MG: 100 CAPSULE ORAL at 08:12

## 2023-12-25 RX ADMIN — Medication 6 MG: at 09:12

## 2023-12-25 RX ADMIN — Medication 1 TABLET: at 08:12

## 2023-12-25 RX ADMIN — ACETAMINOPHEN 1000 MG: 500 TABLET ORAL at 04:12

## 2023-12-25 RX ADMIN — LIDOCAINE 1 PATCH: 50 PATCH CUTANEOUS at 05:12

## 2023-12-25 RX ADMIN — BUSPIRONE HYDROCHLORIDE 10 MG: 10 TABLET ORAL at 09:12

## 2023-12-25 RX ADMIN — GABAPENTIN 100 MG: 100 CAPSULE ORAL at 09:12

## 2023-12-25 RX ADMIN — Medication 1 TABLET: at 09:12

## 2023-12-25 RX ADMIN — ENOXAPARIN SODIUM 40 MG: 40 INJECTION SUBCUTANEOUS at 05:12

## 2023-12-25 RX ADMIN — LISINOPRIL 20 MG: 20 TABLET ORAL at 08:12

## 2023-12-25 RX ADMIN — ACETAMINOPHEN 1000 MG: 500 TABLET ORAL at 06:12

## 2023-12-25 RX ADMIN — BUSPIRONE HYDROCHLORIDE 10 MG: 10 TABLET ORAL at 08:12

## 2023-12-25 RX ADMIN — ALUMINUM HYDROXIDE, MAGNESIUM HYDROXIDE, AND SIMETHICONE 30 ML: 200; 200; 20 SUSPENSION ORAL at 09:12

## 2023-12-25 RX ADMIN — ACETAMINOPHEN 1000 MG: 500 TABLET ORAL at 09:12

## 2023-12-25 RX ADMIN — OXYCODONE HYDROCHLORIDE 5 MG: 5 TABLET ORAL at 08:12

## 2023-12-25 NOTE — PROGRESS NOTES
"[facility] [address] [place] [address] Medicine  Progress Note    Patient Name: Lesli Gong  MRN: [id number]  Patient Class: OP- Observation   Admission Date: 12/20/2023  Length of Stay: 0 days  Attending Physician: Carol Townsend MD  Primary Care Provider: Patti Brian MD        Subjective:     Principal Problem:Weakness        HPI:  Lesli Gong is a 77 yo F with PMHx of neurofibromatosis, dementia, HTN, GIST who presented for generalized weakness. Patient pleasantly confused; only oriented to self. Family not at bedside to assist with history on my interview. Per ED note: "States that her medication was switched to tramadol approximately 6 days ago.  Since then she has had an acute decline in her mobility.  She was able to walk without any assistive devices but now needs to person support to ambulate.  Patient had a fall but patient and daughter unsure if she hit her head.  He is patient's daughter complains of foul-smelling urine."  Patient denies any complaints on my interview.     In ED: Afebrile. HDS. No leukocytosis. Hgb 16.3. CMP grossly unremarkable except AST 57. TSH wnl. UA noninfectious. L shoulder XR without acute fracture or dislocation. CT lumbar spine No acute fractures or traumatic malalignment of the lumbar spine, Lumbar spondylosis as detailed above, most pronounced at L5-S1 with moderate to severe neural foraminal narrowing, and stable L1 vertebral body hemangioma. CT head with generalized cerebral volume loss and chronic microvascular ischemic changes, but without acute process. Given IV reglan 5 mg and oxy 5 mg. Per notes, "Attempted to ambulate patient to the bathroom, patient required a 2 person assist.  At her baseline, patient walks without any assistive devices."  Placed in observation.     Overview/Hospital Course:  Lesli Gong was placed in observation for weakness. Infectious workup unremarkable. Concern for polypharmacy with tramadol and gabapentin, which are " currently held. PT with recommendations of low intensity therapy. OT with recommendations of moderate intensity. CM following to aid in SNF placement.     Interval History: Patient seen and assessed at bedside. Afebrile, VSS. AAO to person and place. Patient with reports of mild L shoulder pain. Started on MM pain regimen, she states it is controlling her pain. Awaiting for SNF placement for discharge.       Review of Systems   Reason unable to perform ROS: dementia.     Objective:     Vital Signs (Most Recent):  Temp: 98.5 °F (36.9 °C) (12/25/23 0751)  Pulse: 80 (12/25/23 0751)  Resp: 16 (12/25/23 0831)  BP: (!) 141/81 (12/25/23 0751)  SpO2: 96 % (12/25/23 0751) Vital Signs (24h Range):  Temp:  [96.1 °F (35.6 °C)-98.5 °F (36.9 °C)] 98.5 °F (36.9 °C)  Pulse:  [76-96] 80  Resp:  [16-18] 16  SpO2:  [96 %-98 %] 96 %  BP: (132-189)/(79-92) 141/81     Weight: 89.5 kg (197 lb 5 oz)  Body mass index is 33.87 kg/m².    Intake/Output Summary (Last 24 hours) at 12/25/2023 0958  Last data filed at 12/25/2023 0552  Gross per 24 hour   Intake --   Output 550 ml   Net -550 ml         Physical Exam  Vitals and nursing note reviewed.   Constitutional:       General: She is not in acute distress.     Appearance: She is well-developed.   HENT:      Head: Normocephalic and atraumatic.      Mouth/Throat:      Pharynx: No oropharyngeal exudate.   Eyes:      Conjunctiva/sclera: Conjunctivae normal.      Pupils: Pupils are equal, round, and reactive to light.   Cardiovascular:      Rate and Rhythm: Normal rate and regular rhythm.      Heart sounds: Normal heart sounds.   Pulmonary:      Effort: Pulmonary effort is normal. No respiratory distress.      Breath sounds: Normal breath sounds. No wheezing.   Abdominal:      General: Bowel sounds are normal. There is no distension.      Palpations: Abdomen is soft.      Tenderness: There is no abdominal tenderness.   Musculoskeletal:         General: No tenderness. Normal range of motion.       Cervical back: Normal range of motion and neck supple.      Right lower leg: No edema.      Left lower leg: No edema.   Lymphadenopathy:      Cervical: No cervical adenopathy.   Skin:     General: Skin is warm and dry.      Capillary Refill: Capillary refill takes less than 2 seconds.      Findings: No rash.      Comments: Neurofibromatosis. Well healed surgical scar to L posterior shoulder.    Neurological:      General: No focal deficit present.      Mental Status: She is alert.      Cranial Nerves: No cranial nerve deficit.      Sensory: No sensory deficit.      Motor: Weakness (generalized) present.      Coordination: Coordination normal.      Comments: Pleasantly confused - oriented to person and place this morning. 5/5 strength to extremities, but needed much assistance to sit up in bed during exam. Follows commands appropriately.              Significant Labs: All pertinent labs within the past 24 hours have been reviewed.    Significant Imaging: I have reviewed all pertinent imaging results/findings within the past 24 hours.    Assessment/Plan:      * Weakness  77 yo F with PMHx of neurofibromatosis, dementia, HTN, GIST who presented for generalized weakness and acute decline in mobility over the past few days. She recently started tramadol earlier this week. At baseline, does not use assistive devices for ambulation. Requiring 2 person assist to ambulate to restroom in ED.    - Afebrile and without leukocytosis. Infectious workup negative.   - CTH with chronic changes, but without acute process  - CT lumbar spine without acute fracture or dislocation  - Concern for polypharmacy. Continue holding tramadol and gabapentin  - PT/OT consulted for acute decline in mobility   -PT with recommendations for low intensity    -OT with recommendations for moderate intensity   - continue to monitor closely  - CM following to aid in SNF placement     Obesity (BMI 30.0-34.9)  Body mass index is 33.87 kg/m². Morbid obesity  complicates all aspects of disease management from diagnostic modalities to treatment. Weight loss encouraged and health benefits explained to patient.     Adjustment disorder with anxiety  Depression  - continue buspar and celexa    History of gastrointestinal stromal tumor (GIST)  S/p resection  - Per chart review, she has not followed up with heme/onc since 2020. Needs f/u on discharge    Essential hypertension  - normotensive on admit  - continue lisinopril 20 mg daily, up titrate as indicated     Neurofibromatosis, type 1  - noted      VTE Risk Mitigation (From admission, onward)           Ordered     enoxaparin injection 40 mg  Daily         12/20/23 2318     IP VTE HIGH RISK PATIENT  Once         12/20/23 2318                    Discharge Planning   PETER: 12/26/2023     Code Status: Full Code   Is the patient medically ready for discharge?: Yes    Reason for patient still in hospital (select all that apply): Patient trending condition and Pending disposition  Discharge Plan A: Home Health                  Ana María Resendiz PA-C  Department of Hospital Medicine   [facility]

## 2023-12-25 NOTE — NURSING
Pt is sitting up in bed, tolerates medications well. No acute distress noted tele sitter present for safety. Bed in lowest position call light within reach. [facility]

## 2023-12-25 NOTE — SUBJECTIVE & OBJECTIVE
Interval History: Patient seen and assessed at bedside. Afebrile, VSS. AAO to person and place. Patient with reports of mild L shoulder pain. Started on MM pain regimen, she states it is controlling her pain. Awaiting for SNF placement for discharge.       Review of Systems   Reason unable to perform ROS: dementia.     Objective:     Vital Signs (Most Recent):  Temp: 98.5 °F (36.9 °C) (12/25/23 0751)  Pulse: 80 (12/25/23 0751)  Resp: 16 (12/25/23 0831)  BP: (!) 141/81 (12/25/23 0751)  SpO2: 96 % (12/25/23 0751) Vital Signs (24h Range):  Temp:  [96.1 °F (35.6 °C)-98.5 °F (36.9 °C)] 98.5 °F (36.9 °C)  Pulse:  [76-96] 80  Resp:  [16-18] 16  SpO2:  [96 %-98 %] 96 %  BP: (132-189)/(79-92) 141/81     Weight: 89.5 kg (197 lb 5 oz)  Body mass index is 33.87 kg/m².    Intake/Output Summary (Last 24 hours) at 12/25/2023 0958  Last data filed at 12/25/2023 0552  Gross per 24 hour   Intake --   Output 550 ml   Net -550 ml         Physical Exam  Vitals and nursing note reviewed.   Constitutional:       General: She is not in acute distress.     Appearance: She is well-developed.   HENT:      Head: Normocephalic and atraumatic.      Mouth/Throat:      Pharynx: No oropharyngeal exudate.   Eyes:      Conjunctiva/sclera: Conjunctivae normal.      Pupils: Pupils are equal, round, and reactive to light.   Cardiovascular:      Rate and Rhythm: Normal rate and regular rhythm.      Heart sounds: Normal heart sounds.   Pulmonary:      Effort: Pulmonary effort is normal. No respiratory distress.      Breath sounds: Normal breath sounds. No wheezing.   Abdominal:      General: Bowel sounds are normal. There is no distension.      Palpations: Abdomen is soft.      Tenderness: There is no abdominal tenderness.   Musculoskeletal:         General: No tenderness. Normal range of motion.      Cervical back: Normal range of motion and neck supple.      Right lower leg: No edema.      Left lower leg: No edema.   Lymphadenopathy:      Cervical: No  cervical adenopathy.   Skin:     General: Skin is warm and dry.      Capillary Refill: Capillary refill takes less than 2 seconds.      Findings: No rash.      Comments: Neurofibromatosis. Well healed surgical scar to L posterior shoulder.    Neurological:      General: No focal deficit present.      Mental Status: She is alert.      Cranial Nerves: No cranial nerve deficit.      Sensory: No sensory deficit.      Motor: Weakness (generalized) present.      Coordination: Coordination normal.      Comments: Pleasantly confused - oriented to person and place this morning. 5/5 strength to extremities, but needed much assistance to sit up in bed during exam. Follows commands appropriately.              Significant Labs: All pertinent labs within the past 24 hours have been reviewed.    Significant Imaging: I have reviewed all pertinent imaging results/findings within the past 24 hours.

## 2023-12-26 PROBLEM — N39.0 UTI (URINARY TRACT INFECTION): Status: ACTIVE | Noted: 2023-12-26

## 2023-12-26 LAB
ALBUMIN SERPL BCP-MCNC: 3.4 G/DL (ref 3.5–5.2)
ALP SERPL-CCNC: 76 U/L (ref 55–135)
ALT SERPL W/O P-5'-P-CCNC: 17 U/L (ref 10–44)
ANION GAP SERPL CALC-SCNC: 8 MMOL/L (ref 8–16)
AST SERPL-CCNC: 15 U/L (ref 10–40)
BACTERIA #/AREA URNS AUTO: ABNORMAL /HPF
BACTERIA BLD CULT: NORMAL
BACTERIA BLD CULT: NORMAL
BASOPHILS # BLD AUTO: 0.02 K/UL (ref 0–0.2)
BASOPHILS NFR BLD: 0.4 % (ref 0–1.9)
BILIRUB SERPL-MCNC: 0.3 MG/DL (ref 0.1–1)
BILIRUB UR QL STRIP: NEGATIVE
BUN SERPL-MCNC: 17 MG/DL (ref 8–23)
CALCIUM SERPL-MCNC: 9.4 MG/DL (ref 8.7–10.5)
CHLORIDE SERPL-SCNC: 104 MMOL/L (ref 95–110)
CLARITY UR REFRACT.AUTO: ABNORMAL
CO2 SERPL-SCNC: 24 MMOL/L (ref 23–29)
COLOR UR AUTO: YELLOW
CREAT SERPL-MCNC: 0.8 MG/DL (ref 0.5–1.4)
DIFFERENTIAL METHOD: ABNORMAL
EOSINOPHIL # BLD AUTO: 0 K/UL (ref 0–0.5)
EOSINOPHIL NFR BLD: 0.2 % (ref 0–8)
ERYTHROCYTE [DISTWIDTH] IN BLOOD BY AUTOMATED COUNT: 14.6 % (ref 11.5–14.5)
EST. GFR  (NO RACE VARIABLE): >60 ML/MIN/1.73 M^2
GLUCOSE SERPL-MCNC: 83 MG/DL (ref 70–110)
GLUCOSE UR QL STRIP: NEGATIVE
HCT VFR BLD AUTO: 38.8 % (ref 37–48.5)
HGB BLD-MCNC: 12.9 G/DL (ref 12–16)
HGB UR QL STRIP: ABNORMAL
IMM GRANULOCYTES # BLD AUTO: 0.12 K/UL (ref 0–0.04)
IMM GRANULOCYTES NFR BLD AUTO: 2.2 % (ref 0–0.5)
KETONES UR QL STRIP: NEGATIVE
LEUKOCYTE ESTERASE UR QL STRIP: ABNORMAL
LYMPHOCYTES # BLD AUTO: 1.5 K/UL (ref 1–4.8)
LYMPHOCYTES NFR BLD: 26.2 % (ref 18–48)
MCH RBC QN AUTO: 29.7 PG (ref 27–31)
MCHC RBC AUTO-ENTMCNC: 33.2 G/DL (ref 32–36)
MCV RBC AUTO: 89 FL (ref 82–98)
MICROSCOPIC COMMENT: ABNORMAL
MONOCYTES # BLD AUTO: 0.7 K/UL (ref 0.3–1)
MONOCYTES NFR BLD: 11.7 % (ref 4–15)
NEUTROPHILS # BLD AUTO: 3.3 K/UL (ref 1.8–7.7)
NEUTROPHILS NFR BLD: 59.3 % (ref 38–73)
NITRITE UR QL STRIP: NEGATIVE
NRBC BLD-RTO: 0 /100 WBC
PH UR STRIP: 6 [PH] (ref 5–8)
PLATELET # BLD AUTO: 197 K/UL (ref 150–450)
PMV BLD AUTO: 10.4 FL (ref 9.2–12.9)
POTASSIUM SERPL-SCNC: 4.3 MMOL/L (ref 3.5–5.1)
PROT SERPL-MCNC: 6.4 G/DL (ref 6–8.4)
PROT UR QL STRIP: NEGATIVE
RBC # BLD AUTO: 4.34 M/UL (ref 4–5.4)
RBC #/AREA URNS AUTO: 3 /HPF (ref 0–4)
SODIUM SERPL-SCNC: 136 MMOL/L (ref 136–145)
SP GR UR STRIP: 1.02 (ref 1–1.03)
SQUAMOUS #/AREA URNS AUTO: 2 /HPF
URN SPEC COLLECT METH UR: ABNORMAL
WBC # BLD AUTO: 5.57 K/UL (ref 3.9–12.7)
WBC #/AREA URNS AUTO: 15 /HPF (ref 0–5)

## 2023-12-26 PROCEDURE — 25000003 PHARM REV CODE 250: Mod: HCNC

## 2023-12-26 PROCEDURE — 63600175 PHARM REV CODE 636 W HCPCS: Mod: HCNC

## 2023-12-26 PROCEDURE — 87186 SC STD MICRODIL/AGAR DIL: CPT | Mod: 59,HCNC

## 2023-12-26 PROCEDURE — 87077 CULTURE AEROBIC IDENTIFY: CPT | Mod: 59,HCNC

## 2023-12-26 PROCEDURE — 36415 COLL VENOUS BLD VENIPUNCTURE: CPT | Mod: HCNC

## 2023-12-26 PROCEDURE — 97116 GAIT TRAINING THERAPY: CPT | Mod: HCNC

## 2023-12-26 PROCEDURE — 81001 URINALYSIS AUTO W/SCOPE: CPT | Mod: HCNC

## 2023-12-26 PROCEDURE — G0378 HOSPITAL OBSERVATION PER HR: HCPCS | Mod: HCNC

## 2023-12-26 PROCEDURE — 87088 URINE BACTERIA CULTURE: CPT | Mod: HCNC

## 2023-12-26 PROCEDURE — 96372 THER/PROPH/DIAG INJ SC/IM: CPT

## 2023-12-26 PROCEDURE — 85025 COMPLETE CBC W/AUTO DIFF WBC: CPT | Mod: HCNC

## 2023-12-26 PROCEDURE — 97530 THERAPEUTIC ACTIVITIES: CPT | Mod: HCNC

## 2023-12-26 PROCEDURE — 80053 COMPREHEN METABOLIC PANEL: CPT | Mod: HCNC

## 2023-12-26 PROCEDURE — 87086 URINE CULTURE/COLONY COUNT: CPT | Mod: HCNC

## 2023-12-26 PROCEDURE — 30200315 PPD INTRADERMAL TEST REV CODE 302: Mod: HCNC | Performed by: HOSPITALIST

## 2023-12-26 PROCEDURE — 96365 THER/PROPH/DIAG IV INF INIT: CPT

## 2023-12-26 PROCEDURE — 86580 TB INTRADERMAL TEST: CPT | Mod: HCNC | Performed by: HOSPITALIST

## 2023-12-26 RX ORDER — LISINOPRIL 20 MG/1
20 TABLET ORAL DAILY
Status: DISCONTINUED | OUTPATIENT
Start: 2023-12-26 | End: 2023-12-28 | Stop reason: HOSPADM

## 2023-12-26 RX ADMIN — CITALOPRAM HYDROBROMIDE 10 MG: 10 TABLET ORAL at 08:12

## 2023-12-26 RX ADMIN — CETIRIZINE HYDROCHLORIDE 10 MG: 10 TABLET, FILM COATED ORAL at 08:12

## 2023-12-26 RX ADMIN — PANTOPRAZOLE SODIUM 40 MG: 40 TABLET, DELAYED RELEASE ORAL at 08:12

## 2023-12-26 RX ADMIN — ENOXAPARIN SODIUM 40 MG: 40 INJECTION SUBCUTANEOUS at 04:12

## 2023-12-26 RX ADMIN — OXYCODONE HYDROCHLORIDE 5 MG: 5 TABLET ORAL at 08:12

## 2023-12-26 RX ADMIN — TUBERCULIN PURIFIED PROTEIN DERIVATIVE 5 UNITS: 5 INJECTION, SOLUTION INTRADERMAL at 04:12

## 2023-12-26 RX ADMIN — Medication 1 TABLET: at 08:12

## 2023-12-26 RX ADMIN — GABAPENTIN 100 MG: 100 CAPSULE ORAL at 08:12

## 2023-12-26 RX ADMIN — OXYCODONE HYDROCHLORIDE 5 MG: 5 TABLET ORAL at 12:12

## 2023-12-26 RX ADMIN — LIDOCAINE 1 PATCH: 50 PATCH CUTANEOUS at 04:12

## 2023-12-26 RX ADMIN — BUSPIRONE HYDROCHLORIDE 10 MG: 10 TABLET ORAL at 08:12

## 2023-12-26 RX ADMIN — OXYCODONE HYDROCHLORIDE 5 MG: 5 TABLET ORAL at 05:12

## 2023-12-26 RX ADMIN — OXYBUTYNIN CHLORIDE 5 MG: 5 TABLET, EXTENDED RELEASE ORAL at 08:12

## 2023-12-26 RX ADMIN — ACETAMINOPHEN 650 MG: 325 TABLET ORAL at 03:12

## 2023-12-26 RX ADMIN — Medication 6 MG: at 08:12

## 2023-12-26 RX ADMIN — CEFTRIAXONE SODIUM 1 G: 1 INJECTION, POWDER, FOR SOLUTION INTRAMUSCULAR; INTRAVENOUS at 11:12

## 2023-12-26 RX ADMIN — LISINOPRIL 20 MG: 20 TABLET ORAL at 08:12

## 2023-12-26 RX ADMIN — Medication 250 MCG: at 08:12

## 2023-12-26 RX ADMIN — ACETAMINOPHEN 650 MG: 325 TABLET ORAL at 02:12

## 2023-12-26 RX ADMIN — THERA TABS 1 TABLET: TAB at 08:12

## 2023-12-26 NOTE — PT/OT/SLP PROGRESS
Physical Therapy   Progress Note    Patient Name:  Lesli Gong  MRN: [id number]    Admit Date: 12/20/2023  Admitting Diagnosis:  Weakness  Length of Stay: 0 days  Recent Surgery: * No surgery found *      Recommendations:     Discharge Recommendations: Moderate therapy intensity  Equipment recommendations: rolling walker  Barriers to discharge: Increased level of skilled assistance required    Justification for Walker HME   The mobility limitation cannot be sufficiently resolved by the use of a cane.   Patient's functional mobility deficit can be sufficiently resolved with the use of a walker.  Patient's mobility limitation significantly impairs their ability to participate in one of more activities of daily living.  The use of a walker will significantly improve the patients ability to participate in MRADLS and the patient will use it on regular basis in the home.      Assessment:     Lesli Gong is a 78 y.o. female admitted to [facility] on 12/20/2023 with medical diagnosis of Weakness. Pt presents with weakness, impaired endurance, impaired self care skills, impaired functional mobility, impaired balance, decreased coordination, decreased safety awareness, pain. Pt is progressing towards goals, but has not yet reached prior level of function.     Pt agreeable to therapy session. Able to perform all mobility with SBA. Ambulated in hallway with RW and requested to perform ADLs while sitting eob at end of session. Pt returned to bed with bed in chair position with bed alarm on and telesitter in view. Will continue to progress pt as tolerated.    Lesli Gong would benefit from continued acute PT intervention to improve quality of life, focus on recovery of impairments, provide patient/caregiver education, reduce fall risk, and maximize (I) and safety with functional mobility. Once medically stable, recommending pt discharge to moderate therapy intensity.      Rehab Prognosis: Good    Plan:     During this  "hospitalization, patient to be seen 4 x/week to address the identified rehab impairments via gait training, therapeutic activities, therapeutic groups, neuromuscular re-education and progress towards stated goals.     Plan of Care Expires:  01/21/23  Plan of Care reviewed with: patient    This plan of care has been discussed with the patient/caregiver, who was included in its development and is in agreement with the identified goals and treatment plan.     Subjective     Communicated with RN prior to session.  Patient found supine upon PT entry to room, agreeable to therapy session. Pt alone during session.    Patient/Family Comments/goals: "I need to get up and move"    Pain/Comfort:  Pain Rating 1: 4/10  Location - Side 1: Left  Location 1: shoulder  Pain Addressed 1: Reposition, Distraction, Cessation of Activity    Patients cultural, spiritual, [employer]ism conflicts given the current situation: None identified     Objective:     Patient found with: telemetry    General Precautions: Standard, fall   Orthopedic Precautions:N/A   Braces:     Oxygen Device: room air    Cognition:  Pt is Alert during session.    Therapist provided skilled verbal and tactile cueing to facilitate the following functional mobility tasks. Listed tasks are focused on recovery of impairments and improving pt's independence and efficiency with bed mobility, transfers and ambulation as able.     Bed Mobility:  Supine > Sit: Stand-by Assistance  Sit > Supine: Stand-by Assistance    Transfers:   Sit <> Stand Transfer: Stand-by Assistance from eob and BSC with RW AD   Bed <> Chair: Stand-by Assistance with RW AD                  Gait:  Distance: ~120 ft  Assistance level: Stand-by Assistance  Assistive Device: rolling walker  Gait Assessment: decreased step length , decreased [place], and decreased gait speed    Balance:  Dynamic Sitting: GOOD: Maintains balance through MODERATE excursions of active trunk movement  Seated ADLs with set up " assistance  Standing:  Static: GOOD: Takes MODERATE challenges from all directions   Dynamic: FAIR+: Needs CLOSE SUPERVISION during gait and is able to right self with minor LOB    Outcome Measure: AM-PAC 6 CLICK MOBILITY  Total Score:18     Patient/Caregiver Education and Additional Therapeutic Activities/Exercises     Provided pt/caregiver education regarding:   PT POC and goals for therapy   Safety with mobility and fall risk   Safe management of AD as needed   Energy conservation techniques   Instruction on use of call button and importance of calling nursing staff for assistance with mobility     Patient/caregiver able to verbalize understanding; will follow-up with pt/caregiver during current admit for additional questions/concerns within scope of practice.     White board updated.     Patient left with bed in chair position with all lines intact, call button in reach, bed alarm on, and telesitter in view .    Goals:     Multidisciplinary Problems       Physical Therapy Goals          Problem: Physical Therapy    Goal Priority Disciplines Outcome Goal Variances Interventions   Physical Therapy Goal     PT, PT/OT Ongoing, Progressing     Description: Goals to be met by: 24     Patient will increase functional independence with mobility by performin. Supine to sit with Modified [place]  2. Sit to supine with Modified [place]  3. Sit to stand transfer with Supervision  4. Bed to chair transfer with Supervision using LRAD  5. Gait  x 100 feet with Supervision using LRAD.                          Time Tracking:       PT Received On: 23  PT Start Time: 1001     PT Stop Time: 1024  PT Total Time (min): 23 min     Billable Minutes: Gait Training 8 and Therapeutic Activity 15    2023

## 2023-12-26 NOTE — PROGRESS NOTES
"[facility] [address] [place] [address] Medicine  Progress Note    Patient Name: Lesli Gong  MRN: [id number]  Patient Class: OP- Observation   Admission Date: 12/20/2023  Length of Stay: 0 days  Attending Physician: Carol Townesnd MD  Primary Care Provider: Patti Brian MD        Subjective:     Principal Problem:Weakness        HPI:  Lesli Gong is a 79 yo F with PMHx of neurofibromatosis, dementia, HTN, GIST who presented for generalized weakness. Patient pleasantly confused; only oriented to self. Family not at bedside to assist with history on my interview. Per ED note: "States that her medication was switched to tramadol approximately 6 days ago.  Since then she has had an acute decline in her mobility.  She was able to walk without any assistive devices but now needs to person support to ambulate.  Patient had a fall but patient and daughter unsure if she hit her head.  He is patient's daughter complains of foul-smelling urine."  Patient denies any complaints on my interview.     In ED: Afebrile. HDS. No leukocytosis. Hgb 16.3. CMP grossly unremarkable except AST 57. TSH wnl. UA noninfectious. L shoulder XR without acute fracture or dislocation. CT lumbar spine No acute fractures or traumatic malalignment of the lumbar spine, Lumbar spondylosis as detailed above, most pronounced at L5-S1 with moderate to severe neural foraminal narrowing, and stable L1 vertebral body hemangioma. CT head with generalized cerebral volume loss and chronic microvascular ischemic changes, but without acute process. Given IV reglan 5 mg and oxy 5 mg. Per notes, "Attempted to ambulate patient to the bathroom, patient required a 2 person assist.  At her baseline, patient walks without any assistive devices."  Placed in observation.     Overview/Hospital Course:  Lesli Gong was placed in observation for weakness. Infectious workup unremarkable. Concern for polypharmacy with tramadol and gabapentin, which are " currently held. Pt with foul-smelling urine during admission. UA infectious. Culture pending. Started on IV rocephin. PT with recommendations of low intensity therapy. OT with recommendations of moderate intensity. CM following to aid in SNF placement.     Interval History: Patient seen and assessed at bedside. Afebrile, VSS. Still with complaints of mild shoulder pain, but states it is controlled with current medications. Nursing with reports of foul-smelling urine overnight. UA infectious - started on rocephin. Denies dysuria or other urinary complaints. Urine culture pending. Awaiting SNF placement.       Review of Systems   Reason unable to perform ROS: dementia.   Constitutional:  Negative for chills and fever.   Respiratory:  Negative for chest tightness and shortness of breath.    Cardiovascular:  Negative for chest pain and leg swelling.   Gastrointestinal:  Negative for abdominal pain and nausea.   Genitourinary:  Negative for dysuria, hematuria and urgency.   Neurological:  Negative for dizziness and weakness.     Objective:     Vital Signs (Most Recent):  Temp: 97.8 °F (36.6 °C) (12/26/23 1247)  Pulse: 77 (12/26/23 1247)  Resp: 16 (12/26/23 1247)  BP: 132/81 (12/26/23 1247)  SpO2: (!) 94 % (12/26/23 1247) Vital Signs (24h Range):  Temp:  [97.5 °F (36.4 °C)-98.6 °F (37 °C)] 97.8 °F (36.6 °C)  Pulse:  [77-92] 77  Resp:  [16-18] 16  SpO2:  [94 %-97 %] 94 %  BP: (122-163)/(70-89) 132/81     Weight: 89.5 kg (197 lb 5 oz)  Body mass index is 33.87 kg/m².    Intake/Output Summary (Last 24 hours) at 12/26/2023 1408  Last data filed at 12/26/2023 0150  Gross per 24 hour   Intake --   Output 600 ml   Net -600 ml         Physical Exam  Vitals and nursing note reviewed.   Constitutional:       General: She is not in acute distress.     Appearance: She is well-developed.   HENT:      Head: Normocephalic and atraumatic.      Mouth/Throat:      Pharynx: No oropharyngeal exudate.   Eyes:      Conjunctiva/sclera:  Conjunctivae normal.      Pupils: Pupils are equal, round, and reactive to light.   Cardiovascular:      Rate and Rhythm: Normal rate and regular rhythm.      Heart sounds: Normal heart sounds.   Pulmonary:      Effort: Pulmonary effort is normal. No respiratory distress.      Breath sounds: Normal breath sounds. No wheezing.   Abdominal:      General: Bowel sounds are normal. There is no distension.      Palpations: Abdomen is soft.      Tenderness: There is no abdominal tenderness.   Musculoskeletal:         General: No tenderness. Normal range of motion.      Cervical back: Normal range of motion and neck supple.      Right lower leg: No edema.      Left lower leg: No edema.   Lymphadenopathy:      Cervical: No cervical adenopathy.   Skin:     General: Skin is warm and dry.      Capillary Refill: Capillary refill takes less than 2 seconds.      Findings: No rash.      Comments: Neurofibromatosis. Well healed surgical scar to L posterior shoulder.    Neurological:      General: No focal deficit present.      Mental Status: She is alert.      Cranial Nerves: No cranial nerve deficit.      Sensory: No sensory deficit.      Motor: Weakness (generalized) present.      Coordination: Coordination normal.      Comments: Pleasantly confused - oriented to person and place. 5/5 strength to extremities, but needed much assistance to sit up in bed during exam. Follows commands appropriately.              Significant Labs: All pertinent labs within the past 24 hours have been reviewed.  Urine Studies:   Recent Labs   Lab 12/26/23  0737   COLORU Yellow   APPEARANCEUA Cloudy*   PHUR 6.0   SPECGRAV 1.025   PROTEINUA Negative   GLUCUA Negative   KETONESU Negative   BILIRUBINUA Negative   OCCULTUA 1+*   NITRITE Negative   LEUKOCYTESUR 1+*   RBCUA 3   WBCUA 15*   BACTERIA Many*   SQUAMEPITHEL 2       Significant Imaging: I have reviewed all pertinent imaging results/findings within the past 24 hours.      Assessment/Plan:      *  Weakness  77 yo F with PMHx of neurofibromatosis, dementia, HTN, GIST who presented for generalized weakness and acute decline in mobility over the past few days. She recently started tramadol earlier this week. At baseline, does not use assistive devices for ambulation. Requiring 2 person assist to ambulate to restroom in ED.    - Afebrile and without leukocytosis. Infectious workup negative.   - CTH with chronic changes, but without acute process  - CT lumbar spine without acute fracture or dislocation  - Concern for polypharmacy. Continue holding tramadol and gabapentin  - PT/OT consulted for acute decline in mobility   -PT with recommendations for low intensity    -OT with recommendations for moderate intensity   - continue to monitor closely  - CM following to aid in SNF placement     UTI (urinary tract infection)  - foul-smelling urine on admission, UA non-infectious  - nursing with reports of foul-smelling urine during stay   - repeat UA infectious  - Urine culture pending  - started on rocephin      Obesity (BMI 30.0-34.9)  Body mass index is 33.87 kg/m². Morbid obesity complicates all aspects of disease management from diagnostic modalities to treatment. Weight loss encouraged and health benefits explained to patient.     Adjustment disorder with anxiety  Depression  - continue buspar and celexa    History of gastrointestinal stromal tumor (GIST)  S/p resection  - Per chart review, she has not followed up with heme/onc since 2020. Needs f/u on discharge    Essential hypertension  - normotensive on admit  - continue lisinopril 20 mg daily, up titrate as indicated     Neurofibromatosis, type 1  - noted      VTE Risk Mitigation (From admission, onward)           Ordered     enoxaparin injection 40 mg  Daily         12/20/23 2310     IP VTE HIGH RISK PATIENT  Once         12/20/23 2318                    Discharge Planning   PETER: 12/26/2023     Code Status: Full Code   Is the patient medically ready for  discharge?: Yes    Reason for patient still in hospital (select all that apply): Patient trending condition, Treatment, and Pending disposition  Discharge Plan A: Skilled Nursing Facility                  Ana María Resendiz PA-C  Department of Hospital Medicine   [facility]

## 2023-12-26 NOTE — PLAN OF CARE
NURSING HOME ORDERS    12/28/2023  [facility]  [facility]  [address]  [facility] [address]  Dept: [phone]  Loc: [phone]     Admit to Nursing Home:  Skilled Nursing Facility    Diagnoses:  Active Hospital Problems    Diagnosis  POA    *Weakness [R53.1]  Yes    UTI (urinary tract infection) [N39.0]  Yes    Obesity (BMI 30.0-34.9) [E66.9]  Yes    Chronic scapular pain [M89.8X1, G89.29]  Yes    Adjustment disorder with anxiety [F43.22]  Yes    History of hepatitis C, s/p successful Rx w/ SVR24 - 2/2018 [Z86.19]  Yes     Completed zepatier + RBV w/ svr        History of gastrointestinal stromal tumor (GIST) [Z85.09]  Yes    Essential hypertension [I10]  Yes    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       Patient is homebound due to:  Weakness    Allergies:  Review of patient's allergies indicates:   Allergen Reactions    Anaprox [naproxen sodium] Nausea Only and Palpitations    Motrin [ibuprofen] Nausea Only and Palpitations    Neomycin-polymyxin-hc      Itchy (skin)^    Sulfa (sulfonamide antibiotics)      Hives (skin)^       Vitals:  Routine    Diet: cardiac diet    Activities:   Activity as tolerated    Goals of Care Treatment Preferences:  Code Status: Full Code      Labs:  per facility    Nursing Precautions:  Fall and Pressure ulcer prevention    Consults:   PT to evaluate and treat- 5 times a week, OT to evaluate and treat- 5 times a week, and Nutrition to evaluate and recommend diet     Miscellaneous Care: n/a                   Diabetes Care:  n/a      Medications: Discontinue all previous medication orders, if any. See new list below.     Medication List        START taking these medications      acetaminophen 325 MG tablet  Commonly known as: TYLENOL  Take 2 tablets (650 mg total) by mouth every 4 (four) hours as needed for  Pain.        melatonin 3 mg tablet  Commonly known as: MELATIN  Take 2 tablets (6 mg total) by mouth nightly as needed for Insomnia.            CONTINUE taking these medications      * busPIRone 10 MG tablet  Commonly known as: BUSPAR  Take 1 tablet (10 mg total) by mouth 2 (two) times daily.     * busPIRone 5 MG Tab  Commonly known as: BUSPAR  TAKE 1 TO 2 TABLETS (5-10 MG TOTAL) BY MOUTH 2 (TWO) TIMES DAILY AS NEEDED (ANXIETY).     calcium-vitamin D 250 (625)-125 mg-unit per tablet  Commonly known as: OSCAL  Take 1 tablet by mouth once daily.     cetirizine 10 MG tablet  Commonly known as: ZYRTEC  Take 1 tablet (10 mg total) by mouth once daily.     citalopram 10 MG tablet  Commonly known as: CeleXA  Take 1 tablet (10 mg total) by mouth once daily.     cyanocobalamin (vitamin B-12) 50 mcg tablet  Take 50 mcg by mouth once daily.     diclofenac sodium 1 % Gel  Commonly known as: VOLTAREN  APPLY 2 G TOPICALLY 2 (TWO) TIMES DAILY AS NEEDED. TO RIGHT ARM/ ELBOW.     gabapentin 100 MG capsule  Commonly known as: NEURONTIN  Take 1 capsule (100 mg total) by mouth 2 (two) times daily.     LIDOcaine 5 %  Commonly known as: LIDODERM  Place 1 patch onto the skin once daily. Remove & Discard patch within 12 hours or as directed by MD     lisinopriL 10 MG tablet  Take 2 tablets (20 mg total) by mouth once daily.     multivitamin capsule  Take 1 capsule by mouth once daily.     omeprazole 20 MG capsule  Commonly known as: PRILOSEC  TAKE 1 CAPSULE (20 MG TOTAL) BY MOUTH ONCE DAILY. 90 DAY COURSE, WILL RESTART     oxybutynin 5 MG Tr24  Commonly known as: DITROPAN-XL  Take 1 tablet (5 mg total) by mouth once daily.                * This list has 2 medication(s) that are the same as other medications prescribed for you. Read the directions carefully, and ask your doctor or other care provider to review them with you.                STOP taking these medications      cane Amanda                Immunizations Administered as of  12/28/2023       Name Date Dose VIS Date Route Exp Date    COVID-19, MRNA, LN-S, PF (Moderna) 3/18/2021 -- -- -- --    Site: Right arm     Lot: [id number]     COVID-19, MRNA, LN-S, PF (Moderna) 2/18/2021 -- -- Intramuscular --    Site: Left arm     Lot: [id number]     COVID-19, MRNA, LN-S, PF (Pfizer) (Purple Cap) 11/12/2021  2:45 PM 0.3 mL 12/12/2020 Intramuscular 1/31/2022    Site: Right deltoid     Given By: Joann Cruz, RN     : [employer]     Lot: [id number]             This patient has had both covid vaccinations    Some patients may experience side effects after vaccination.  These may include fever, headache, muscle or joint aches.  Most symptoms resolve with 24-48 hours and do not require urgent medical evaluation unless they persist for more than 72 hours or symptoms are concerning for an unrelated medical condition.          _________________________________  Carol Brown MD  12/28/2023

## 2023-12-26 NOTE — PLAN OF CARE
Pt accepted by [facility]. Pt chose [facility] . Pt's daughter's information given to [facility] via [facility]. 142/passr uploaded into [facility], PPD and CXR ordered.      12/26/23 3100   Post-Acute Status   Post-Acute Placement Status Pending post-acute provider review/more information requested   Discharge Plan   Discharge Plan A Skilled Nursing Facility   Discharge Plan B Skilled Nursing Facility     Jules George RN,BSN

## 2023-12-26 NOTE — ASSESSMENT & PLAN NOTE
- foul-smelling urine on admission, UA non-infectious  - nursing with reports of foul-smelling urine during stay   - repeat UA infectious  - Urine culture pending  - started on rocephin

## 2023-12-26 NOTE — ASSESSMENT & PLAN NOTE
77 yo F with PMHx of neurofibromatosis, dementia, HTN, GIST who presented for generalized weakness and acute decline in mobility over the past few days. She recently started tramadol earlier this week. At baseline, does not use assistive devices for ambulation. Requiring 2 person assist to ambulate to restroom in ED.    - Afebrile and without leukocytosis. Infectious workup negative.   - CTH with chronic changes, but without acute process  - CT lumbar spine without acute fracture or dislocation  - Concern for polypharmacy. Continue holding tramadol and gabapentin  - PT/OT consulted for acute decline in mobility   -PT with recommendations for low intensity    -OT with recommendations for moderate intensity   - continue to monitor closely  - CM following to aid in SNF placement

## 2023-12-26 NOTE — SUBJECTIVE & OBJECTIVE
Interval History: Patient seen and assessed at bedside. Afebrile, VSS. Still with complaints of mild shoulder pain, but states it is controlled with current medications. Nursing with reports of foul-smelling urine overnight. UA infectious - started on rocephin. Denies dysuria or other urinary complaints. Urine culture pending. Awaiting SNF placement.       Review of Systems   Reason unable to perform ROS: dementia.   Constitutional:  Negative for chills and fever.   Respiratory:  Negative for chest tightness and shortness of breath.    Cardiovascular:  Negative for chest pain and leg swelling.   Gastrointestinal:  Negative for abdominal pain and nausea.   Genitourinary:  Negative for dysuria, hematuria and urgency.   Neurological:  Negative for dizziness and weakness.     Objective:     Vital Signs (Most Recent):  Temp: 97.8 °F (36.6 °C) (12/26/23 1247)  Pulse: 77 (12/26/23 1247)  Resp: 16 (12/26/23 1247)  BP: 132/81 (12/26/23 1247)  SpO2: (!) 94 % (12/26/23 1247) Vital Signs (24h Range):  Temp:  [97.5 °F (36.4 °C)-98.6 °F (37 °C)] 97.8 °F (36.6 °C)  Pulse:  [77-92] 77  Resp:  [16-18] 16  SpO2:  [94 %-97 %] 94 %  BP: (122-163)/(70-89) 132/81     Weight: 89.5 kg (197 lb 5 oz)  Body mass index is 33.87 kg/m².    Intake/Output Summary (Last 24 hours) at 12/26/2023 1408  Last data filed at 12/26/2023 0150  Gross per 24 hour   Intake --   Output 600 ml   Net -600 ml         Physical Exam  Vitals and nursing note reviewed.   Constitutional:       General: She is not in acute distress.     Appearance: She is well-developed.   HENT:      Head: Normocephalic and atraumatic.      Mouth/Throat:      Pharynx: No oropharyngeal exudate.   Eyes:      Conjunctiva/sclera: Conjunctivae normal.      Pupils: Pupils are equal, round, and reactive to light.   Cardiovascular:      Rate and Rhythm: Normal rate and regular rhythm.      Heart sounds: Normal heart sounds.   Pulmonary:      Effort: Pulmonary effort is normal. No respiratory  distress.      Breath sounds: Normal breath sounds. No wheezing.   Abdominal:      General: Bowel sounds are normal. There is no distension.      Palpations: Abdomen is soft.      Tenderness: There is no abdominal tenderness.   Musculoskeletal:         General: No tenderness. Normal range of motion.      Cervical back: Normal range of motion and neck supple.      Right lower leg: No edema.      Left lower leg: No edema.   Lymphadenopathy:      Cervical: No cervical adenopathy.   Skin:     General: Skin is warm and dry.      Capillary Refill: Capillary refill takes less than 2 seconds.      Findings: No rash.      Comments: Neurofibromatosis. Well healed surgical scar to L posterior shoulder.    Neurological:      General: No focal deficit present.      Mental Status: She is alert.      Cranial Nerves: No cranial nerve deficit.      Sensory: No sensory deficit.      Motor: Weakness (generalized) present.      Coordination: Coordination normal.      Comments: Pleasantly confused - oriented to person and place. 5/5 strength to extremities, but needed much assistance to sit up in bed during exam. Follows commands appropriately.              Significant Labs: All pertinent labs within the past 24 hours have been reviewed.  Urine Studies:   Recent Labs   Lab 12/26/23  0737   COLORU Yellow   APPEARANCEUA Cloudy*   PHUR 6.0   SPECGRAV 1.025   PROTEINUA Negative   GLUCUA Negative   KETONESU Negative   BILIRUBINUA Negative   OCCULTUA 1+*   NITRITE Negative   LEUKOCYTESUR 1+*   RBCUA 3   WBCUA 15*   BACTERIA Many*   SQUAMEPITHEL 2       Significant Imaging: I have reviewed all pertinent imaging results/findings within the past 24 hours.

## 2023-12-27 ENCOUNTER — TELEPHONE (OUTPATIENT)
Dept: PAIN MEDICINE | Facility: CLINIC | Age: 78
End: 2023-12-27
Payer: MEDICARE

## 2023-12-27 LAB
ALBUMIN SERPL BCP-MCNC: 3.7 G/DL (ref 3.5–5.2)
ALP SERPL-CCNC: 85 U/L (ref 55–135)
ALT SERPL W/O P-5'-P-CCNC: 19 U/L (ref 10–44)
ANION GAP SERPL CALC-SCNC: 9 MMOL/L (ref 8–16)
AST SERPL-CCNC: 16 U/L (ref 10–40)
BASOPHILS # BLD AUTO: 0.03 K/UL (ref 0–0.2)
BASOPHILS NFR BLD: 0.5 % (ref 0–1.9)
BILIRUB SERPL-MCNC: 0.4 MG/DL (ref 0.1–1)
BUN SERPL-MCNC: 14 MG/DL (ref 8–23)
CALCIUM SERPL-MCNC: 9.5 MG/DL (ref 8.7–10.5)
CHLORIDE SERPL-SCNC: 104 MMOL/L (ref 95–110)
CO2 SERPL-SCNC: 22 MMOL/L (ref 23–29)
CREAT SERPL-MCNC: 0.7 MG/DL (ref 0.5–1.4)
DIFFERENTIAL METHOD: ABNORMAL
EOSINOPHIL # BLD AUTO: 0 K/UL (ref 0–0.5)
EOSINOPHIL NFR BLD: 0.4 % (ref 0–8)
ERYTHROCYTE [DISTWIDTH] IN BLOOD BY AUTOMATED COUNT: 14.5 % (ref 11.5–14.5)
EST. GFR  (NO RACE VARIABLE): >60 ML/MIN/1.73 M^2
GLUCOSE SERPL-MCNC: 95 MG/DL (ref 70–110)
HCT VFR BLD AUTO: 42.1 % (ref 37–48.5)
HGB BLD-MCNC: 14 G/DL (ref 12–16)
IMM GRANULOCYTES # BLD AUTO: 0.11 K/UL (ref 0–0.04)
IMM GRANULOCYTES NFR BLD AUTO: 2 % (ref 0–0.5)
LYMPHOCYTES # BLD AUTO: 1.4 K/UL (ref 1–4.8)
LYMPHOCYTES NFR BLD: 24.2 % (ref 18–48)
MCH RBC QN AUTO: 29.4 PG (ref 27–31)
MCHC RBC AUTO-ENTMCNC: 33.3 G/DL (ref 32–36)
MCV RBC AUTO: 88 FL (ref 82–98)
MONOCYTES # BLD AUTO: 0.6 K/UL (ref 0.3–1)
MONOCYTES NFR BLD: 11.4 % (ref 4–15)
NEUTROPHILS # BLD AUTO: 3.4 K/UL (ref 1.8–7.7)
NEUTROPHILS NFR BLD: 61.5 % (ref 38–73)
NRBC BLD-RTO: 0 /100 WBC
PLATELET # BLD AUTO: 206 K/UL (ref 150–450)
PMV BLD AUTO: 9.9 FL (ref 9.2–12.9)
POCT GLUCOSE: 97 MG/DL (ref 70–110)
POTASSIUM SERPL-SCNC: 4.4 MMOL/L (ref 3.5–5.1)
PROT SERPL-MCNC: 6.9 G/DL (ref 6–8.4)
RBC # BLD AUTO: 4.77 M/UL (ref 4–5.4)
SODIUM SERPL-SCNC: 135 MMOL/L (ref 136–145)
WBC # BLD AUTO: 5.59 K/UL (ref 3.9–12.7)

## 2023-12-27 PROCEDURE — 85025 COMPLETE CBC W/AUTO DIFF WBC: CPT | Mod: HCNC

## 2023-12-27 PROCEDURE — 96366 THER/PROPH/DIAG IV INF ADDON: CPT

## 2023-12-27 PROCEDURE — 96372 THER/PROPH/DIAG INJ SC/IM: CPT

## 2023-12-27 PROCEDURE — 63600175 PHARM REV CODE 636 W HCPCS: Mod: HCNC

## 2023-12-27 PROCEDURE — G0378 HOSPITAL OBSERVATION PER HR: HCPCS | Mod: HCNC

## 2023-12-27 PROCEDURE — 36415 COLL VENOUS BLD VENIPUNCTURE: CPT | Mod: HCNC

## 2023-12-27 PROCEDURE — 25000003 PHARM REV CODE 250: Mod: HCNC

## 2023-12-27 PROCEDURE — 80053 COMPREHEN METABOLIC PANEL: CPT | Mod: HCNC

## 2023-12-27 PROCEDURE — 97530 THERAPEUTIC ACTIVITIES: CPT | Mod: HCNC,CO

## 2023-12-27 PROCEDURE — 97116 GAIT TRAINING THERAPY: CPT | Mod: HCNC

## 2023-12-27 RX ORDER — TALC
6 POWDER (GRAM) TOPICAL NIGHTLY PRN
Refills: 0
Start: 2023-12-27

## 2023-12-27 RX ORDER — CEFPODOXIME PROXETIL 100 MG/1
100 TABLET, FILM COATED ORAL 2 TIMES DAILY
Start: 2023-12-27

## 2023-12-27 RX ORDER — ACETAMINOPHEN 325 MG/1
650 TABLET ORAL EVERY 4 HOURS PRN
Refills: 0
Start: 2023-12-27

## 2023-12-27 RX ADMIN — GABAPENTIN 100 MG: 100 CAPSULE ORAL at 08:12

## 2023-12-27 RX ADMIN — Medication 250 MCG: at 09:12

## 2023-12-27 RX ADMIN — Medication 1 TABLET: at 09:12

## 2023-12-27 RX ADMIN — ENOXAPARIN SODIUM 40 MG: 40 INJECTION SUBCUTANEOUS at 06:12

## 2023-12-27 RX ADMIN — CITALOPRAM HYDROBROMIDE 10 MG: 10 TABLET ORAL at 09:12

## 2023-12-27 RX ADMIN — Medication 1 TABLET: at 08:12

## 2023-12-27 RX ADMIN — GABAPENTIN 100 MG: 100 CAPSULE ORAL at 09:12

## 2023-12-27 RX ADMIN — LISINOPRIL 20 MG: 20 TABLET ORAL at 09:12

## 2023-12-27 RX ADMIN — CEFTRIAXONE SODIUM 1 G: 1 INJECTION, POWDER, FOR SOLUTION INTRAMUSCULAR; INTRAVENOUS at 10:12

## 2023-12-27 RX ADMIN — THERA TABS 1 TABLET: TAB at 09:12

## 2023-12-27 RX ADMIN — CETIRIZINE HYDROCHLORIDE 10 MG: 10 TABLET, FILM COATED ORAL at 08:12

## 2023-12-27 RX ADMIN — ACETAMINOPHEN 650 MG: 325 TABLET ORAL at 06:12

## 2023-12-27 RX ADMIN — BUSPIRONE HYDROCHLORIDE 10 MG: 10 TABLET ORAL at 09:12

## 2023-12-27 RX ADMIN — Medication 6 MG: at 08:12

## 2023-12-27 RX ADMIN — OXYCODONE HYDROCHLORIDE 5 MG: 5 TABLET ORAL at 02:12

## 2023-12-27 RX ADMIN — OXYBUTYNIN CHLORIDE 5 MG: 5 TABLET, EXTENDED RELEASE ORAL at 09:12

## 2023-12-27 RX ADMIN — BUSPIRONE HYDROCHLORIDE 10 MG: 10 TABLET ORAL at 08:12

## 2023-12-27 RX ADMIN — PANTOPRAZOLE SODIUM 40 MG: 40 TABLET, DELAYED RELEASE ORAL at 09:12

## 2023-12-27 RX ADMIN — LIDOCAINE 1 PATCH: 50 PATCH CUTANEOUS at 06:12

## 2023-12-27 NOTE — PLAN OF CARE
Problem: Adult Inpatient Plan of Care  Goal: Plan of Care Review  12/27/2023 0552 by Patti Frank LPN  Outcome: Ongoing, Progressing  12/27/2023 0552 by Patti Frank LPN  Outcome: Ongoing, Progressing  Goal: Patient-Specific Goal (Individualized)  Outcome: Ongoing, Progressing  Goal: Absence of Hospital-Acquired Illness or Injury  12/27/2023 0552 by Patti Frank LPN  Outcome: Ongoing, Progressing  12/27/2023 0552 by Patti Frank LPN  Outcome: Ongoing, Progressing  Goal: Optimal Comfort and Wellbeing  12/27/2023 0552 by Patti Frank LPN  Outcome: Ongoing, Progressing  12/27/2023 0552 by Patti Frank LPN  Outcome: Ongoing, Progressing  Goal: Readiness for Transition of Care  12/27/2023 0552 by Patti Frank LPN  Outcome: Ongoing, Progressing  12/27/2023 0552 by Patti Frank LPN  Outcome: Ongoing, Progressing     Problem: Infection  Goal: Absence of Infection Signs and Symptoms  12/27/2023 0552 by Patti Frank LPN  Outcome: Ongoing, Progressing  12/27/2023 0552 by Patti Frank LPN  Outcome: Ongoing, Progressing     Problem: Skin Injury Risk Increased  Goal: Skin Health and Integrity  12/27/2023 0552 by Patti Frank LPN  Outcome: Ongoing, Progressing  12/27/2023 0552 by Patti Frank LPN  Outcome: Ongoing, Progressing     Problem: Fall Injury Risk  Goal: Absence of Fall and Fall-Related Injury  12/27/2023 0552 by Patti Frank LPN  Outcome: Ongoing, Progressing  12/27/2023 0552 by Patti Frank LPN  Outcome: Ongoing, Progressing     Problem: Confusion Acute  Goal: Optimal Cognitive Function  12/27/2023 0552 by Patti Frank LPN  Outcome: Ongoing, Progressing  12/27/2023 0552 by Patti Frank LPN  Outcome: Ongoing, Progressing     Problem: Behavioral Health Comorbidity  Goal: Maintenance of Behavioral Health Symptom Control  12/27/2023 0552 by Patti Frank LPN  Outcome: Ongoing, Progressing  12/27/2023 0552 by Patti Frank LPN  Outcome: Ongoing, Progressing     Problem:  Hypertension Comorbidity  Goal: Blood Pressure in Desired Range  12/27/2023 0552 by Patti Frank LPN  Outcome: Ongoing, Progressing  12/27/2023 0552 by Patti Frank LPN  Outcome: Ongoing, Progressing     Problem: Pain Chronic (Persistent) (Comorbidity Management)  Goal: Acceptable Pain Control and Functional Ability  12/27/2023 0552 by Patti Frank LPN  Outcome: Ongoing, Progressing  12/27/2023 0552 by Patti Frank LPN  Outcome: Ongoing, Progressing

## 2023-12-27 NOTE — DISCHARGE SUMMARY
"[address] - Observation [address] [place] [address] Medicine  Discharge Summary      Patient Name: Lesli Gong  MRN: [id number]  JUNO: [id number]  Patient Class: OP- Observation  Admission Date: 12/20/2023  Hospital Length of Stay: 7 days  Discharge Date and Time:  12/27/2023 3:19 PM  Attending Physician: Carol Townsend MD   Discharging Provider: Carol Townsend MD  Primary Care Provider: Patti Brian MD  [facility] Medicine Team: [facility] MED O Carol Townsend MD  Primary Care Team: [facility] O    HPI:   Lesli Gong is a 79 yo F with PMHx of neurofibromatosis, dementia, HTN, GIST who presented for generalized weakness. Patient pleasantly confused; only oriented to self. Family not at bedside to assist with history on my interview. Per ED note: "States that her medication was switched to tramadol approximately 6 days ago.  Since then she has had an acute decline in her mobility.  She was able to walk without any assistive devices but now needs to person support to ambulate.  Patient had a fall but patient and daughter unsure if she hit her head.  He is patient's daughter complains of foul-smelling urine."  Patient denies any complaints on my interview.     In ED: Afebrile. HDS. No leukocytosis. Hgb 16.3. CMP grossly unremarkable except AST 57. TSH wnl. UA noninfectious. L shoulder XR without acute fracture or dislocation. CT lumbar spine No acute fractures or traumatic malalignment of the lumbar spine, Lumbar spondylosis as detailed above, most pronounced at L5-S1 with moderate to severe neural foraminal narrowing, and stable L1 vertebral body hemangioma. CT head with generalized cerebral volume loss and chronic microvascular ischemic changes, but without acute process. Given IV reglan 5 mg and oxy 5 mg. Per notes, "Attempted to ambulate patient to the bathroom, patient required a 2 person assist.  At her baseline, patient walks without any assistive devices."  Placed in observation.     * No surgery found *  "     Hospital Course:   Lesli Gong was placed in observation for weakness. Infectious workup unremarkable. Concern for polypharmacy with tramadol & methocarbamol which was discontinued. Pt noted to have foul-smelling urine later in the admission. No systemic symptoms. UCx with GNR's, no h/o of resistance, CTX > cefpodoxime.  Emphasized conservative mgmt for chronic pain: tylenol, lidocaine, topicals, home gabapentin. Discharged to SNF.      Goals of Care Treatment Preferences:  Code Status: Full Code      Consults:     No new Assessment & Plan notes have been filed under this hospital service since the last note was generated.  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] 07/30/2014 Not Applicable    Neurofibromatosis, type 1 [Q85.01] 07/30/2014 Yes      Problems Resolved During this Admission:       Discharged Condition: stable    Disposition: Skilled Nursing Facility    Follow Up:    Patient Instructions:      Ambulatory referral/consult to Neurology   Standing Status: Future   Referral Priority: Urgent Referral Type: Consultation   Referral Reason: Specialty Services Required   Requested Specialty: Neurology   Number of Visits Requested: 1       Significant Diagnostic Studies: N/A    Pending Diagnostic Studies:       Procedure Component Value Units Date/Time    Comprehensive metabolic panel [[id number]] Collected: 12/21/23 0844    Order Status:  Sent Lab Status: In process Updated: 12/21/23 9822    Specimen: Blood            Medications:  Reconciled Home Medications:      Medication List        START taking these medications      acetaminophen 325 MG tablet  Commonly known as: TYLENOL  Take 2 tablets (650 mg total) by mouth every 4 (four) hours as needed for Pain.     cefpodoxime 100 MG tablet  Commonly known as: VANTIN  Take 1 tablet (100 mg total) by mouth 2 (two) times daily.     melatonin 3 mg tablet  Commonly known as: MELATIN  Take 2 tablets (6 mg total) by mouth nightly as needed for Insomnia.            CONTINUE taking these medications      * busPIRone 10 MG tablet  Commonly known as: BUSPAR  Take 1 tablet (10 mg total) by mouth 2 (two) times daily.     * busPIRone 5 MG Tab  Commonly known as: BUSPAR  TAKE 1 TO 2 TABLETS (5-10 MG TOTAL) BY MOUTH 2 (TWO) TIMES DAILY AS NEEDED (ANXIETY).     calcium-vitamin D 250 (625)-125 mg-unit per tablet  Commonly known as: OSCAL  Take 1 tablet by mouth once daily.     cetirizine 10 MG tablet  Commonly known as: ZYRTEC  Take 1 tablet (10 mg total) by mouth once daily.     citalopram 10 MG tablet  Commonly known as: CeleXA  Take 1 tablet (10 mg total) by mouth once daily.     cyanocobalamin (vitamin B-12) 50 mcg tablet  Take 50 mcg by mouth once daily.     diclofenac sodium 1 % Gel  Commonly known as: VOLTAREN  APPLY 2 G TOPICALLY 2 (TWO) TIMES DAILY AS NEEDED. TO RIGHT ARM/ ELBOW.     gabapentin 100 MG capsule  Commonly known as: NEURONTIN  Take 1 capsule (100 mg total) by mouth 2 (two) times daily.     LIDOcaine 5 %  Commonly known as: LIDODERM  Place 1 patch onto the skin once daily. Remove & Discard patch within 12 hours or as directed by MD     lisinopriL 10 MG tablet  Take 2 tablets (20 mg total) by mouth once daily.     multivitamin capsule  Take 1 capsule by mouth once daily.     omeprazole 20 MG capsule  Commonly known as: PRILOSEC  TAKE 1 CAPSULE (20 MG TOTAL) BY MOUTH ONCE DAILY. 90 DAY COURSE, WILL  RESTART     oxybutynin 5 MG Tr24  Commonly known as: DITROPAN-XL  Take 1 tablet (5 mg total) by mouth once daily.           * This list has 2 medication(s) that are the same as other medications prescribed for you. Read the directions carefully, and ask your doctor or other care provider to review them with you.                STOP taking these medications      cane Amanda     traMADoL 50 mg tablet  Commonly known as: ULTRAM              Indwelling Lines/Drains at time of discharge:   Lines/Drains/Airways       None                   Time spent on the discharge of patient: 35 minutes         Carol Townsend MD  Department of Hospital Medicine  [facility] - Observation 11H

## 2023-12-27 NOTE — TELEPHONE ENCOUNTER
"----- Message from Jesenia Frias sent at 12/27/2023 10:39 AM CST -----  Regarding: Call back  "Type:  Patient Call Back    Who Called:Beata Gong(Daughter)    What is the reqeust in detail:Requesting all back. Please advise    Can the clinic reply by [facility]?no     Best Call Back Number:[phone]      Additional Information:Daughter says her mother is in Hospital for two weeks and would like to speak with nurse.Daughter says its very important for call back today            "

## 2023-12-27 NOTE — PLAN OF CARE
12/27/23 1212   Post-Acute Status   Post-Acute Authorization Placement   Post-Acute Placement Status Pending post-acute provider review/more information requested     JAKI sent updated orders via [employer] for review to [facility].    JAKI followed up with the facility to see if they have everything to admit pt today. JAKI was informed that pt's daughter still has to come in to complete paperwork, and they will reach out to her today to get that done.    SW will continue to follow up.    UPDATE    SW followed up with admissions who reported that pt's daughter completed paperwork at 3:50PM; however, they have not had an opportunity to review the updated documentation that JAKI sent via [employer] earlier, and pt will have to admit to the facility in the morning.    AJKI updated the medical team of the conversation with admissions.      Kaylynn Whiteside LMSW  [facility]  Ext. [phone]

## 2023-12-27 NOTE — TELEPHONE ENCOUNTER
Patients daughter stated that her mother has been In the hosptial for 2 weeks. First she verbalized that the tramadol worked and then she stated she found her mother on the floor and called the ambulance. Patient daughter has also stated that the ER has been giving her oxycodone and is wanting to know if she can start with oxycodone from . staff informed patient that with any medication change, it requires an appointment. Her daughter also verbalized that  informed her to call if the tramadol did not work. Patient daughter would prefer a call from .

## 2023-12-27 NOTE — PT/OT/SLP PROGRESS
Physical Therapy Treatment    Patient Name:  Lesli Gong   MRN:  [id number]  Admitting Diagnosis: Weakness  Recent Surgery: * No surgery found *      Recommendations:     Discharge Recommendations:  Moderate intensity therapy at discharge   Discharge Equipment Recommendations: bedside commode, walker, rolling   DME Justification: Patient demonstrates a mobility limitation that significantly impairs their ability to participate in one or more mobility related activities of daily living. Patient's mobility limitation cannot be sufficiently resolved with the use of a cane, but can be sufficiently resolved with the use of a rolling walker.The use of a rolling walker will considerably improve their ability to participate in MRADLs. Patient will use the walker on a regular basis at home.   Patient has a mobility limitation that significantly impairs their ability to participate in one or more mobility related activities of daily living, including toileting. This deficit can be resolved by using a bedside commode. Patient demonstrates mobility limitations that will cause them to be confined to one room at home, and a bathroom will not be accessible. Using a bedside commode will greatly improve the patient's ability to participate in MRADLs.  Barriers to discharge: None    Highest Level of Mobility: transfer to chair  Assistance Needed: minimal assistance    Assessment:     Lesli Gong is a 78 y.o. female admitted with a medical diagnosis of Weakness. Patient tolerated PT treatment well today. She is most limited today by weakness.  She was able to practice bed mobility, standing, transfers, and took a few steps. She did not want to walk today. Focus of treatment was improving overall mobility. Patient is progressing well. Patient continues to demonstrate progression to warrant moderate intensity therapy evidenced by objectives noted below.  See detailed treatment note below:    Problem List: weakness, impaired  "endurance, impaired self care skills, impaired functional mobility, gait instability, impaired balance, decreased coordination, decreased safety awareness, pain, decreased ROM. weakness, impaired endurance, impaired self care skills, impaired functional mobility, gait instability, impaired balance, decreased coordination, decreased safety awareness, pain, decreased ROM  Rehab Prognosis: Good     GOALS:   Multidisciplinary Problems       Physical Therapy Goals          Problem: Physical Therapy    Goal Priority Disciplines Outcome Goal Variances Interventions   Physical Therapy Goal     PT, PT/OT Ongoing, Progressing     Description: Goals to be met by: 24     Patient will increase functional independence with mobility by performin. Supine to sit with Modified Exchange  2. Sit to supine with Modified Exchange  3. Sit to stand transfer with Supervision  4. Bed to chair transfer with Supervision using LRAD  5. Gait  x 100 feet with Supervision using LRAD.                        Plan:     Goals for next session: increase gait distance and quality    During this hospitalization, patient to be seen 4 x/week to address the listed problems via gait training, therapeutic exercises, therapeutic activities, neuromuscular re-education  Plan of Care Expires:  23  Plan of Care Reviewed with: patient    Subjective     Communicated with RN prior to session.  Patient found resting in bed upon PT entry to room.   "I'm cold"    Pain/Comfort:  Pain Rating 1:  (did not rate)  Location - Side 1: Left  Location - Orientation 1: generalized  Location 1: shoulder  Pain Addressed 1: Reposition, Distraction  Pain Rating Post-Intervention 1:  (did not rate)    Objective:     Patient found with: telemetry   Mental Status: Patient is Alert and Cooperative during session.    General Precautions: Standard, fall   Orthopedic Precautions:N/A   Braces: N/A   Respiratory Status: room air  Vital Signs (Most Recent):    Temp: " 97.8 °F (36.6 °C) (12/27/23 0818)  Pulse: 83 (12/27/23 0818)  Resp: 19 (12/27/23 0818)  BP: (!) 156/86 (12/27/23 0818)  SpO2: 96 % (12/27/23 0818)    Functional Mobility:  Bed Mobility:   Supine to Sit: stand by assistance  Scooting anteriorly to EOB to have both feet planted on floor: stand by assistance    Sitting Balance at Edge of Bed:  Assistance Level Required: standy by assistance  Postural deviations noted: rounded shoulders and forward head  Cueing provided for: upright and midline sitting posture    Transfers:   Sit <> Stand Transfer: minimum assistance with no assistive device   Bed <> Chair Transfer: Step Transfer technique with minimum assistance with no assistive device      Gait:  Patient ambulated: steps during transfer   Patient required: minimal assist  Patient used:  No Assistive Device  Gait Deviation(s): decreased speed, decreased step length, shuffling pattern, generalized instability, and narrow base of support  Cueing provided for: step sequencing, upright posture, and adequate step length    Education:  Patient was educated on the following:  Progress of PT goals and plan of care  In room safety and use of call button  Importance of continued upright mobility and exercise    Patient left up in chair with all lines intact, call button in reach, and RN notified.    AM-PAC 6 CLICK MOBILITY  Turning over in bed (including adjusting bedclothes, sheets and blankets)?: 4  Sitting down on and standing up from a chair with arms (e.g., wheelchair, bedside commode, etc.): 3  Moving from lying on back to sitting on the side of the bed?: 4  Moving to and from a bed to a chair (including a wheelchair)?: 3  Need to walk in hospital room?: 3  Climbing 3-5 steps with a railing?: 3  Basic Mobility Total Score: 20       Time Tracking:     PT Received On: 12/27/23  PT Start Time: 0830     PT Stop Time: 0843  PT Total Time (min): 13 min     Billable Minutes:   Gait Training 13    Treatment Type:  Treatment  PT/PTA: PT       Reyna Noel, PT, DPT  12/27/2023

## 2023-12-27 NOTE — NURSING
Pt alert and orientated to self only. No distress noted. Bed in lowest position. Side rails up x2. Call bell and personal belongs within reach. Safety precautions maintained. Tele sitter in place. Pt free of falls or injuries. No further issues or concerns at this time. Plan of care continues

## 2023-12-27 NOTE — PT/OT/SLP PROGRESS
Occupational Therapy   Treatment    Name: Lesli Gong  MRN: [id number]  Admitting Diagnosis:  Weakness       Recommendations:     Discharge Recommendations: Moderate Intensity Therapy  Discharge Equipment Recommendations:  bedside commode, walker, rolling  Barriers to discharge:       Assessment:     Lesli Gong is a 78 y.o. female with a medical diagnosis of Weakness.  She presents with the following performance deficits affecting function are weakness, gait instability, impaired endurance, impaired balance, decreased safety awareness, impaired self care skills, impaired functional mobility, decreased coordination.     Pt very pleasant and willing to participate. She demonstrates some mild confusion with the day of the week. She demonstrates some unsteadiness with her standing balance while performing grooming task at bathroom sink. She does appears to be progressing well with OT.     Rehab Prognosis:  Good; patient would benefit from acute skilled OT services to address these deficits and reach maximum level of function.       Plan:     Patient to be seen 4 x/week to address the above listed problems via self-care/home management, therapeutic activities, therapeutic exercises, neuromuscular re-education  Plan of Care Expires: 01/21/24  Plan of Care Reviewed with: patient    Subjective     Chief Complaint: Can I get the doctor to take this out? (Referring to peripheral IV R UE  )  Patient/Family Comments/goals: Pt agreeable to tx session  Pain/Comfort:  Pain Rating 1: 0/10    Objective:     Communicated with: Nurse  prior to session. Patient found supine with pulse ox (continuous), telemetry, peripheral IV (telesitter and sitter present in room) upon OT entry to room. Sitter reports she is in the room with the pt due to pt getting OOB without assistance although telesitter is in the room.   A client care conference was completed by the OTR and the SHELTON prior to treatment by the SHELTON to discuss the patient's  POC and current status.   General Precautions: Standard, fall    Orthopedic Precautions:N/A  Braces: N/A  Respiratory Status: Room air     Occupational Performance:     Bed Mobility:    Patient completed Supine to Sit with stand by assistance   Patient completed Scooting EOB  with stand by assistance    Functional Mobility/Transfers:  Patient completed Sit <> Stand Transfer with contact guard assistance  with  rolling walker   Functional Mobility: Pt performed functional mobility with RW CGA and verbal cueing for RW management to bathroom sink and in-room mobility.   Pt performed stand>sit with RW CGA into bed side chair.     Activities of Daily Living:  Pt donned gown seated EOB with min A to cover back.  Grooming: pt performed washing face with wash cloth in standing at bathroom sink with contact guard assistance and RW. She demonstrated leaning against wall during grooming task and able to weight shift to center with RW CGA.      [facility] 6 Click ADL: 19    Treatment & Education:  Pt completed bed mobility, functional mobility, transfers and ADLs as documented above.  Pt performed B scapula elevation ~10 reps, bench press holding pillow x5, B forearm sup/pronation ~5 reps , B wrist flex/ext ~5 reps, and B wrist circumduction seated in chair.    Pt educated and instructed on the following:  RW management and safety  Using call bell for all assistance including required assistance for OOB mobility to decrease risk for falls and safety    Patient left up in chair with all lines intact, call button in reach, and sitter present    GOALS:   Multidisciplinary Problems       Occupational Therapy Goals          Problem: Occupational Therapy    Goal Priority Disciplines Outcome Interventions   Occupational Therapy Goal     OT, PT/OT Ongoing, Progressing    Description: Goals to be met by: 12/28/2023     Patient will increase functional independence with ADLs by performing:    UE Dressing with Set-up Assistance.  LE Dressing  with Minimal Assistance.  Grooming while standing at sink with Contact Guard Assistance. MET 12/27/2023  Toileting from toilet with Minimal Assistance for hygiene and clothing management.   Toilet transfer to toilet with Contact Guard Assistance.                         Time Tracking:     OT Date of Treatment: 12/27/23  OT Start Time: 1043  OT Stop Time: 1059  OT Total Time (min): 16 min    Billable Minutes:Therapeutic Activity 16    OT/ELIZABETH: ELIZABETH     Number of ELIZABETH visits since last OT visit: 1    12/27/2023

## 2023-12-28 VITALS
WEIGHT: 197.31 LBS | BODY MASS INDEX: 33.69 KG/M2 | HEIGHT: 64 IN | TEMPERATURE: 98 F | HEART RATE: 79 BPM | OXYGEN SATURATION: 93 % | SYSTOLIC BLOOD PRESSURE: 119 MMHG | DIASTOLIC BLOOD PRESSURE: 81 MMHG | RESPIRATION RATE: 16 BRPM

## 2023-12-28 LAB — BACTERIA UR CULT: ABNORMAL

## 2023-12-28 PROCEDURE — G0378 HOSPITAL OBSERVATION PER HR: HCPCS | Mod: HCNC

## 2023-12-28 PROCEDURE — 25000003 PHARM REV CODE 250: Mod: HCNC

## 2023-12-28 RX ADMIN — OXYBUTYNIN CHLORIDE 5 MG: 5 TABLET, EXTENDED RELEASE ORAL at 09:12

## 2023-12-28 RX ADMIN — THERA TABS 1 TABLET: TAB at 09:12

## 2023-12-28 RX ADMIN — GABAPENTIN 100 MG: 100 CAPSULE ORAL at 09:12

## 2023-12-28 RX ADMIN — Medication 250 MCG: at 09:12

## 2023-12-28 RX ADMIN — Medication 1 TABLET: at 09:12

## 2023-12-28 RX ADMIN — PANTOPRAZOLE SODIUM 40 MG: 40 TABLET, DELAYED RELEASE ORAL at 09:12

## 2023-12-28 RX ADMIN — BUSPIRONE HYDROCHLORIDE 10 MG: 10 TABLET ORAL at 08:12

## 2023-12-28 RX ADMIN — ACETAMINOPHEN 650 MG: 325 TABLET ORAL at 08:12

## 2023-12-28 RX ADMIN — LISINOPRIL 20 MG: 20 TABLET ORAL at 09:12

## 2023-12-28 RX ADMIN — CITALOPRAM HYDROBROMIDE 10 MG: 10 TABLET ORAL at 09:12

## 2023-12-28 NOTE — NURSING
Pt alert with sitter at bedside. No distress noted. Bed in lowest position. Side rails up x2. Call bell and personal belongs within reach. Safety precautions maintained. Pt free of falls or injuries. No further issues or concerns at this time. Plan of care continues

## 2023-12-28 NOTE — PLAN OF CARE
Problem: Adult Inpatient Plan of Care  Goal: Plan of Care Review  12/28/2023 0601 by Patti Frank LPN  Outcome: Ongoing, Progressing  12/28/2023 0601 by Patti Frank LPN  Outcome: Ongoing, Progressing  Goal: Patient-Specific Goal (Individualized)  12/28/2023 0601 by Patti Frank LPN  Outcome: Ongoing, Progressing  12/28/2023 0601 by Patti Frank LPN  Outcome: Ongoing, Progressing  Goal: Absence of Hospital-Acquired Illness or Injury  12/28/2023 0601 by Patti Frank LPN  Outcome: Ongoing, Progressing  12/28/2023 0601 by Patti Frank LPN  Outcome: Ongoing, Progressing  Goal: Optimal Comfort and Wellbeing  12/28/2023 0601 by Patti Frank LPN  Outcome: Ongoing, Progressing  12/28/2023 0601 by Patti Frank LPN  Outcome: Ongoing, Progressing  Goal: Readiness for Transition of Care  12/28/2023 0601 by Patti Frank LPN  Outcome: Ongoing, Progressing  12/28/2023 0601 by Patti Frank LPN  Outcome: Ongoing, Progressing     Problem: Infection  Goal: Absence of Infection Signs and Symptoms  12/28/2023 0601 by Patti Frank LPN  Outcome: Ongoing, Progressing  12/28/2023 0601 by Patti Frank LPN  Outcome: Ongoing, Progressing     Problem: Skin Injury Risk Increased  Goal: Skin Health and Integrity  12/28/2023 0601 by Patti Frank LPN  Outcome: Ongoing, Progressing  12/28/2023 0601 by Patti Frank LPN  Outcome: Ongoing, Progressing     Problem: Fall Injury Risk  Goal: Absence of Fall and Fall-Related Injury  12/28/2023 0601 by Patti Frank LPN  Outcome: Ongoing, Progressing  12/28/2023 0601 by Patti Frank LPN  Outcome: Ongoing, Progressing     Problem: Confusion Acute  Goal: Optimal Cognitive Function  12/28/2023 0601 by Patti Frank LPN  Outcome: Ongoing, Progressing  12/28/2023 0601 by Patti Frank LPN  Outcome: Ongoing, Progressing     Problem: Behavioral Health Comorbidity  Goal: Maintenance of Behavioral Health Symptom Control  12/28/2023 0601 by Patti Frank,  LPN  Outcome: Ongoing, Progressing  12/28/2023 0601 by Patti Frank LPN  Outcome: Ongoing, Progressing     Problem: Hypertension Comorbidity  Goal: Blood Pressure in Desired Range  12/28/2023 0601 by Patti Frank LPN  Outcome: Ongoing, Progressing  12/28/2023 0601 by Patti Frank LPN  Outcome: Ongoing, Progressing     Problem: Pain Chronic (Persistent) (Comorbidity Management)  Goal: Acceptable Pain Control and Functional Ability  12/28/2023 0601 by Patti Frank LPN  Outcome: Ongoing, Progressing  12/28/2023 0601 by Patti Frank LPN  Outcome: Ongoing, Progressing

## 2023-12-28 NOTE — PLAN OF CARE
Problem: Adult Inpatient Plan of Care  Goal: Plan of Care Review  Outcome: Met  Goal: Patient-Specific Goal (Individualized)  Outcome: Met  Goal: Absence of Hospital-Acquired Illness or Injury  Outcome: Met  Goal: Optimal Comfort and Wellbeing  Outcome: Met  Goal: Readiness for Transition of Care  Outcome: Met     Problem: Infection  Goal: Absence of Infection Signs and Symptoms  Outcome: Met     Problem: Skin Injury Risk Increased  Goal: Skin Health and Integrity  Outcome: Met     Problem: Fall Injury Risk  Goal: Absence of Fall and Fall-Related Injury  Outcome: Met     Problem: Confusion Acute  Goal: Optimal Cognitive Function  Outcome: Met     Problem: Behavioral Health Comorbidity  Goal: Maintenance of Behavioral Health Symptom Control  Outcome: Met     Problem: Hypertension Comorbidity  Goal: Blood Pressure in Desired Range  Outcome: Met     Problem: Pain Chronic (Persistent) (Comorbidity Management)  Goal: Acceptable Pain Control and Functional Ability  Outcome: Met

## 2023-12-28 NOTE — SUBJECTIVE & OBJECTIVE
Interval History: No acute events overnight.  Dc pulled yesterday.  Tentative plan to DC to SNF today.  Has no complaints.    Review of Systems   Constitutional:  Negative for chills, fatigue and fever.   Respiratory:  Negative for cough and shortness of breath.    Cardiovascular:  Negative for chest pain, palpitations and leg swelling.   Gastrointestinal:  Negative for abdominal pain, diarrhea, nausea and vomiting.   Genitourinary:  Negative for dysuria and urgency.   Neurological:  Negative for dizziness and headaches.   All other systems reviewed and are negative.    Objective:     Vital Signs (Most Recent):  Temp: 98.1 °F (36.7 °C) (12/28/23 0816)  Pulse: 79 (12/28/23 0816)  Resp: 16 (12/28/23 0816)  BP: 119/81 (12/28/23 0816)  SpO2: (!) 93 % (12/28/23 0816) Vital Signs (24h Range):  Temp:  [98 °F (36.7 °C)-98.8 °F (37.1 °C)] 98.1 °F (36.7 °C)  Pulse:  [79-89] 79  Resp:  [16-20] 16  SpO2:  [93 %-97 %] 93 %  BP: (119-157)/(67-81) 119/81     Weight: 89.5 kg (197 lb 5 oz)  Body mass index is 33.87 kg/m².  No intake or output data in the 24 hours ending 12/28/23 0911      Physical Exam  Constitutional:       Appearance: Normal appearance. She is well-developed. She is obese.   HENT:      Head: Normocephalic and atraumatic.   Cardiovascular:      Rate and Rhythm: Normal rate and regular rhythm.      Heart sounds: No murmur heard.  Pulmonary:      Effort: Pulmonary effort is normal. No respiratory distress.      Breath sounds: Normal breath sounds. No wheezing or rales.   Abdominal:      General: There is no distension.      Palpations: Abdomen is soft.      Tenderness: There is no abdominal tenderness.   Musculoskeletal:         General: No deformity.   Skin:     General: Skin is warm.   Neurological:      General: No focal deficit present.      Mental Status: She is alert. Mental status is at baseline.             Significant Labs: All pertinent labs within the past 24 hours have been reviewed.  CBC:   Recent Labs  "  Lab 12/27/23  0312   WBC 5.59   HGB 14.0   HCT 42.1        CMP:   Recent Labs   Lab 12/27/23 0312   *   K 4.4      CO2 22*   GLU 95   BUN 14   CREATININE 0.7   CALCIUM 9.5   PROT 6.9   ALBUMIN 3.7   BILITOT 0.4   ALKPHOS 85   AST 16   ALT 19   ANIONGAP 9     Urine Culture: No results for input(s): "LABURIN" in the last 48 hours.    Significant Imaging: I have reviewed all pertinent imaging results/findings within the past 24 hours.  "

## 2023-12-28 NOTE — ASSESSMENT & PLAN NOTE
- foul-smelling urine on admission, UA non-infectious  - nursing with reports of foul-smelling urine during stay   - repeat UA infectious  - Urine culture with Klebsiella  - 3 days of Ceftriaxone

## 2023-12-28 NOTE — PLAN OF CARE
12/28/23 0953   Post-Acute Status   Post-Acute Authorization Placement   Post-Acute Placement Status Set-up Complete/Auth obtained     Pt is discharging to [facility] located at [address] [place] [address] [place] [address]. Pt is going to  [address]. ANGEL Easley can call report to Kelly at [phone].    SW arranged stretcher transport via [facility]. Requested  time is 11:00AM. Requested  time does not guarantee arrival time.      Kaylynn Whiteside LMSW  [facility]  Ext. [phone]

## 2023-12-28 NOTE — PROGRESS NOTES
"[address] [facility] [address] [place] [address] Medicine  Progress Note    Patient Name: Lesli Gong  MRN: [id number]  Patient Class: OP- Observation   Admission Date: 12/20/2023  Length of Stay: 0 days  Attending Physician: Carol Brown MD  Primary Care Provider: Patti Brian MD        Subjective:     Principal Problem:Weakness        HPI:  Lesli Gong is a 79 yo F with PMHx of neurofibromatosis, dementia, HTN, GIST who presented for generalized weakness. Patient pleasantly confused; only oriented to self. Family not at bedside to assist with history on my interview. Per ED note: "States that her medication was switched to tramadol approximately 6 days ago.  Since then she has had an acute decline in her mobility.  She was able to walk without any assistive devices but now needs to person support to ambulate.  Patient had a fall but patient and daughter unsure if she hit her head.  He is patient's daughter complains of foul-smelling urine."  Patient denies any complaints on my interview.     In ED: Afebrile. HDS. No leukocytosis. Hgb 16.3. CMP grossly unremarkable except AST 57. TSH wnl. UA noninfectious. L shoulder XR without acute fracture or dislocation. CT lumbar spine No acute fractures or traumatic malalignment of the lumbar spine, Lumbar spondylosis as detailed above, most pronounced at L5-S1 with moderate to severe neural foraminal narrowing, and stable L1 vertebral body hemangioma. CT head with generalized cerebral volume loss and chronic microvascular ischemic changes, but without acute process. Given IV reglan 5 mg and oxy 5 mg. Per notes, "Attempted to ambulate patient to the bathroom, patient required a 2 person assist.  At her baseline, patient walks without any assistive devices."  Placed in observation.     Overview/Hospital Course:  Lesli Gong was placed in observation for weakness. Infectious workup unremarkable. Concern for polypharmacy with tramadol & methocarbamol which was " discontinued. Pt noted to have foul-smelling urine later in the admission. No systemic symptoms. UCx with GNR's, no h/o of resistance.  She did a 3 day course of Ceftriaxone.  Emphasized conservative mgmt for chronic pain: tylenol, lidocaine, topicals, home gabapentin. Discharged to SNF.     Interval History: No acute events overnight.  Dc pulled yesterday.  Tentative plan to DC to SNF today.  Has no complaints.    Review of Systems   Constitutional:  Negative for chills, fatigue and fever.   Respiratory:  Negative for cough and shortness of breath.    Cardiovascular:  Negative for chest pain, palpitations and leg swelling.   Gastrointestinal:  Negative for abdominal pain, diarrhea, nausea and vomiting.   Genitourinary:  Negative for dysuria and urgency.   Neurological:  Negative for dizziness and headaches.   All other systems reviewed and are negative.    Objective:     Vital Signs (Most Recent):  Temp: 98.1 °F (36.7 °C) (12/28/23 0816)  Pulse: 79 (12/28/23 0816)  Resp: 16 (12/28/23 0816)  BP: 119/81 (12/28/23 0816)  SpO2: (!) 93 % (12/28/23 0816) Vital Signs (24h Range):  Temp:  [98 °F (36.7 °C)-98.8 °F (37.1 °C)] 98.1 °F (36.7 °C)  Pulse:  [79-89] 79  Resp:  [16-20] 16  SpO2:  [93 %-97 %] 93 %  BP: (119-157)/(67-81) 119/81     Weight: 89.5 kg (197 lb 5 oz)  Body mass index is 33.87 kg/m².  No intake or output data in the 24 hours ending 12/28/23 0911      Physical Exam  Constitutional:       Appearance: Normal appearance. She is well-developed. She is obese.   HENT:      Head: Normocephalic and atraumatic.   Cardiovascular:      Rate and Rhythm: Normal rate and regular rhythm.      Heart sounds: No murmur heard.  Pulmonary:      Effort: Pulmonary effort is normal. No respiratory distress.      Breath sounds: Normal breath sounds. No wheezing or rales.   Abdominal:      General: There is no distension.      Palpations: Abdomen is soft.      Tenderness: There is no abdominal tenderness.   Musculoskeletal:          "General: No deformity.   Skin:     General: Skin is warm.   Neurological:      General: No focal deficit present.      Mental Status: She is alert. Mental status is at baseline.             Significant Labs: All pertinent labs within the past 24 hours have been reviewed.  CBC:   Recent Labs   Lab 12/27/23 0312   WBC 5.59   HGB 14.0   HCT 42.1        CMP:   Recent Labs   Lab 12/27/23 0312   *   K 4.4      CO2 22*   GLU 95   BUN 14   CREATININE 0.7   CALCIUM 9.5   PROT 6.9   ALBUMIN 3.7   BILITOT 0.4   ALKPHOS 85   AST 16   ALT 19   ANIONGAP 9     Urine Culture: No results for input(s): "LABURIN" in the last 48 hours.    Significant Imaging: I have reviewed all pertinent imaging results/findings within the past 24 hours.    Assessment/Plan:      * Weakness  77 yo F with PMHx of neurofibromatosis, dementia, HTN, GIST who presented for generalized weakness and acute decline in mobility over the past few days. She recently started tramadol earlier this week. At baseline, does not use assistive devices for ambulation. Requiring 2 person assist to ambulate to restroom in ED.    - Afebrile and without leukocytosis. Infectious workup negative.   - CTH with chronic changes, but without acute process  - CT lumbar spine without acute fracture or dislocation  - Concern for polypharmacy. Continue holding tramadol and gabapentin  - PT/OT consulted for acute decline in mobility   -PT with recommendations for low intensity    -OT with recommendations for moderate intensity   - continue to monitor closely  - CM following to aid in SNF placement     UTI (urinary tract infection)  - foul-smelling urine on admission, UA non-infectious  - nursing with reports of foul-smelling urine during stay   - repeat UA infectious  - Urine culture with Klebsiella  - 3 days of Ceftriaxone    Obesity (BMI 30.0-34.9)  Body mass index is 33.87 kg/m². Morbid obesity complicates all aspects of disease management from diagnostic " modalities to treatment. Weight loss encouraged and health benefits explained to patient.     Chronic scapular pain        Adjustment disorder with anxiety  Depression  - continue buspar and celexa    History of hepatitis C, s/p successful Rx w/ SVR24 - 2/2018        History of gastrointestinal stromal tumor (GIST)  S/p resection  - Per chart review, she has not followed up with heme/onc since 2020. Needs f/u on discharge    Essential hypertension  - normotensive on admit  - continue lisinopril 20 mg daily, up titrate as indicated     Neurofibromatosis, type 1  - noted    History of sarcoma of soft tissue          VTE Risk Mitigation (From admission, onward)           Ordered     enoxaparin injection 40 mg  Daily         12/20/23 2318     IP VTE HIGH RISK PATIENT  Once         12/20/23 2318                    Discharge Planning   PETER: 12/28/2023     Code Status: Full Code   Is the patient medically ready for discharge?: Yes    Reason for patient still in hospital (select all that apply): Pending disposition  Discharge Plan A: Skilled Nursing Facility                  Carol Brown MD  Department of Hospital Medicine   [facility]

## 2023-12-28 NOTE — PLAN OF CARE
[facility]  Discharge Final Note    Primary Care Provider: Patti Brian MD    Expected Discharge Date: 12/28/2023    Patient discharged to [facility] for SNF via ambulance transportation.     Patient's bedside nurse and patient/family notified of the above.    Discharge Plan A and Plan B have been determined by review of patient's clinical status, future medical and therapeutic needs, and coverage/benefits for post-acute care in coordination with multidisciplinary team members.        Final Discharge Note (most recent)       Final Note - 12/28/23 1135          Final Note    Assessment Type Final Discharge Note (P)      Anticipated Discharge Disposition Skilled Nursing Facility (P)         Post-Acute Status    Post-Acute Authorization Placement (P)      Post-Acute Placement Status Set-up Complete/Auth obtained (P)                      Important Message from Medicare                 Future Appointments   Date Time Provider Department Center   2/5/2024  2:00 PM Patti Brian MD [facility]   2/14/2024  3:30 PM Patti Brian MD [facility]   2/15/2024  1:30 PM [facility]   6/12/2024  1:20 PM Aisha Sanchez MD [facility]  [facility] scheduled post-discharge follow-up appointment and information added to AVS.     Kaylynn Whiteside LMSW  [facility]  Ext. [phone]

## 2024-01-11 DIAGNOSIS — Z00.00 ENCOUNTER FOR MEDICARE ANNUAL WELLNESS EXAM: ICD-10-CM

## 2024-02-05 ENCOUNTER — OFFICE VISIT (OUTPATIENT)
Dept: INTERNAL MEDICINE | Facility: CLINIC | Age: 79
End: 2024-02-05
Payer: MEDICARE

## 2024-02-05 VITALS
SYSTOLIC BLOOD PRESSURE: 128 MMHG | HEIGHT: 64 IN | DIASTOLIC BLOOD PRESSURE: 66 MMHG | BODY MASS INDEX: 30.99 KG/M2 | OXYGEN SATURATION: 96 % | HEART RATE: 84 BPM | WEIGHT: 181.56 LBS

## 2024-02-05 DIAGNOSIS — R41.3 MEMORY DIFFICULTY: Primary | ICD-10-CM

## 2024-02-05 DIAGNOSIS — M25.512 CHRONIC LEFT SHOULDER PAIN: ICD-10-CM

## 2024-02-05 DIAGNOSIS — M54.12 RADICULOPATHY, CERVICAL REGION: ICD-10-CM

## 2024-02-05 DIAGNOSIS — G89.29 CHRONIC LEFT SHOULDER PAIN: ICD-10-CM

## 2024-02-05 PROCEDURE — 3288F FALL RISK ASSESSMENT DOCD: CPT | Mod: HCNC,CPTII,S$GLB, | Performed by: INTERNAL MEDICINE

## 2024-02-05 PROCEDURE — 1157F ADVNC CARE PLAN IN RCRD: CPT | Mod: HCNC,CPTII,S$GLB, | Performed by: INTERNAL MEDICINE

## 2024-02-05 PROCEDURE — 3074F SYST BP LT 130 MM HG: CPT | Mod: HCNC,CPTII,S$GLB, | Performed by: INTERNAL MEDICINE

## 2024-02-05 PROCEDURE — 1160F RVW MEDS BY RX/DR IN RCRD: CPT | Mod: HCNC,CPTII,S$GLB, | Performed by: INTERNAL MEDICINE

## 2024-02-05 PROCEDURE — 99999 PR PBB SHADOW E&M-EST. PATIENT-LVL V: CPT | Mod: PBBFAC,HCNC,, | Performed by: INTERNAL MEDICINE

## 2024-02-05 PROCEDURE — 1159F MED LIST DOCD IN RCRD: CPT | Mod: HCNC,CPTII,S$GLB, | Performed by: INTERNAL MEDICINE

## 2024-02-05 PROCEDURE — 1101F PT FALLS ASSESS-DOCD LE1/YR: CPT | Mod: HCNC,CPTII,S$GLB, | Performed by: INTERNAL MEDICINE

## 2024-02-05 PROCEDURE — 3078F DIAST BP <80 MM HG: CPT | Mod: HCNC,CPTII,S$GLB, | Performed by: INTERNAL MEDICINE

## 2024-02-05 PROCEDURE — 99214 OFFICE O/P EST MOD 30 MIN: CPT | Mod: HCNC,S$GLB,, | Performed by: INTERNAL MEDICINE

## 2024-02-05 PROCEDURE — 1126F AMNT PAIN NOTED NONE PRSNT: CPT | Mod: HCNC,CPTII,S$GLB, | Performed by: INTERNAL MEDICINE

## 2024-02-05 RX ORDER — PROMETHAZINE HYDROCHLORIDE 12.5 MG/1
1 TABLET ORAL EVERY 6 HOURS PRN
COMMUNITY
End: 2024-04-16 | Stop reason: SDUPTHER

## 2024-02-05 RX ORDER — OXYCODONE AND ACETAMINOPHEN 5; 325 MG/1; MG/1
TABLET ORAL
COMMUNITY
End: 2024-02-09 | Stop reason: SDUPTHER

## 2024-02-05 NOTE — PROGRESS NOTES
"Subjective:       Patient ID: Lesli Gong is a 78 y.o. female.    Chief Complaint: Follow-up (3 mth follow up )    HPI  78 y.o. female here for follow up of    Patient was admitted 12/20/23 to 12/28/23 for generalized weakness.  Concern for polypharmacy with tramadol and methocarbamol which was discontinued. Treated for UTI CTX -> cefpodoxime. Discharged to [facility].       PT and OT are coming twice a week. Reports she is doing well walking. Has walker. [facility].   Requesting personal care attendant 40 hours Monday-Friday.   Need assistance bathing, cannot cook, can feed self if food is prepared. Does not drive. Toileting independent w assist for balance if needed. Workign to get meals on wheels.   Current equipment: walker    Appt w Dr. Joseph scheduled on 2/15.   Tylenol #3 seemed to be causing GI symptoms and this is why this was switched to tramadol.     Oxycodone 5mg taking every 6 hours along with regular 650mg tylenol.   -------  Anxiety  Buspar 5-10mg bid prn anxiety  Citalopram 10mg     HTN  Lisinopril 20mg     Concern for dementia  MRI 2022 with chronic microvascular, generalized volume loss, no sigm of stroke  Repeating things, forgetting things.   Does not drive.   Daughter manages her meds.       Review of Systems   Constitutional:  Negative for fever.   Respiratory:  Negative for shortness of breath.    Cardiovascular:  Negative for chest pain.   Musculoskeletal: Negative.    Skin: Negative.        Objective:   /66 (BP Location: Left arm, Patient Position: Sitting, BP Method: Medium (Manual))   Pulse 84   Ht 5' 4" (1.626 m)   Wt 82.3 kg (181 lb 8.8 oz)   SpO2 96%   BMI 31.16 kg/m²      Physical Exam  Constitutional:       General: She is not in acute distress.     Appearance: She is well-developed. She is not diaphoretic.   HENT:      Head: Normocephalic and atraumatic.   Cardiovascular:      Rate and Rhythm: Normal rate and regular rhythm.   Pulmonary:      Effort: " Pulmonary effort is normal. No respiratory distress.      Breath sounds: No wheezing or rales.   Skin:     General: Skin is warm and dry.   Neurological:      Mental Status: She is alert and oriented to person, place, and time.   Psychiatric:         Behavior: Behavior normal.         Assessment:       1. Memory difficulty    2. Chronic left shoulder pain    3. Radiculopathy, cervical region        Plan:       Memory difficulty  -     WALKER FOR HOME USE  -     WHEELCHAIR FOR HOME USE  -     TRANSFER TUB BENCH FOR HOME USE  -     HME - OTHER  -     Ambulatory referral/consult to Outpatient Case Management  -     Ambulatory referral/consult to Pain Clinic; Future; Expected date: 02/12/2024    Chronic left shoulder pain  -     WALKER FOR HOME USE  -     WHEELCHAIR FOR HOME USE  -     TRANSFER TUB BENCH FOR HOME USE  -     HME - OTHER  -     Ambulatory referral/consult to Outpatient Case Management  -     Ambulatory referral/consult to Pain Clinic; Future; Expected date: 02/12/2024    Radiculopathy, cervical region  -     WALKER FOR HOME USE  -     WHEELCHAIR FOR HOME USE  -     TRANSFER TUB BENCH FOR HOME USE  -     HME - OTHER  -     Ambulatory referral/consult to Outpatient Case Management  -     Ambulatory referral/consult to Pain Clinic; Future; Expected date: 02/12/2024          Health Maintenance Due   Topic    Mammogram     Lipid Panel       Medical equipment - THS  Letter for PCA  Case management  Neurospsyc  Pain clinic

## 2024-02-08 DIAGNOSIS — Q85.01 NEUROFIBROMATOSIS, TYPE 1: ICD-10-CM

## 2024-02-08 DIAGNOSIS — M79.2 NEUROPATHIC PAIN: ICD-10-CM

## 2024-02-08 RX ORDER — LIDOCAINE 50 MG/G
1 PATCH TOPICAL DAILY
Qty: 30 PATCH | Refills: 1 | Status: CANCELLED | OUTPATIENT
Start: 2024-02-08

## 2024-02-08 RX ORDER — OXYCODONE AND ACETAMINOPHEN 5; 325 MG/1; MG/1
TABLET ORAL
Status: CANCELLED | OUTPATIENT
Start: 2024-02-08

## 2024-02-09 RX ORDER — OXYCODONE AND ACETAMINOPHEN 5; 325 MG/1; MG/1
1 TABLET ORAL EVERY 6 HOURS PRN
Qty: 28 TABLET | Refills: 0 | Status: SHIPPED | OUTPATIENT
Start: 2024-02-09 | End: 2024-02-14

## 2024-02-09 NOTE — TELEPHONE ENCOUNTER
----- Message from Valencia Templeton sent at 2/9/2024 10:01 AM CST -----  Contact: pt's daughter Gianna [phone]  Gianna is requesting a callback from [facility] in regards to pt getting a refill on oxycodone-acetaminophen (PERCOCET) 5-325 mg per tablet. Per Gianna, would another provider be able to refill this for pt.  Per Gianna, she sent a message through the pt portal as well.       Pt is using   [facility]/pharmacy #[id number] - [place], [place] - [address]  [address]  [place] [address]  Phone: [phone] Fax: [phone]      Please call.             Thank you

## 2024-02-09 NOTE — TELEPHONE ENCOUNTER
No care due was identified.  [facility] Embedded Care Due Messages. Reference number: [id number].   2/09/2024 10:53:28 AM CST

## 2024-02-14 ENCOUNTER — TELEPHONE (OUTPATIENT)
Dept: PAIN MEDICINE | Facility: CLINIC | Age: 79
End: 2024-02-14
Payer: MEDICARE

## 2024-02-14 ENCOUNTER — TELEPHONE (OUTPATIENT)
Dept: INTERNAL MEDICINE | Facility: CLINIC | Age: 79
End: 2024-02-14
Payer: MEDICARE

## 2024-02-14 DIAGNOSIS — F11.20 UNCOMPLICATED OPIOID DEPENDENCE: Primary | ICD-10-CM

## 2024-02-14 DIAGNOSIS — M25.512 CHRONIC LEFT SHOULDER PAIN: ICD-10-CM

## 2024-02-14 DIAGNOSIS — M54.12 RADICULOPATHY, CERVICAL REGION: ICD-10-CM

## 2024-02-14 DIAGNOSIS — G89.29 CHRONIC LEFT SHOULDER PAIN: ICD-10-CM

## 2024-02-14 RX ORDER — OXYCODONE HYDROCHLORIDE 5 MG/1
5 TABLET ORAL EVERY 6 HOURS PRN
Qty: 120 TABLET | Refills: 0 | OUTPATIENT
Start: 2024-02-14 | End: 2024-02-14 | Stop reason: SDUPTHER

## 2024-02-14 RX ORDER — OXYCODONE HYDROCHLORIDE 5 MG/1
5 TABLET ORAL EVERY 6 HOURS PRN
Qty: 120 TABLET | Refills: 0 | Status: SHIPPED | OUTPATIENT
Start: 2024-02-14 | End: 2024-03-12 | Stop reason: SDUPTHER

## 2024-02-14 NOTE — TELEPHONE ENCOUNTER
Pt daughter sent us a message  Pt daughter stated that pain management needs a new referral for pain management to be able to get pt a appt for a new doctor.   Also asking for a refill for her oxyCODONE-acetaminophen (PERCOCET) 5-325 mg per tablet   It doesn't look like you prescribed it to her tho.

## 2024-02-14 NOTE — TELEPHONE ENCOUNTER
Staff spoke with pt in regards to her message about seeing another provider. Pt has been scheduled with .      ----- Message from Marta Fofana sent at 2/14/2024 11:32 AM CST -----   Name of Who is Calling:     What is the request in detail:  request call back in reference to  scheduling with different provider / caller want to know if patient medications can be refilled  Please contact to further discuss and advise      Can the clinic reply by [facility]:     What [facility] to [facility] Back [facility]  [phone] /  koby / ariel

## 2024-02-14 NOTE — TELEPHONE ENCOUNTER
Pt daughter message:    Hi pray you enjoyed tonja loya....I did call pm to get appointment with new doctor christina d they need another referral from and they possibly can get her in with new doc early march 9 th or 15th . I call again today as well. Meanwhile she will need  ew script for 5mg oxycodone 1 every 6hrs qty to last her till her appointment . The covering doc call in 7day till you get back.  I'm working on IT helping me to get in her my chart.  Thanks for your help  God bless!

## 2024-02-14 NOTE — TELEPHONE ENCOUNTER
----- Message from Marta Fofana sent at 2/14/2024 11:32 AM CST -----   Name of Who is Calling:     What is the request in detail:  request call back in reference to  scheduling with different provider / caller want to know if patient medications can be refilled  Please contact to further discuss and advise      Can the clinic reply by [facility]:     [facility]  [phone] /  koby / ariel

## 2024-02-17 DIAGNOSIS — F41.1 GAD (GENERALIZED ANXIETY DISORDER): ICD-10-CM

## 2024-02-17 NOTE — TELEPHONE ENCOUNTER
No care due was identified.  [facility] Embedded Care Due Messages. Reference number: [id number].   2/17/2024 6:59:10 AM CST

## 2024-02-19 RX ORDER — BUSPIRONE HYDROCHLORIDE 5 MG/1
5-10 TABLET ORAL 2 TIMES DAILY PRN
Qty: 270 TABLET | Refills: 1 | Status: SHIPPED | OUTPATIENT
Start: 2024-02-19 | End: 2024-04-16

## 2024-02-19 NOTE — TELEPHONE ENCOUNTER
Refill Routing Note   Medication(s) are not appropriate for processing by [facility] for the following reason(s):        Outside of protocol    [facility] action(s):  Route             Appointments  past 12m or future 3m with PCP    Date Provider   Last Visit   2/5/2024 Patti Brian MD   Next Visit   5/8/2024 Patti Brian MD   ED visits in past 90 days: 0        Note composed:8:30 AM 02/19/2024

## 2024-02-20 ENCOUNTER — OUTPATIENT CASE MANAGEMENT (OUTPATIENT)
Dept: ADMINISTRATIVE | Facility: OTHER | Age: 79
End: 2024-02-20
Payer: MEDICARE

## 2024-02-22 ENCOUNTER — OUTPATIENT CASE MANAGEMENT (OUTPATIENT)
Dept: ADMINISTRATIVE | Facility: OTHER | Age: 79
End: 2024-02-22
Payer: MEDICARE

## 2024-02-22 NOTE — PROGRESS NOTES
Outpatient Care Management   - Low Risk Patient Assessment    Patient: Lesli Gong  MRN:  [id number]  Date of Service:  2/22/2024  Completed by:  Mirlande Lima LMSW  Referral Date: 02/05/2024    Reason for Visit   Patient presents with    Social Work Assessment - Low/Mod Risk    Plan Of Care       Brief Summary:SW received referral from patient PCP. SW completed assessment with patient daughter and son Bartolome. Daughter reports patient and son resides together. Daughter reports assistance is needed for ADL's. Daughter and son assist with IADl's. Son denied patient needs assistance with medical, medication and shelter but needs assistance with food and utilities. Per chart review ACP on file. Daughter provides transportation to and from appointments.   Care plan was created in collaboration with patient/caregiver input.

## 2024-02-23 ENCOUNTER — OUTPATIENT CASE MANAGEMENT (OUTPATIENT)
Dept: ADMINISTRATIVE | Facility: OTHER | Age: 79
End: 2024-02-23
Payer: MEDICARE

## 2024-02-23 NOTE — PROGRESS NOTES
Outpatient Care Management   - Care Plan Follow Up    Patient: Lesli Gong  MRN:  [id number]  Date of Service:  2/23/2024  Completed by:  Mirlande Lima LMSW  Referral Date: 02/05/2024    No chief complaint on file.      Brief Summary:8:38  SW contacted [place] Department of  [phone] regarding SNAP eligibility and spoke with Devang. Devang unable to provide SW information on what proverty guidelines agency was using to approve clients. Tammy unsure if it's based on a 200 and or a 130 FPL . Rep advised SW agency considers all bills and income via savings and or checking in order to qualify someone. If someone feels that they're not receiving the correct benefit amount. The individual can speak with their worker in order to complete a redetermination.    :SW attempt to reach patient son regarding the above , however no answer. JAKI unable to leave a message.       Complex Care Plan     Care plan was discussed and completed today with input from patient and/or caregiver.    Patient Instructions     No follow-ups on file.

## 2024-02-26 ENCOUNTER — OUTPATIENT CASE MANAGEMENT (OUTPATIENT)
Dept: ADMINISTRATIVE | Facility: OTHER | Age: 79
End: 2024-02-26
Payer: MEDICARE

## 2024-02-26 NOTE — PROGRESS NOTES
Outpatient Care Management   - Care Plan Follow Up    Patient: Lesli Gong  MRN:  [id number]  Date of Service:  2/26/2024  Completed by:  Mirlande Lima LMSW  Referral Date: 02/05/2024    Reason for Visit   Patient presents with    Update Plan Of Care    Case Closure       Brief Summary: SW followed up with patient son Bartolome regarding utility assistance. Vladimir reports one of the agencies he called no longer had finds. SW encouraged son to follow-up with  the other agency that was provided. SW also discussed the possibility of him reapplying for SNAP benefits for patient. Vladimir advised SW he will reapply. SW encouraged Bartolome to provide all bills that patient is responsible for. Per SNAP rep all bills are considered when deciding on SNAP amount.     Complex Care Plan     Care plan was discussed and completed today with input from patient and/or caregiver.    Patient Instructions     No follow-ups on file.

## 2024-03-05 ENCOUNTER — TELEPHONE (OUTPATIENT)
Dept: SPINE | Facility: CLINIC | Age: 79
End: 2024-03-05
Payer: MEDICARE

## 2024-03-06 ENCOUNTER — OFFICE VISIT (OUTPATIENT)
Dept: SPINE | Facility: CLINIC | Age: 79
End: 2024-03-06
Payer: MEDICARE

## 2024-03-06 ENCOUNTER — TELEPHONE (OUTPATIENT)
Dept: ORTHOPEDICS | Facility: CLINIC | Age: 79
End: 2024-03-06
Payer: MEDICARE

## 2024-03-06 ENCOUNTER — TELEPHONE (OUTPATIENT)
Dept: SPINE | Facility: CLINIC | Age: 79
End: 2024-03-06

## 2024-03-06 VITALS
HEIGHT: 64 IN | TEMPERATURE: 98 F | OXYGEN SATURATION: 100 % | SYSTOLIC BLOOD PRESSURE: 132 MMHG | RESPIRATION RATE: 20 BRPM | DIASTOLIC BLOOD PRESSURE: 82 MMHG | WEIGHT: 183 LBS | BODY MASS INDEX: 31.24 KG/M2 | HEART RATE: 86 BPM

## 2024-03-06 DIAGNOSIS — G89.29 CHRONIC LEFT SHOULDER PAIN: ICD-10-CM

## 2024-03-06 DIAGNOSIS — R41.3 MEMORY DIFFICULTY: ICD-10-CM

## 2024-03-06 DIAGNOSIS — M25.512 CHRONIC LEFT SHOULDER PAIN: ICD-10-CM

## 2024-03-06 DIAGNOSIS — M79.2 NEUROPATHIC PAIN: Primary | ICD-10-CM

## 2024-03-06 DIAGNOSIS — M54.12 RADICULOPATHY, CERVICAL REGION: ICD-10-CM

## 2024-03-06 PROCEDURE — 1160F RVW MEDS BY RX/DR IN RCRD: CPT | Mod: HCNC,CPTII,S$GLB, | Performed by: STUDENT IN AN ORGANIZED HEALTH CARE EDUCATION/TRAINING PROGRAM

## 2024-03-06 PROCEDURE — 1125F AMNT PAIN NOTED PAIN PRSNT: CPT | Mod: HCNC,CPTII,S$GLB, | Performed by: STUDENT IN AN ORGANIZED HEALTH CARE EDUCATION/TRAINING PROGRAM

## 2024-03-06 PROCEDURE — 80347 BENZODIAZEPINES 13 OR MORE: CPT | Mod: HCNC | Performed by: STUDENT IN AN ORGANIZED HEALTH CARE EDUCATION/TRAINING PROGRAM

## 2024-03-06 PROCEDURE — 1101F PT FALLS ASSESS-DOCD LE1/YR: CPT | Mod: HCNC,CPTII,S$GLB, | Performed by: STUDENT IN AN ORGANIZED HEALTH CARE EDUCATION/TRAINING PROGRAM

## 2024-03-06 PROCEDURE — 1159F MED LIST DOCD IN RCRD: CPT | Mod: HCNC,CPTII,S$GLB, | Performed by: STUDENT IN AN ORGANIZED HEALTH CARE EDUCATION/TRAINING PROGRAM

## 2024-03-06 PROCEDURE — 80326 AMPHETAMINES 5 OR MORE: CPT | Mod: HCNC | Performed by: STUDENT IN AN ORGANIZED HEALTH CARE EDUCATION/TRAINING PROGRAM

## 2024-03-06 PROCEDURE — 99999 PR PBB SHADOW E&M-EST. PATIENT-LVL IV: CPT | Mod: PBBFAC,HCNC,, | Performed by: STUDENT IN AN ORGANIZED HEALTH CARE EDUCATION/TRAINING PROGRAM

## 2024-03-06 PROCEDURE — 3288F FALL RISK ASSESSMENT DOCD: CPT | Mod: HCNC,CPTII,S$GLB, | Performed by: STUDENT IN AN ORGANIZED HEALTH CARE EDUCATION/TRAINING PROGRAM

## 2024-03-06 PROCEDURE — 3079F DIAST BP 80-89 MM HG: CPT | Mod: HCNC,CPTII,S$GLB, | Performed by: STUDENT IN AN ORGANIZED HEALTH CARE EDUCATION/TRAINING PROGRAM

## 2024-03-06 PROCEDURE — 1157F ADVNC CARE PLAN IN RCRD: CPT | Mod: HCNC,CPTII,S$GLB, | Performed by: STUDENT IN AN ORGANIZED HEALTH CARE EDUCATION/TRAINING PROGRAM

## 2024-03-06 PROCEDURE — 3075F SYST BP GE 130 - 139MM HG: CPT | Mod: HCNC,CPTII,S$GLB, | Performed by: STUDENT IN AN ORGANIZED HEALTH CARE EDUCATION/TRAINING PROGRAM

## 2024-03-06 PROCEDURE — 99215 OFFICE O/P EST HI 40 MIN: CPT | Mod: HCNC,S$GLB,, | Performed by: STUDENT IN AN ORGANIZED HEALTH CARE EDUCATION/TRAINING PROGRAM

## 2024-03-06 RX ORDER — PREGABALIN 25 MG/1
25 CAPSULE ORAL 2 TIMES DAILY
Qty: 60 CAPSULE | Refills: 2 | Status: SHIPPED | OUTPATIENT
Start: 2024-03-06 | End: 2024-03-12

## 2024-03-06 NOTE — PROGRESS NOTES
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 upper extremity strength is normal and symmetric.  No atrophy or tone abnormalities are noted.  NEURO: Bilateral upper extremity coordination and muscle stretch reflexes are physiologic and symmetric.   No loss of sensation is noted. Allodynia noted throughout, worsened at medial border of scapula (site of prior neuroma excision)  GAIT: Normal    CMP  Sodium   Date Value Ref Range Status   12/27/2023 135 (L) 136 - 145 mmol/L Final     Potassium   Date Value Ref Range Status   12/27/2023 4.4 3.5 - 5.1 mmol/L Final     Chloride   Date Value Ref Range Status   12/27/2023 104 95 - 110 mmol/L Final     CO2   Date Value Ref Range Status   12/27/2023 22 (L) 23 - 29 mmol/L Final     Glucose   Date Value Ref Range Status   12/27/2023 95 70 - 110 mg/dL Final     BUN   Date Value Ref Range Status   12/27/2023 14 8 - 23 mg/dL Final     Creatinine   Date Value Ref Range Status   12/27/2023 0.7 0.5 - 1.4 mg/dL Final     Calcium   Date Value Ref Range Status   12/27/2023 9.5 8.7 - 10.5 mg/dL Final     Total Protein   Date Value Ref Range Status   12/27/2023 6.9 6.0 - 8.4 g/dL Final     Albumin   Date Value Ref Range Status   12/27/2023 3.7 3.5 - 5.2 g/dL Final     Total Bilirubin   Date Value Ref Range Status   12/27/2023 0.4 0.1 - 1.0 mg/dL  Final     Comment:     For infants and newborns, interpretation of results should be based  on gestational age, weight and in agreement with clinical  observations.    Premature Infant recommended reference ranges:  Up to 24 hours.............<8.0 mg/dL  Up to 48 hours............<12.0 mg/dL  3-5 days..................<15.0 mg/dL  6-29 days.................<15.0 mg/dL       Alkaline Phosphatase   Date Value Ref Range Status   12/27/2023 85 55 - 135 U/L Final     AST   Date Value Ref Range Status   12/27/2023 16 10 - 40 U/L Final     ALT   Date Value Ref Range Status   12/27/2023 19 10 - 44 U/L Final     Anion Gap   Date Value Ref Range Status   12/27/2023 9 8 - 16 mmol/L Final     eGFR   Date Value Ref Range Status   12/27/2023 >60.0 >60 mL/min/1.73 m^2 Final         ASSESSMENT: 78 y.o. year old female with left shoulder pain, consistent with:     1. Neuropathic pain  POCT Urine Drug Screen Pain Management    Pain Clinic Drug Screen    pregabalin (LYRICA) 25 MG capsule      2. Memory difficulty  Ambulatory referral/consult to Pain Clinic      3. Chronic left shoulder pain  Ambulatory referral/consult to Pain Clinic    Ambulatory referral/consult to Orthopedics    MRI Shoulder Without Contrast Left    POCT Urine Drug Screen Pain Management    Pain Clinic Drug Screen    pregabalin (LYRICA) 25 MG capsule      4. Radiculopathy, cervical region  Ambulatory referral/consult to Pain Clinic        IMPRESSION: Lesli Gong presents today for chronic left shoulder pain.  She had previously been under the care of Dr. Cheney and Dr. Joseph, but she felt uncomfortable with the plan of care as mentioned at her last visit. She continues to have pain associated with her left shoulder, even with passive movement. She believes her pain began after a neurofibroma excision in 2017. On physical exam, she has numerous cutaneous neurofibromas and allodynia reproducing her chief complaint medial to the scapula.  She also has left  scapular winging. MRI thoracic spine largely unremarkable and chronic degenerative changes do not correlate with presenting symptoms.  She had an xray of the left shoulder which was also unremarkable.  At this point, we should continue workup for left shoulder pathology. We will obtain an MRI left shoulder.  We will also refer her to see orthopedic surgery regarding her left shoulder. We will continue conservative management with non-narcotic medications. She has had benefit from chronic opiate use, and her last UDS seems appropriate.  We will obtain a new UDS today and have her sign a new pain contract.  If all measures are appropriate, we will consider prescribing narcotics at her next appointment.    PLAN:   - I have stressed the importance of physical activity and a home exercise plan to help with pain and improve health.  - Patient can continue with medications for now since they are providing benefits, using them appropriately, and without side effects.  - She continues to have lidocaine patches for her left scapula, as patient's pain seems to be neuropathic  - schedule for left shoulder MRI without contrast  - referral to orthopedic surgery for left shoulder pain and left scapula winging  - Urine Drug Screen today  - start pregabalin 25mg twice a day  - updated pain contract today; we will consider prescribing Percocet 5-325mg if her urine drug screen is clear  - OF NOTE: she has had multiple doctors prescribing her narcotics since her last visit.  We discussed that signing a pain contract with me means that she can obtain her narcotics from no other physician  -  seems to be consistent with history as stated by patient  - They report that they have benefit without significant side effects  - urine drug screen today  - opiate contract signed today  - I counseled that with opiate therapy, they need to take the medication as prescribed.  They will be required to present every 4 weeks for medication refills  (virtual ok for monthly evaluation) and every 12 weeks with me (in person to evaluate his progress).  They may not take benzodiazepines or alcohol along with his opioids. They may not take any opiate medications from any other providers, unless discussed with me first.  They will have random urine drug screens to assess compliance.  I counseled that opiate prescriptions will be reported on the national drug monitoring database that is accessible by all medical providers.  Any breech in contract will make them ineligible for long-term opiate prescribing, necessitating a taper off all opioids and management via only non-opioid means.  - She can continue taking tylenol 1000mg three times a day (currently taking 650mg twice daily)  - follow up in 2 weeks to discuss UDS and MRI shoulder, if available by then  - Counseled patient regarding the importance of activity modification and physical therapy.    The above plan and management options were discussed at length with patient. Patient is in agreement with the above and verbalized understanding.  Extensive time was spent with the patient and her daughter covering prior history and management thus far, as she is new to my practice.  > 60 minute were spent discussing options for management of her pain not limited to procedures and both non-opioid and opioid medical management, if indicated.    Vandana Vicente MD  03/06/2024

## 2024-03-11 ENCOUNTER — TELEPHONE (OUTPATIENT)
Dept: SPINE | Facility: CLINIC | Age: 79
End: 2024-03-11
Payer: MEDICARE

## 2024-03-11 DIAGNOSIS — G89.29 CHRONIC MIDLINE THORACIC BACK PAIN: ICD-10-CM

## 2024-03-11 DIAGNOSIS — G89.29 CHRONIC LEFT SHOULDER PAIN: ICD-10-CM

## 2024-03-11 DIAGNOSIS — M25.512 CHRONIC LEFT SHOULDER PAIN: ICD-10-CM

## 2024-03-11 DIAGNOSIS — M54.6 CHRONIC MIDLINE THORACIC BACK PAIN: ICD-10-CM

## 2024-03-11 DIAGNOSIS — M54.12 RADICULOPATHY, CERVICAL REGION: ICD-10-CM

## 2024-03-11 DIAGNOSIS — F11.20 UNCOMPLICATED OPIOID DEPENDENCE: ICD-10-CM

## 2024-03-11 RX ORDER — OXYCODONE HYDROCHLORIDE 5 MG/1
5 TABLET ORAL EVERY 6 HOURS PRN
Qty: 120 TABLET | Refills: 0 | Status: CANCELLED | OUTPATIENT
Start: 2024-03-11

## 2024-03-11 NOTE — TELEPHONE ENCOUNTER
Patient requesting refill on percocet.gabapentin  Last office visit 03/06/2024   shows last refill on 2/14/2024  [facility]  Patient last UDS 1/13/2023 was consistent with current therapy              CODEINE  Not Detected Not Detected Present Not Detected    MORPHINE  Not Detected Not Detected Present Not Detected    6-ACETYLMORPHINE  Not Detected Not Detected Not Detected Not Detected    OXYCODONE  Not Detected Not Detected Not Detected Not Detected    NOROYXCODONE  Not Detected Not Detected Not Detected Not Detected    OXYMORPHONE  Not Detected Not Detected Not Detected Not Detected    NOROXYMORPHONE  Not Detected Not Detected Not Detected Not Detected    HYDROCODONE  Not Detected Not Detected Present Not Detected    NORHYDROCODONE  Not Detected Not Detected Present Not Detected    HYDROMORPHONE  Not Detected Not Detected Not Detected Not Detected    BUPRENORPHINE  Not Detected Not Detected Not Detected Not Detected    NORUBPRENORPHINE  Not Detected Not Detected Not Detected Not Detected    FENTANYL  Not Detected Not Detected Not Detected Not Detected    NORFENTANYL  Not Detected Not Detected Not Detected Not Detected    MEPERIDINE METABOLITE  Not Detected Not Detected Not Detected Not Detected    TAPENTADOL  Not Detected Not Detected Not Detected Not Detected    TAPENTADOL-O-SULF  Not Detected Not Detected Not Detected Not Detected    METHADONE  Not Detected Not Detected Not Detected Not Detected    TRAMADOL  Not Detected Not Detected Not Detected Not Detected    AMPHETAMINE  Not Detected Not Detected Not Detected Not Detected    METHAMPHETAMINE  Not Detected Not Detected Not Detected Not Detected    MDMA- ECSTASY  Not Detected Not Detected Not Detected Not Detected    MDA  Not Detected Not Detected Not Detected Not Detected    MDEA- Rachel  Not Detected Not Detected Not Detected Not Detected    METHYLPHENIDATE  Not Detected Not  Detected Not Detected Not Detected    PHENTERMINE  Not Detected Not Detected Not Detected Not Detected    BENZOYLECGONINE  Not Detected Not Detected Not Detected Not Detected    ALPRAZOLAM  Not Detected Not Detected Not Detected Not Detected    ALPHA-OH-ALPRAZOLAM  Not Detected Not Detected Not Detected Not Detected    CLONAZEPAM  Not Detected Not Detected Not Detected Not Detected    7-AMINOCLONAZEPAM  Not Detected Not Detected Not Detected Not Detected    DIAZEPAM  Not Detected Not Detected Not Detected Not Detected    NORDIAZEPAM  Not Detected Not Detected Not Detected Not Detected    OXAZEPAM  Not Detected Not Detected Not Detected Present    TEMAZEPAM  Not Detected Not Detected Not Detected Not Detected    Lorazepam  Not Detected Not Detected Not Detected Not Detected    MIDAZOLAM  Not Detected Not Detected Not Detected Not Detected    ZOLPIDEM  Not Detected Not Detected Not Detected Not Detected    BARBITURATES  Not Detected Not Detected Not Detected Not Detected    Creatinine, Urine 20.0 - 400.0 mg/dL 57.0 160.7 250.4 140.1 94.0 R, CM   ETHYL GLUCURONIDE  Not Detected Not Detected Not Detected Not Detected    MARIJUANA METABOLITE  Not Detected Not Detected Not Detected Not Detected    PCP  Not Detected Not Detected Not Detected Not Detected    CARISOPRODOL  Not Detected Not Detected CM Not Detected CM Not Detected CM    Comment: The carisoprodol immunoassay has cross-reactivity to  carisoprodol and meprobamate.   Naloxone  Not Detected Not Detected      Gabapentin  Present Present      Pregabalin  Not Detected Not Detected      Alpha-OH-Midazolam  Not Detected Not Detected      Zolpidem Metabolite  Not Detected Not Detected      PAIN MANAGEMENT DRUG PANEL  See Below See Below CM See Below CM See Below CM

## 2024-03-11 NOTE — TELEPHONE ENCOUNTER
Staff is forwarding this message to another staff member to further assist.    ----- Message from Yvonne Alaniz sent at 3/11/2024 11:25 AM CDT -----  Regarding: RX  Type:  Patient Returning Call      Name of who is calling: Lottie/Teofilo        What is request in detail:Patient is requesting a call back concerning medication for mom, she was seen on 3/06. Need RX for Pericet and Gabapentin. Patient will be of medication on 03/12.        Can clinic reply by [facility]:no        [phone]

## 2024-03-11 NOTE — TELEPHONE ENCOUNTER
----- Message from Yvonne Alaniz sent at 3/11/2024 11:25 AM CDT -----  Regarding: RX  Type:  Patient Returning Call      Name of who is calling: Ashkan        What is request in detail:Patient is requesting a call back concerning medication for mom, she was seen on 3/06. Need RX for Pericet and Gabapentin. Patient will be of medication on 03/12.        Can clinic reply by [facility]:no        [phone]

## 2024-03-12 ENCOUNTER — NURSE TRIAGE (OUTPATIENT)
Dept: ADMINISTRATIVE | Facility: CLINIC | Age: 79
End: 2024-03-12
Payer: MEDICARE

## 2024-03-12 ENCOUNTER — PATIENT MESSAGE (OUTPATIENT)
Dept: SPINE | Facility: CLINIC | Age: 79
End: 2024-03-12
Payer: MEDICARE

## 2024-03-12 DIAGNOSIS — M54.12 RADICULOPATHY, CERVICAL REGION: ICD-10-CM

## 2024-03-12 DIAGNOSIS — M25.512 CHRONIC LEFT SHOULDER PAIN: ICD-10-CM

## 2024-03-12 DIAGNOSIS — F11.20 UNCOMPLICATED OPIOID DEPENDENCE: ICD-10-CM

## 2024-03-12 DIAGNOSIS — G89.29 CHRONIC LEFT SHOULDER PAIN: ICD-10-CM

## 2024-03-12 LAB
6MAM UR QL: NOT DETECTED
7AMINOCLONAZEPAM UR QL: NOT DETECTED
A-OH ALPRAZ UR QL: NOT DETECTED
ALPHA-OH-MIDAZOLAM: NOT DETECTED
ALPRAZ UR QL: NOT DETECTED
AMPHET UR QL SCN: NOT DETECTED
ANNOTATION COMMENT IMP: NORMAL
BARBITURATES UR QL: NEGATIVE
BUPRENORPHINE UR QL: NOT DETECTED
BZE UR QL: NEGATIVE
CARBOXYTHC UR QL: NEGATIVE
CARISOPRODOL UR QL: NEGATIVE
CLONAZEPAM UR QL: NOT DETECTED
CODEINE UR QL: NOT DETECTED
CREAT UR-MCNC: 143.5 MG/DL (ref 20–400)
DIAZEPAM UR QL: NOT DETECTED
ETHYL GLUCURONIDE UR QL: NEGATIVE
FENTANYL UR QL: NOT DETECTED
GABAPENTIN: PRESENT
HYDROCODONE UR QL: NOT DETECTED
HYDROMORPHONE UR QL: NOT DETECTED
LORAZEPAM UR QL: NOT DETECTED
MDA UR QL: NOT DETECTED
MDEA UR QL: NOT DETECTED
MDMA UR QL: NOT DETECTED
ME-PHENIDATE UR QL: NOT DETECTED
METHADONE UR QL: NEGATIVE
METHAMPHET UR QL: NOT DETECTED
MIDAZOLAM UR QL SCN: NOT DETECTED
MORPHINE UR QL: NOT DETECTED
NALOXONE: NOT DETECTED
NORBUPRENORPHINE UR QL CFM: NOT DETECTED
NORDIAZEPAM UR QL: NOT DETECTED
NORFENTANYL UR QL: NOT DETECTED
NORHYDROCODONE UR QL CFM: NOT DETECTED
NORMEPERIDINE UR QL CFM: NOT DETECTED
NOROXYCODONE UR QL CFM: PRESENT
NOROXYMORPHONE UR QL SCN: PRESENT
OXAZEPAM UR QL: NOT DETECTED
OXYCODONE UR QL: PRESENT
OXYMORPHONE UR QL: PRESENT
PATHOLOGY STUDY: NORMAL
PCP UR QL: NEGATIVE
PHENTERMINE UR QL: NOT DETECTED
PREGABALIN: NOT DETECTED
SERVICE CMNT-IMP: NORMAL
TAPENTADOL UR QL SCN: NOT DETECTED
TAPENTADOL UR QL SCN: NOT DETECTED
TEMAZEPAM UR QL: NOT DETECTED
TRAMADOL UR QL: NEGATIVE
ZOLPIDEM METABOLITE: NOT DETECTED
ZOLPIDEM UR QL: NOT DETECTED

## 2024-03-12 RX ORDER — OXYCODONE HYDROCHLORIDE 5 MG/1
5 TABLET ORAL EVERY 6 HOURS PRN
Qty: 32 TABLET | Refills: 0 | Status: SHIPPED | OUTPATIENT
Start: 2024-03-12 | End: 2024-03-20

## 2024-03-12 RX ORDER — GABAPENTIN 100 MG/1
100 CAPSULE ORAL 2 TIMES DAILY
Qty: 60 CAPSULE | Refills: 11 | Status: SHIPPED | OUTPATIENT
Start: 2024-03-12 | End: 2024-03-20

## 2024-03-12 NOTE — TELEPHONE ENCOUNTER
Attempted to contact pt x2, unable to leave a VM message.       Reason for Disposition   Second attempt to contact caller AND no contact made. Phone number verified.    Protocols used: No Contact or Duplicate Contact Call-A-AH

## 2024-03-12 NOTE — TELEPHONE ENCOUNTER
Pt's daughter reports pt saw Dr. Vicente and prescribed a medication that was not covered by insurance, so reached out to office last week and another pain medication and Gabapentin was suppose sent in, but daughter states she hasn't not heard back from anyone as of yet and pt has only one pain pill left for tonight, Daughter advised a new prescription for Gabapentin is seen in pt's chart that was sent to [facility] today, but no new pain medication prescriptions seen, would have to reach out to the office when open for controlled medication refills per protocol, but a message will be routed to PCP/office with the request. Daughter/Pt encouraged to call back with any worsening symptoms or questions. She verbalized understanding.    Reason for Disposition   Caller requesting a CONTROLLED substance prescription refill (e.g., narcotics, ADHD medicines)    Protocols used: Medication Refill and Renewal Call-A-AH

## 2024-03-12 NOTE — TELEPHONE ENCOUNTER
----- Message from Marta Fofana sent at 3/12/2024 11:45 AM CDT -----   Name of Who is Calling:     What is the request in detail:  request magda back in reference to  discuss medications  and patient is running out of pain medication   oxyCODONE (ROXICODONE) 5 MG immediate release tablet   Please contact to further discuss and advise      Can the clinic reply by [facility]:     What Number to Call Back if not in [facility]:  [phone] / koby / ariel

## 2024-03-12 NOTE — TELEPHONE ENCOUNTER
Contacted patient daughter- she is reporting that her mother is running out of pain medication as of today- per the note, the refill of the percocet is based off the results of the UDS a of 03.06.24- no results rendered.     She also reports the Lyrica wasn't covered so was changed to Gabapentin and requires that to be sent to pharmacy.

## 2024-03-13 ENCOUNTER — TELEPHONE (OUTPATIENT)
Dept: PAIN MEDICINE | Facility: CLINIC | Age: 79
End: 2024-03-13
Payer: MEDICARE

## 2024-03-13 NOTE — TELEPHONE ENCOUNTER
Good evening,    Looking back at the records, Dr. Brian prescribed oxycodone 5mg every 6 hours (4 times a day) AS NEEDED and the script was filled 2/14/24.  There should have been enough to last until 3/15/24, even if the meds were taken every 6 hours.    The urine drug screen just came back this evening and seems appropriate. I will give another script of #32 pills of the same medication (oxycodone 5mg) to last through 3/20/24, which should be the date of your return visit.     As per the contract that was signed in clinic, you should only be taking the medication as prescribed.  If medications are being finished prior to the next refill date, that counts as a breach of contract.    We'll see you on 3/20/24.  We'll discuss everything again at that visit.    Vandana Vicente

## 2024-03-13 NOTE — TELEPHONE ENCOUNTER
----- Message from Dinorah Jose A Mcclellan sent at 3/12/2024  5:33 PM CDT -----  Type:  Patient Returning Call    Who Called: Pt  Who Left Message for Patient:  Does the patient know what this is regarding?: medication  Would the patient rather a call back or a response via [facility]? Call  Best Call Back Number: [phone]  Additional Information: Pt. Daughter would like to speak to provider related pt medication and refill.  Daughter stated she has called several time on several separate days and has not received a call back.  She also has sent several messages requesting a response.  Pt is currently out of her medication.  This request has been since 3/6/24.  Pt was told at that time another request will be submitted and it has not been put in.  Pt is currently in pain and need her medication.

## 2024-03-13 NOTE — TELEPHONE ENCOUNTER
Responded to portal message. As discussed yesterday with patient daughter, the approval was based off the results of the UDS - as that was resulted on yesterday- Dr. Vicente approved the medication last night.

## 2024-03-20 ENCOUNTER — OFFICE VISIT (OUTPATIENT)
Dept: SPINE | Facility: CLINIC | Age: 79
End: 2024-03-20
Payer: MEDICARE

## 2024-03-20 VITALS
DIASTOLIC BLOOD PRESSURE: 77 MMHG | BODY MASS INDEX: 31.24 KG/M2 | HEIGHT: 64 IN | SYSTOLIC BLOOD PRESSURE: 128 MMHG | WEIGHT: 183 LBS | RESPIRATION RATE: 18 BRPM | HEART RATE: 90 BPM | OXYGEN SATURATION: 100 %

## 2024-03-20 DIAGNOSIS — M25.512 CHRONIC LEFT SHOULDER PAIN: ICD-10-CM

## 2024-03-20 DIAGNOSIS — F11.90 CHRONIC, CONTINUOUS USE OF OPIOIDS: Primary | ICD-10-CM

## 2024-03-20 DIAGNOSIS — G89.29 CHRONIC LEFT SHOULDER PAIN: ICD-10-CM

## 2024-03-20 PROCEDURE — 3074F SYST BP LT 130 MM HG: CPT | Mod: HCNC,CPTII,S$GLB, | Performed by: STUDENT IN AN ORGANIZED HEALTH CARE EDUCATION/TRAINING PROGRAM

## 2024-03-20 PROCEDURE — 1125F AMNT PAIN NOTED PAIN PRSNT: CPT | Mod: HCNC,CPTII,S$GLB, | Performed by: STUDENT IN AN ORGANIZED HEALTH CARE EDUCATION/TRAINING PROGRAM

## 2024-03-20 PROCEDURE — 1160F RVW MEDS BY RX/DR IN RCRD: CPT | Mod: HCNC,CPTII,S$GLB, | Performed by: STUDENT IN AN ORGANIZED HEALTH CARE EDUCATION/TRAINING PROGRAM

## 2024-03-20 PROCEDURE — 3288F FALL RISK ASSESSMENT DOCD: CPT | Mod: HCNC,CPTII,S$GLB, | Performed by: STUDENT IN AN ORGANIZED HEALTH CARE EDUCATION/TRAINING PROGRAM

## 2024-03-20 PROCEDURE — 99214 OFFICE O/P EST MOD 30 MIN: CPT | Mod: HCNC,S$GLB,, | Performed by: STUDENT IN AN ORGANIZED HEALTH CARE EDUCATION/TRAINING PROGRAM

## 2024-03-20 PROCEDURE — 1159F MED LIST DOCD IN RCRD: CPT | Mod: HCNC,CPTII,S$GLB, | Performed by: STUDENT IN AN ORGANIZED HEALTH CARE EDUCATION/TRAINING PROGRAM

## 2024-03-20 PROCEDURE — 1101F PT FALLS ASSESS-DOCD LE1/YR: CPT | Mod: HCNC,CPTII,S$GLB, | Performed by: STUDENT IN AN ORGANIZED HEALTH CARE EDUCATION/TRAINING PROGRAM

## 2024-03-20 PROCEDURE — 3078F DIAST BP <80 MM HG: CPT | Mod: HCNC,CPTII,S$GLB, | Performed by: STUDENT IN AN ORGANIZED HEALTH CARE EDUCATION/TRAINING PROGRAM

## 2024-03-20 PROCEDURE — 1157F ADVNC CARE PLAN IN RCRD: CPT | Mod: HCNC,CPTII,S$GLB, | Performed by: STUDENT IN AN ORGANIZED HEALTH CARE EDUCATION/TRAINING PROGRAM

## 2024-03-20 PROCEDURE — 99999 PR PBB SHADOW E&M-EST. PATIENT-LVL IV: CPT | Mod: PBBFAC,HCNC,, | Performed by: STUDENT IN AN ORGANIZED HEALTH CARE EDUCATION/TRAINING PROGRAM

## 2024-03-20 RX ORDER — GABAPENTIN 100 MG/1
100 CAPSULE ORAL 3 TIMES DAILY
Qty: 90 CAPSULE | Refills: 2 | Status: SHIPPED | OUTPATIENT
Start: 2024-03-20

## 2024-03-20 RX ORDER — HYDROCODONE BITARTRATE AND ACETAMINOPHEN 5; 325 MG/1; MG/1
1 TABLET ORAL EVERY 6 HOURS PRN
Qty: 112 TABLET | Refills: 0 | Status: SHIPPED | OUTPATIENT
Start: 2024-03-20 | End: 2024-04-04

## 2024-03-20 NOTE — PROGRESS NOTES
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 neurofibromas.  HEAD/FACE:  Normocephalic, atraumatic. Cranial nerves grossly intact.   NECK: Limited ROM with pain on flexion and lateral rotation.   CV: RRR with palpation of the radial artery.  PULM: No evidence of respiratory difficulty, symmetric chest rise.  BACK:  Paraspinals nontender to palpation. Mild pain with palpation over thoracic spine. Tenderness with palpation along medial scapular border.  Winging of left scapular border noted.  EXTREMITIES: Good capillary refill. Normal ROM in right shoulder. Limited ROM in left shoulder secondary to pain in upper back; pain in L shoulder throughout all ROM. +AC joint tenderness and biceps tenderness  MUSCULOSKELETAL: Right shoulder maneuvers are negative. Bilateral upper extremity strength is normal and symmetric.  No atrophy or tone abnormalities are noted.  NEURO: Bilateral upper extremity coordination and muscle stretch reflexes are physiologic and symmetric.   No loss of sensation is noted. Allodynia noted throughout, worsened at medial border of scapula (site of prior neuroma excision)  GAIT: Normal    CMP  Sodium   Date Value Ref Range Status   12/27/2023 135 (L) 136 - 145 mmol/L Final     Potassium   Date Value Ref Range Status   12/27/2023 4.4 3.5 - 5.1 mmol/L Final     Chloride   Date Value " Ref Range Status   12/27/2023 104 95 - 110 mmol/L Final     CO2   Date Value Ref Range Status   12/27/2023 22 (L) 23 - 29 mmol/L Final     Glucose   Date Value Ref Range Status   12/27/2023 95 70 - 110 mg/dL Final     BUN   Date Value Ref Range Status   12/27/2023 14 8 - 23 mg/dL Final     Creatinine   Date Value Ref Range Status   12/27/2023 0.7 0.5 - 1.4 mg/dL Final     Calcium   Date Value Ref Range Status   12/27/2023 9.5 8.7 - 10.5 mg/dL Final     Total Protein   Date Value Ref Range Status   12/27/2023 6.9 6.0 - 8.4 g/dL Final     Albumin   Date Value Ref Range Status   12/27/2023 3.7 3.5 - 5.2 g/dL Final     Total Bilirubin   Date Value Ref Range Status   12/27/2023 0.4 0.1 - 1.0 mg/dL Final     Comment:     For infants and newborns, interpretation of results should be based  on gestational age, weight and in agreement with clinical  observations.    Premature Infant recommended reference ranges:  Up to 24 hours.............<8.0 mg/dL  Up to 48 hours............<12.0 mg/dL  3-5 days..................<15.0 mg/dL  6-29 days.................<15.0 mg/dL       Alkaline Phosphatase   Date Value Ref Range Status   12/27/2023 85 55 - 135 U/L Final     AST   Date Value Ref Range Status   12/27/2023 16 10 - 40 U/L Final     ALT   Date Value Ref Range Status   12/27/2023 19 10 - 44 U/L Final     Anion Gap   Date Value Ref Range Status   12/27/2023 9 8 - 16 mmol/L Final     eGFR   Date Value Ref Range Status   12/27/2023 >60.0 >60 mL/min/1.73 m^2 Final         ASSESSMENT: 78 y.o. year old female with left shoulder pain, consistent with:     1. Chronic left shoulder pain  HYDROcodone-acetaminophen (NORCO) 5-325 mg per tablet    gabapentin (NEURONTIN) 100 MG capsule          IMPRESSION: Lesli Gong presents today for chronic left shoulder pain.  She had previously been under the care of Dr. Cheney and Dr. Joseph, but she felt uncomfortable with the plan of care as mentioned at her last visit. She continues to have  pain associated with her left shoulder, even with passive movement. She believes her pain began after a neurofibroma excision in 2017. On physical exam, she has numerous cutaneous neurofibromas and allodynia reproducing her chief complaint medial to the scapula.  She also has left scapular winging. MRI thoracic spine largely unremarkable and chronic degenerative changes do not correlate with presenting symptoms.  She had an xray of the left shoulder which was also unremarkable. We should continue workup for left shoulder pathology and an MRI left shoulder is scheduled.  She also has an appointment scheduled with orthopedic surgery regarding her left shoulder. We will continue conservative management with non-narcotic medications. She has had benefit from chronic opiate use, and her last UDS seems appropriate.  Her pain contract is updated, so at this time she would qualify for continued opioid prescribing.  It is unclear as to the cause of her abnormal side effects of the tramadol and the tylenol #3s she had previously for years.  As her pain remains the same, it would be safer to wean the narcotics back to her previous total morphine equivalents.    PLAN:   - I have stressed the importance of physical activity and a home exercise plan to help with pain and improve health.  - Patient can continue with medications for now since they are providing benefits, using them appropriately, and without side effects.  - She continues to have lidocaine patches for her left scapula, as patient's pain seems to be neuropathic  - left shoulder MRI without contrast: scheduled for 3/28/24  - referral to orthopedic surgery for left shoulder pain and left scapula winging: appointment 3/27/24 with Dr. Fong  - Urine Drug Screen 3/6/2024: appropriate  - she states lyrica was not covered  - she can try gabapentin again as she reports no side effects. Script given for gabapentin 100mg three times a day  - script given for Norco  5-325mg every 6 hours AS NEEDED for SEVERE pain - #112 pills ordered.  I advised that she should NOT take this scheduled, but only as needed.  The daughter understood this as well.  - she can continue taking 2 extra strength tylenol   - updated pain contract 3/6/2024; we will consider prescribing Percocet 5-325mg if her urine drug screen is clear  -  seems to be consistent with history as stated by patient  - They report that they have benefit without significant side effects  - I counseled that with opiate therapy, they need to take the medication as prescribed.  They will be required to present every 4 weeks for medication refills (virtual ok for monthly evaluation) and every 12 weeks with me (in person to evaluate his progress).  They may not take benzodiazepines or alcohol along with his opioids. They may not take any opiate medications from any other providers, unless discussed with me first.  They will have random urine drug screens to assess compliance.  I counseled that opiate prescriptions will be reported on the national drug monitoring database that is accessible by all medical providers.  Any breech in contract will make them ineligible for long-term opiate prescribing, necessitating a taper off all opioids and management via only non-opioid means.  - follow up in 1 month (virtual), 2 months (virtual), and in person with me in 3 months  - Counseled patient regarding the importance of activity modification and physical therapy.    The above plan and management options were discussed at length with patient. Patient is in agreement with the above and verbalized understanding.      Vandana Vicente MD  03/20/2024

## 2024-03-28 ENCOUNTER — TELEPHONE (OUTPATIENT)
Dept: INTERNAL MEDICINE | Facility: CLINIC | Age: 79
End: 2024-03-28

## 2024-03-28 ENCOUNTER — HOSPITAL ENCOUNTER (OUTPATIENT)
Dept: RADIOLOGY | Facility: HOSPITAL | Age: 79
Discharge: HOME | End: 2024-03-28
Attending: STUDENT IN AN ORGANIZED HEALTH CARE EDUCATION/TRAINING PROGRAM
Payer: MEDICARE

## 2024-03-28 DIAGNOSIS — D53.9 NUTRITIONAL ANEMIA, UNSPECIFIED: ICD-10-CM

## 2024-03-28 DIAGNOSIS — G89.29 CHRONIC LEFT SHOULDER PAIN: ICD-10-CM

## 2024-03-28 DIAGNOSIS — R41.3 MEMORY DIFFICULTY: ICD-10-CM

## 2024-03-28 DIAGNOSIS — M25.512 CHRONIC LEFT SHOULDER PAIN: ICD-10-CM

## 2024-03-28 DIAGNOSIS — Z02.2 ENCOUNTER FOR EXAMINATION FOR ADMISSION TO NURSING HOME: Primary | ICD-10-CM

## 2024-03-28 PROCEDURE — 73221 MRI JOINT UPR EXTREM W/O DYE: CPT | Mod: TC,HCNC,LT

## 2024-03-28 PROCEDURE — 73221 MRI JOINT UPR EXTREM W/O DYE: CPT | Mod: 26,HCNC,LT, | Performed by: INTERNAL MEDICINE

## 2024-03-28 NOTE — TELEPHONE ENCOUNTER
Packet received for facility admit. Needs labs, chest xray, and covid test and visit with me. Thanks.

## 2024-03-30 ENCOUNTER — NURSE TRIAGE (OUTPATIENT)
Dept: ADMINISTRATIVE | Facility: CLINIC | Age: 79
End: 2024-03-30
Payer: MEDICARE

## 2024-03-30 NOTE — TELEPHONE ENCOUNTER
Spoke with daughter of pt who reports that pt has been having shoulder pain. States that she was given instructions to call in every time she is giving extra dose of medication to pt. States pt is not currently with her but with brother.  States that pt was on oxycodone which worked for pt but new medication is not. Advised unable to triage pt unless pt with her. Daughter did attempt to call pt on [address] with no response.  Also Advised if pt is having medical emergency to bring to ED.    No one on call for pain management.   Reason for Disposition   Caller has cancelled the call before the first contact    Protocols used: No Contact or Duplicate Contact Call-A-[facility]

## 2024-04-01 PROBLEM — N39.0 UTI (URINARY TRACT INFECTION): Status: RESOLVED | Noted: 2023-12-26 | Resolved: 2024-04-01

## 2024-04-03 ENCOUNTER — NURSE TRIAGE (OUTPATIENT)
Dept: ADMINISTRATIVE | Facility: CLINIC | Age: 79
End: 2024-04-03
Payer: MEDICARE

## 2024-04-03 NOTE — TELEPHONE ENCOUNTER
Daughter calls, pt was placed on new medication for chronic shoulder pain. Mother has dementia. Daughter is frustrated because she feels that she was switched to a medication that isn't working. Pt has decrease in appetite which is how she presented before she had to be hospitalized after a med change to tramadol. Daughter wants  oxycodone-acetaminophen prescribed because that has helped patient before. Daughter is not with pt right now. Pt has appt at end of month with pain mgmt but daughter does not want her mother to continue the hydrocodone medication.     Recommended that for the new med request the dispo is to see see MD in office within 3 days. Will send high priority message to specially pain mgmt MD. Also recommended that daughter could call back when she is with patient to triage pain and symptoms to give further recommendations. Provided direct line. Daughter v/u.   Reason for Disposition   Prescription request for new medicine (not a refill)    Protocols used: Medication Question Call-A-[place]

## 2024-04-04 ENCOUNTER — OFFICE VISIT (OUTPATIENT)
Dept: SPINE | Facility: CLINIC | Age: 79
End: 2024-04-04
Payer: MEDICARE

## 2024-04-04 ENCOUNTER — TELEPHONE (OUTPATIENT)
Dept: INTERNAL MEDICINE | Facility: CLINIC | Age: 79
End: 2024-04-04

## 2024-04-04 VITALS
DIASTOLIC BLOOD PRESSURE: 80 MMHG | BODY MASS INDEX: 31.24 KG/M2 | HEIGHT: 64 IN | WEIGHT: 183 LBS | SYSTOLIC BLOOD PRESSURE: 127 MMHG | OXYGEN SATURATION: 100 % | RESPIRATION RATE: 12 BRPM | HEART RATE: 87 BPM

## 2024-04-04 DIAGNOSIS — F11.90 CHRONIC, CONTINUOUS USE OF OPIOIDS: Primary | ICD-10-CM

## 2024-04-04 DIAGNOSIS — F11.20 UNCOMPLICATED OPIOID DEPENDENCE: ICD-10-CM

## 2024-04-04 DIAGNOSIS — F03.B11 MODERATE DEMENTIA WITH AGITATION, UNSPECIFIED DEMENTIA TYPE: ICD-10-CM

## 2024-04-04 DIAGNOSIS — M25.512 CHRONIC LEFT SHOULDER PAIN: ICD-10-CM

## 2024-04-04 DIAGNOSIS — G89.29 CHRONIC LEFT SHOULDER PAIN: Primary | ICD-10-CM

## 2024-04-04 DIAGNOSIS — G89.29 CHRONIC LEFT SHOULDER PAIN: ICD-10-CM

## 2024-04-04 DIAGNOSIS — M25.512 CHRONIC LEFT SHOULDER PAIN: Primary | ICD-10-CM

## 2024-04-04 PROCEDURE — 3079F DIAST BP 80-89 MM HG: CPT | Mod: HCNC,CPTII,S$GLB, | Performed by: STUDENT IN AN ORGANIZED HEALTH CARE EDUCATION/TRAINING PROGRAM

## 2024-04-04 PROCEDURE — 1160F RVW MEDS BY RX/DR IN RCRD: CPT | Mod: HCNC,CPTII,S$GLB, | Performed by: STUDENT IN AN ORGANIZED HEALTH CARE EDUCATION/TRAINING PROGRAM

## 2024-04-04 PROCEDURE — 1101F PT FALLS ASSESS-DOCD LE1/YR: CPT | Mod: HCNC,CPTII,S$GLB, | Performed by: STUDENT IN AN ORGANIZED HEALTH CARE EDUCATION/TRAINING PROGRAM

## 2024-04-04 PROCEDURE — 99215 OFFICE O/P EST HI 40 MIN: CPT | Mod: HCNC,S$GLB,, | Performed by: STUDENT IN AN ORGANIZED HEALTH CARE EDUCATION/TRAINING PROGRAM

## 2024-04-04 PROCEDURE — 1125F AMNT PAIN NOTED PAIN PRSNT: CPT | Mod: HCNC,CPTII,S$GLB, | Performed by: STUDENT IN AN ORGANIZED HEALTH CARE EDUCATION/TRAINING PROGRAM

## 2024-04-04 PROCEDURE — 3074F SYST BP LT 130 MM HG: CPT | Mod: HCNC,CPTII,S$GLB, | Performed by: STUDENT IN AN ORGANIZED HEALTH CARE EDUCATION/TRAINING PROGRAM

## 2024-04-04 PROCEDURE — 1157F ADVNC CARE PLAN IN RCRD: CPT | Mod: HCNC,CPTII,S$GLB, | Performed by: STUDENT IN AN ORGANIZED HEALTH CARE EDUCATION/TRAINING PROGRAM

## 2024-04-04 PROCEDURE — 1159F MED LIST DOCD IN RCRD: CPT | Mod: HCNC,CPTII,S$GLB, | Performed by: STUDENT IN AN ORGANIZED HEALTH CARE EDUCATION/TRAINING PROGRAM

## 2024-04-04 PROCEDURE — 3288F FALL RISK ASSESSMENT DOCD: CPT | Mod: HCNC,CPTII,S$GLB, | Performed by: STUDENT IN AN ORGANIZED HEALTH CARE EDUCATION/TRAINING PROGRAM

## 2024-04-04 PROCEDURE — 99999 PR PBB SHADOW E&M-EST. PATIENT-LVL IV: CPT | Mod: PBBFAC,HCNC,, | Performed by: STUDENT IN AN ORGANIZED HEALTH CARE EDUCATION/TRAINING PROGRAM

## 2024-04-04 RX ORDER — OXYCODONE AND ACETAMINOPHEN 5; 325 MG/1; MG/1
1 TABLET ORAL EVERY 8 HOURS PRN
Qty: 90 TABLET | Refills: 0 | Status: SHIPPED | OUTPATIENT
Start: 2024-04-04 | End: 2024-04-11 | Stop reason: DRUGHIGH

## 2024-04-04 NOTE — TELEPHONE ENCOUNTER
Discussed with daughter pt's ongoing pain and progressing dementia. Would likely benefit from palliative care consult.  Working on SNF placement. Needs to move appt up with me for paperwork. Labs and cxr tomorrow.     Please schedule w me on 2pm on Tuesday for SNF paperwork

## 2024-04-04 NOTE — PROGRESS NOTES
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 Bilateral        Family History:  Family History   Problem Relation Age of Onset    Breast cancer Mother     Neurofibromatosis Father     Cancer Sister         GIST    Neurofibromatosis Sister        Social History:  Social History     Socioeconomic History    Marital status:    Tobacco Use    Smoking status: Never    Smokeless tobacco: Never   Substance and Sexual Activity    Alcohol use: No    Drug use: No    Sexual activity: Not Currently     Social Determinants of Health     Financial Resource Strain: Low Risk  (2023)    Overall Financial Resource Strain (CARDIA)     Difficulty of Paying Living Expenses: Not hard at all   Food Insecurity: No Food Insecurity (2023)    Hunger Vital Sign     Worried About Running Out of Food in the Last Year: Never true     Ran Out of Food in the Last Year: Never true   Transportation Needs: No Transportation Needs (2023)    PRAPARE - Transportation     Lack of Transportation (Medical): No     Lack of Transportation (Non-Medical): No   Physical Activity: Insufficiently Active (2023)    Exercise Vital Sign     Days of Exercise per Week: 1 day     Minutes of Exercise per Session: 10 min   Stress: No Stress Concern Present (2023)    [employer] [place] [employer] -  "Occupational Stress Questionnaire     Feeling of Stress : Not at all   Social Connections: Moderately Isolated (9/12/2023)    Social Connection and Isolation Panel [NHANES]     Frequency of Communication with Friends and Family: More than three times a week     Frequency of Social Gatherings with Friends and Family: Twice a week     Attends Taoism Services: More than 4 times per year     Active Member of Clubs or Organizations: No     Attends Club or Organization Meetings: Never     Marital Status:    Housing Stability: Low Risk  (9/12/2023)    Housing Stability Vital Sign     Unable to Pay for Housing in the Last Year: No     Number of Places Lived in the Last Year: 1     Unstable Housing in the Last Year: No       OBJECTIVE:    /80 (BP Location: Right arm, Patient Position: Sitting, BP Method: Medium (Automatic))   Pulse 87   Resp 12   Ht 5' 4" (1.626 m)   Wt 83 kg (182 lb 15.7 oz)   SpO2 100%   BMI 31.41 kg/m²     PHYSICAL EXAMINATION:  GENERAL: Well appearing, in no acute distress, alert and oriented x3.  PSYCH:  Mood and affect appropriate.  SKIN: Skin color, texture, turgor normal, scattered global neurofibromas.  HEAD/FACE:  Normocephalic, atraumatic. Cranial nerves grossly intact.   NECK: Limited ROM with pain on flexion and lateral rotation.   CV: RRR with palpation of the radial artery.  PULM: No evidence of respiratory difficulty, symmetric chest rise.  BACK:  Paraspinals nontender to palpation. pain with palpation over thoracic spine. Tenderness with palpation along medial scapular border.  Winging of left scapular border noted.  EXTREMITIES: Good capillary refill. Normal ROM in right shoulder. Limited ROM in left shoulder secondary to pain in upper back; pain in L shoulder throughout all ROM. +AC joint tenderness and biceps tenderness  MUSCULOSKELETAL: Right shoulder maneuvers are negative. Bilateral upper extremity strength is normal and symmetric.  No atrophy or tone " abnormalities are noted.  NEURO: Bilateral upper extremity coordination and muscle stretch reflexes are physiologic and symmetric.   No loss of sensation is noted. Allodynia noted throughout, worsened at medial border of scapula (site of prior neuroma excision)  GAIT: Normal    CMP  Sodium   Date Value Ref Range Status   12/27/2023 135 (L) 136 - 145 mmol/L Final     Potassium   Date Value Ref Range Status   12/27/2023 4.4 3.5 - 5.1 mmol/L Final     Chloride   Date Value Ref Range Status   12/27/2023 104 95 - 110 mmol/L Final     CO2   Date Value Ref Range Status   12/27/2023 22 (L) 23 - 29 mmol/L Final     Glucose   Date Value Ref Range Status   12/27/2023 95 70 - 110 mg/dL Final     BUN   Date Value Ref Range Status   12/27/2023 14 8 - 23 mg/dL Final     Creatinine   Date Value Ref Range Status   12/27/2023 0.7 0.5 - 1.4 mg/dL Final     Calcium   Date Value Ref Range Status   12/27/2023 9.5 8.7 - 10.5 mg/dL Final     Total Protein   Date Value Ref Range Status   12/27/2023 6.9 6.0 - 8.4 g/dL Final     Albumin   Date Value Ref Range Status   12/27/2023 3.7 3.5 - 5.2 g/dL Final     Total Bilirubin   Date Value Ref Range Status   12/27/2023 0.4 0.1 - 1.0 mg/dL Final     Comment:     For infants and newborns, interpretation of results should be based  on gestational age, weight and in agreement with clinical  observations.    Premature Infant recommended reference ranges:  Up to 24 hours.............<8.0 mg/dL  Up to 48 hours............<12.0 mg/dL  3-5 days..................<15.0 mg/dL  6-29 days.................<15.0 mg/dL       Alkaline Phosphatase   Date Value Ref Range Status   12/27/2023 85 55 - 135 U/L Final     AST   Date Value Ref Range Status   12/27/2023 16 10 - 40 U/L Final     ALT   Date Value Ref Range Status   12/27/2023 19 10 - 44 U/L Final     Anion Gap   Date Value Ref Range Status   12/27/2023 9 8 - 16 mmol/L Final     eGFR   Date Value Ref Range Status   12/27/2023 >60.0 >60 mL/min/1.73 m^2 Final          ASSESSMENT: 78 y.o. year old female with left shoulder pain, consistent with:     1. Chronic, continuous use of opioids  oxyCODONE-acetaminophen (PERCOCET) 5-325 mg per tablet      2. Chronic left shoulder pain            IMPRESSION: Lesli Gong presents today for chronic left shoulder pain.  Her daughter and son speak on her behalf as she has poor cognitive function. She had previously been under the care of Dr. Cheney and Dr. Joseph, and had been on Tylenol #3 until December 2023, where she was started on Tramadol.  Since that time, she was admitted to the hospital (polypharmacy vs. Urinary tract infection) and was started on oxycodone 5mg every 6 hours prior to presenting to my office. She continues to have pain associated with her left shoulder, even with passive movement. On physical exam, she has numerous cutaneous neurofibromas and allodynia reproducing her chief complaint medial to the scapula.  She also has left scapular winging. MRI thoracic spine largely unremarkable and chronic degenerative changes do not correlate with presenting symptoms.  MRI left shoulder demonstrates posterosuperior labral tear, biceps tendinopathy, subacromial subdeltoid bursitis, and possible supraspinatus tear.  She also has an appointment scheduled with orthopedic surgery regarding her left shoulder. Of note, the family express frustration with the management of her pain thus far.  I expressed that it is imperative to optimize multi-modal treatment prior to escalating doses of her narcotics, in an effort to improve her pain while reducing her overall opiate consumption, as usage of higher dosage narcotics can further worsen her cognitive function.    PLAN:   - I have stressed the importance of physical activity and a home exercise plan to help with pain and improve health.  - Patient can continue with medications for now since they are providing benefits, using them appropriately, and without side effects.  - She  continues to have lidocaine patches for her left scapula, as patient's pain seems to be neuropathic  - left shoulder MRI without contrast reviewed  - appointment 3/27/24 with Dr. Fong for left shoulder pain and left scapula winging  - we further discussed that various interventions can be offered which may potentially help with her continued left shoulder pain  - Urine Drug Screen 3/6/2024: appropriate  - continue gabapentin 100mg three times a day  - stopped Norco 5-325mg every 6 hours due to lack of benefit  - script given for Percocet 5-325mg every 8 hours AS NEEDED for SEVERE pain - #90 pills ordered.  I advised that she should NOT take this scheduled, but only as needed.  The daughter understood this as well.  - updated pain contract 3/6/2024  -  seems to be consistent with history as stated by patient  - They report that they have benefit without significant side effects  - I counseled that with opiate therapy, they need to take the medication as prescribed.  They will be required to present every 4 weeks for medication refills (virtual ok for monthly evaluation) and every 12 weeks with me (in person to evaluate his progress).  They may not take benzodiazepines or alcohol along with his opioids. They may not take any opiate medications from any other providers, unless discussed with me first.  They will have random urine drug screens to assess compliance.  I counseled that opiate prescriptions will be reported on the national drug monitoring database that is accessible by all medical providers.  Any breech in contract will make them ineligible for long-term opiate prescribing, necessitating a taper off all opioids and management via only non-opioid means.  - follow up in 1 month (virtual), 2 months (virtual), and in person with me in 3 months  - Counseled patient regarding the importance of activity modification and physical therapy.    The above plan and management options were discussed at length  with patient. Patient is in agreement with the above and verbalized understanding.  > 60 minutes were spent discussing imaging and plan of care including potential options.    Vandana Vicente MD  04/04/2024

## 2024-04-05 ENCOUNTER — TELEPHONE (OUTPATIENT)
Dept: PAIN MEDICINE | Facility: CLINIC | Age: 79
End: 2024-04-05
Payer: MEDICARE

## 2024-04-05 ENCOUNTER — PATIENT MESSAGE (OUTPATIENT)
Dept: INTERNAL MEDICINE | Facility: CLINIC | Age: 79
End: 2024-04-05
Payer: MEDICARE

## 2024-04-05 ENCOUNTER — PATIENT MESSAGE (OUTPATIENT)
Dept: SPINE | Facility: CLINIC | Age: 79
End: 2024-04-05
Payer: MEDICARE

## 2024-04-05 ENCOUNTER — HOSPITAL ENCOUNTER (OUTPATIENT)
Dept: RADIOLOGY | Facility: HOSPITAL | Age: 79
Discharge: HOME | End: 2024-04-05
Attending: INTERNAL MEDICINE
Payer: MEDICARE

## 2024-04-05 DIAGNOSIS — Z02.2 ENCOUNTER FOR EXAMINATION FOR ADMISSION TO NURSING HOME: ICD-10-CM

## 2024-04-05 DIAGNOSIS — R41.3 MEMORY DIFFICULTY: ICD-10-CM

## 2024-04-05 PROCEDURE — 71046 X-RAY EXAM CHEST 2 VIEWS: CPT | Mod: 26,HCNC,, | Performed by: RADIOLOGY

## 2024-04-05 PROCEDURE — 71046 X-RAY EXAM CHEST 2 VIEWS: CPT | Mod: TC,HCNC

## 2024-04-05 NOTE — TELEPHONE ENCOUNTER
Dr. Brian's msg from 04/04/24 @1013  Discussed with daughter pt's ongoing pain and progressing dementia. Would likely benefit from palliative care consult.  Working on SNF placement. Needs to move appt up with me for paperwork. Labs and cxr tomorrow.      Please schedule w me on 2pm on Tuesday for SNF paperwork        Responded to portal msg, held slot for 04/09/24 2pm Dr. Brian and sent to advanced  group.

## 2024-04-05 NOTE — TELEPHONE ENCOUNTER
----- Message from Kathy Yee sent at 4/5/2024  2:12 PM CDT -----  Regarding: call back  Name of caller: Gianna ( daughter)       What is the requesting detail:  States she no longer wants to change pharmacies, Please give her a call back to further discuss.       Can the clinic reply by [facility]:       What number to call back: [phone]

## 2024-04-05 NOTE — TELEPHONE ENCOUNTER
Staff spoke with patient daughter regarding message sent through patient portal, staff clarified that patient was to keep appt with np scheduled on 04/17/24 as  stated on yesterday.

## 2024-04-05 NOTE — TELEPHONE ENCOUNTER
----- Message from Marta Fofana sent at 4/5/2024  1:43 PM CDT -----   Name of Who is Calling:     What is the request in detail: request call back in reference to medication   oxyCODONE-acetaminophen (PERCOCET) 5-325 mg per tablet / pharmacy was out of  stock  / caller want to know if can send prescription to pharmacy listed below  Please contact to further discuss and advise      Can the clinic reply by [facility]:     What Number to Call Back if not in [facility]:    [phone]/ skyler / daughter        [facility]  Address: [address], [place] [address]  Phone: [phone]

## 2024-04-05 NOTE — TELEPHONE ENCOUNTER
Patient called to inform staff that she have received her medication from [facility].

## 2024-04-10 ENCOUNTER — PATIENT MESSAGE (OUTPATIENT)
Dept: INTERNAL MEDICINE | Facility: CLINIC | Age: 79
End: 2024-04-10
Payer: MEDICARE

## 2024-04-11 ENCOUNTER — OFFICE VISIT (OUTPATIENT)
Dept: PALLIATIVE MEDICINE | Facility: CLINIC | Age: 79
End: 2024-04-11
Payer: MEDICARE

## 2024-04-11 ENCOUNTER — OFFICE VISIT (OUTPATIENT)
Dept: ORTHOPEDICS | Facility: CLINIC | Age: 79
End: 2024-04-11
Payer: MEDICARE

## 2024-04-11 VITALS
SYSTOLIC BLOOD PRESSURE: 136 MMHG | BODY MASS INDEX: 32.7 KG/M2 | HEART RATE: 92 BPM | HEIGHT: 64 IN | WEIGHT: 191.56 LBS | DIASTOLIC BLOOD PRESSURE: 75 MMHG

## 2024-04-11 DIAGNOSIS — M75.52 BURSITIS OF LEFT SHOULDER: Primary | ICD-10-CM

## 2024-04-11 DIAGNOSIS — Q85.01 NEUROFIBROMATOSIS, TYPE 1: ICD-10-CM

## 2024-04-11 DIAGNOSIS — G89.29 CHRONIC LEFT SHOULDER PAIN: Primary | ICD-10-CM

## 2024-04-11 DIAGNOSIS — F41.9 ANXIETY: ICD-10-CM

## 2024-04-11 DIAGNOSIS — G30.1 MODERATE LATE ONSET ALZHEIMER'S DEMENTIA WITH MOOD DISTURBANCE: ICD-10-CM

## 2024-04-11 DIAGNOSIS — M19.012 PRIMARY OSTEOARTHRITIS OF LEFT SHOULDER: ICD-10-CM

## 2024-04-11 DIAGNOSIS — F03.B11 MODERATE DEMENTIA WITH AGITATION, UNSPECIFIED DEMENTIA TYPE: ICD-10-CM

## 2024-04-11 DIAGNOSIS — F02.B3 MODERATE LATE ONSET ALZHEIMER'S DEMENTIA WITH MOOD DISTURBANCE: ICD-10-CM

## 2024-04-11 DIAGNOSIS — Z51.5 PALLIATIVE CARE BY SPECIALIST: ICD-10-CM

## 2024-04-11 DIAGNOSIS — M25.512 CHRONIC LEFT SHOULDER PAIN: Primary | ICD-10-CM

## 2024-04-11 PROCEDURE — 1123F ACP DISCUSS/DSCN MKR DOCD: CPT | Mod: HCNC,CPTII,S$GLB, | Performed by: PHYSICIAN ASSISTANT

## 2024-04-11 PROCEDURE — 1160F RVW MEDS BY RX/DR IN RCRD: CPT | Mod: HCNC,CPTII,S$GLB, | Performed by: INTERNAL MEDICINE

## 2024-04-11 PROCEDURE — 1101F PT FALLS ASSESS-DOCD LE1/YR: CPT | Mod: HCNC,CPTII,S$GLB, | Performed by: INTERNAL MEDICINE

## 2024-04-11 PROCEDURE — 3075F SYST BP GE 130 - 139MM HG: CPT | Mod: HCNC,CPTII,S$GLB, | Performed by: INTERNAL MEDICINE

## 2024-04-11 PROCEDURE — 1125F AMNT PAIN NOTED PAIN PRSNT: CPT | Mod: HCNC,CPTII,S$GLB, | Performed by: PHYSICIAN ASSISTANT

## 2024-04-11 PROCEDURE — 99205 OFFICE O/P NEW HI 60 MIN: CPT | Mod: HCNC,S$GLB,, | Performed by: INTERNAL MEDICINE

## 2024-04-11 PROCEDURE — 1125F AMNT PAIN NOTED PAIN PRSNT: CPT | Mod: HCNC,CPTII,S$GLB, | Performed by: INTERNAL MEDICINE

## 2024-04-11 PROCEDURE — 99999 PR PBB SHADOW E&M-EST. PATIENT-LVL IV: CPT | Mod: PBBFAC,HCNC,, | Performed by: INTERNAL MEDICINE

## 2024-04-11 PROCEDURE — 20610 DRAIN/INJ JOINT/BURSA W/O US: CPT | Mod: HCNC,LT,S$GLB, | Performed by: PHYSICIAN ASSISTANT

## 2024-04-11 PROCEDURE — 1159F MED LIST DOCD IN RCRD: CPT | Mod: HCNC,CPTII,S$GLB, | Performed by: INTERNAL MEDICINE

## 2024-04-11 PROCEDURE — 1159F MED LIST DOCD IN RCRD: CPT | Mod: HCNC,CPTII,S$GLB, | Performed by: PHYSICIAN ASSISTANT

## 2024-04-11 PROCEDURE — 3288F FALL RISK ASSESSMENT DOCD: CPT | Mod: HCNC,CPTII,S$GLB, | Performed by: INTERNAL MEDICINE

## 2024-04-11 PROCEDURE — 99999 PR PBB SHADOW E&M-EST. PATIENT-LVL III: CPT | Mod: PBBFAC,HCNC,, | Performed by: PHYSICIAN ASSISTANT

## 2024-04-11 PROCEDURE — 1123F ACP DISCUSS/DSCN MKR DOCD: CPT | Mod: HCNC,CPTII,S$GLB, | Performed by: INTERNAL MEDICINE

## 2024-04-11 PROCEDURE — 3078F DIAST BP <80 MM HG: CPT | Mod: HCNC,CPTII,S$GLB, | Performed by: INTERNAL MEDICINE

## 2024-04-11 PROCEDURE — 1160F RVW MEDS BY RX/DR IN RCRD: CPT | Mod: HCNC,CPTII,S$GLB, | Performed by: PHYSICIAN ASSISTANT

## 2024-04-11 PROCEDURE — 99214 OFFICE O/P EST MOD 30 MIN: CPT | Mod: HCNC,25,S$GLB, | Performed by: PHYSICIAN ASSISTANT

## 2024-04-11 RX ORDER — OXYCODONE AND ACETAMINOPHEN 7.5; 325 MG/1; MG/1
1 TABLET ORAL EVERY 4 HOURS PRN
Qty: 120 TABLET | Refills: 0 | Status: SHIPPED | OUTPATIENT
Start: 2024-04-11 | End: 2024-04-29

## 2024-04-11 RX ORDER — TRIAMCINOLONE ACETONIDE 40 MG/ML
40 INJECTION, SUSPENSION INTRA-ARTICULAR; INTRAMUSCULAR
Status: COMPLETED | OUTPATIENT
Start: 2024-04-11 | End: 2024-04-11

## 2024-04-11 RX ADMIN — TRIAMCINOLONE ACETONIDE 40 MG: 40 INJECTION, SUSPENSION INTRA-ARTICULAR; INTRAMUSCULAR at 01:04

## 2024-04-11 NOTE — PROGRESS NOTES
SUBJECTIVE:     Chief Complaint & History of Present Illness:  Lesli Gong is a  Established  patient 78 y.o. female who is seen here today with a complaint of    Chief Complaint   Patient presents with    Left Shoulder - Pain    .  She is a patient well-known to us was last seen treated the clinic 01/14/2021 by Dr. Bill jim for elbow and hand.  Concerns today revolve around pain soreness in the left shoulder difficulty with overhead activities and awaking at night secondary to pain.  She has had significant progression of her dementia most of the information and history is receive via the daughter  On a scale of 1-10, with 10 being worst pain imaginable, he rates this pain as 4 on good days and 7 on bad days.  she describes the pain as sore and achy.    Review of patient's allergies indicates:   Allergen Reactions    Anaprox [naproxen sodium] Nausea Only and Palpitations    Motrin [ibuprofen] Nausea Only and Palpitations    Neomycin-polymyxin-hc      Itchy (skin)^    Sulfa (sulfonamide antibiotics)      Hives (skin)^         Current Outpatient Medications   Medication Sig Dispense Refill    acetaminophen (TYLENOL) 325 MG tablet Take 2 tablets (650 mg total) by mouth every 4 (four) hours as needed for Pain.  0    busPIRone (BUSPAR) 10 MG tablet Take 1 tablet (10 mg total) by mouth 2 (two) times daily. 90 tablet 3    busPIRone (BUSPAR) 5 MG Tab TAKE 1 TO 2 TABLETS (5-10 MG TOTAL) BY MOUTH 2 (TWO) TIMES DAILY AS NEEDED (ANXIETY). 270 tablet 1    calcium-vitamin D (OSCAL) 250 (625)-125 mg-unit per tablet Take 1 tablet by mouth once daily.      cefpodoxime (VANTIN) 100 MG tablet Take 1 tablet (100 mg total) by mouth 2 (two) times daily.      cetirizine (ZYRTEC) 10 MG tablet Take 1 tablet (10 mg total) by mouth once daily. 30 tablet 11    citalopram (CELEXA) 10 MG tablet Take 1 tablet (10 mg total) by mouth once daily. 90 tablet 3    cyanocobalamin, vitamin B-12, 50 mcg tablet Take 50 mcg by mouth once daily.       diclofenac sodium (VOLTAREN) 1 % Gel APPLY 2 G TOPICALLY 2 (TWO) TIMES DAILY AS NEEDED. TO RIGHT ARM/ ELBOW. 100 g 1    gabapentin (NEURONTIN) 100 MG capsule Take 1 capsule (100 mg total) by mouth 3 (three) times daily. 90 capsule 2    LIDOcaine (LIDODERM) 5 % Place 1 patch onto the skin once daily. Remove & Discard patch within 12 hours or as directed by MD 30 patch 1    lisinopriL 10 MG tablet Take 2 tablets (20 mg total) by mouth once daily. 180 tablet 3    melatonin (MELATIN) 3 mg tablet Take 2 tablets (6 mg total) by mouth nightly as needed for Insomnia.  0    multivitamin capsule Take 1 capsule by mouth once daily.      omeprazole (PRILOSEC) 20 MG capsule TAKE 1 CAPSULE (20 MG TOTAL) BY MOUTH ONCE DAILY. 90 DAY COURSE, WILL RESTART 90 capsule 3    oxybutynin (DITROPAN-XL) 5 MG TR24 Take 1 tablet (5 mg total) by mouth once daily. 90 tablet 3    oxyCODONE-acetaminophen (PERCOCET) 7.5-325 mg per tablet Take 1 tablet by mouth every 4 (four) hours as needed for Pain. 120 tablet 0    promethazine (PHENERGAN) 12.5 MG Tab Take 1 tablet by mouth every 6 (six) hours as needed.       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    Therapy        Past Surgical  History:   Procedure Laterality Date    APPENDECTOMY      BILATERAL SALPINGOOPHORECTOMY      BREAST BIOPSY Right     BREAST BIOPSY Left     BUNIONECTOMY Right      SECTION      COLONOSCOPY N/A 2018    Procedure: COLONOSCOPY;  Surgeon: Oscar Medley MD;  Location: [facility] ([address] [place] [address]);  Service: Endoscopy;  Laterality: N/A;    EXPLORATORY LAPAROTOMY W/ BOWEL RESECTION      HYSTERECTOMY N/A     pheochromocytoma excision N/A     TONSILLECTOMY Bilateral        Vital Signs (Most Recent)  There were no vitals filed for this visit.    Review of Systems:  ROS:  Constitutional: no fever or chills  Eyes: no visual changes  ENT: no nasal congestion or sore throat  Respiratory: no cough or shortness of breath  Cardiovascular: no chest pain or palpitations, positive for essential hypertension  Gastrointestinal: no nausea or vomiting, tolerating diet, positive for gastrointestinal stromal tumor, hepatitis-C  Genitourinary: no hematuria or dysuria  Integument/Breast: no rash or pruritis  Hematologic/Lymphatic: no easy bruising or lymphadenopathy  Musculoskeletal: no arthralgias or myalgias  Neurological: no seizures or tremors, positive for neurofibromatosis type 1 radiculopathy cervical region, memory difficulty, moderate Alzheimer's dementia with mood disturbances  Behavioral/Psych: no auditory or visual hallucinations, positive anxiety, adjustment disorder, generalized anxiety disorder, depression, opioid dependence  Endocrine: no heat or cold intolerance      OBJECTIVE:     PHYSICAL EXAM:     , General Appearance: Well nourished, well developed, in no acute distress.  Neurological: Mood & affect are normal.  Shoulder exam:  left  Tenderness: scapula, biceps tendon, lateral acromial  ROM: forward flexion 180/180, extension 45/45, full abduction 180/180, abduction-glenohumeral 90/90, external rotation 50/50  Shoulder Strength: biceps 5/5, triceps 5/5, abduction 5/5, adduction 5/5, external rotation 5/5  with shoulder at side, flexion 5/5, and extension 5/5  positive for tenderness about the glenohumeral joint, positive for tenderness over the acromioclavicular joint, and negative for impingement sign  Stability tests: anterior apprehension test negative and posterior apprehension test negative       Shoulder exam:  right  Tenderness: none  ROM: forward flexion 180/180, extension 45/45, full abduction 180/180, abduction-glenohumeral 90/90, external rotation 50/50  Shoulder Strength: biceps 5/5, triceps 5/5, abduction 5/5, adduction 5/5, external rotation 5/5 with shoulder at side, flexion 5/5, and extension 5/5  negative for tenderness about the glenohumeral joint, negative for tenderness over the acromioclavicular joint, and negative for impingement sign  Stability tests: anterior apprehension test negative and posterior apprehension test negative                RADIOGRAPHS:  Of the shoulder from previous visit reviewed by me today demonstrate mild degenerative changes in the glenohumeral region as well as the AC joint with no evidence of fracture dislocation MRI demonstrates some degenerative wear of supraspinatus and infraspinatus with fraying and some degenerative wear of the labrum without evidence of complete rotator cuff care or refraction or labral tear with instabilities    ASSESSMENT/PLAN:       ICD-10-CM ICD-9-CM   1. Bursitis of left shoulder  M75.52 726.10   2. Primary osteoarthritis of left shoulder  M19.012 715.11       Plan: We discussed with the patient at length all the different treatment options available for her left shoulder including anti-inflammatories, acetaminophen, rest, ice, Physical therapy to include strengthening exercise, occasional cortisone injections for temporary relief, arthroscopic surgical repair, and finally shoulder arthroplasty.   Will proceed with therapeutic diagnostic cortisone injection left shoulder      The risks, benefits, pros, cons, and potential side effects of the  procedure were discussed with the patient in detail all questions were answered.  The patient is comfortable and willing to proceed with the procedure. Verbal consent was obtained and the proper joint was identified by the patient and provider      The injection site was identified and the skin was prepared with an ETOH solution. The    left  shoulder was injected with 1 ml of Kenalog and 5 ml Lidocaine under sterile technique. Lesli Gong tolerated the procedure well, she was advised to rest the  shoulder  today, ice and support. she did receive immediate relief of the pain in and about her  shoulder  she was told this would be short lived and is secondary to the lidocaine. she may have an increase in her discomfort tonight followed by steady improvement over the next several days. It may take 1-3 weeks following the injection to get the full benefit of the medication.  I will see her back in 4-6 months. Sooner if she has any problems or concerns.

## 2024-04-11 NOTE — PROGRESS NOTES
Consult Note  Palliative Care      Consult Requested By: Dr. Patti Brian  Reason for Consult: Shoulder pain and advancing dementia      ASSESSMENT/PLAN:     Plan/Recommendations:  Lesli was seen today for establish care.    Diagnoses and all orders for this visit:    Chronic left shoulder pain  -     Ambulatory referral/consult to CLINIC Palliative Care  -     oxyCODONE-acetaminophen (PERCOCET) 7.5-325 mg per tablet; Take 1 tablet by mouth every 4 (four) hours as needed for Pain.  Patient was seen by Orthopedics and would request steroid injection into the bursa for both diagnostic and therapeutic measures.  May need physical therapy to help improve her function.  Pain relief however May allow her to do everything that she needs to do.  We may be able to decrease the dose of gabapentin and discontinue it altogether if the shoulder injection works.  Palliative care by specialist  -     oxyCODONE-acetaminophen (PERCOCET) 7.5-325 mg per tablet; Take 1 tablet by mouth every 4 (four) hours as needed for Pain.  Impression:    Symptoms:  shoulder pain and progressive dementia     Medicolegal:  Has some decision making capacity.  Campbell Simpson is surrogate decision maker.   Gianna is HCPOA.     Psychosocial:  support system consists of family     Spiritual:  Scientologist    Prognostication:  Several years related to her dementia progression.    Understanding of disease and Illness Trajectory: Family  has  adequate understanding of her illness, they can benefit from continued education on what to expect in the future.      Goals of care:  Comfort and quality of life  Advance Care Planning     Date: 04/11/2024    Today a voluntary meeting took place: Clinic Exam Room    Patient Participation: Patient is able to participate     Attendees (Name and  Relationship to patient):  Campbell Gong    Staff attendees (Name and  Role): Amada Brewer MD    ACP Conversation (General): Understanding of advance care planning and  role of health care agent defined she has 1 brother who cares for her mom in the mom's home.  She is the decision maker.  She understands that dementia will progress.     Code Status: did not engage in discussions today    ACP Documents: Other Documents (specify):  Has healthcare power of  documented in the chart    Goals of care: The healthcare power of   endorses that what is most important right now is to focus on symptom/pain control and comfort and QOL     Accordingly, we have decided that the best plan to meet the patient's goals includes continuing with treatment      Recommendations/  Follow-up tasks: Other (specify below) follow-up with orthopedics       Length of ACP   conversation in minutes: 5 minutes          Code status:  Full    Advance directives:  Healthcare power of  as documented in an placed in the chart.      Summary and recommendations:  Daughters biggest concern is mom's pain and relief because the patient gets agitated when her pain is so great.  Hopefully will get Orthopedics to assist us in doing a bursal injection for both diagnosis and treatment.  As her disease progresses (dementia) will focus more on goals of care.  Right now she seems to be doing pretty well.      Five min time was spent on advance care planning, goals of care discussion, emotional support, formulating and communicating prognosis and goals of care, exploring burden/benefit of various approaches of treatment.            Moderate dementia with agitation, unspecified dementia type  -     Ambulatory referral/consult to CLINIC Palliative Care  Patient requires direction in her activities of daily living..  Daughter manages her medications.  Son lives with her and helps with her ADLs.  Daughter reports that they are doing well at this time.  Moderate late onset Alzheimer's dementia with mood disturbance  Patient requires direction in her activities of daily living..  Daughter manages her medications.   Son lives with her and helps with her ADLs.  Daughter reports that they are doing well at this time.  Her mood disturbances being managed with the use of citalopram and buspirone.  Neurofibromatosis doing well.    Gastroesophageal reflux disease currently under control with Pepcid.  Understanding of illness/Prognosis:     Goals of care:  Comfort and quality of life    Follow up:  1 month or sooner.  Also a three-month visit if I am going to continue prescribing opiates.  Hopefully her shoulder injection will work.  Daughter accesses MyChart.    Patient's encounter and above plan of care discussed with patient's daughter Gianna.    SUBJECTIVE:     History of Present Illness:  Patient is a 78 y.o. year old female presenting with left shoulder pain.  Patient and daughter report that the pain has been going on now for many months.  She has had numerous x-rays and has been offered opiates.  The daughter reports that the oxycodone 5 mg every 8 hours initially relieves the pain but does not last.  Oxycodone in reality has a half-life of about 3 hours.  We talked about using oxycodone without the acetaminophen but the daughter reports that pain relief is much improved with the acetaminophen added to it.  We talked about using a higher dose of 7.5 mg if she was to not get any relief from her injection that I was hoping would be done.  We talked about gradually increasing her activity with her shoulder as well.  Not sure if physical therapy would be needed if the bursal injection provides relief rather than just resumption of her normal activities.  I am cognizant of the agitation related to the pain with pain relief being the primary modality.  Patient also has some insomnia but that is better with the melatonin.    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     History of hepatitis C, s/p successful Rx w/ SVR24 - 2018 3/17/2017    Completed zepatier + RBV w/ svr     History of psychiatric hospitalization     Hx of psychiatric care     Mass 10-10-14    excision of mass/left shoulder    Neurofibromatosis, type 1 (von Recklinghausen's disease) 2014    Pheochromocytoma     S/p resection in     Psychiatric problem     Soft tissue sarcoma of chest wall 2014    Therapy      Past Surgical History:   Procedure Laterality Date    APPENDECTOMY      BILATERAL SALPINGOOPHORECTOMY      BREAST BIOPSY Right     BREAST BIOPSY Left     BUNIONECTOMY Right      SECTION      COLONOSCOPY N/A 2018    Procedure: COLONOSCOPY;  Surgeon: Oscar Medley MD;  Location: [facility] ([address] [place] [address]);  Service: Endoscopy;  Laterality: N/A;    EXPLORATORY LAPAROTOMY W/ BOWEL RESECTION      HYSTERECTOMY N/A     pheochromocytoma excision N/A     TONSILLECTOMY Bilateral      Family History   Problem Relation Age of Onset    Breast cancer Mother     Neurofibromatosis Father     Cancer Sister         GIST    Neurofibromatosis Sister      Review of patient's allergies indicates:   Allergen Reactions    Anaprox [naproxen sodium] Nausea Only and Palpitations    Motrin [ibuprofen] Nausea Only and Palpitations    Neomycin-polymyxin-hc      Itchy (skin)^    Sulfa (sulfonamide antibiotics)      Hives (skin)^       Medications:    Current Outpatient Medications:     acetaminophen (TYLENOL) 325 MG tablet, Take 2 tablets (650 mg total) by mouth every 4 (four) hours as needed for Pain., Disp: , Rfl: 0    busPIRone (BUSPAR) 10 MG tablet, Take 1 tablet (10 mg total) by mouth 2 (two) times daily., Disp: 90 tablet, Rfl: 3    busPIRone (BUSPAR) 5 MG Tab, TAKE 1 TO 2 TABLETS (5-10 MG TOTAL) BY MOUTH 2 (TWO) TIMES DAILY AS NEEDED (ANXIETY)., Disp: 270 tablet, Rfl: 1    calcium-vitamin D (OSCAL) 250 (625)-125 mg-unit per tablet, Take 1 tablet by mouth once daily., Disp: , Rfl:     cefpodoxime  (VANTIN) 100 MG tablet, Take 1 tablet (100 mg total) by mouth 2 (two) times daily., Disp: , Rfl:     cetirizine (ZYRTEC) 10 MG tablet, Take 1 tablet (10 mg total) by mouth once daily., Disp: 30 tablet, Rfl: 11    citalopram (CELEXA) 10 MG tablet, Take 1 tablet (10 mg total) by mouth once daily., Disp: 90 tablet, Rfl: 3    cyanocobalamin, vitamin B-12, 50 mcg tablet, Take 50 mcg by mouth once daily., Disp: , Rfl:     diclofenac sodium (VOLTAREN) 1 % Gel, APPLY 2 G TOPICALLY 2 (TWO) TIMES DAILY AS NEEDED. TO RIGHT ARM/ ELBOW., Disp: 100 g, Rfl: 1    gabapentin (NEURONTIN) 100 MG capsule, Take 1 capsule (100 mg total) by mouth 3 (three) times daily., Disp: 90 capsule, Rfl: 2    LIDOcaine (LIDODERM) 5 %, Place 1 patch onto the skin once daily. Remove & Discard patch within 12 hours or as directed by MD, Disp: 30 patch, Rfl: 1    lisinopriL 10 MG tablet, Take 2 tablets (20 mg total) by mouth once daily., Disp: 180 tablet, Rfl: 3    melatonin (MELATIN) 3 mg tablet, Take 2 tablets (6 mg total) by mouth nightly as needed for Insomnia., Disp: , Rfl: 0    multivitamin capsule, Take 1 capsule by mouth once daily., Disp: , Rfl:     omeprazole (PRILOSEC) 20 MG capsule, TAKE 1 CAPSULE (20 MG TOTAL) BY MOUTH ONCE DAILY. 90 DAY COURSE, WILL RESTART, Disp: 90 capsule, Rfl: 3    oxybutynin (DITROPAN-XL) 5 MG TR24, Take 1 tablet (5 mg total) by mouth once daily., Disp: 90 tablet, Rfl: 3    promethazine (PHENERGAN) 12.5 MG Tab, Take 1 tablet by mouth every 6 (six) hours as needed., Disp: , Rfl:     oxyCODONE-acetaminophen (PERCOCET) 7.5-325 mg per tablet, Take 1 tablet by mouth every 4 (four) hours as needed for Pain., Disp: 120 tablet, Rfl: 0  No current facility-administered medications for this visit.    OBJECTIVE:       ROS:  Review of Systems   Constitutional:  Positive for activity change and appetite change.        Related to her shoulder pain   Gastrointestinal:  Positive for abdominal pain. Negative for constipation.         When she stops her Pepcid   Psychiatric/Behavioral:  Positive for behavioral problems, confusion and decreased concentration.         Does not sleep all night.  Gets very agitated and almost combative with doing anything when her shoulder pain is worse.       Review of Symptoms      Symptom Assessment (ESAS 0-10 Scale)  Pain:  9  Dyspnea:  0  Anxiety:  7  Nausea:  0  Depression:  0  Anorexia:  0  Fatigue:  0  Insomnia:  0  Restlessness:  0  Agitation:  10     Constipation:  Negative  Diarrhea:  Negative    Constipation:  No constipation    Bowel Management Plan (BMP):  Yes      Pain Assessment:  OME in 24 hours:  15  Location(s): arm    Arm       Location: left        Quality: Aching        Quantity: 9/10 in intensity        Chronicity: Onset 1 year(s) ago, gradually worsening since See notes from primary care        Aggravating Factors: Activity        Alleviating Factors: None        Associated Symptoms: None    Modified Heaven Scale:  0    Performance Status:  70    Living Arrangements:  Lives with family    Psychosocial/Cultural:   See Palliative Psychosocial Note: No  , 2 children.  I took care of her  Augustin Gong who  of liver cancer she lives with her son.  Daughter does her ADLs and med management  **Primary  to Follow**  Palliative Care  Consult: No    Spiritual:  F - Karyn and Belief:  Denominational  I - Importance:  Yes  C - Community:  Yes  A - Address in Care:  Yes      Advance Care Planning   Advance Directives:   Medical Power of : Yes    Agent's Name:  Gianna    Decision Making:  Patient answered questions and Family answered questions  Goals of Care: The patient and healthcare power of   endorses that what is most important right now is to focus on symptom/pain control and comfort and QOL     Accordingly, we have decided that the best plan to meet the patient's goals includes continuing with treatment              Physical Exam:  Vitals: Pulse:  92 (04/11/24 1010)  BP: 136/75 (04/11/24 1010)  Physical Exam  Constitutional:       Comments: Neurofibromas are evident on her skin   Cardiovascular:      Rate and Rhythm: Normal rate.   Pulmonary:      Effort: Pulmonary effort is normal.   Musculoskeletal:      Comments: Tenderness of her left shoulder anteriorly as well as over the glenohumeral joint.  She is able to move her arm through a range of motion but winces and stutters movement to full extension.   Neurological:      Mental Status: Mental status is at baseline.   Psychiatric:         Mood and Affect: Mood normal.         Labs:  CBC:   WBC   Date Value Ref Range Status   04/05/2024 6.26 3.90 - 12.70 K/uL Final     Hemoglobin   Date Value Ref Range Status   04/05/2024 14.4 12.0 - 16.0 g/dL Final     Hematocrit   Date Value Ref Range Status   04/05/2024 45.5 37.0 - 48.5 % Final     MCV   Date Value Ref Range Status   04/05/2024 95 82 - 98 fL Final     Platelets   Date Value Ref Range Status   04/05/2024 206 150 - 450 K/uL Final       LFT:   Lab Results   Component Value Date    AST 13 04/05/2024    ALKPHOS 112 04/05/2024    BILITOT 0.4 04/05/2024       Albumin:   Albumin   Date Value Ref Range Status   04/05/2024 4.1 3.5 - 5.2 g/dL Final     Protein:   Total Protein   Date Value Ref Range Status   04/05/2024 7.9 6.0 - 8.4 g/dL Final       Radiology:I have reviewed all pertinent imaging results/findings within the past 24 hours.      I spent a total of 80 minutes on the day of the visit.This includes face to face time in discussion of goals of care, symptom assessment, coordination of care and emotional support.  This also includes non-face to face time preparing to see the patient (eg, review of tests/imaging), obtaining and/or reviewing separately obtained history, documenting clinical information in the electronic or other health record, independently interpreting results and communicating results to the patient/family/caregiver, or care coordinator.        5 minutes spent in discussing ACP    Signature: Amada Brewer MD

## 2024-04-16 ENCOUNTER — OFFICE VISIT (OUTPATIENT)
Dept: INTERNAL MEDICINE | Facility: CLINIC | Age: 79
End: 2024-04-16
Payer: MEDICARE

## 2024-04-16 VITALS
WEIGHT: 185.44 LBS | DIASTOLIC BLOOD PRESSURE: 72 MMHG | HEIGHT: 64 IN | HEART RATE: 86 BPM | BODY MASS INDEX: 31.66 KG/M2 | OXYGEN SATURATION: 95 % | SYSTOLIC BLOOD PRESSURE: 110 MMHG

## 2024-04-16 DIAGNOSIS — Q85.01 NEUROFIBROMATOSIS, TYPE 1: Primary | ICD-10-CM

## 2024-04-16 DIAGNOSIS — M25.512 CHRONIC LEFT SHOULDER PAIN: ICD-10-CM

## 2024-04-16 DIAGNOSIS — F02.B3 MODERATE LATE ONSET ALZHEIMER'S DEMENTIA WITH MOOD DISTURBANCE: ICD-10-CM

## 2024-04-16 DIAGNOSIS — Z85.09 HISTORY OF GASTROINTESTINAL STROMAL TUMOR (GIST): ICD-10-CM

## 2024-04-16 DIAGNOSIS — G89.29 CHRONIC LEFT SHOULDER PAIN: ICD-10-CM

## 2024-04-16 DIAGNOSIS — G89.29 CHRONIC SCAPULAR PAIN: ICD-10-CM

## 2024-04-16 DIAGNOSIS — E66.9 OBESITY (BMI 30.0-34.9): ICD-10-CM

## 2024-04-16 DIAGNOSIS — M54.12 RADICULOPATHY, CERVICAL REGION: ICD-10-CM

## 2024-04-16 DIAGNOSIS — F41.1 GAD (GENERALIZED ANXIETY DISORDER): ICD-10-CM

## 2024-04-16 DIAGNOSIS — R41.3 MEMORY DIFFICULTY: ICD-10-CM

## 2024-04-16 DIAGNOSIS — I10 ESSENTIAL HYPERTENSION: ICD-10-CM

## 2024-04-16 DIAGNOSIS — Z86.19 HISTORY OF HEPATITIS C: ICD-10-CM

## 2024-04-16 DIAGNOSIS — F32.A DEPRESSION, UNSPECIFIED DEPRESSION TYPE: ICD-10-CM

## 2024-04-16 DIAGNOSIS — F11.20 UNCOMPLICATED OPIOID DEPENDENCE: ICD-10-CM

## 2024-04-16 DIAGNOSIS — Z85.831 HISTORY OF SARCOMA OF SOFT TISSUE: ICD-10-CM

## 2024-04-16 DIAGNOSIS — G30.1 MODERATE LATE ONSET ALZHEIMER'S DEMENTIA WITH MOOD DISTURBANCE: ICD-10-CM

## 2024-04-16 DIAGNOSIS — M89.8X1 CHRONIC SCAPULAR PAIN: ICD-10-CM

## 2024-04-16 PROCEDURE — 3078F DIAST BP <80 MM HG: CPT | Mod: HCNC,CPTII,S$GLB, | Performed by: PHYSICIAN ASSISTANT

## 2024-04-16 PROCEDURE — 99215 OFFICE O/P EST HI 40 MIN: CPT | Mod: HCNC,S$GLB,, | Performed by: PHYSICIAN ASSISTANT

## 2024-04-16 PROCEDURE — 3074F SYST BP LT 130 MM HG: CPT | Mod: HCNC,CPTII,S$GLB, | Performed by: PHYSICIAN ASSISTANT

## 2024-04-16 PROCEDURE — 99999 PR PBB SHADOW E&M-EST. PATIENT-LVL IV: CPT | Mod: PBBFAC,HCNC,, | Performed by: PHYSICIAN ASSISTANT

## 2024-04-16 PROCEDURE — 1157F ADVNC CARE PLAN IN RCRD: CPT | Mod: HCNC,CPTII,S$GLB, | Performed by: PHYSICIAN ASSISTANT

## 2024-04-16 PROCEDURE — 1160F RVW MEDS BY RX/DR IN RCRD: CPT | Mod: HCNC,CPTII,S$GLB, | Performed by: PHYSICIAN ASSISTANT

## 2024-04-16 PROCEDURE — 1125F AMNT PAIN NOTED PAIN PRSNT: CPT | Mod: HCNC,CPTII,S$GLB, | Performed by: PHYSICIAN ASSISTANT

## 2024-04-16 PROCEDURE — 1159F MED LIST DOCD IN RCRD: CPT | Mod: HCNC,CPTII,S$GLB, | Performed by: PHYSICIAN ASSISTANT

## 2024-04-16 RX ORDER — PROMETHAZINE HYDROCHLORIDE 12.5 MG/1
12.5 TABLET ORAL EVERY 6 HOURS PRN
Qty: 30 TABLET | Refills: 1 | Status: SHIPPED | OUTPATIENT
Start: 2024-04-16

## 2024-04-16 RX ORDER — MELATONIN 10 MG
10 CAPSULE ORAL NIGHTLY
COMMUNITY

## 2024-04-17 ENCOUNTER — TELEPHONE (OUTPATIENT)
Dept: NEUROLOGY | Facility: CLINIC | Age: 79
End: 2024-04-17
Payer: MEDICARE

## 2024-04-17 PROBLEM — Z12.11 COLON CANCER SCREENING: Status: RESOLVED | Noted: 2018-01-09 | Resolved: 2024-04-17

## 2024-04-17 PROBLEM — M25.512 LEFT SHOULDER PAIN: Status: RESOLVED | Noted: 2017-01-24 | Resolved: 2024-04-17

## 2024-04-17 NOTE — TELEPHONE ENCOUNTER
Returned Gianna's call about patient needing to be sooner. No answer so had to leave message on voicemail stating Dr. Sanchez does not have any sooner appointments for new patiients at this time she can always try [facility] or [facility].

## 2024-04-17 NOTE — TELEPHONE ENCOUNTER
----- Message from Ella Vera sent at 4/17/2024  8:22 AM CDT -----  Regarding: Pt wld like an earlier appt  Contact: [phone]  Name of Who is Calling:Alexsandra (daughter)        What is the request in detail:Daughter called states she wld like to get mom sche with an earlier appt none avail. Please advise        Can the clinic reply by [facility]:No        What Number to Call Back if not in [facility]: Telephone Information:  Mobile          [phone]

## 2024-04-17 NOTE — PROGRESS NOTES
Subjective:       Patient ID: Lesli Gong is a 78 y.o. female.        Chief Complaint: Follow-up (Needs paperwork for SNF)    Lesli Gong is an established patient of Patti Brian MD here today for follow up visit to have paperwork completed.    She will be going to [facility].  Her daughter, Gianna, is a nurse and here today.  Gianna gives most history.    She saw palliative care 4/2024.    Dementia -   Daughter started to notice issues with memory in 2022  MRI brain 3/2022  Daughter notes progressive issues with memory, needs assistance in ADLs, daughter manages meds, patient does not drive  24 hour/day care with a sitter and patient's son  Will be moving to [facility]  Referred for NP testing and neurology consult but not completed, daughter notes difficulty in scheduling    Mood disturbance, anxiety, depression -   She gets anxious when her shoulder/back are hurting  Buspar 10 mg BID  Celexa 10 mg daily    HTN -   Lisinopril 10 mg 2 daily    Insomnia -   Melatonin 10 mg nightly    Intermittent nausea - phenergan 12.5 mg prn    GERD - prilosec 20 mg daily    H/o hepatitis c s/p tx 2/2018    Neurofibromatosis    Chronic scapular pain, cervical radiculopathy, chronic left shoulder pain -   Gabapentin 100 mg TID  Percocet 7.5-325 mg every 4 hours prn  Lidocaine patches  Diclofenac gel    OAB tx with oxybutynin 5 mg XL           Review of Systems   Constitutional:  Negative for chills, diaphoresis, fatigue and fever.   HENT:  Negative for congestion and sore throat.    Eyes:  Negative for visual disturbance.   Respiratory:  Negative for cough, chest tightness and shortness of breath.    Cardiovascular:  Negative for chest pain, palpitations and leg swelling.   Gastrointestinal:  Negative for abdominal pain, blood in stool, constipation, diarrhea, nausea and vomiting.   Genitourinary:  Negative for dysuria, frequency, hematuria and urgency.   Musculoskeletal:   Positive for arthralgias and back pain.   Skin:  Negative for rash.   Neurological:  Negative for dizziness, syncope, weakness and headaches.   Psychiatric/Behavioral:  Negative for dysphoric mood and sleep disturbance. The patient is not nervous/anxious.        Objective:      Physical Exam  Vitals and nursing note reviewed.   Constitutional:       Appearance: Normal appearance. She is well-developed.   HENT:      Head: Normocephalic.      Right Ear: External ear normal.      Left Ear: External ear normal.   Eyes:      Pupils: Pupils are equal, round, and reactive to light.   Cardiovascular:      Rate and Rhythm: Normal rate and regular rhythm.      Heart sounds: Normal heart sounds. No murmur heard.     No friction rub. No gallop.   Pulmonary:      Effort: Pulmonary effort is normal. No respiratory distress.      Breath sounds: Normal breath sounds.   Abdominal:      Palpations: Abdomen is soft.      Tenderness: There is no abdominal tenderness.   Musculoskeletal:      Comments: Winces upon examination of left shoulder and asks that I d/c exam of it due to pain   Skin:     General: Skin is warm and dry.      Comments: Neurofibromas    Neurological:      Mental Status: She is alert.         Assessment:       1. Neurofibromatosis, type 1    2. Radiculopathy, cervical region    3. Uncomplicated opioid dependence    4. Chronic left shoulder pain    5. Chronic scapular pain    6. Moderate late onset Alzheimer's dementia with mood disturbance    7. Memory difficulty    8. HELGA (generalized anxiety disorder)    9. Depression, unspecified depression type    10. Essential hypertension    11. History of gastrointestinal stromal tumor (GIST)    12. History of sarcoma of soft tissue    13. Obesity (BMI 30.0-34.9)    14. History of hepatitis C, s/p successful Rx w/ SVR24 - 2/2018        Plan:       Lesli was seen today for follow-up.    Diagnoses and all orders for this visit:    Neurofibromatosis, type 1 - chronic,  "stable    Radiculopathy, cervical region  Uncomplicated opioid dependence  Chronic left shoulder pain  Chronic scapular pain        -     Following with spine clinic, pain management, and palliative care    Moderate late onset Alzheimer's dementia with mood disturbance - daughter still trying to schedule NP testing  Memory difficulty    HELGA (generalized anxiety disorder) - stable and controlled, continue current regimen, gets very anxious when pain is uncontrolled    Depression, unspecified depression type - stable and controlled, continue current regimen    Essential hypertension - stable and controlled, continue current regimen    History of gastrointestinal stromal tumor (GIST)    History of sarcoma of soft tissue    Obesity (BMI 30.0-34.9)    History of hepatitis C, s/p successful Rx w/ SVR24 - 2/2018    Other orders  -     promethazine (PHENERGAN) 12.5 MG Tab; Take 1 tablet (12.5 mg total) by mouth every 6 (six) hours as needed (nausea).    Will complete paperwork for placement  >45 minutes spent on patient encounter    Pt has been given instructions populated from patient instructions database and has verbalized understanding of the after visit summary and information contained wherein.    Follow up with a primary care provider. May go to ER for acute shortness of breath, lightheadedness, fever, or any other emergent complaints or changes in condition.    "This note will be shared with the patient"    Future Appointments   Date Time Provider Department Center   4/24/2024 12:40 PM GISELLE, DEXA1 [facility] BMD [facility]   4/26/2024  1:20 PM Ashley Prieto NP [facility]   5/8/2024  1:30 PM Patti Brian MD [facility] IM [facility]   5/15/2024 11:00 AM Ashley Prieto NP [facility]   6/12/2024  1:20 PM Aisha Sanchez MD [facility]   6/17/2024 10:45 AM Vandana Vicente MD [facility]                 "

## 2024-04-18 ENCOUNTER — PATIENT MESSAGE (OUTPATIENT)
Dept: ADMINISTRATIVE | Facility: HOSPITAL | Age: 79
End: 2024-04-18
Payer: MEDICARE

## 2024-04-18 ENCOUNTER — TELEPHONE (OUTPATIENT)
Dept: INTERNAL MEDICINE | Facility: CLINIC | Age: 79
End: 2024-04-18
Payer: MEDICARE

## 2024-04-23 ENCOUNTER — PATIENT MESSAGE (OUTPATIENT)
Dept: PALLIATIVE MEDICINE | Facility: CLINIC | Age: 79
End: 2024-04-23
Payer: MEDICARE

## 2024-04-24 ENCOUNTER — DOCUMENTATION ONLY (OUTPATIENT)
Dept: PALLIATIVE MEDICINE | Facility: CLINIC | Age: 79
End: 2024-04-24
Payer: MEDICARE

## 2024-04-24 ENCOUNTER — HOSPITAL ENCOUNTER (OUTPATIENT)
Dept: RADIOLOGY | Facility: CLINIC | Age: 79
Discharge: HOME | End: 2024-04-24
Attending: INTERNAL MEDICINE
Payer: MEDICARE

## 2024-04-24 DIAGNOSIS — Z78.0 MENOPAUSE: ICD-10-CM

## 2024-04-24 PROCEDURE — 77080 DXA BONE DENSITY AXIAL: CPT | Mod: TC,HCNC

## 2024-04-24 PROCEDURE — 77080 DXA BONE DENSITY AXIAL: CPT | Mod: 26,HCNC,, | Performed by: INTERNAL MEDICINE

## 2024-04-24 NOTE — PROGRESS NOTES
Spoke with patient's daughter by phone.  Patient had immediate relief of her shoulder pain after steroid injection with local anesthetic.  The pain relief lasted 3-5 days.  She is now back to needing pain medicine about 3 times a day.  Daughter wonders whether increasing to 10 mg we will offer her more relief.  She still has some oxycodone 5s that I asked her to double and see.  I told her I would reach out to orthopedics to see what other things we could do with her shoulder and whether a 2nd injection in quick succession would be indicated.

## 2024-04-28 DIAGNOSIS — M25.512 CHRONIC LEFT SHOULDER PAIN: ICD-10-CM

## 2024-04-28 DIAGNOSIS — Z51.5 PALLIATIVE CARE BY SPECIALIST: ICD-10-CM

## 2024-04-28 DIAGNOSIS — G89.29 CHRONIC LEFT SHOULDER PAIN: ICD-10-CM

## 2024-04-28 RX ORDER — OXYCODONE AND ACETAMINOPHEN 7.5; 325 MG/1; MG/1
1 TABLET ORAL EVERY 4 HOURS PRN
Qty: 120 TABLET | Refills: 0 | Status: CANCELLED | OUTPATIENT
Start: 2024-04-28 | End: 2025-04-28

## 2024-04-29 RX ORDER — OXYCODONE AND ACETAMINOPHEN 10; 325 MG/1; MG/1
1 TABLET ORAL EVERY 4 HOURS PRN
Qty: 180 TABLET | Refills: 0 | Status: SHIPPED | OUTPATIENT
Start: 2024-04-29 | End: 2024-05-14 | Stop reason: SDUPTHER

## 2024-04-30 ENCOUNTER — PATIENT MESSAGE (OUTPATIENT)
Dept: PALLIATIVE MEDICINE | Facility: CLINIC | Age: 79
End: 2024-04-30
Payer: MEDICARE

## 2024-04-30 ENCOUNTER — TELEPHONE (OUTPATIENT)
Dept: INTERNAL MEDICINE | Facility: CLINIC | Age: 79
End: 2024-04-30
Payer: MEDICARE

## 2024-04-30 NOTE — TELEPHONE ENCOUNTER
----- Message from Patti Brian MD sent at 4/30/2024  3:20 PM CDT -----  Appt (virtual ok) for osteoporosis

## 2024-05-05 DIAGNOSIS — F41.1 GAD (GENERALIZED ANXIETY DISORDER): ICD-10-CM

## 2024-05-05 DIAGNOSIS — N32.81 OVERACTIVE BLADDER: ICD-10-CM

## 2024-05-05 RX ORDER — OXYBUTYNIN CHLORIDE 5 MG/1
5 TABLET, EXTENDED RELEASE ORAL
Qty: 90 TABLET | Refills: 3 | OUTPATIENT
Start: 2024-05-05

## 2024-05-05 NOTE — TELEPHONE ENCOUNTER
No care due was identified.  [facility] Embedded Care Due Messages. Reference number: [id number].   5/05/2024 8:11:37 AM CDT

## 2024-05-05 NOTE — TELEPHONE ENCOUNTER
No care due was identified.  [facility] Embedded Care Due Messages. Reference number: [id number].   5/05/2024 12:05:58 PM CDT

## 2024-05-06 NOTE — TELEPHONE ENCOUNTER
When reviewing the 11/09/2023 filled Buspar  it was for BID but dispense amount was 90 with 3 refills which was a 6 month prescription.    Refill Request.

## 2024-05-06 NOTE — TELEPHONE ENCOUNTER
Refill Decision Note   Lesli Gong  is requesting a refill authorization.  Brief Assessment and Rationale for Refill:  Quick Discontinue     Medication Therapy Plan:  refill not needed; request sent due to promethazine needing a prior authorization; promethazine is on BEERS List and is normally not covered by medicare      Comments:     Note composed:7:07 PM 05/05/2024             Appointments     Last Visit   2/5/2024 Patti Brian MD   Next Visit   5/8/2024 Patti Brian MD

## 2024-05-07 RX ORDER — BUSPIRONE HYDROCHLORIDE 10 MG/1
10 TABLET ORAL 2 TIMES DAILY
Qty: 180 TABLET | Refills: 3 | Status: SHIPPED | OUTPATIENT
Start: 2024-05-07

## 2024-05-08 ENCOUNTER — PATIENT MESSAGE (OUTPATIENT)
Dept: INTERNAL MEDICINE | Facility: CLINIC | Age: 79
End: 2024-05-08

## 2024-05-08 NOTE — PROGRESS NOTES
NEUROPSYCHOLOGY CONSULT (TELEHEALTH)  Referral Information  Name: Lesli Gong  MRN: [id number]  : 1945  Age: 78 y.o.  Race: Black or   Gender: female  REFERRAL SOURCE: Patti Brian MD  DATE CONDUCTED: 2024  SOURCES OF INFORMATION:  The following was gathered from a clinical interview with Ms. Lesli Gong and review of the available medical records. Ms. Gong expressed an understanding of the purpose of the evaluation and consented to all procedures. Total licensed billing psychologists professional time including clinical interview, test administration and interpretation of tests administered by the billing psychologist, integration of test results and other clinical data, preparing the final report, and personally reporting results to the patient   Billin - 60 minutes  Telemedicine:   The patient location is: Home  The provider location is: Home  The chief complaint/medical necessity leading to consultation/medical necessity is: cognitive decline  Visit type: Virtual visit with synchronous audio and video  Total time spent with patient: 35 minutes  Each patient to whom he or she provides medical services by telemedicine is:  (1) informed of the relationship between the physician and patient and the respective role of any other health care provider with respect to management of the patient; and (2) notified that he or she may decline to receive medical services by telemedicine and may withdraw from such care at any time.  Consent/Emergency Plan: The patient expressed an understanding of the purpose of the evaluation and consented to all procedures. I informed the patient of limits to confidentiality and discussed an emergency plan.    NEUROPSYCHOLOGICAL EVALUATION - CONFIDENTIAL    SUMMARY/TREATMENT PLAN   Ms. Gong is a 78 year old female with progressive cognitive and functional decline over the past 3 years. She requires support in all complex ADLs. Her  children do not feel comfortable leaving her alone for any period of time. She is scheduled for neuropsychological testing on . An abbreviated battery will likely be administered given the extent of her cognitive impairment. Her functioning is at the level of dementia, mild to moderate severity. A mixed vascular and neurodegenerative etiology is likely, including Alzheimer's disease given her memory impairment. Full impressions to follow testing.     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     Neurology: Dr. Sanchez on .     Neuropsychiatric Symptoms: Irritability and sleep may be points of intervention.     Neuropsychology Follow-up: Testing on            Ms. Gong will be provided the results of the evaluation.     Thank you for allowing me to participate in Ms. Martins care.  If you have any questions, please contact me at [phone].    Jeff Richards Psy.D., ABPP  Board Certified in Clinical Neuropsychology  Department of Neurology    HISTORY OF PRESENT ILLNESS: Ms. Lesli Gong is a 78 y.o., left-handed, female with 11 years of education who was referred for a neuropsychological evaluation in the setting of family reported cognitive impairment over about the past 3 years, around the time that her  . She was accompanied to this visit by her daughter, Gianna, who provided the majority of the history. Short-term memory impairment is her primary symptom, including disorientation to time (day of the week, president) and forgetting conversations. Longterm autobiographical memory is also impacted. She has trouble getting her thoughts together.     Ms. Gong denied problems with cognition, but frequently looked to her  "daughter for answers during the intake.     Neuropsychiatric Symptoms:  Hallucinations: Denied  Delusional/Paranoid Thinking: Denied  Irritability/Agitation: Endorsed by daughter. She can be impatient, such as quickly wanting something she asks for.   Depression/Labile Mood: Denied, she denied active SI, plan, or intent.  Anxiety: Endorsed, she is jumpy, like to a loud noise.     DAILY FUNCTIONING:  BASIC ADLS:  Feeding: independent, she mostly has a good appetite, not so much in the morning.   Dressing: Some help with shoes.   Bathing: no prompting, but she does require supervision.   Toileting: She wears pull ups    IADLS:  Support System: Son lives her. Gianna moved from [place] 4 years ago after her father became ill and now lives less than 10 minutes away. They are not comfortable leaving their mother alone for any period of time.   Appointment Management: dependent, Gianna manages.   Medication Compliance: dependent, son manages.   Financial Management: dependent, son manages.   Cooking: Son and daughter cook.   Driving: She stopped driving about 3 years ago.    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 No hearing aides.   Falls: Mostly recently about 4 months ago, with Gianna noting that her mother's feet look "heavy." Per 2023 hospital discharge summary: "placed in observation for weakness. Infectious workup unremarkable. Concern for polypharmacy with tramadol & methocarbamol which was discontinued. Pt noted to have foul-smelling urine later in the admission. No systemic symptoms. UCx with GNR's, no h/o of resistance, CTX > cefpodoxime. Emphasized conservative mgmt for chronic pain: tylenol, lidocaine, topicals, home gabapentin. Discharged to SNF."  Sleep: 10mg melatonin. She will wake up and walk around the house in the middle of the night. She will say that she wants to go see her  sister in the middle of the night. She has never " "walked out of the house.     MEDICAL HISTORY: Ms. 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    NEUROIMAGING:  3/2022 Brain MRI: "Mild generalized volume loss with several scattered patchy and confluent foci T2/FLAIR signal abnormality within the supratentorial white matter and walt while nonspecific suggest mild moderate degree of chronic microvascular ischemic change. No evidence for acute infarction. Slight prominence of the pituitary with fullness and prominent T1 hyperintensity in the neural hypophysis which may be developmental variant.  However pars intermedius cyst or underlying pituitary adenoma not excluded and further evaluation with dedicated pituitary MR imaging as warranted."    12/2023 Head CT: "Mild generalized cerebral volume loss with compensatory enlargement the ventricles and sulci.  Findings similar when compared to prior CT.  No hydrocephalus. Mild patchy hypoattenuation in the supratentorial white matter, nonspecific but most likely reflecting chronic small vessel ischemic changes. No parenchymal mass effect, hemorrhage, edema or major vascular distribution infarct.No extra-axial blood or fluid collections. No displaced calvarial fracture.  Mastoid air cells and paranasal sinuses are essentially clear."    SUBSTANCE USE: Ms. Gong  reports that she has never smoked. She has never used smokeless tobacco. She reports that she does not drink alcohol and does not use drugs.    CURRENT MEDICATIONS:    Current Outpatient Medications:     busPIRone (BUSPAR) 10 MG tablet, " TAKE 1 TABLET BY MOUTH TWICE A DAY, Disp: 180 tablet, Rfl: 3    calcium-vitamin D (OSCAL) 250 (625)-125 mg-unit per tablet, Take 1 tablet by mouth once daily., Disp: , Rfl:     cetirizine (ZYRTEC) 10 MG tablet, Take 1 tablet (10 mg total) by mouth once daily., Disp: 30 tablet, Rfl: 11    citalopram (CELEXA) 10 MG tablet, Take 1 tablet (10 mg total) by mouth once daily., Disp: 90 tablet, Rfl: 3    cyanocobalamin, vitamin B-12, 50 mcg tablet, Take 50 mcg by mouth once daily., Disp: , Rfl:     diclofenac sodium (VOLTAREN) 1 % Gel, APPLY 2 G TOPICALLY 2 (TWO) TIMES DAILY AS NEEDED. TO RIGHT ARM/ ELBOW., Disp: 100 g, Rfl: 1    gabapentin (NEURONTIN) 100 MG capsule, Take 1 capsule (100 mg total) by mouth 3 (three) times daily., Disp: 90 capsule, Rfl: 2    LIDOcaine (LIDODERM) 5 %, Place 1 patch onto the skin once daily. Remove & Discard patch within 12 hours or as directed by MD, Disp: 30 patch, Rfl: 1    lisinopriL 10 MG tablet, Take 2 tablets (20 mg total) by mouth once daily., Disp: 180 tablet, Rfl: 3    melatonin 10 mg Cap, Take 10 mg by mouth nightly., Disp: , Rfl:     multivitamin capsule, Take 1 capsule by mouth once daily., Disp: , Rfl:     omeprazole (PRILOSEC) 20 MG capsule, TAKE 1 CAPSULE (20 MG TOTAL) BY MOUTH ONCE DAILY. 90 DAY COURSE, WILL RESTART, Disp: 90 capsule, Rfl: 3    oxybutynin (DITROPAN-XL) 5 MG TR24, Take 1 tablet (5 mg total) by mouth once daily., Disp: 90 tablet, Rfl: 3    oxyCODONE-acetaminophen (PERCOCET)  mg per tablet, Take 1 tablet by mouth every 4 (four) hours as needed for Pain., Disp: 180 tablet, Rfl: 0    promethazine (PHENERGAN) 12.5 MG Tab, Take 1 tablet (12.5 mg total) by mouth every 6 (six) hours as needed (nausea)., Disp: 30 tablet, Rfl: 1     FAMILY HISTORY: family history includes Breast cancer in her mother; Cancer in her sister; Neurofibromatosis in her father and sister. No known family history of dementia.     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    Retired: She could not recall when she retired.     Relationship Status:   about 3 years ago. 2 children.     MENTAL STATUS AND OBSERVATIONS:  APPEARANCE: Appropriately dressed/groomed.   ALERTNESS/ORIENTATION: Attentive and alert.   GAIT/MOTOR: Unable to assess.   SENSORY: Corrective lenses.   SPEECH/LANGUAGE: Normal in rate, rhythm, tone, and volume. Expressive language was grossly intact. Receptive language was compromised.   STATED MOOD/AFFECT: Mood was euthymic.   INTERPERSONAL BEHAVIOR: Rapport was quickly and easily established   THOUGHT PROCESSES: Thoughts seemed logical and goal-directed.

## 2024-05-09 ENCOUNTER — PATIENT MESSAGE (OUTPATIENT)
Dept: PAIN MEDICINE | Facility: CLINIC | Age: 79
End: 2024-05-09
Payer: MEDICARE

## 2024-05-09 ENCOUNTER — OFFICE VISIT (OUTPATIENT)
Dept: NEUROLOGY | Facility: CLINIC | Age: 79
End: 2024-05-09
Payer: MEDICARE

## 2024-05-09 ENCOUNTER — TELEPHONE (OUTPATIENT)
Dept: PAIN MEDICINE | Facility: CLINIC | Age: 79
End: 2024-05-09
Payer: MEDICARE

## 2024-05-09 ENCOUNTER — PATIENT MESSAGE (OUTPATIENT)
Dept: NEUROLOGY | Facility: CLINIC | Age: 79
End: 2024-05-09

## 2024-05-09 ENCOUNTER — PATIENT MESSAGE (OUTPATIENT)
Dept: INTERNAL MEDICINE | Facility: CLINIC | Age: 79
End: 2024-05-09
Payer: MEDICARE

## 2024-05-09 ENCOUNTER — PATIENT MESSAGE (OUTPATIENT)
Dept: PALLIATIVE MEDICINE | Facility: CLINIC | Age: 79
End: 2024-05-09
Payer: MEDICARE

## 2024-05-09 DIAGNOSIS — F01.A11 MILD MIXED VASCULAR AND NEURODEGENERATIVE DEMENTIA WITH AGITATION: Primary | ICD-10-CM

## 2024-05-09 PROCEDURE — 96116 NUBHVL XM PHYS/QHP 1ST HR: CPT | Mod: HCNC,95,, | Performed by: PSYCHIATRY & NEUROLOGY

## 2024-05-09 PROCEDURE — 99499 UNLISTED E&M SERVICE: CPT | Mod: HCNC,95,, | Performed by: PSYCHIATRY & NEUROLOGY

## 2024-05-09 RX ORDER — OXYCODONE AND ACETAMINOPHEN 10; 325 MG/1; MG/1
1 TABLET ORAL EVERY 4 HOURS PRN
Qty: 180 TABLET | Refills: 0 | Status: CANCELLED | OUTPATIENT
Start: 2024-05-09

## 2024-05-09 NOTE — TELEPHONE ENCOUNTER
----- Message from Guilherme De La Fuente MD sent at 5/9/2024 12:03 PM CDT -----  Pls schedule f/u with Dr Opal BEYER

## 2024-05-14 ENCOUNTER — TELEPHONE (OUTPATIENT)
Dept: INTERNAL MEDICINE | Facility: CLINIC | Age: 79
End: 2024-05-14
Payer: MEDICARE

## 2024-05-14 ENCOUNTER — PATIENT MESSAGE (OUTPATIENT)
Dept: PALLIATIVE MEDICINE | Facility: CLINIC | Age: 79
End: 2024-05-14
Payer: MEDICARE

## 2024-05-14 ENCOUNTER — TELEPHONE (OUTPATIENT)
Dept: PALLIATIVE MEDICINE | Facility: CLINIC | Age: 79
End: 2024-05-14
Payer: MEDICARE

## 2024-05-14 ENCOUNTER — CLINICAL SUPPORT (OUTPATIENT)
Dept: INTERNAL MEDICINE | Facility: CLINIC | Age: 79
End: 2024-05-14
Payer: MEDICARE

## 2024-05-14 DIAGNOSIS — Z02.2 ENCOUNTER FOR EXAMINATION FOR ADMISSION TO NURSING HOME: Primary | ICD-10-CM

## 2024-05-14 LAB
CTP QC/QA: YES
SARS-COV-2 RDRP RESP QL NAA+PROBE: NEGATIVE

## 2024-05-14 PROCEDURE — 87635 SARS-COV-2 COVID-19 AMP PRB: CPT | Mod: QW,HCNC,S$GLB, | Performed by: INTERNAL MEDICINE

## 2024-05-14 RX ORDER — OXYCODONE AND ACETAMINOPHEN 10; 325 MG/1; MG/1
1 TABLET ORAL EVERY 4 HOURS PRN
Qty: 180 TABLET | Refills: 0 | Status: SHIPPED | OUTPATIENT
Start: 2024-05-14

## 2024-05-14 NOTE — PROGRESS NOTES
1259 Consent given. Used 2 pt identifiers before Specimen was collected for covid testing.     1310  result is Negative.

## 2024-05-14 NOTE — TELEPHONE ENCOUNTER
PT's daughter states that the nursing home is requesting a hard copy of the script for pain medication. She wants to pick the prescription up. PT's daughter name is Gianna [phone]     The person we can fax it to is Rasheeda and the fax number is [phone] with [facility].

## 2024-05-14 NOTE — TELEPHONE ENCOUNTER
"Your fax has been successfully sent to [phone] at [phone].  ------------------------------------------------------------    5/14/2024 3:51:49 PM Transmission Record          Sent to [phone] with remote ID [phone]"          Result: (0/339;0/0) Success          Page record: 1 - 3          Elapsed time: 01:30 on channel 62  "

## 2024-05-21 PROBLEM — F01.511: Status: ACTIVE | Noted: 2024-04-11

## 2024-05-21 NOTE — ASSESSMENT & PLAN NOTE
Level of Care: Will refer to social work as her daughter would likely benefit from additional information regarding dementia support services.     Neurology: Dr. Sanchez on 6/12.     Neuropsychiatric Symptoms: Irritability and sleep may be points of intervention.     Neuropsychology Follow-up: Testing on 6/19

## 2024-06-10 ENCOUNTER — PATIENT MESSAGE (OUTPATIENT)
Dept: INTERNAL MEDICINE | Facility: CLINIC | Age: 79
End: 2024-06-10
Payer: MEDICARE

## 2024-07-01 ENCOUNTER — TELEPHONE (OUTPATIENT)
Dept: NEUROLOGY | Facility: CLINIC | Age: 79
End: 2024-07-01
Payer: MEDICARE

## 2025-01-06 NOTE — TELEPHONE ENCOUNTER
Called to leave VM for patient to return office call.  1st attempt.    [Well-appearing] : well-appearing [Normocephalic] : normocephalic [No dysmorphic facial features] : no dysmorphic facial features [No ocular abnormalities] : no ocular abnormalities [Neck supple] : neck supple [Soft] : soft [No abnormal neurocutaneous stigmata or skin lesions] : no abnormal neurocutaneous stigmata or skin lesions [Straight] : straight [No deformities] : no deformities [Alert] : alert [Well related, good eye contact] : well related, good eye contact [Conversant] : conversant [Normal speech and language] : normal speech and language [Follows instructions well] : follows instructions well [VFF] : VFF [Pupils reactive to light and accommodation] : pupils reactive to light and accommodation [Full extraocular movements] : full extraocular movements [No nystagmus] : no nystagmus [Normal facial sensation to light touch] : normal facial sensation to light touch [No facial asymmetry or weakness] : no facial asymmetry or weakness [Gross hearing intact] : gross hearing intact [Equal palate elevation] : equal palate elevation [Good shoulder shrug] : good shoulder shrug [Normal tongue movement] : normal tongue movement [Midline tongue, no fasciculations] : midline tongue, no fasciculations [R handed] : R handed [Normal axial and appendicular muscle tone] : normal axial and appendicular muscle tone [Gets up on table without difficulty] : gets up on table without difficulty [No pronator drift] : no pronator drift [Normal finger tapping and fine finger movements] : normal finger tapping and fine finger movements [No abnormal involuntary movements] : no abnormal involuntary movements [5/5 strength in proximal and distal muscles of arms and legs] : 5/5 strength in proximal and distal muscles of arms and legs [Able to walk on heels] : able to walk on heels [Able to walk on toes] : able to walk on toes [2+ biceps] : 2+ biceps [Knee jerks] : knee jerks [Ankle jerks] : ankle jerks [No ankle clonus] : no ankle clonus [Good walking balance] : good walking balance [Normal gait] : normal gait [de-identified] : no resp distress, no retractions  [de-identified] : full ROM  [de-identified] : walks well [de-identified] : LT intact
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
